# Patient Record
Sex: MALE | Race: WHITE | Employment: OTHER | URBAN - NONMETROPOLITAN AREA
[De-identification: names, ages, dates, MRNs, and addresses within clinical notes are randomized per-mention and may not be internally consistent; named-entity substitution may affect disease eponyms.]

---

## 2017-01-02 DIAGNOSIS — I73.9 PVD (PERIPHERAL VASCULAR DISEASE) (H): Primary | ICD-10-CM

## 2017-01-03 NOTE — TELEPHONE ENCOUNTER
Zocor     Last Written Prescription Date: 11/20/15  Last Fill Quantity: 90, # refills: 3  Last Office Visit with [facility], [facility] or [facility] prescribing provider: 04/29/16       CHOL      120   10/13/2015  HDL       53   10/13/2015  LDL       52   10/13/2015  TRIG       73   10/13/2015  CHOLHDLRATIO      2.3   10/13/2015

## 2017-01-04 ENCOUNTER — ANTICOAGULATION THERAPY VISIT (OUTPATIENT)
Dept: ANTICOAGULATION | Facility: OTHER | Age: 77
End: 2017-01-04
Attending: FAMILY MEDICINE
Payer: COMMERCIAL

## 2017-01-04 DIAGNOSIS — I73.9 PVD (PERIPHERAL VASCULAR DISEASE) (H): ICD-10-CM

## 2017-01-04 DIAGNOSIS — Z79.01 LONG-TERM (CURRENT) USE OF ANTICOAGULANTS: Primary | ICD-10-CM

## 2017-01-04 DIAGNOSIS — I48.20 CHRONIC ATRIAL FIBRILLATION (H): ICD-10-CM

## 2017-01-04 LAB — INR POINT OF CARE: 1.8 (ref 0.86–1.14)

## 2017-01-04 PROCEDURE — 85610 PROTHROMBIN TIME: CPT | Mod: QW

## 2017-01-04 RX ORDER — WARFARIN SODIUM 5 MG/1
TABLET ORAL
Qty: 90 TABLET | Refills: 4 | COMMUNITY
Start: 2017-01-04 | End: 2017-06-19

## 2017-01-04 RX ORDER — SIMVASTATIN 10 MG
TABLET ORAL
Qty: 30 TABLET | Refills: 0 | Status: SHIPPED | OUTPATIENT
Start: 2017-01-04 | End: 2017-02-01

## 2017-01-04 NOTE — MR AVS SNAPSHOT
Rm Duarte   1/4/2017 8:30 AM   Anticoagulation Therapy Visit    Description:  76 year old male   Provider:  [facility]   Department:  [facility]           INR as of 1/4/2017     Selected INR 1.8! (1/4/2017)      Anticoagulation Summary as of 1/4/2017     INR goal 2.0-3.0   Selected INR 1.8! (1/4/2017)   Full instructions 2.5 mg on Wed, Fri; 5 mg all other days   Next INR check 2/15/2017    Indications   Chronic atrial fibrillation (H) [I48.2]  PVD (peripheral vascular disease) (H) [I73.9]  Long-term (current) use of anticoagulants [Z79.01] [Z79.01]         Your next Anticoagulation Clinic appointment(s)     Feb 15, 2017  8:30 AM   Anticoagulation Visit with [facility]AG[facility]   [facility]    [address] [facility] [address]   [phone]              January 2017 Details    Sun Mon Tue Wed Thu Fri Sat     1               2               3               4      2.5 mg   See details      5      5 mg         6      2.5 mg         7      5 mg           8      5 mg         9      5 mg         10      5 mg         11      2.5 mg         12      5 mg         13      2.5 mg         14      5 mg           15      5 mg         16      5 mg         17      5 mg         18      2.5 mg         19      5 mg         20      2.5 mg         21      5 mg           22      5 mg         23      5 mg         24      5 mg         25      2.5 mg         26      5 mg         27      2.5 mg         28      5 mg           29      5 mg         30      5 mg         31      5 mg              Date Details   01/04 This INR check               How to take your warfarin dose     To take:  2.5 mg Take 0.5 of a 5 mg tablet.    To take:  5 mg Take 1 of the 5 mg tablets.           February 2017 Details    Sun Mon Tue Wed Thu Fri Sat        1      2.5 mg         2      5 mg         3      2.5 mg         4      5 mg           5      5 mg         6      5 mg         7      5 mg         8       2.5 mg         9      5 mg         10      2.5 mg         11      5 mg           12      5 mg         13      5 mg         14      5 mg         15            16               17               18                 19               20               21               22               23               24               25                 26               27               28                    Date Details   No additional details    Date of next INR:  2/15/2017         How to take your warfarin dose     To take:  2.5 mg Take 0.5 of a 5 mg tablet.    To take:  5 mg Take 1 of the 5 mg tablets.

## 2017-01-04 NOTE — PROGRESS NOTES
ANTICOAGULATION FOLLOW-UP CLINIC VISIT    Patient Name:  Rm Duarte  Date:  1/4/2017  Contact Type:  Face to Face    SUBJECTIVE:     Patient Findings     Positives Diet Changes, No Problem Findings    Comments Eating greens routinely.  Discussed this and decided to change the overall Coumadin dose.             OBJECTIVE    INR PROTIME   Date Value Ref Range Status   01/04/2017 1.8* 0.86 - 1.14 Final       ASSESSMENT / PLAN  INR assessment SUB    Recheck INR In: 6 WEEKS    INR Location Clinic      Anticoagulation Summary as of 1/4/2017     INR goal 2.0-3.0   Selected INR 1.8! (1/4/2017)   Maintenance plan 2.5 mg (5 mg x 0.5) on Wed; 5 mg (5 mg x 1) all other days   Full instructions 2.5 mg on Wed; 5 mg all other days   Weekly total 32.5 mg   Plan last modified Zena Burden RN (1/4/2017)   Next INR check 2/15/2017   Priority INR   Target end date Indefinite    Indications   Chronic atrial fibrillation (H) [I48.2]  PVD (peripheral vascular disease) (H) [I73.9]  Long-term (current) use of anticoagulants [Z79.01] [Z79.01]         Anticoagulation Episode Summary     INR check location     Preferred lab     Send INR reminders to  ANTICOAG POOL    Comments takes Azithromycin 250mg daily long term      Anticoagulation Care Providers     Provider Role Specialty Phone number    Dominguez Maradiaga MD [facility] [phone]            See the Encounter Report to view Anticoagulation Flowsheet and Dosing Calendar (Go to Encounters tab in chart review, and find the Anticoagulation Therapy Visit)        Zena Burden RN

## 2017-01-25 DIAGNOSIS — J44.9 CHRONIC OBSTRUCTIVE PULMONARY DISEASE, UNSPECIFIED COPD TYPE (H): Primary | ICD-10-CM

## 2017-01-27 RX ORDER — IPRATROPIUM BROMIDE AND ALBUTEROL 20; 100 UG/1; UG/1
SPRAY, METERED RESPIRATORY (INHALATION)
Qty: 12 G | Refills: 0 | Status: SHIPPED | OUTPATIENT
Start: 2017-01-27 | End: 2017-12-06

## 2017-01-27 NOTE — TELEPHONE ENCOUNTER
Combivent respimat 20-100mcg/act inhaler      Last Written Prescription Date: 6-3-2016  Last Fill Quantity: not specified but pharmacy requesting 12 each,  # refills: 0   Last Office Visit with [facility], [facility] or [facility] prescribing provider: 7-6-2016

## 2017-02-01 DIAGNOSIS — I73.9 PVD (PERIPHERAL VASCULAR DISEASE) (H): Primary | ICD-10-CM

## 2017-02-01 RX ORDER — SIMVASTATIN 10 MG
TABLET ORAL
Qty: 90 TABLET | Refills: 0 | Status: SHIPPED | OUTPATIENT
Start: 2017-02-01 | End: 2017-05-15

## 2017-02-01 NOTE — TELEPHONE ENCOUNTER
Simvastatin     Last Written Prescription Date: 1/4/17---The patient is requesting a 90 day supply  Last Fill Quantity: 30, # refills: 0  Last Office Visit with [facility], [facility] or [facility] prescribing provider: 4/29/16       CHOL      120   10/13/2015  HDL       53   10/13/2015  LDL       52   10/13/2015  TRIG       73   10/13/2015  CHOLHDLRATIO      2.3   10/13/2015

## 2017-02-15 ENCOUNTER — ANTICOAGULATION THERAPY VISIT (OUTPATIENT)
Dept: ANTICOAGULATION | Facility: OTHER | Age: 77
End: 2017-02-15
Attending: FAMILY MEDICINE
Payer: COMMERCIAL

## 2017-02-15 DIAGNOSIS — I48.20 CHRONIC ATRIAL FIBRILLATION (H): ICD-10-CM

## 2017-02-15 DIAGNOSIS — I73.9 PVD (PERIPHERAL VASCULAR DISEASE) (H): ICD-10-CM

## 2017-02-15 DIAGNOSIS — Z79.01 LONG-TERM (CURRENT) USE OF ANTICOAGULANTS: ICD-10-CM

## 2017-02-15 LAB — INR POINT OF CARE: 4.9 (ref 0.86–1.14)

## 2017-02-15 PROCEDURE — 85610 PROTHROMBIN TIME: CPT | Mod: QW

## 2017-02-15 NOTE — Clinical Note
FYI - INR 4.9.  Holding 2/15; will take 2.5mg 2/16.  INR recheck 2/22/17.  States he was ill last wk; did some home remedies for his breathing that seemed to work.  Was using O2 2L/min 24/7; is able to back off this usage this wk as he feels improved.  Monitors PAO2, stated it was low, would not reveal how low it got.  Denies bleeding/bruising.

## 2017-02-15 NOTE — PROGRESS NOTES
ANTICOAGULATION FOLLOW-UP CLINIC VISIT    Patient Name:  Rm Duarte  Date:  2/15/2017  Contact Type:  Face to Face    SUBJECTIVE:     Patient Findings     Positives No Problem Findings    Comments Had been ill last wk; did not seek medical advice; did home remedies.  Was using O2 24/7; reports is able to back off the usage slightly.  States is feeling improved.  Using O2 2L/min today.  States he has been eating his usual amounts of greens.  Discussed need to be cautious with sharp objects; cautious with wt bearing - fall prevention.  Pt informed if there is any bleeding that can not be stopped with the use of cold/ice/direct pressure without peaking 10-20 mins, then go to the nearest Emergency dept or call 911.  PCP notified.           OBJECTIVE    INR Protime   Date Value Ref Range Status   02/15/2017 4.9 (A) 0.86 - 1.14 Final       ASSESSMENT / PLAN  INR assessment SUPRA    Recheck INR In: 1 WEEK    INR Location Clinic      Anticoagulation Summary as of 2/15/2017     INR goal 2.0-3.0   Today's INR 4.9!   Maintenance plan 2.5 mg (5 mg x 0.5) on Wed; 5 mg (5 mg x 1) all other days   Full instructions 2/15: Hold; 2/16: 2.5 mg; Otherwise 2.5 mg on Wed; 5 mg all other days   Weekly total 32.5 mg   Plan last modified Zena Burden RN (1/4/2017)   Next INR check 2/22/2017   Priority INR   Target end date Indefinite    Indications   Chronic atrial fibrillation (H) [I48.2]  PVD (peripheral vascular disease) (H) [I73.9]  Long-term (current) use of anticoagulants [Z79.01] [Z79.01]         Anticoagulation Episode Summary     INR check location     Preferred lab     Send INR reminders to  ANTICOAG POOL    Comments takes Azithromycin 250mg daily long term      Anticoagulation Care Providers     Provider Role Specialty Phone number    Dominguez Maradiaga MD [facility] [phone]            See the Encounter Report to view Anticoagulation Flowsheet and Dosing Calendar (Go to Encounters tab in chart  review, and find the Anticoagulation Therapy Visit)        Zena Burden RN

## 2017-02-15 NOTE — MR AVS SNAPSHOT
Abdalla RADHA Gerald   2/15/2017 8:30 AM   Anticoagulation Therapy Visit    Description:  76 year old male   Provider:  NERY ANTI COAGULATION   Department:  [facility]           INR as of 2/15/2017     Today's INR 4.9!      Anticoagulation Summary as of 2/15/2017     INR goal 2.0-3.0   Today's INR 4.9!   Full instructions 2/15: Hold; 2/16: 2.5 mg; Otherwise 2.5 mg on Wed; 5 mg all other days   Next INR check 2/22/2017    Indications   Chronic atrial fibrillation (H) [I48.2]  PVD (peripheral vascular disease) (H) [I73.9]  Long-term (current) use of anticoagulants [Z79.01] [Z79.01]         Your next Anticoagulation Clinic appointment(s)     Feb 22, 2017  8:30 AM CST   Anticoagulation Visit with NERY ANTI COAGULATION   [facility]    [address] [facility] [address]   [phone]              February 2017 Details    Sun Mon Tue Wed Thu Fri Sat        1               2               3               4                 5               6               7               8               9               10               11                 12               13               14               15      Hold   See details      16      2.5 mg         17      5 mg         18      5 mg           19      5 mg         20      5 mg         21      5 mg         22            23               24               25                 26               27               28                    Date Details   02/15 This INR check       Date of next INR:  2/22/2017         How to take your warfarin dose     To take:  2.5 mg Take 0.5 of a 5 mg tablet.    To take:  5 mg Take 1 of the 5 mg tablets.    Hold Do not take your warfarin dose. See the Details table to the right for additional instructions.

## 2017-02-22 ENCOUNTER — ANTICOAGULATION THERAPY VISIT (OUTPATIENT)
Dept: ANTICOAGULATION | Facility: OTHER | Age: 77
End: 2017-02-22
Attending: FAMILY MEDICINE
Payer: COMMERCIAL

## 2017-02-22 DIAGNOSIS — I48.20 CHRONIC ATRIAL FIBRILLATION (H): ICD-10-CM

## 2017-02-22 DIAGNOSIS — I73.9 PVD (PERIPHERAL VASCULAR DISEASE) (H): ICD-10-CM

## 2017-02-22 DIAGNOSIS — Z79.01 LONG-TERM (CURRENT) USE OF ANTICOAGULANTS: ICD-10-CM

## 2017-02-22 LAB — INR POINT OF CARE: 1.5 (ref 0.86–1.14)

## 2017-02-22 PROCEDURE — 85610 PROTHROMBIN TIME: CPT | Mod: QW

## 2017-02-22 NOTE — MR AVS SNAPSHOT
Rm Duarte   2/22/2017 8:30 AM   Anticoagulation Therapy Visit    Description:  76 year old male   Provider:  [facility] ANTI COAGULATION   Department:  [facility]           INR as of 2/22/2017     Today's INR 1.5!      Anticoagulation Summary as of 2/22/2017     INR goal 2.0-3.0   Today's INR 1.5!   Full instructions 2/22: 5 mg; Otherwise 2.5 mg on Wed; 5 mg all other days   Next INR check 4/5/2017    Indications   Chronic atrial fibrillation (H) [I48.2]  PVD (peripheral vascular disease) (H) [I73.9]  Long-term (current) use of anticoagulants [Z79.01] [Z79.01]         Your next Anticoagulation Clinic appointment(s)     Apr 05, 2017  8:30 AM CDT   Anticoagulation Visit with NERY ANTI COAGULATION   [facility] ([facility])    [address] [facility] [address]  [facility] [address]   [phone]              February 2017 Details    Sun Mon Tue Wed Thu Fri Sat        1               2               3               4                 5               6               7               8               9               10               11                 12               13               14               15               16               17               18                 19               20               21               22      5 mg   See details      23      5 mg         24      5 mg         25      5 mg           26      5 mg         27      5 mg         28      5 mg              Date Details   02/22 This INR check               How to take your warfarin dose     To take:  5 mg Take 1 of the 5 mg tablets.           March 2017 Details    Sun Mon Tue Wed Thu Fri Sat        1      2.5 mg         2      5 mg         3      5 mg         4      5 mg           5      5 mg         6      5 mg         7      5 mg         8      2.5 mg         9      5 mg         10      5 mg         11      5 mg           12      5 mg         13      5 mg         14      5 mg         15      2.5 mg         16      5 mg         17       5 mg         18      5 mg           19      5 mg         20      5 mg         21      5 mg         22      2.5 mg         23      5 mg         24      5 mg         25      5 mg           26      5 mg         27      5 mg         28      5 mg         29      2.5 mg         30      5 mg         31      5 mg           Date Details   No additional details            How to take your warfarin dose     To take:  2.5 mg Take 0.5 of a 5 mg tablet.    To take:  5 mg Take 1 of the 5 mg tablets.           April 2017 Details    Sun Mon Tue Wed Thu Fri Sat           1      5 mg           2      5 mg         3      5 mg         4      5 mg         5            6               7               8                 9               10               11               12               13               14               15                 16               17               18               19               20               21               22                 23               24               25               26               27               28               29                 30                      Date Details   No additional details    Date of next INR:  4/5/2017         How to take your warfarin dose     To take:  2.5 mg Take 0.5 of a 5 mg tablet.    To take:  5 mg Take 1 of the 5 mg tablets.

## 2017-02-22 NOTE — PROGRESS NOTES
ANTICOAGULATION FOLLOW-UP CLINIC VISIT    Patient Name:  Rm Duarte  Date:  2/22/2017  Contact Type:  Face to Face    SUBJECTIVE:     Patient Findings     Positives Change in diet/appetite, Intentional hold of therapy    Comments Intentional HOLD due to supratherapeutic INR last wk.  Pt states he remembered he ate some chocolate that now causes diarrhea and had that all day the day before.  Pt increased greens this past wk.  Pt will resume his normal eating habits.           OBJECTIVE    INR Protime   Date Value Ref Range Status   02/22/2017 1.5 (A) 0.86 - 1.14 Final       ASSESSMENT / PLAN  INR assessment SUB    Recheck INR In: 6 WEEKS    INR Location Clinic      Anticoagulation Summary as of 2/22/2017     INR goal 2.0-3.0   Today's INR 1.5!   Maintenance plan 2.5 mg (5 mg x 0.5) on Wed; 5 mg (5 mg x 1) all other days   Full instructions 2/22: 5 mg; Otherwise 2.5 mg on Wed; 5 mg all other days   Weekly total 32.5 mg   Plan last modified Zena Burden RN (1/4/2017)   Next INR check 4/5/2017   Priority INR   Target end date Indefinite    Indications   Chronic atrial fibrillation (H) [I48.2]  PVD (peripheral vascular disease) (H) [I73.9]  Long-term (current) use of anticoagulants [Z79.01] [Z79.01]         Anticoagulation Episode Summary     INR check location     Preferred lab     Send INR reminders to  ANTICOAG POOL    Comments takes Azithromycin 250mg daily long term      Anticoagulation Care Providers     Provider Role Specialty Phone number    Dominguez Maradiaga MD [facility] [phone]            See the Encounter Report to view Anticoagulation Flowsheet and Dosing Calendar (Go to Encounters tab in chart review, and find the Anticoagulation Therapy Visit)        Zena Burden RN

## 2017-04-05 ENCOUNTER — ANTICOAGULATION THERAPY VISIT (OUTPATIENT)
Dept: ANTICOAGULATION | Facility: OTHER | Age: 77
End: 2017-04-05
Attending: FAMILY MEDICINE
Payer: COMMERCIAL

## 2017-04-05 DIAGNOSIS — Z79.01 LONG-TERM (CURRENT) USE OF ANTICOAGULANTS: ICD-10-CM

## 2017-04-05 DIAGNOSIS — I48.20 CHRONIC ATRIAL FIBRILLATION (H): ICD-10-CM

## 2017-04-05 DIAGNOSIS — I73.9 PVD (PERIPHERAL VASCULAR DISEASE) (H): ICD-10-CM

## 2017-04-05 LAB — INR POINT OF CARE: 2.7 (ref 0.86–1.14)

## 2017-04-05 PROCEDURE — 85610 PROTHROMBIN TIME: CPT | Mod: QW

## 2017-05-15 DIAGNOSIS — I73.9 PVD (PERIPHERAL VASCULAR DISEASE) (H): ICD-10-CM

## 2017-05-15 NOTE — TELEPHONE ENCOUNTER
Zocor     Last Written Prescription Date: 2/1/2017  Last Fill Quantity: 90, # refills: 0  Last Office Visit with G, P or [facility] prescribing provider: 1/26/2016       Lab Results   Component Value Date    CHOL 120 10/13/2015     Lab Results   Component Value Date    HDL 53 10/13/2015     Lab Results   Component Value Date    LDL 52 10/13/2015     Lab Results   Component Value Date    TRIG 73 10/13/2015     Lab Results   Component Value Date    CHOLHDLRATIO 2.3 10/13/2015

## 2017-05-16 RX ORDER — SIMVASTATIN 10 MG
TABLET ORAL
Qty: 30 TABLET | Refills: 0 | Status: SHIPPED | OUTPATIENT
Start: 2017-05-16 | End: 2017-06-23

## 2017-05-17 ENCOUNTER — ANTICOAGULATION THERAPY VISIT (OUTPATIENT)
Dept: ANTICOAGULATION | Facility: OTHER | Age: 77
End: 2017-05-17
Attending: FAMILY MEDICINE
Payer: COMMERCIAL

## 2017-05-17 DIAGNOSIS — I73.9 PVD (PERIPHERAL VASCULAR DISEASE) (H): ICD-10-CM

## 2017-05-17 DIAGNOSIS — Z79.01 LONG-TERM (CURRENT) USE OF ANTICOAGULANTS: ICD-10-CM

## 2017-05-17 DIAGNOSIS — I48.20 CHRONIC ATRIAL FIBRILLATION (H): ICD-10-CM

## 2017-05-17 LAB — INR POINT OF CARE: 2.8 (ref 0.86–1.14)

## 2017-05-17 PROCEDURE — 85610 PROTHROMBIN TIME: CPT | Mod: QW,ZL

## 2017-05-17 NOTE — MR AVS SNAPSHOT
Rm Duarte   5/17/2017 8:45 AM   Anticoagulation Therapy Visit    Description:  76 year old male   Provider:  [facility]AG[facility]   Department:  [facility]           INR as of 5/17/2017     Today's INR 2.8      Anticoagulation Summary as of 5/17/2017     INR goal 2.0-3.0   Today's INR 2.8   Full instructions 2.5 mg on Wed; 5 mg all other days   Next INR check 6/28/2017    Indications   Chronic atrial fibrillation (H) [I48.2]  PVD (peripheral vascular disease) (H) [I73.9]  Long-term (current) use of anticoagulants [Z79.01] [Z79.01]         May 2017 Details    Sun Mon Tue Wed Thu Fri Sat      1               2               3               4               5               6                 7               8               9               10               11               12               13                 14               15               16               17      2.5 mg   See details      18      5 mg         19      5 mg         20      5 mg           21      5 mg         22      5 mg         23      5 mg         24      2.5 mg         25      5 mg         26      5 mg         27      5 mg           28      5 mg         29      5 mg         30      5 mg         31      2.5 mg             Date Details   05/17 This INR check               How to take your warfarin dose     To take:  2.5 mg Take 0.5 of a 5 mg tablet.    To take:  5 mg Take 1 of the 5 mg tablets.           June 2017 Details    Sun Mon Tue Wed Thu Fri Sat         1      5 mg         2      5 mg         3      5 mg           4      5 mg         5      5 mg         6      5 mg         7      2.5 mg         8      5 mg         9      5 mg         10      5 mg           11      5 mg         12      5 mg         13      5 mg         14      2.5 mg         15      5 mg         16      5 mg         17      5 mg           18      5 mg         19      5 mg         20      5 mg         21      2.5 mg         22      5 mg         23      5 mg          24      5 mg           25      5 mg         26      5 mg         27      5 mg         28            29               30                 Date Details   No additional details    Date of next INR:  6/28/2017         How to take your warfarin dose     To take:  2.5 mg Take 0.5 of a 5 mg tablet.    To take:  5 mg Take 1 of the 5 mg tablets.

## 2017-05-17 NOTE — PROGRESS NOTES
ANTICOAGULATION FOLLOW-UP CLINIC VISIT    Patient Name:  Rm Duarte  Date:  5/17/2017  Contact Type:  Face to Face    SUBJECTIVE:     Patient Findings     Positives No Problem Findings           OBJECTIVE    INR Protime   Date Value Ref Range Status   05/17/2017 2.8 (A) 0.86 - 1.14 Final       ASSESSMENT / PLAN  INR assessment THER    Recheck INR In: 6 WEEKS    INR Location Clinic      Anticoagulation Summary as of 5/17/2017     INR goal 2.0-3.0   Today's INR 2.8   Maintenance plan 2.5 mg (5 mg x 0.5) on Wed; 5 mg (5 mg x 1) all other days   Full instructions 2.5 mg on Wed; 5 mg all other days   Weekly total 32.5 mg   No change documented Zena Burden RN   Plan last modified Zena Burden RN (1/4/2017)   Next INR check 6/28/2017   Priority INR   Target end date Indefinite    Indications   Chronic atrial fibrillation (H) [I48.2]  PVD (peripheral vascular disease) (H) [I73.9]  Long-term (current) use of anticoagulants [Z79.01] [Z79.01]         Anticoagulation Episode Summary     INR check location     Preferred lab     Send INR reminders to  ANTICOAG POOL    Comments takes Azithromycin 250mg daily long term      Anticoagulation Care Providers     Provider Role Specialty Phone number    Dominguez Maradiaga MD [facility] [phone]            See the Encounter Report to view Anticoagulation Flowsheet and Dosing Calendar (Go to Encounters tab in chart review, and find the Anticoagulation Therapy Visit)        Zena Burden RN

## 2017-06-12 DIAGNOSIS — J43.8 OTHER EMPHYSEMA (H): Primary | ICD-10-CM

## 2017-06-12 RX ORDER — AZITHROMYCIN 250 MG/1
TABLET, FILM COATED ORAL
Qty: 30 TABLET | Refills: 5 | Status: SHIPPED | OUTPATIENT
Start: 2017-06-12 | End: 2017-12-01

## 2017-06-12 NOTE — TELEPHONE ENCOUNTER
Last office visit 04/29/16.  Azithromycin last filled by Joanie Zhu. Please associate dx and sign if appropriate.

## 2017-06-13 ENCOUNTER — ANTICOAGULATION THERAPY VISIT (OUTPATIENT)
Dept: ANTICOAGULATION | Facility: OTHER | Age: 77
End: 2017-06-13

## 2017-06-13 DIAGNOSIS — I73.9 PVD (PERIPHERAL VASCULAR DISEASE) (H): ICD-10-CM

## 2017-06-13 DIAGNOSIS — I48.20 CHRONIC ATRIAL FIBRILLATION (H): ICD-10-CM

## 2017-06-13 DIAGNOSIS — Z79.01 LONG-TERM (CURRENT) USE OF ANTICOAGULANTS: ICD-10-CM

## 2017-06-13 NOTE — PROGRESS NOTES
ANTICOAGULATION FOLLOW-UP CLINIC VISIT    Patient Name:  Rm Duarte  Date:  6/13/2017  Contact Type:  Telephone    SUBJECTIVE:     Patient Findings     Comments Call made to patient as  Note sent to warfarin clinic that patient started z marjorie. Per patient he has been on azithromycin daily for long term and this is not new.  No changes made to warfarin dosing.           OBJECTIVE    INR Protime   Date Value Ref Range Status   05/17/2017 2.8 (A) 0.86 - 1.14 Final       ASSESSMENT / PLAN  No question data found.  Anticoagulation Summary as of 6/13/2017     INR goal 2.0-3.0   Today's INR No new INR was available at the time of this encounter.   Maintenance plan 2.5 mg (5 mg x 0.5) on Wed; 5 mg (5 mg x 1) all other days   Full instructions 2.5 mg on Wed; 5 mg all other days   Weekly total 32.5 mg   No change documented Zena Haas RN   Plan last modified Zena Burden RN (1/4/2017)   Next INR check 6/28/2017   Priority INR   Target end date Indefinite    Indications   Chronic atrial fibrillation (H) [I48.2]  PVD (peripheral vascular disease) (H) [I73.9]  Long-term (current) use of anticoagulants [Z79.01] [Z79.01]         Anticoagulation Episode Summary     INR check location     Preferred lab     Send INR reminders to  ANTICOAG POOL    Comments takes Azithromycin 250mg daily long term      Anticoagulation Care Providers     Provider Role Specialty Phone number    Dominguez Maradiaga MD [facility] [phone]            See the Encounter Report to view Anticoagulation Flowsheet and Dosing Calendar (Go to Encounters tab in chart review, and find the Anticoagulation Therapy Visit)        Zena Haas RN

## 2017-06-13 NOTE — MR AVS SNAPSHOT
Rm Duarte   6/13/2017   Anticoagulation Therapy Visit    Description:  76 year old male   Provider:  Dominguez Maradiaga MD   Department:  [facility]           INR as of 6/13/2017     Today's INR No new INR was available at the time of this encounter.      Anticoagulation Summary as of 6/13/2017     INR goal 2.0-3.0   Today's INR No new INR was available at the time of this encounter.   Full instructions 2.5 mg on Wed; 5 mg all other days   Next INR check 6/28/2017    Indications   Chronic atrial fibrillation (H) [I48.2]  PVD (peripheral vascular disease) (H) [I73.9]  Long-term (current) use of anticoagulants [Z79.01] [Z79.01]         June 2017 Details    Sun Mon Tue Wed Thu Fri Sat         1               2               3                 4               5               6               7               8               9               10                 11               12               13      5 mg   See details      14      2.5 mg         15      5 mg         16      5 mg         17      5 mg           18      5 mg         19      5 mg         20      5 mg         21      2.5 mg         22      5 mg         23      5 mg         24      5 mg           25      5 mg         26      5 mg         27      5 mg         28            29               30                 Date Details   06/13 This INR check       Date of next INR:  6/28/2017         How to take your warfarin dose     To take:  2.5 mg Take 0.5 of a 5 mg tablet.    To take:  5 mg Take 1 of the 5 mg tablets.

## 2017-06-14 DIAGNOSIS — I48.20 CHRONIC ATRIAL FIBRILLATION (H): ICD-10-CM

## 2017-06-15 ENCOUNTER — ANTICOAGULATION THERAPY VISIT (OUTPATIENT)
Dept: ANTICOAGULATION | Facility: OTHER | Age: 77
End: 2017-06-15

## 2017-06-15 ENCOUNTER — OFFICE VISIT (OUTPATIENT)
Dept: FAMILY MEDICINE | Facility: OTHER | Age: 77
End: 2017-06-15
Attending: FAMILY MEDICINE
Payer: COMMERCIAL

## 2017-06-15 ENCOUNTER — TELEPHONE (OUTPATIENT)
Dept: FAMILY MEDICINE | Facility: OTHER | Age: 77
End: 2017-06-15

## 2017-06-15 VITALS
OXYGEN SATURATION: 93 % | TEMPERATURE: 98.5 F | BODY MASS INDEX: 26.88 KG/M2 | WEIGHT: 192 LBS | DIASTOLIC BLOOD PRESSURE: 52 MMHG | SYSTOLIC BLOOD PRESSURE: 92 MMHG | RESPIRATION RATE: 24 BRPM | HEART RATE: 75 BPM | HEIGHT: 71 IN

## 2017-06-15 DIAGNOSIS — I73.9 PVD (PERIPHERAL VASCULAR DISEASE) (H): ICD-10-CM

## 2017-06-15 DIAGNOSIS — I48.20 CHRONIC ATRIAL FIBRILLATION (H): ICD-10-CM

## 2017-06-15 DIAGNOSIS — Z79.01 LONG-TERM (CURRENT) USE OF ANTICOAGULANTS: ICD-10-CM

## 2017-06-15 DIAGNOSIS — J44.1 COPD EXACERBATION (H): Primary | ICD-10-CM

## 2017-06-15 PROCEDURE — 96372 THER/PROPH/DIAG INJ SC/IM: CPT | Performed by: FAMILY MEDICINE

## 2017-06-15 PROCEDURE — 99212 OFFICE O/P EST SF 10 MIN: CPT

## 2017-06-15 PROCEDURE — 99213 OFFICE O/P EST LOW 20 MIN: CPT | Performed by: FAMILY MEDICINE

## 2017-06-15 RX ORDER — SULFAMETHOXAZOLE/TRIMETHOPRIM 800-160 MG
1 TABLET ORAL 2 TIMES DAILY
Qty: 28 TABLET | Refills: 0 | Status: ON HOLD | OUTPATIENT
Start: 2017-06-15 | End: 2017-06-21

## 2017-06-15 RX ORDER — METHYLPREDNISOLONE SODIUM SUCCINATE 125 MG/2ML
125 INJECTION, POWDER, LYOPHILIZED, FOR SOLUTION INTRAMUSCULAR; INTRAVENOUS ONCE
Qty: 2 ML | Refills: 0 | OUTPATIENT
Start: 2017-06-15 | End: 2017-06-15

## 2017-06-15 RX ORDER — PREDNISONE 20 MG/1
TABLET ORAL
Qty: 20 TABLET | Refills: 0 | Status: ON HOLD | OUTPATIENT
Start: 2017-06-15 | End: 2017-06-21

## 2017-06-15 ASSESSMENT — PAIN SCALES - GENERAL: PAINLEVEL: NO PAIN (0)

## 2017-06-15 NOTE — TELEPHONE ENCOUNTER
Coumadin 5 MG    Last Written Prescription Date: 01/04/17  Last Fill Qty: 90, # refills: 4  Last Office Visit with G, P or [facility] prescribing provider: 06/15/17       Date and Result of Last PT/INR:   Lab Results   Component Value Date    INR 2.8 05/17/2017    INR 2.7 04/05/2017    INR 8.49 09/07/2016    INR 5.12 08/26/2015

## 2017-06-15 NOTE — PROGRESS NOTES
Prior to injection verified patient identity using patient's name and date of birth.  Per orders of Dr. Maradiaga, injection of Solumedrol 125 mg given by Joann Walls. Patient instructed to remain in clinic for 20 minutes afterwards, and to report any adverse reaction to me immediately.

## 2017-06-15 NOTE — PROGRESS NOTES
ANTICOAGULATION FOLLOW-UP CLINIC VISIT    Patient Name:  Rm Duarte  Date:  6/15/2017  Contact Type:  Telephone    SUBJECTIVE:     Patient Findings     Positives Antibiotic use or infection (Bactrim and Prednisone burst)           OBJECTIVE    INR Protime   Date Value Ref Range Status   05/17/2017 2.8 (A) 0.86 - 1.14 Final       ASSESSMENT / PLAN  INR assessment THER NO INR THIS DATE   Recheck INR In: 4 DAYS    INR Location Clinic      Anticoagulation Summary as of 6/15/2017     INR goal 2.0-3.0   Today's INR No new INR was available at the time of this encounter.   Maintenance plan 2.5 mg (5 mg x 0.5) on Wed; 5 mg (5 mg x 1) all other days   Full instructions 6/15: 2.5 mg; 6/16: 2.5 mg; 6/17: 2.5 mg; 6/18: 2.5 mg; Otherwise 2.5 mg on Wed; 5 mg all other days   Weekly total 32.5 mg   Plan last modified Zena Burden RN (1/4/2017)   Next INR check 6/19/2017   Priority INR   Target end date Indefinite    Indications   Chronic atrial fibrillation (H) [I48.2]  PVD (peripheral vascular disease) (H) [I73.9]  Long-term (current) use of anticoagulants [Z79.01] [Z79.01]         Anticoagulation Episode Summary     INR check location     Preferred lab     Send INR reminders to  ANTICOAG POOL    Comments takes Azithromycin 250mg daily long term      Anticoagulation Care Providers     Provider Role Specialty Phone number    Dominguez Maradiaga MD [facility] [phone]            See the Encounter Report to view Anticoagulation Flowsheet and Dosing Calendar (Go to Encounters tab in chart review, and find the Anticoagulation Therapy Visit)    INR dosing and INR recheck date discussed via telephone this date.  Pt is on Bactrim and Prednisone burst.    LAZ DAVID RN

## 2017-06-15 NOTE — MR AVS SNAPSHOT
"              After Visit Summary   6/15/2017    Rm Duarte    MRN: [id number]           Patient Information     Date Of Birth          1940        Visit Information        Provider Department      6/15/2017 11:15 AM Dominguez Maradiaga MD [facility]        Today's Diagnoses     COPD exacerbation (H)    -  1      Care Instructions    F/u with ongoing concerns.           Follow-ups after your visit        Who to contact     If you have questions or need follow up information about today's clinic visit or your schedule please contact [facility] directly at [phone].  Normal or non-critical lab and imaging results will be communicated to you by [employer], letter or phone within 4 business days after the clinic has received the results. If you do not hear from us within 7 days, please contact the clinic through [employer] or phone. If you have a critical or abnormal lab result, we will notify you by phone as soon as possible.  Submit refill requests through [employer] or call your pharmacy and they will forward the refill request to us. Please allow 3 business days for your refill to be completed.          Additional Information About Your Visit        MyChart Information     [employer] lets you send messages to your doctor, view your test results, renew your prescriptions, schedule appointments and more. To sign up, go to [email or web address] . Click on \"Log in\" on the left side of the screen, which will take you to the Welcome page. Then click on \"Sign up Now\" on the right side of the page.     You will be asked to enter the access code listed below, as well as some personal information. Please follow the directions to create your username and password.     Your access code is: [email or web address]  Expires: 2017 12:34 PM     Your access code will  in 90 days. If you need help or a new code, please call your [facility] or [phone].        Care EveryWhere ID     This is " "your Care EveryWhere ID. This could be used by other organizations to access your [place] medical records  [id number]        Your Vitals Were     Pulse Temperature Respirations Height Pulse Oximetry BMI (Body Mass Index)    75 98.5  F (36.9  C) (Tympanic) 24 5' 11\" (1.803 m) 93% 26.78 kg/m2       Blood Pressure from Last 3 Encounters:   06/15/17 92/52   07/06/16 112/60   06/22/16 106/60    Weight from Last 3 Encounters:   06/15/17 192 lb (87.1 kg)   04/29/16 197 lb (89.4 kg)   03/07/16 196 lb (88.9 kg)              We Performed the Following     INJECTION INTRAMUSCULAR OR SUB-Q     METHYLPRED 125 MG SOD INJ          Today's Medication Changes          These changes are accurate as of: 6/15/17 12:34 PM.  If you have any questions, ask your nurse or doctor.               Start taking these medicines.        Dose/Directions    methylPREDNISolone sodium succinate 125 mg/2 mL injection   Commonly known as:  solu-MEDROL   Used for:  COPD exacerbation (H)   Started by:  Dominguez Maradiaga MD        Dose:  125 mg   Inject 2 mLs (125 mg) into the muscle once for 1 dose   Quantity:  2 mL   Refills:  0       predniSONE 20 MG tablet   Commonly known as:  DELTASONE   Used for:  COPD exacerbation (H)   Started by:  Dominguez Maradiaga MD        Take 3 tabs (60 mg) by mouth daily x 3 days, 2 tabs (40 mg) daily x 3 days, 1 tab (20 mg) daily x 3 days, then 1/2 tab (10 mg) x 3 days.   Quantity:  20 tablet   Refills:  0       sulfamethoxazole-trimethoprim 800-160 MG per tablet   Commonly known as:  BACTRIM DS/SEPTRA DS   Used for:  COPD exacerbation (H)   Started by:  Dominguez Maradiaga MD        Dose:  1 tablet   Take 1 tablet by mouth 2 times daily   Quantity:  28 tablet   Refills:  0            Where to get your medicines      These medications were sent to [facility] [address] [id number]  [id number] [id number]     Phone:  [phone]     predniSONE 20 MG tablet    sulfamethoxazole-trimethoprim " 800-160 MG per tablet         Some of these will need a paper prescription and others can be bought over the counter.  Ask your nurse if you have questions.     You don't need a prescription for these medications     methylPREDNISolone sodium succinate 125 mg/2 mL injection                Primary Care Provider Office Phone # Fax #    Dominguez Maradiaga [phone]       [facility] [address] [place] [facility] [address]        Thank you!     Thank you for choosing [facility]  for your care. Our goal is always to provide you with excellent care. Hearing back from our patients is one way we can continue to improve our services. Please take a few minutes to complete the written survey that you may receive in the mail after your visit with us. Thank you!             Your Updated Medication List - Protect others around you: Learn how to safely use, store and throw away your medicines at www.disposemymeds.org.          This list is accurate as of: 6/15/17 12:34 PM.  Always use your most recent med list.                   Brand Name Dispense Instructions for use    ASPIRIN LOW DOSE 81 MG tablet   Generic drug:  aspirin      Take 1 tablet by mouth daily.       azithromycin 250 MG tablet    ZITHROMAX    30 tablet    Take one tablet daily.       budesonide Powd      1 puff 2 times daily Inhales 0.25mcg BID       CENTRUM SILVER per tablet      Take 1 tablet by mouth daily.       cilostazol 100 MG tablet    PLETAL    180 tablet    TAKE ONE TABLET BY MOUTH TWICE DAILY       IMODIUM A-D 2 MG tablet   Generic drug:  loperamide      Take 2 mg by mouth 4 times daily as needed. Take 2 tablet by oral route after 1st loose stool and 1 tablet after each subsequent bowel movement; do not exceed 16 mg in 24 hrs. As needed.       * ipratropium - albuterol 0.5 mg/2.5 mg/3 mL 0.5-2.5 (3) MG/3ML neb solution    DUONEB    360 vial    USE ONE AMPULE IN NEBULIZER EVERY 6 HOURS AS NEEDED FOR SHORTNESS OF  BREATH /DYSPNEA  OR  WHEEZING       * COMBIVENT RESPIMAT  MCG/ACT inhaler   Generic drug:  Ipratropium-Albuterol     12 g    INHALE ONE PUFF BY MOUTH 4 TIMES DAILY *MAX 6 DOSES PER DAY*       methylPREDNISolone sodium succinate 125 mg/2 mL injection    solu-MEDROL    2 mL    Inject 2 mLs (125 mg) into the muscle once for 1 dose       PERFOROMIST 20 MCG/2ML neb solution   Generic drug:  formoterol     360 mL    USE ONE  IN NEBULIZER EVERY 12 HOURS       predniSONE 20 MG tablet    DELTASONE    20 tablet    Take 3 tabs (60 mg) by mouth daily x 3 days, 2 tabs (40 mg) daily x 3 days, 1 tab (20 mg) daily x 3 days, then 1/2 tab (10 mg) x 3 days.       simvastatin 10 MG tablet    ZOCOR    30 tablet    TAKE ONE TABLET BY MOUTH AT BEDTIME       sulfamethoxazole-trimethoprim 800-160 MG per tablet    BACTRIM DS/SEPTRA DS    28 tablet    Take 1 tablet by mouth 2 times daily       tamsulosin 0.4 MG capsule    FLOMAX    30 capsule    Take 1 capsule (0.4 mg) by mouth daily       warfarin 5 MG tablet    COUMADIN    90 tablet    Take 2.5mg (1/2 pill) Wed; 5mg (1 pill) all other days or as directed by [facility]       * Notice:  This list has 2 medication(s) that are the same as other medications prescribed for you. Read the directions carefully, and ask your doctor or other care provider to review them with you.

## 2017-06-15 NOTE — NURSING NOTE
"Chief Complaint   Patient presents with     Shortness of Breath     Pt is in for shortness of breath, cough and chest congestion. Pt is on 2 LPM of O2.       Initial BP 92/52 (BP Location: Right arm, Patient Position: Chair, Cuff Size: Adult Large)  Pulse 75  Temp 98.5  F (36.9  C) (Tympanic)  Resp 24  Ht 5' 11\" (1.803 m)  Wt 192 lb (87.1 kg)  SpO2 93%  BMI 26.78 kg/m2 Estimated body mass index is 26.78 kg/(m^2) as calculated from the following:    Height as of this encounter: 5' 11\" (1.803 m).    Weight as of this encounter: 192 lb (87.1 kg).  Medication Reconciliation: complete   Joann Walls    "

## 2017-06-15 NOTE — PROGRESS NOTES
Rm Duarte    Laura 15, 2017    Chief Complaint   Patient presents with     Shortness of Breath     Pt is in for shortness of breath, cough and chest congestion. Pt is on 2 LPM of O2.       SUBJECTIVE:  Here for copd exaccerbation.  This happens from time to time.  He has purulent sputum and increased sob.  He has been on prophylaxis (azithro) and this has decreased these events.  He would like to get meds to try and avoid the hospital.  He has a very good understanding about the disease.  See below.      Past Medical History:   Diagnosis Date     Atrial fibrillation (H) 3/23/2012     COPD 5/16/2011     PVD (peripheral vascular disease) (H)        Past Surgical History:   Procedure Laterality Date     COPD:FEV1-0.74/FVC-3.35 22%, DLCO-28%  3/1/2010    Severe COPD; 4/23/2008 PFT's done : 0.96/3.81 25% Fev1, DLCO 45%.  1991 were 4.31/6.20!!!!!!!??     ECHO: TDS, EF~50%, abnl septum, o/w NORMAL  5/20/2011     PAD: CTA> occlusion of L mid SFA with reconstitution  12/2/2010     S/P colonoscopy: tubular adenoma & tics  5/4/2010    Repeat in 2014; Dr Graham     Tobacco Abuse Ongoing         Current Outpatient Prescriptions   Medication Sig Dispense Refill     sulfamethoxazole-trimethoprim (BACTRIM DS/SEPTRA DS) 800-160 MG per tablet Take 1 tablet by mouth 2 times daily 28 tablet 0     predniSONE (DELTASONE) 20 MG tablet Take 3 tabs (60 mg) by mouth daily x 3 days, 2 tabs (40 mg) daily x 3 days, 1 tab (20 mg) daily x 3 days, then 1/2 tab (10 mg) x 3 days. 20 tablet 0     methylPREDNISolone sodium succinate (SOLU-MEDROL) 125 mg/2 mL injection Inject 2 mLs (125 mg) into the muscle once for 1 dose 2 mL 0     azithromycin (ZITHROMAX) 250 MG tablet Take one tablet daily. 30 tablet 5     simvastatin (ZOCOR) 10 MG tablet TAKE ONE TABLET BY MOUTH AT BEDTIME 30 tablet 0     cilostazol (PLETAL) 100 MG tablet TAKE ONE TABLET BY MOUTH TWICE DAILY 180 tablet 3     COMBIVENT RESPIMAT  MCG/ACT inhaler INHALE ONE PUFF BY MOUTH 4  TIMES DAILY *MAX 6 DOSES PER DAY* 12 g 0     warfarin (COUMADIN) 5 MG tablet Take 2.5mg (1/2 pill) Wed; 5mg (1 pill) all other days or as directed by [facility] 90 tablet 4     ipratropium - albuterol 0.5 mg/2.5 mg/3 mL (DUONEB) 0.5-2.5 (3) MG/3ML nebulization USE ONE AMPULE IN NEBULIZER EVERY 6 HOURS AS NEEDED FOR SHORTNESS OF BREATH /DYSPNEA  OR  WHEEZING 360 vial 3     PERFOROMIST 20 MCG/2ML nebulizer solution USE ONE  IN NEBULIZER EVERY 12 HOURS 360 mL 3     tamsulosin (FLOMAX) 0.4 MG 24 hr capsule Take 1 capsule (0.4 mg) by mouth daily 30 capsule 1     budesonide POWD 1 puff 2 times daily Inhales 0.25mcg BID       Multiple Vitamins-Minerals (CENTRUM SILVER) per tablet Take 1 tablet by mouth daily.       aspirin (ASPIRIN LOW DOSE) 81 MG tablet Take 1 tablet by mouth daily.       loperamide (IMODIUM A-D) 2 MG tablet Take 2 mg by mouth 4 times daily as needed. Take 2 tablet by oral route after 1st loose stool and 1 tablet after each subsequent bowel movement; do not exceed 16 mg in 24 hrs. As needed.         No Known Allergies    Family History   Problem Relation Age of Onset     Other - See Comments Mother      AAA, cause of death     Other - See Comments Other 69     AAA, family hx     Chronic Obstructive Pulmonary Disease Brother        Social History     Social History     Marital status: Single     Spouse name: N/A     Number of children: N/A     Years of education: N/A     Occupational History      Retired     /[employer]     Social History Main Topics     Smoking status: Former Smoker     Types: Cigarettes     Smokeless tobacco: Never Used      Comment: Tried to quit; Quit 2011     Alcohol use 0.0 oz/week     0 Standard drinks or equivalent per week      Comment: Beer; rarely     Drug use: Not on file     Sexual activity: Not on file     Other Topics Concern     Blood Transfusions Yes     Caffeine Concern No     Parent/Sibling W/ Cabg, Mi Or Angioplasty Before 65f 55m? No      Social History Narrative       5 point ROS negative except as noted above in HPI, including Gen., Resp., CV, GI &  system review.     OBJECTIVE:  B/P: 92/52, T: 98.5, P: 75, R: 24    GENERAL APPEARANCE: Alert, no acute distress  CV: regular rate and rhythm, no murmur, rub or gallop  RESP: decreased breath sounds with some wheeze at the base bilaterally.  No crackles.   ABDOMEN: normal bowel sounds, soft, nontender, no hepatosplenomegaly or other masses  SKIN: no suspicious lesions or rashes to visualized skin  NEURO: Alert, oriented x 3, speech and mentation normal    ASSESSMENT and PLAN:  (J44.1) COPD exacerbation (H)  (primary encounter diagnosis)  Comment: discussed.   Plan: sulfamethoxazole-trimethoprim (BACTRIM         DS/SEPTRA DS) 800-160 MG per tablet, predniSONE        (DELTASONE) 20 MG tablet, METHYLPRED 125 MG SOD        INJ, INJECTION INTRAMUSCULAR OR SUB-Q,         methylPREDNISolone sodium succinate         (SOLU-MEDROL) 125 mg/2 mL injection        Add the bactrim.  Solumedrol, and oral prednisone burst and taper.  Didn't do extensive workup as would not have changed management.  F/u with any worsening for further workup.  He is well educated on this.

## 2017-06-15 NOTE — MR AVS SNAPSHOT
Rm Duarte   6/15/2017   Anticoagulation Therapy Visit    Description:  76 year old male   Provider:  Dominguez Maradiaga MD   Department:  Hc Anti Coagulation           INR as of 6/15/2017     Today's INR No new INR was available at the time of this encounter.      Anticoagulation Summary as of 6/15/2017     INR goal 2.0-3.0   Today's INR No new INR was available at the time of this encounter.   Full instructions 6/15: 2.5 mg; 6/16: 2.5 mg; 6/17: 2.5 mg; 6/18: 2.5 mg; Otherwise 2.5 mg on Wed; 5 mg all other days   Next INR check 6/19/2017    Indications   Chronic atrial fibrillation (H) [I48.2]  PVD (peripheral vascular disease) (H) [I73.9]  Long-term (current) use of anticoagulants [Z79.01] [Z79.01]         June 2017 Details    Sun Mon Tue Wed Thu Fri Sat         1               2               3                 4               5               6               7               8               9               10                 11               12               13               14               15      2.5 mg   See details      16      2.5 mg         17      2.5 mg           18      2.5 mg         19            20               21               22               23               24                 25               26               27               28               29               30                 Date Details   06/15 This INR check       Date of next INR:  6/19/2017         How to take your warfarin dose     To take:  2.5 mg Take 0.5 of a 5 mg tablet.    To take:  5 mg Take 1 of the 5 mg tablets.

## 2017-06-15 NOTE — TELEPHONE ENCOUNTER
Pt calls he thought he was supposed to get 3 prescriptions at visit today. I spoke to Dr Maradiaga and was told the 3rd medication was the Solumedrol shot he was given in the clinic. I called the pt and notified.

## 2017-06-19 ENCOUNTER — TELEPHONE (OUTPATIENT)
Dept: FAMILY MEDICINE | Facility: OTHER | Age: 77
End: 2017-06-19

## 2017-06-19 DIAGNOSIS — I48.20 CHRONIC ATRIAL FIBRILLATION (H): ICD-10-CM

## 2017-06-19 RX ORDER — WARFARIN SODIUM 5 MG/1
TABLET ORAL
Qty: 90 TABLET | Refills: 4 | Status: SHIPPED | OUTPATIENT
Start: 2017-06-19 | End: 2017-11-01

## 2017-06-19 RX ORDER — WARFARIN SODIUM 5 MG/1
TABLET ORAL
Qty: 90 TABLET | Refills: 0 | Status: SHIPPED | OUTPATIENT
Start: 2017-06-19 | End: 2017-07-13

## 2017-06-19 NOTE — TELEPHONE ENCOUNTER
Reason for call:  Medication    1. Medication Name? Warfarin  2. Is this request for a refill? Yes  3. What Pharmacy do you use? [facility]  4. Have you contacted your pharmacy? Yes    5. If yes, when?  (Please note that the turn-around-time for prescriptions is 72 business hours; I am sending your request at this time. SEND TO  [facility]  )  Description: Pt stated prescription was called in on Wed. Was very upset and stated he is out of med and needs filled today. Advised pt we did not get request from pharmacy and he stated that they did call this in to us and we need to call them and get it taken care of. If you have any questions or concern please call pt back at [phone]  Was an appointment offered for this a call? No   Preferred method for responding to this messageTelephone Call  If we cannot reach you directly, may we leave a detailed response at the number you provided? Yes  Can this message wait until your PCP/Provider returns if not available today? No, PCP out

## 2017-06-19 NOTE — TELEPHONE ENCOUNTER
Last [facility] office visit states office visit 6/19/17.  Please see below.  Medication pended.  Please advise.  Thanks

## 2017-06-20 ENCOUNTER — HOSPITAL ENCOUNTER (OUTPATIENT)
Facility: HOSPITAL | Age: 77
Setting detail: OBSERVATION
Discharge: HOME | End: 2017-06-21
Attending: EMERGENCY MEDICINE | Admitting: INTERNAL MEDICINE
Payer: COMMERCIAL

## 2017-06-20 ENCOUNTER — ANTICOAGULATION THERAPY VISIT (OUTPATIENT)
Dept: ANTICOAGULATION | Facility: OTHER | Age: 77
End: 2017-06-20
Attending: FAMILY MEDICINE
Payer: COMMERCIAL

## 2017-06-20 DIAGNOSIS — J44.1 COPD EXACERBATION (H): ICD-10-CM

## 2017-06-20 DIAGNOSIS — I48.20 CHRONIC ATRIAL FIBRILLATION (H): ICD-10-CM

## 2017-06-20 DIAGNOSIS — I73.9 PVD (PERIPHERAL VASCULAR DISEASE) (H): ICD-10-CM

## 2017-06-20 DIAGNOSIS — J44.1 OBSTRUCTIVE CHRONIC BRONCHITIS WITH EXACERBATION (H): Primary | ICD-10-CM

## 2017-06-20 DIAGNOSIS — Z79.01 LONG-TERM (CURRENT) USE OF ANTICOAGULANTS: ICD-10-CM

## 2017-06-20 DIAGNOSIS — J43.8 OTHER EMPHYSEMA (H): ICD-10-CM

## 2017-06-20 LAB
ALBUMIN SERPL-MCNC: 3.1 G/DL (ref 3.4–5)
ALP SERPL-CCNC: 78 U/L (ref 40–150)
ALT SERPL W P-5'-P-CCNC: 31 U/L (ref 0–70)
ANION GAP SERPL CALCULATED.3IONS-SCNC: 5 MMOL/L (ref 3–14)
AST SERPL W P-5'-P-CCNC: 18 U/L (ref 0–45)
BASOPHILS # BLD AUTO: 0 10E9/L (ref 0–0.2)
BASOPHILS NFR BLD AUTO: 0.1 %
BILIRUB SERPL-MCNC: 0.3 MG/DL (ref 0.2–1.3)
BUN SERPL-MCNC: 24 MG/DL (ref 7–30)
CALCIUM SERPL-MCNC: 8.3 MG/DL (ref 8.5–10.1)
CHLORIDE SERPL-SCNC: 103 MMOL/L (ref 94–109)
CO2 SERPL-SCNC: 29 MMOL/L (ref 20–32)
CREAT SERPL-MCNC: 1.12 MG/DL (ref 0.66–1.25)
CRP SERPL-MCNC: <2.9 MG/L (ref 0–8)
D DIMER PPP DDU-MCNC: <200 NG/ML D-DU (ref 0–300)
DIFFERENTIAL METHOD BLD: ABNORMAL
EOSINOPHIL # BLD AUTO: 0.1 10E9/L (ref 0–0.7)
EOSINOPHIL NFR BLD AUTO: 1.2 %
ERYTHROCYTE [DISTWIDTH] IN BLOOD BY AUTOMATED COUNT: 13.3 % (ref 10–15)
GFR SERPL CREATININE-BSD FRML MDRD: 64 ML/MIN/1.7M2
GLUCOSE SERPL-MCNC: 105 MG/DL (ref 70–99)
HCT VFR BLD AUTO: 43.5 % (ref 40–53)
HGB BLD-MCNC: 14.1 G/DL (ref 13.3–17.7)
IMM GRANULOCYTES # BLD: 0 10E9/L (ref 0–0.4)
IMM GRANULOCYTES NFR BLD: 0.4 %
INR POINT OF CARE: 2.2 (ref 0.86–1.14)
LYMPHOCYTES # BLD AUTO: 0.5 10E9/L (ref 0.8–5.3)
LYMPHOCYTES NFR BLD AUTO: 5.3 %
MCH RBC QN AUTO: 32.5 PG (ref 26.5–33)
MCHC RBC AUTO-ENTMCNC: 32.4 G/DL (ref 31.5–36.5)
MCV RBC AUTO: 100 FL (ref 78–100)
MONOCYTES # BLD AUTO: 0.5 10E9/L (ref 0–1.3)
MONOCYTES NFR BLD AUTO: 5.6 %
NEUTROPHILS # BLD AUTO: 7.4 10E9/L (ref 1.6–8.3)
NEUTROPHILS NFR BLD AUTO: 87.4 %
NRBC # BLD AUTO: 0 10*3/UL
NRBC BLD AUTO-RTO: 0 /100
NT-PROBNP SERPL-MCNC: 162 PG/ML (ref 0–900)
PLATELET # BLD AUTO: 308 10E9/L (ref 150–450)
POTASSIUM SERPL-SCNC: 4.5 MMOL/L (ref 3.4–5.3)
PROT SERPL-MCNC: 7.3 G/DL (ref 6.8–8.8)
RBC # BLD AUTO: 4.34 10E12/L (ref 4.4–5.9)
SODIUM SERPL-SCNC: 137 MMOL/L (ref 133–144)
TROPONIN I SERPL-MCNC: NORMAL UG/L (ref 0–0.04)
WBC # BLD AUTO: 8.5 10E9/L (ref 4–11)

## 2017-06-20 PROCEDURE — 25000132 ZZH RX MED GY IP 250 OP 250 PS 637: Performed by: INTERNAL MEDICINE

## 2017-06-20 PROCEDURE — 94640 AIRWAY INHALATION TREATMENT: CPT

## 2017-06-20 PROCEDURE — 83880 ASSAY OF NATRIURETIC PEPTIDE: CPT | Performed by: EMERGENCY MEDICINE

## 2017-06-20 PROCEDURE — 80053 COMPREHEN METABOLIC PANEL: CPT | Performed by: EMERGENCY MEDICINE

## 2017-06-20 PROCEDURE — 99220 ZZC INITIAL OBSERVATION CARE,LEVL III: CPT | Performed by: NURSE PRACTITIONER

## 2017-06-20 PROCEDURE — 25000125 ZZHC RX 250: Performed by: FAMILY MEDICINE

## 2017-06-20 PROCEDURE — 25000132 ZZH RX MED GY IP 250 OP 250 PS 637: Performed by: NURSE PRACTITIONER

## 2017-06-20 PROCEDURE — 94640 AIRWAY INHALATION TREATMENT: CPT | Mod: 76

## 2017-06-20 PROCEDURE — 40000275 ZZH STATISTIC RCP TIME EA 10 MIN

## 2017-06-20 PROCEDURE — 96374 THER/PROPH/DIAG INJ IV PUSH: CPT

## 2017-06-20 PROCEDURE — 40000786 ZZHCL STATISTIC ACTIVE MRSA SURVEILLANCE CULTURE: Performed by: INTERNAL MEDICINE

## 2017-06-20 PROCEDURE — 25000128 H RX IP 250 OP 636: Performed by: EMERGENCY MEDICINE

## 2017-06-20 PROCEDURE — 86140 C-REACTIVE PROTEIN: CPT | Performed by: EMERGENCY MEDICINE

## 2017-06-20 PROCEDURE — 85025 COMPLETE CBC W/AUTO DIFF WBC: CPT | Performed by: EMERGENCY MEDICINE

## 2017-06-20 PROCEDURE — 99285 EMERGENCY DEPT VISIT HI MDM: CPT | Mod: 25

## 2017-06-20 PROCEDURE — 85379 FIBRIN DEGRADATION QUANT: CPT | Performed by: EMERGENCY MEDICINE

## 2017-06-20 PROCEDURE — 71020 ZZHC CHEST TWO VIEWS, FRONT/LAT: CPT | Mod: TC

## 2017-06-20 PROCEDURE — 93005 ELECTROCARDIOGRAM TRACING: CPT

## 2017-06-20 PROCEDURE — 99285 EMERGENCY DEPT VISIT HI MDM: CPT | Performed by: EMERGENCY MEDICINE

## 2017-06-20 PROCEDURE — G0378 HOSPITAL OBSERVATION PER HR: HCPCS

## 2017-06-20 PROCEDURE — 84484 ASSAY OF TROPONIN QUANT: CPT | Performed by: EMERGENCY MEDICINE

## 2017-06-20 PROCEDURE — 25000125 ZZHC RX 250: Performed by: NURSE PRACTITIONER

## 2017-06-20 PROCEDURE — 93010 ELECTROCARDIOGRAM REPORT: CPT | Performed by: INTERNAL MEDICINE

## 2017-06-20 PROCEDURE — 25000125 ZZHC RX 250: Performed by: EMERGENCY MEDICINE

## 2017-06-20 RX ORDER — ACETAMINOPHEN 325 MG/1
650 TABLET ORAL EVERY 4 HOURS PRN
Status: DISCONTINUED | OUTPATIENT
Start: 2017-06-20 | End: 2017-06-21 | Stop reason: HOSPADM

## 2017-06-20 RX ORDER — NALOXONE HYDROCHLORIDE 0.4 MG/ML
.1-.4 INJECTION, SOLUTION INTRAMUSCULAR; INTRAVENOUS; SUBCUTANEOUS
Status: DISCONTINUED | OUTPATIENT
Start: 2017-06-20 | End: 2017-06-21 | Stop reason: HOSPADM

## 2017-06-20 RX ORDER — IPRATROPIUM BROMIDE AND ALBUTEROL SULFATE 2.5; .5 MG/3ML; MG/3ML
3 SOLUTION RESPIRATORY (INHALATION) EVERY 4 HOURS PRN
Status: DISCONTINUED | OUTPATIENT
Start: 2017-06-20 | End: 2017-06-21 | Stop reason: HOSPADM

## 2017-06-20 RX ORDER — LEVOFLOXACIN 500 MG/1
500 TABLET, FILM COATED ORAL DAILY
Status: DISCONTINUED | OUTPATIENT
Start: 2017-06-20 | End: 2017-06-21 | Stop reason: HOSPADM

## 2017-06-20 RX ORDER — BUDESONIDE 0.5 MG/2ML
0.5 INHALANT ORAL ONCE
Status: COMPLETED | OUTPATIENT
Start: 2017-06-20 | End: 2017-06-20

## 2017-06-20 RX ORDER — PREDNISONE 20 MG/1
40 TABLET ORAL DAILY
Status: DISCONTINUED | OUTPATIENT
Start: 2017-06-21 | End: 2017-06-21 | Stop reason: HOSPADM

## 2017-06-20 RX ORDER — CILOSTAZOL 100 MG/1
100 TABLET ORAL 2 TIMES DAILY
Status: DISCONTINUED | OUTPATIENT
Start: 2017-06-20 | End: 2017-06-21 | Stop reason: HOSPADM

## 2017-06-20 RX ORDER — SODIUM CHLORIDE 9 MG/ML
INJECTION, SOLUTION INTRAVENOUS CONTINUOUS
Status: DISCONTINUED | OUTPATIENT
Start: 2017-06-20 | End: 2017-06-20

## 2017-06-20 RX ORDER — BUDESONIDE 0.5 MG/2ML
0.5 INHALANT ORAL 2 TIMES DAILY
Status: DISCONTINUED | OUTPATIENT
Start: 2017-06-20 | End: 2017-06-21 | Stop reason: HOSPADM

## 2017-06-20 RX ORDER — LOPERAMIDE HCL 2 MG
2 CAPSULE ORAL 4 TIMES DAILY PRN
Status: DISCONTINUED | OUTPATIENT
Start: 2017-06-20 | End: 2017-06-21 | Stop reason: HOSPADM

## 2017-06-20 RX ORDER — BUDESONIDE 0.25 MG/2ML
0.25 INHALANT ORAL ONCE
Status: DISCONTINUED | OUTPATIENT
Start: 2017-06-20 | End: 2017-06-20

## 2017-06-20 RX ORDER — FORMOTEROL FUMARATE DIHYDRATE 20 UG/2ML
20 SOLUTION RESPIRATORY (INHALATION) 2 TIMES DAILY
Status: DISCONTINUED | OUTPATIENT
Start: 2017-06-20 | End: 2017-06-20

## 2017-06-20 RX ORDER — LEVOFLOXACIN 750 MG/1
750 TABLET, FILM COATED ORAL DAILY
Status: DISCONTINUED | OUTPATIENT
Start: 2017-06-20 | End: 2017-06-20

## 2017-06-20 RX ORDER — ALBUTEROL SULFATE 0.83 MG/ML
2.5 SOLUTION RESPIRATORY (INHALATION)
Status: DISCONTINUED | OUTPATIENT
Start: 2017-06-20 | End: 2017-06-20

## 2017-06-20 RX ORDER — WARFARIN SODIUM 2 MG/1
4 TABLET ORAL
Status: COMPLETED | OUTPATIENT
Start: 2017-06-20 | End: 2017-06-20

## 2017-06-20 RX ORDER — ASPIRIN 81 MG/1
81 TABLET, CHEWABLE ORAL DAILY
Status: DISCONTINUED | OUTPATIENT
Start: 2017-06-21 | End: 2017-06-21 | Stop reason: HOSPADM

## 2017-06-20 RX ORDER — TAMSULOSIN HYDROCHLORIDE 0.4 MG/1
0.4 CAPSULE ORAL AT BEDTIME
Status: DISCONTINUED | OUTPATIENT
Start: 2017-06-20 | End: 2017-06-21 | Stop reason: HOSPADM

## 2017-06-20 RX ORDER — IPRATROPIUM BROMIDE AND ALBUTEROL SULFATE 2.5; .5 MG/3ML; MG/3ML
3 SOLUTION RESPIRATORY (INHALATION) ONCE
Status: COMPLETED | OUTPATIENT
Start: 2017-06-20 | End: 2017-06-20

## 2017-06-20 RX ORDER — ARFORMOTEROL TARTRATE 15 UG/2ML
15 SOLUTION RESPIRATORY (INHALATION) 2 TIMES DAILY
Status: DISCONTINUED | OUTPATIENT
Start: 2017-06-20 | End: 2017-06-21 | Stop reason: HOSPADM

## 2017-06-20 RX ORDER — DEXAMETHASONE SODIUM PHOSPHATE 10 MG/ML
15 INJECTION, SOLUTION INTRAMUSCULAR; INTRAVENOUS ONCE
Status: COMPLETED | OUTPATIENT
Start: 2017-06-20 | End: 2017-06-20

## 2017-06-20 RX ORDER — LIDOCAINE 40 MG/G
CREAM TOPICAL
Status: DISCONTINUED | OUTPATIENT
Start: 2017-06-20 | End: 2017-06-21 | Stop reason: HOSPADM

## 2017-06-20 RX ADMIN — DEXAMETHASONE SODIUM PHOSPHATE 15 MG: 10 INJECTION, SOLUTION INTRAMUSCULAR; INTRAVENOUS at 10:08

## 2017-06-20 RX ADMIN — IPRATROPIUM BROMIDE AND ALBUTEROL SULFATE 3 ML: .5; 3 SOLUTION RESPIRATORY (INHALATION) at 14:03

## 2017-06-20 RX ADMIN — ALBUTEROL SULFATE 2.5 MG: 2.5 SOLUTION RESPIRATORY (INHALATION) at 10:11

## 2017-06-20 RX ADMIN — SODIUM CHLORIDE: 9 INJECTION, SOLUTION INTRAVENOUS at 10:08

## 2017-06-20 RX ADMIN — IPRATROPIUM BROMIDE AND ALBUTEROL SULFATE 3 ML: .5; 3 SOLUTION RESPIRATORY (INHALATION) at 10:15

## 2017-06-20 RX ADMIN — TAMSULOSIN HYDROCHLORIDE 0.4 MG: 0.4 CAPSULE ORAL at 21:43

## 2017-06-20 RX ADMIN — BUDESONIDE 0.5 MG: 0.5 INHALANT RESPIRATORY (INHALATION) at 10:15

## 2017-06-20 RX ADMIN — IPRATROPIUM BROMIDE AND ALBUTEROL SULFATE 3 ML: .5; 3 SOLUTION RESPIRATORY (INHALATION) at 23:40

## 2017-06-20 RX ADMIN — IPRATROPIUM BROMIDE AND ALBUTEROL SULFATE 3 ML: .5; 3 SOLUTION RESPIRATORY (INHALATION) at 09:16

## 2017-06-20 RX ADMIN — ARFORMOTEROL TARTRATE 15 MCG: 15 SOLUTION RESPIRATORY (INHALATION) at 19:12

## 2017-06-20 RX ADMIN — LOPERAMIDE HYDROCHLORIDE 2 MG: 2 CAPSULE ORAL at 21:43

## 2017-06-20 RX ADMIN — IPRATROPIUM BROMIDE AND ALBUTEROL SULFATE 3 ML: .5; 3 SOLUTION RESPIRATORY (INHALATION) at 19:12

## 2017-06-20 RX ADMIN — LEVOFLOXACIN 500 MG: 500 TABLET, FILM COATED ORAL at 14:20

## 2017-06-20 RX ADMIN — WARFARIN SODIUM 4 MG: 2 TABLET ORAL at 18:02

## 2017-06-20 RX ADMIN — BUDESONIDE 0.5 MG: 0.5 INHALANT RESPIRATORY (INHALATION) at 19:12

## 2017-06-20 RX ADMIN — CILOSTAZOL 100 MG: 100 TABLET ORAL at 21:43

## 2017-06-20 ASSESSMENT — ENCOUNTER SYMPTOMS
LIGHT-HEADEDNESS: 1
FATIGUE: 1
HEADACHES: 1
VOICE CHANGE: 1
ACTIVITY CHANGE: 1
SHORTNESS OF BREATH: 1
CHEST TIGHTNESS: 1
COUGH: 1
WEAKNESS: 1
MYALGIAS: 1
WHEEZING: 1

## 2017-06-20 NOTE — ED NOTES
"Patient presents by self with complaint of shortness of breath/COPD exacerbation. Patient was seen in the clinic on June 16 and prescribed Prednisone and Azithromax. Patient reports improvement in symptoms until yesterday when he states \"my breathing went downhill.\" Patient reports being unable to catch his breath, especially with exertion. Patient uses 2L of O2 at home and reports his saturations have been about 92-93%. On arrival, audible wheeze noted with sats 93%. RR of 22-24. Denies fevers. Patient also states he has had a Infrequent cough, with green/tan sputum. Attached to monitors, call light within reach.   "

## 2017-06-20 NOTE — PROGRESS NOTES
Met with Rm for CM/SS assessment.  See flow sheet for completed assessment.      Rm lives independently at home alone.  He is independent with AD:'s, medications, driving and finances.  He lives in a two level home.  All of the stairs have railings.  He is chronically on 2 L of oxygen which he gets through [facility].  He states that he has a health care directive completed.  He believes that his sister, Patt is listed as the health care agent.  He states that his sister and a couple of friends are his support system.  He is a  but is not connected with the [facility] and declines interest at this time.  He declines tobacco use.  He drinks a beer on occasion.  He identifies as Baptism but this is not of real importance to him.  He has been independent with household chores and yard work.  States that at times he struggles and is not sure if he would qualify for support.  States that he contacted his insurance but has not heard back.  Provided him with contact information for [facility].      Rm sees Dr. Maradiaga for primary care and was last seen on Thursday.  He was last seen by a dental provider in [place] a couple years ago.  He has not been to an eye care provider for many years.  He denies an interest in local eye care or dental care providers.  He uses [facility] for medications.      Rm denies questions or concerns.

## 2017-06-20 NOTE — ED NOTES
Pt ambulated around unit with assist of staff.  Pt sats dropped to 74% on 2L NC.  Pt had increased work of breathing.

## 2017-06-20 NOTE — H&P
[facility]    History and Physical  Hospitalist       Date of Admission:  6/20/2017  Date of Service (when I saw the patient): 06/20/17    Assessment & Plan   Rm Duarte is a 76 year old male who presents with dyspnea.    COPD exacerbation (H): Patient is on chronic daily azithromycin treatment. He was started on bactrim by PCP 6/15/17. He continues his normal respiratory treatments and inhalers. He is on 2 liters oxygen chronically, he states he has not increased his dose or amount of time he wears this at home. His actually order in PRN, however he states he wears it continuously for the last several months. He reports his sputum has become more tenacious and he feels more difficult to clear. Will start Levaquin and continue oral steroids tomorrow as he received IV decadron in the ED and took his prednisone dose this morning. Oxygen at 2 liters and increase PRN. He has known decreased sats with any activity down to 86%-90% per last pulmonology note. Supportive cares. No signs of sepsis or pneumonia. CXR shows no consolidation or marah infiltrate, does show stable interstitial disease.       Long-term (current) use of anticoagulants [Z79.01]: Expect increased INr level with use of Levaquin, pharmacy to dose coumadin.      Chronic atrial fibrillation (H): EKG from ED showing NSR with PVCs. Rate well controlled. Continue coumadin,he is not on any beta blockers or rate controlling medications at home.        Arteriosclerotic cardiovascular disease (ASCVD): As above. He is on a statin, will continue on discharge.       PVD (peripheral vascular disease) (H): Continue Pletal, ASA,coumadin as at home.     DVT Prophylaxis: Low Risk/Ambulatory with no VTE prophylaxis indicated  Code Status: DNR / DNI    Disposition: Expected discharge in 1-2 days once vital signs stable, dyspnea improved.    Kristie Lynch, CNP    Primary Care Physician   Dominguez Maradiaga    Chief Complaint   Increasing dyspnea,chest  "tightness and cough over the past 6 days without improvement with outpatient intervention.    History is obtained from the patient    History of Present Illness   Rm Duarte is a 76 year old male who presents with increasing dyspnea over the prior 6 days. He reports he noticed increased shortness of breath first on 6/14 and he promptly got in to see his PCP the next day. He was started on bactrim BID and given IM steroids and oral prednisone taper. He has not noted any improvement in his shortness of breath since starting treatment and was concerned so came in to be evaluated again. He notes audible wheezing with ambulation as a feeling of decreased sputum production due to increased tenacity with change os sputum color form white to tan/yellow. He denies any orthopnea, has been able to sleep lying down. Mild tight cough. He reports that he noted having \"sweats\" on and off for the first two days after seeing Dr. Maradiaga but denies chills and does not think he was running a fever at anytime. He presented to the ED and was able to maintain his saturations on 2 liters/nc oxygen until he got up to ambulate at which time he dropped into the mid 70's. He is placed in hospital for further observation and treatment.     Past Medical History    I have reviewed this patient's medical history and updated it with pertinent information if needed.   Past Medical History:   Diagnosis Date     Atrial fibrillation (H) 3/23/2012     COPD 5/16/2011     PVD (peripheral vascular disease) (H)        Past Surgical History   I have reviewed this patient's surgical history and updated it with pertinent information if needed.  Past Surgical History:   Procedure Laterality Date     COPD:FEV1-0.74/FVC-3.35 22%, DLCO-28%  3/1/2010    Severe COPD; 4/23/2008 PFT's done : 0.96/3.81 25% Fev1, DLCO 45%.  1991 were 4.31/6.20!!!!!!!??     ECHO: TDS, EF~50%, abnl septum, o/w NORMAL  5/20/2011     PAD: CTA> occlusion of L mid SFA with reconstitution "  2010     S/P colonoscopy: tubular adenoma & tics  2010    Repeat in 2014; Dr Graham     Tobacco Abuse Ongoing         Prior to Admission Medications   Prior to Admission Medications   Prescriptions Last Dose Informant Patient Reported? Taking?   COMBIVENT RESPIMAT  MCG/ACT inhaler 2017 at Unknown time  No Yes   Sig: INHALE ONE PUFF BY MOUTH 4 TIMES DAILY *MAX 6 DOSES PER DAY*   Multiple Vitamins-Minerals (CENTRUM SILVER) per tablet 2017 at Unknown time  Yes Yes   Sig: Take 1 tablet by mouth daily.   PERFOROMIST 20 MCG/2ML nebulizer solution 2017 at Unknown time  No Yes   Sig: USE ONE  IN NEBULIZER EVERY 12 HOURS   aspirin (ASPIRIN LOW DOSE) 81 MG tablet 2017 at Unknown time  Yes Yes   Sig: Take 1 tablet by mouth daily.   azithromycin (ZITHROMAX) 250 MG tablet 2017 at Unknown time  No Yes   Sig: Take one tablet daily.   budesonide POWD 2017 at Unknown time  Yes Yes   Si puff 2 times daily Inhales 0.25mcg BID   cilostazol (PLETAL) 100 MG tablet 2017 at Unknown time  No Yes   Sig: TAKE ONE TABLET BY MOUTH TWICE DAILY   ipratropium - albuterol 0.5 mg/2.5 mg/3 mL (DUONEB) 0.5-2.5 (3) MG/3ML nebulization 2017 at Unknown time  No Yes   Sig: USE ONE AMPULE IN NEBULIZER EVERY 6 HOURS AS NEEDED FOR SHORTNESS OF BREATH /DYSPNEA  OR  WHEEZING   loperamide (IMODIUM A-D) 2 MG tablet 2017 at Unknown time  Yes Yes   Sig: Take 2 mg by mouth 4 times daily as needed. Take 2 tablet by oral route after 1st loose stool and 1 tablet after each subsequent bowel movement; do not exceed 16 mg in 24 hrs. As needed.   predniSONE (DELTASONE) 20 MG tablet 2017 at Unknown time  No Yes   Sig: Take 3 tabs (60 mg) by mouth daily x 3 days, 2 tabs (40 mg) daily x 3 days, 1 tab (20 mg) daily x 3 days, then 1/2 tab (10 mg) x 3 days.   simvastatin (ZOCOR) 10 MG tablet 2017 at Unknown time  No Yes   Sig: TAKE ONE TABLET BY MOUTH AT BEDTIME   sulfamethoxazole-trimethoprim (BACTRIM  DS/SEPTRA DS) 800-160 MG per tablet 2017 at Unknown time  No Yes   Sig: Take 1 tablet by mouth 2 times daily   tamsulosin (FLOMAX) 0.4 MG 24 hr capsule 2017 at Unknown time  No Yes   Sig: Take 1 capsule (0.4 mg) by mouth daily   warfarin (COUMADIN) 5 MG tablet Past Week at Unknown time  No Yes   Sig: TAKE 1/2 A TABLET BY MOUTH ON WEDNESDAY AND 1 TABLET ALL OTHER DAYS OR AS DIRECTED   warfarin (COUMADIN) 5 MG tablet 2017 at Unknown time  No Yes   Sig: Take 2.5mg (1/2 pill) Wed; 5mg (1 pill) all other days or as directed by [facility]      Facility-Administered Medications: None     Allergies   No Known Allergies    Social History   I have reviewed this patient's social history and updated it with pertinent information if needed. Rm Duarte  reports that he has quit smoking. His smoking use included Cigarettes. He has never used smokeless tobacco. He reports that he drinks alcohol.    Family History   I have reviewed this patient's family history and updated it with pertinent information if needed.   Family History   Problem Relation Age of Onset     Other - See Comments Mother      AAA, cause of death     Other - See Comments Other 69     AAA, family hx     Chronic Obstructive Pulmonary Disease Brother       MI age 60's     Family History Negative Sister        Review of Systems   CONSTITUTIONAL:  positive for  sweats and fatigue  negative for  fevers, chills, malaise, anorexia and weight loss  HEENT:  negative for  earaches, nasal congestion, sore throat and hoarseness  RESPIRATORY:  positive for  cough with sputum, dyspnea and wheezing  negative for  dry cough, hemoptysis, chest pain, pleuritic pain and cyanosis  CARDIOVASCULAR:  positive for  dyspnea  negative for  chest pain, palpitations, orthopnea, exertional chest pressure/discomfort, edema  GASTROINTESTINAL:  negative for nausea, vomiting, change in bowel habits and abdominal pain  GENITOURINARY:  negative for frequency,  dysuria and urinary incontinence  HEMATOLOGIC/LYMPHATIC:  negative for swelling/edema  ALLERGIC/IMMUNOLOGIC:  negative for urticaria, hay fever and drug reactions  MUSCULOSKELETAL:  negative for  myalgias and arthralgias  NEUROLOGICAL:  negative for headaches, dizziness, numbness, pain and tingling    Physical Exam   Temp: 97.2  F (36.2  C) Temp src: Tympanic BP: 114/73 Pulse: 96 Heart Rate: 94 Resp: 22 SpO2: 95 % O2 Device: Nasal cannula Oxygen Delivery: 5 LPM (will wean to 4L NC)  Vital Signs with Ranges  Temp:  [97.2  F (36.2  C)-97.7  F (36.5  C)] 97.2  F (36.2  C)  Pulse:  [95-96] 96  Heart Rate:  [81-95] 94  Resp:  [20-22] 22  BP: ()/(61-78) 114/73  SpO2:  [92 %-100 %] 95 %  195 lbs 12.3 oz    Constitutional: Flat affect, awake,no acute distress  HEENT: Mild erythema with cobblestone appearance posterior nasopharynx. No cervical lymphadenopathy.   Respiratory: Very diminished and tight with minimal air movement bilaterally mid to base. Upper lobes have inspiratory and expiratory wheezing.   Cardiovascular: Heart rate irregularly irregular, no murmur, no JVD, no peripheral edema.   GI: Soft, nontender, bowel sounds positive   Skin: No open areas, rashes or bruising noted.   Musculoskeletal: Moves arms and legs independently. Pedal pulse on the left in nonpalpable-patient states this is his normal.   Neurologic: Alert and oriented, no marah neurological deficits noted during course of exam.       Data   Data reviewed today:  I personally reviewed labs and radiology reports from ED.    Recent Labs  Lab 06/20/17  0931 06/20/17   WBC 8.5  --    HGB 14.1  --      --      --    INR  --  2.2*     --    POTASSIUM 4.5  --    CHLORIDE 103  --    CO2 29  --    BUN 24  --    CR 1.12  --    ANIONGAP 5  --    KENDRICK 8.3*  --    *  --    ALBUMIN 3.1*  --    PROTTOTAL 7.3  --    BILITOTAL 0.3  --    ALKPHOS 78  --    ALT 31  --    AST 18  --    TROPI <0.015The 99th percentile for upper reference  range is 0.045 ug/L.  Troponin values in the range of 0.045 - 0.120 ug/L may be associated with risks of adverse clinical events.  --        Recent Results (from the past 24 hour(s))   XR Chest 2 Views    Narrative    CHEST TWO VIEWS    REPORT:  There is some interstitial thickening seen at the lung bases  which is stable from 2015.  There is an irregular distribution of  pulmonary vascularity suggestive of emphysema.  Heart is normal in  size.    IMPRESSION:  INTERSTITIAL THICKENING WITHOUT CHANGE.  Exam Date: Jun 20, 2017 10:50:05 AM  Author: CRUZITO LEVY  This report is preliminary and transcribed

## 2017-06-20 NOTE — PROGRESS NOTES
ANTICOAGULATION FOLLOW-UP CLINIC VISIT    Patient Name:  Rm Duarte  Date:  6/20/2017  Contact Type:  Face to Face    SUBJECTIVE:     Patient Findings     Comments resp distress. Coarse sounding respirations. Prednisone, last day of 40mg today, will start 20mg x 3 and 10mg x 3. Has 8 more days of Bactrim           OBJECTIVE    INR Protime   Date Value Ref Range Status   06/20/2017 2.2 (A) 0.86 - 1.14 Final       ASSESSMENT / PLAN  INR assessment THER    Recheck INR In: 6 DAYS    INR Location Clinic      Anticoagulation Summary as of 6/20/2017     INR goal 2.0-3.0   Today's INR 2.2   Maintenance plan 2.5 mg (5 mg x 0.5) on Wed; 5 mg (5 mg x 1) all other days   Full instructions 6/20: 2.5 mg; 6/22: 2.5 mg; 6/24: 2.5 mg; 6/25: 2.5 mg; Otherwise 2.5 mg on Wed; 5 mg all other days   Weekly total 32.5 mg   Plan last modified Zena Burden RN (1/4/2017)   Next INR check 6/26/2017   Priority INR   Target end date Indefinite    Indications   Chronic atrial fibrillation (H) [I48.2]  PVD (peripheral vascular disease) (H) [I73.9]  Long-term (current) use of anticoagulants [Z79.01] [Z79.01]         Anticoagulation Episode Summary     INR check location     Preferred lab     Send INR reminders to  ANTICOAG POOL    Comments takes Azithromycin 250mg daily long term      Anticoagulation Care Providers     Provider Role Specialty Phone number    Dominguez Maradiaga MD [facility] [phone]            See the Encounter Report to view Anticoagulation Flowsheet and Dosing Calendar (Go to Encounters tab in chart review, and find the Anticoagulation Therapy Visit)        Zena Haas, RN

## 2017-06-20 NOTE — ED NOTES
Patient aware of plan of care with no questions or concerns. Report called to June. Belongings sent with patient.

## 2017-06-20 NOTE — ED PROVIDER NOTES
History     Chief Complaint   Patient presents with     Shortness of Breath     worsening over the last few days.     HPI  Rm Duarte is a 76 year old male who has had atrial fibrillation for many years and is on coumadin.  He was in the INR clinic today when staff there noted his severe DACOSTA and advised him to come directly to the the ED.   He is a alberto living in his own home in [place].  He has known COPD on multiple preventive as well as rescue inhalers and nebs as well as Oxygen 2 L/M.  He claims to have never been in the hospital for his exacerbations but this episode seems to be worse with DACOSTA unlike he has ever experienced.  Other medical issues include PVD, hyperlipidemia, BPH, and long standing smoking hx which he stopped a few years ago.  Denies fever, chills, allergies to pollens or recent respiratory infection.     I have reviewed the Medications, Allergies, Past Medical and Surgical History, and Social History in the Epic system.  Review of Systems   Constitutional: Positive for activity change and fatigue.   HENT: Positive for congestion and voice change.    Respiratory: Positive for cough, chest tightness, shortness of breath and wheezing.    Musculoskeletal: Positive for myalgias.   Neurological: Positive for weakness, light-headedness and headaches.   All other systems reviewed and are negative.    Physical Exam   BP: 119/77  Pulse: 95  Heart Rate: 95  Temp: 97.6  F (36.4  C)  Resp: 22  SpO2: 97 %  Physical Exam   Constitutional: He is oriented to person, place, and time. He appears well-developed and well-nourished.   Mildly disheveled stoic fellow lying quietly but on the edge of orthopnea lying at 45 degree.    HENT:   Head: Normocephalic and atraumatic.   Eyes: Conjunctivae and EOM are normal. Pupils are equal, round, and reactive to light.   Neck: Normal range of motion. Neck supple. No tracheal deviation present. No thyromegaly present.   Cardiovascular: Normal rate and regular  "rhythm.    No murmur heard.  Pulmonary/Chest: He is in respiratory distress. He has wheezes. He has rales.   Scant bibasilar rales with generalized wheezes and prolonged expiratory sounds.    Abdominal: Soft. Bowel sounds are normal. He exhibits no distension. There is no tenderness. There is no rebound and no guarding.   Musculoskeletal: Normal range of motion. He exhibits no edema, tenderness or deformity.   Neurological: He is alert and oriented to person, place, and time.   Skin: He is not diaphoretic.     ED Course     ED Course     Procedures  ECG  Sinus rhythm with PACs, nonspecific T wave abnormality, QTc 421 ms  Critical Care time:  none    Labs Ordered and Resulted from Time of ED Arrival Up to the Time of Departure from the ED   CBC WITH PLATELETS DIFFERENTIAL - Abnormal; Notable for the following:        Result Value    RBC Count 4.34 (*)     Absolute Lymphocytes 0.5 (*)     All other components within normal limits   COMPREHENSIVE METABOLIC PANEL - Abnormal; Notable for the following:     Glucose 105 (*)     Calcium 8.3 (*)     Albumin 3.1 (*)     All other components within normal limits   CRP INFLAMMATION   TROPONIN I   NT PROBNP INPATIENT   D-DIMER (FV RANGE)   PERIPHERAL IV CATHETER     Assessments & Plan (with Medical Decision Making)   Rm has an obvious COPD flareup.  He was anxious to get home but realized he seemed somehow worse this time so was compliant.  IV placed and labs obtained which were remarkably normal.  CXR showed progression of interstitial thickening at the lung bases with his obvious emphysema in upper aspects of chest.   Sequence of nebs were given along with decadron 15mg IV but after a \"road test\" around the dep't Rm was all in with sats inspite of his 2 L/M down to 70%.  So he is admitted to the hospitalist service with Dr. Bradley accepting.   I have reviewed the nursing notes.    I have reviewed the findings, diagnosis, plan and need for follow up with the " patient.    Current Discharge Medication List          Final diagnoses:   COPD exacerbation (H)       6/20/2017   [facility]     Lobo Zuleta MD  06/20/17 [id number]

## 2017-06-20 NOTE — PLAN OF CARE
Problem: Patient Goal: Rt Focus  Goal: 1. Patient Goal: RT Focus  [place] [facility] - Respiratory Clinical Assessment     Current Patient History:    Respiratory History: COPD    Smoking History: 1 ppd    Oxygen dependency: Yes,    Oxygen prescribed: 2 lpm NC at home     6/20/2017 2:06 PM Patient Initial Assessment:     Level of Consciousness: alert , cooperative    Skin color: pink    Lung sounds:Diminshed with wheezes    Respirations:  Mild intercostal retractions    Respiratory symptoms: harsh cough    Cough/Sputum:  harsh and mild    Current oxygenation status: 5L NC

## 2017-06-20 NOTE — IP AVS SNAPSHOT
MRN:[id number]                      After Visit Summary   6/20/2017    Rm Duarte    MRN: [id number]           Thank you!     Thank you for choosing [facility] for your care. Our goal is always to provide you with excellent care. Hearing back from our patients is one way we can continue to improve our services. Please take a few minutes to complete the written survey that you may receive in the mail after you visit with us. Thank you!        Patient Information     Date Of Birth          1940        Designated Caregiver       Most Recent Value    Caregiver    Will someone help with your care after discharge? yes    Name of designated caregiver Dominguez Delvalle     Phone number of caregiver -- [patient has it at home, cell number ]    Caregiver address [place]       About your hospital stay     You were admitted on:  June 20, 2017 You last received care in the:  [facility]    You were discharged on:  June 21, 2017        Reason for your hospital stay       COPD exacerbation                  Who to Call     For medical emergencies, please call 911.  For non-urgent questions about your medical care, please call your primary care provider or clinic, [phone]          Attending Provider     Provider Specialty    Lobo Zuleta MD Emergency Medicine    Wade Smart MD Internal Medicine    Kristie Lynch, NP Nurse Practitioner       Primary Care Provider Office Phone # Fax #    Dominguez Maradiaga [phone]       When to contact your care team       Return to be seen if you develop worsening shortness of breath, chest pain or any other worsening symptoms.                  After Care Instructions     Activity       Your activity upon discharge: activity as tolerated            Diet       Follow this diet upon discharge: Orders Placed This Encounter      Combination Diet 2 gm NA Diet            Discharge Instructions       Take 2.5mg of Coumadin tonight and  "tomorrow () and follow-up with Coumadin Clinic for INR on .  Do not take your Azithromycin while taking Levaquin. Return to normal dosing once Levaquin is complete.                  Follow-up Appointments     Follow-up and recommended labs and tests        Follow up with primary care provider, Dominguez Maradiaga, within 7 days for hospital follow- up.  No follow up labs or test are needed.  Follow-up with Coumadin clinic with INR on 17 for coumadin dosing.                  Your next 10 appointments already scheduled     2017 11:15 AM CDT   (Arrive by 11:00 AM)   SHORT with Dominguez Maradiaga MD   [facility]    [address] [facility] [address]   [phone]              Further instructions from your care team         Appointment made with Dr. Maradiaga  at 11:00    Pending Results     No orders found for last 3 day(s).            Admission Information     Date & Time Provider Department Dept. Phone    2017 Kristie Lynch, [facility] [phone]      Your Vitals Were     Blood Pressure Pulse Temperature Respirations Height Weight    108/70 83 97.1  F (36.2  C) (Tympanic) 20 1.803 m (5' 11\") 88.8 kg (195 lb 12.3 oz)    Pulse Oximetry BMI (Body Mass Index)                96% 27.3 kg/m2          MyChart Information     amaysim lets you send messages to your doctor, view your test results, renew your prescriptions, schedule appointments and more. To sign up, go to [email or web address] . Click on \"Log in\" on the left side of the screen, which will take you to the Welcome page. Then click on \"Sign up Now\" on the right side of the page.     You will be asked to enter the access code listed below, as well as some personal information. Please follow the directions to create your username and password.     Your access code is: [email or web address]  Expires: 2017 12:34 PM     Your access code will  in 90 days. If you " need help or a new code, please call your [facility] clinic or [phone].        Care EveryWhere ID     This is your Care EveryWhere ID. This could be used by other organizations to access your [facility] medical records  [id number]        Equal Access to Services     DARA SIMPSON : Hadii aad ku hadedwigejoss Jg, waangelda luqadaha, qaybta kaalmada zac, karina clint margamalgorzata trimbledanie mccormack suleman prado. So [facility] [phone].    ATENCIÓN: Si habla español, tiene a dhillon disposición servicios gratuitos de asistencia lingüística. Llame al [phone].    We comply with applicable federal civil rights laws and [place] laws. We do not discriminate on the basis of race, color, national origin, age, disability sex, sexual orientation or gender identity.               Review of your medicines      START taking        Dose / Directions    levofloxacin 500 MG tablet   Commonly known as:  LEVAQUIN   Indication:  COPD exacerbation   Used for:  Obstructive chronic bronchitis with exacerbation (H)        Dose:  500 mg   Start taking on:  6/22/2017   Take 1 tablet (500 mg) by mouth daily for 5 days   Quantity:  5 tablet   Refills:  0         CONTINUE these medicines which may have CHANGED, or have new prescriptions. If we are uncertain of the size of tablets/capsules you have at home, strength may be listed as something that might have changed.        Dose / Directions    predniSONE 20 MG tablet   Commonly known as:  DELTASONE   This may have changed:  additional instructions        2 tabs (40 mg) daily x 2 days, 1 tab (20 mg) daily x 3 days, then 1/2 tab (10 mg) x 3 days.   Quantity:  9 tablet   Refills:  0         CONTINUE these medicines which have NOT CHANGED        Dose / Directions    ASPIRIN LOW DOSE 81 MG tablet   Generic drug:  aspirin        Dose:  1 tablet   Take 1 tablet by mouth daily.   Refills:  0       azithromycin 250 MG tablet   Commonly known as:  ZITHROMAX   Used for:  Other emphysema (H)        Take one tablet  daily.   Quantity:  30 tablet   Refills:  5       budesonide Powd   Used for:  Long term (current) use of anticoagulants        Dose:  1 puff   1 puff 2 times daily Inhales 0.25mcg BID   Refills:  0       CENTRUM SILVER per tablet        Dose:  1 tablet   Take 1 tablet by mouth daily.   Refills:  0       cilostazol 100 MG tablet   Commonly known as:  PLETAL        TAKE ONE TABLET BY MOUTH TWICE DAILY   Quantity:  180 tablet   Refills:  3       IMODIUM A-D 2 MG tablet   Generic drug:  loperamide        Dose:  2 mg   Take 2 mg by mouth 4 times daily as needed. Take 2 tablet by oral route after 1st loose stool and 1 tablet after each subsequent bowel movement; do not exceed 16 mg in 24 hrs. As needed.   Refills:  0       * ipratropium - albuterol 0.5 mg/2.5 mg/3 mL 0.5-2.5 (3) MG/3ML neb solution   Commonly known as:  DUONEB   Used for:  COPD (chronic obstructive pulmonary disease) (H)        USE ONE AMPULE IN NEBULIZER EVERY 6 HOURS AS NEEDED FOR SHORTNESS OF BREATH /DYSPNEA  OR  WHEEZING   Quantity:  360 vial   Refills:  3       * COMBIVENT RESPIMAT  MCG/ACT inhaler   Used for:  Chronic obstructive pulmonary disease, unspecified COPD type (H)   Generic drug:  Ipratropium-Albuterol        INHALE ONE PUFF BY MOUTH 4 TIMES DAILY *MAX 6 DOSES PER DAY*   Quantity:  12 g   Refills:  0       PERFOROMIST 20 MCG/2ML neb solution   Used for:  Other emphysema (H)   Generic drug:  formoterol        USE ONE  IN NEBULIZER EVERY 12 HOURS   Quantity:  360 mL   Refills:  3       simvastatin 10 MG tablet   Commonly known as:  ZOCOR        TAKE ONE TABLET BY MOUTH AT BEDTIME   Quantity:  30 tablet   Refills:  0       tamsulosin 0.4 MG capsule   Commonly known as:  FLOMAX   Used for:  BPH (benign prostatic hyperplasia)        Dose:  0.4 mg   Take 1 capsule (0.4 mg) by mouth daily   Quantity:  30 capsule   Refills:  1       * warfarin 5 MG tablet   Commonly known as:  COUMADIN        TAKE 1/2 A TABLET BY MOUTH ON WEDNESDAY AND 1  TABLET ALL OTHER DAYS OR AS DIRECTED   Quantity:  90 tablet   Refills:  0       * warfarin 5 MG tablet   Commonly known as:  COUMADIN        Take 2.5mg (1/2 pill) Wed; 5mg (1 pill) all other days or as directed by [facility]   Quantity:  90 tablet   Refills:  4       * Notice:  This list has 4 medication(s) that are the same as other medications prescribed for you. Read the directions carefully, and ask your doctor or other care provider to review them with you.      STOP taking     sulfamethoxazole-trimethoprim 800-160 MG per tablet   Commonly known as:  BACTRIM DS/SEPTRA DS                Where to get your medicines      These medications were sent to [id number] - [place], [place] - [address]   [address] , [place] [address]     Phone:  [phone]     levofloxacin 500 MG tablet    predniSONE 20 MG tablet                Protect others around you: Learn how to safely use, store and throw away your medicines at www.disposemymeds.org.             Medication List: This is a list of all your medications and when to take them. Check marks below indicate your daily home schedule. Keep this list as a reference.      Medications           Morning Afternoon Evening Bedtime As Needed    ASPIRIN LOW DOSE 81 MG tablet   Take 1 tablet by mouth daily.   Generic drug:  aspirin                                azithromycin 250 MG tablet   Commonly known as:  ZITHROMAX   Take one tablet daily.                                budesonide Powd   1 puff 2 times daily Inhales 0.25mcg BID                                CENTRUM SILVER per tablet   Take 1 tablet by mouth daily.                                cilostazol 100 MG tablet   Commonly known as:  PLETAL   TAKE ONE TABLET BY MOUTH TWICE DAILY   Last time this was given:  100 mg on 6/21/2017  8:18 AM                                IMODIUM A-D 2 MG tablet   Take 2 mg by mouth 4 times daily as needed. Take 2 tablet by oral route after 1st loose stool and 1 tablet  after each subsequent bowel movement; do not exceed 16 mg in 24 hrs. As needed.   Generic drug:  loperamide                                * ipratropium - albuterol 0.5 mg/2.5 mg/3 mL 0.5-2.5 (3) MG/3ML neb solution   Commonly known as:  DUONEB   USE ONE AMPULE IN NEBULIZER EVERY 6 HOURS AS NEEDED FOR SHORTNESS OF BREATH /DYSPNEA  OR  WHEEZING   Last time this was given:  3 mLs on 6/21/2017  8:39 AM                                * COMBIVENT RESPIMAT  MCG/ACT inhaler   INHALE ONE PUFF BY MOUTH 4 TIMES DAILY *MAX 6 DOSES PER DAY*   Generic drug:  Ipratropium-Albuterol                                levofloxacin 500 MG tablet   Commonly known as:  LEVAQUIN   Take 1 tablet (500 mg) by mouth daily for 5 days   Start taking on:  6/22/2017   Last time this was given:  500 mg on 6/21/2017  8:17 AM                                PERFOROMIST 20 MCG/2ML neb solution   USE ONE  IN NEBULIZER EVERY 12 HOURS   Generic drug:  formoterol                                predniSONE 20 MG tablet   Commonly known as:  DELTASONE   2 tabs (40 mg) daily x 2 days, 1 tab (20 mg) daily x 3 days, then 1/2 tab (10 mg) x 3 days.   Last time this was given:  40 mg on 6/21/2017  8:18 AM                                simvastatin 10 MG tablet   Commonly known as:  ZOCOR   TAKE ONE TABLET BY MOUTH AT BEDTIME                                tamsulosin 0.4 MG capsule   Commonly known as:  FLOMAX   Take 1 capsule (0.4 mg) by mouth daily   Last time this was given:  0.4 mg on 6/20/2017  9:43 PM                                * warfarin 5 MG tablet   Commonly known as:  COUMADIN   TAKE 1/2 A TABLET BY MOUTH ON WEDNESDAY AND 1 TABLET ALL OTHER DAYS OR AS DIRECTED   Last time this was given:  4 mg on 6/20/2017  6:02 PM                                * warfarin 5 MG tablet   Commonly known as:  COUMADIN   Take 2.5mg (1/2 pill) Wed; 5mg (1 pill) all other days or as directed by [facility]   Last time this was given:  4 mg on 6/20/2017  6:02 PM                                 * Notice:  This list has 4 medication(s) that are the same as other medications prescribed for you. Read the directions carefully, and ask your doctor or other care provider to review them with you.

## 2017-06-20 NOTE — PHARMACY-ANTICOAGULATION SERVICE
Clinical Pharmacy - Warfarin Dosing Consult     Pharmacy has been consulted to manage this patient s warfarin therapy.  Indication: Atrial Fibrillation  Therapy Goal: INR 2-3  Provider/Team: Hospitalist  OP Anticoag Clinic: [facility]  Warfarin Prior to Admission: Yes  Warfarin PTA Regimen: 2.5 mg on Wed, 5 mg the rest of the week  Significant drug interactions: Levaquin started 6/20, was on zithromax, prednisone, and bactrim at home  Recent documented change in oral intake/nutrition: Unknown    INR Protime   Date Value Ref Range Status   06/20/2017 2.2 (A) 0.86 - 1.14 Final   05/17/2017 2.8 (A) 0.86 - 1.14 Final       Recommend warfarin 4 mg today.  Pharmacy will monitor Abdalla M Gerald daily and order warfarin doses to achieve specified goal.      Please contact pharmacy as soon as possible if the warfarin needs to be held for a procedure or if the warfarin goals change.

## 2017-06-20 NOTE — ED NOTES
Pt in for complaints of sob worsening since last night. Was seen at the clinic a few days ago ans started on an abx an prednisone for sx. Not helping per pt.

## 2017-06-20 NOTE — PLAN OF CARE
Face to face report given with opportunity to observe patient.    Report given to Jeanie Solorzano   6/20/2017  6:54 PM

## 2017-06-20 NOTE — IP AVS SNAPSHOT
[facility]    [address] [address]    Phone:  [phone]    Fax:  [phone]                                       After Visit Summary   6/20/2017    Rm Duarte    MRN: [id number]           After Visit Summary Signature Page     I have received my discharge instructions, and my questions have been answered. I have discussed any challenges I see with this plan with the nurse or doctor.    ..........................................................................................................................................  Patient/Patient Representative Signature      ..........................................................................................................................................  Patient Representative Print Name and Relationship to Patient    ..................................................               ................................................  Date                                            Time    ..........................................................................................................................................  Reviewed by Signature/Title    ...................................................              ..............................................  Date                                                            Time

## 2017-06-20 NOTE — PHARMACY
Performist nebs 20 mcg inhalation bid changed to Brovana (Arformoterol) 15 mcg inhalation bid per protocol  June 20, 2017  Sonia Woody

## 2017-06-20 NOTE — MR AVS SNAPSHOT
Rm Duarte   6/20/2017 8:45 AM   Anticoagulation Therapy Visit    Description:  76 year old male   Provider:   ANTI COAGULATION   Department:   Anti Coagulation           INR as of 6/20/2017     Today's INR       Anticoagulation Summary as of 6/20/2017     INR goal 2.0-3.0   Today's INR    Full instructions 6/20: 2.5 mg; 6/22: 2.5 mg; 6/24: 2.5 mg; 6/25: 2.5 mg; Otherwise 2.5 mg on Wed; 5 mg all other days   Next INR check 6/26/2017    Indications   Chronic atrial fibrillation (H) [I48.2]  PVD (peripheral vascular disease) (H) [I73.9]  Long-term (current) use of anticoagulants [Z79.01] [Z79.01]         June 2017 Details    Sun Mon Tue Wed Thu Fri Sat         1               2               3                 4               5               6               7               8               9               10                 11               12               13               14               15               16               17                 18               19               20      2.5 mg   See details      21      2.5 mg         22      2.5 mg         23      5 mg         24      2.5 mg           25      2.5 mg         26            27               28               29               30                 Date Details   06/20 This INR check       Date of next INR:  6/26/2017         How to take your warfarin dose     To take:  2.5 mg Take 0.5 of a 5 mg tablet.    To take:  5 mg Take 1 of the 5 mg tablets.

## 2017-06-20 NOTE — ED NOTES
"Patient reports \"my breathing is much better after the neb.\" Continues on 2L of O2 via NC with sats 96%.   "

## 2017-06-21 VITALS
HEART RATE: 83 BPM | SYSTOLIC BLOOD PRESSURE: 108 MMHG | OXYGEN SATURATION: 96 % | HEIGHT: 71 IN | BODY MASS INDEX: 27.41 KG/M2 | DIASTOLIC BLOOD PRESSURE: 70 MMHG | TEMPERATURE: 97.1 F | WEIGHT: 195.77 LBS | RESPIRATION RATE: 20 BRPM

## 2017-06-21 LAB
ANION GAP SERPL CALCULATED.3IONS-SCNC: 4 MMOL/L (ref 3–14)
BACTERIA SPEC CULT: NORMAL
BASOPHILS # BLD AUTO: 0 10E9/L (ref 0–0.2)
BASOPHILS NFR BLD AUTO: 0.2 %
BUN SERPL-MCNC: 21 MG/DL (ref 7–30)
CALCIUM SERPL-MCNC: 8.6 MG/DL (ref 8.5–10.1)
CHLORIDE SERPL-SCNC: 104 MMOL/L (ref 94–109)
CO2 SERPL-SCNC: 31 MMOL/L (ref 20–32)
CREAT SERPL-MCNC: 1.04 MG/DL (ref 0.66–1.25)
DIFFERENTIAL METHOD BLD: ABNORMAL
EOSINOPHIL # BLD AUTO: 0 10E9/L (ref 0–0.7)
EOSINOPHIL NFR BLD AUTO: 0.2 %
ERYTHROCYTE [DISTWIDTH] IN BLOOD BY AUTOMATED COUNT: 13.2 % (ref 10–15)
GFR SERPL CREATININE-BSD FRML MDRD: 70 ML/MIN/1.7M2
GLUCOSE SERPL-MCNC: 94 MG/DL (ref 70–99)
HCT VFR BLD AUTO: 39.2 % (ref 40–53)
HGB BLD-MCNC: 13 G/DL (ref 13.3–17.7)
IMM GRANULOCYTES # BLD: 0 10E9/L (ref 0–0.4)
IMM GRANULOCYTES NFR BLD: 0.5 %
INR PPP: 2.34 (ref 0.8–1.2)
LYMPHOCYTES # BLD AUTO: 0.6 10E9/L (ref 0.8–5.3)
LYMPHOCYTES NFR BLD AUTO: 9.5 %
MCH RBC QN AUTO: 32.7 PG (ref 26.5–33)
MCHC RBC AUTO-ENTMCNC: 33.2 G/DL (ref 31.5–36.5)
MCV RBC AUTO: 99 FL (ref 78–100)
MICRO REPORT STATUS: NORMAL
MONOCYTES # BLD AUTO: 0.5 10E9/L (ref 0–1.3)
MONOCYTES NFR BLD AUTO: 7.7 %
NEUTROPHILS # BLD AUTO: 4.8 10E9/L (ref 1.6–8.3)
NEUTROPHILS NFR BLD AUTO: 81.9 %
NRBC # BLD AUTO: 0 10*3/UL
NRBC BLD AUTO-RTO: 0 /100
PLATELET # BLD AUTO: 264 10E9/L (ref 150–450)
POTASSIUM SERPL-SCNC: 4.5 MMOL/L (ref 3.4–5.3)
RBC # BLD AUTO: 3.98 10E12/L (ref 4.4–5.9)
SODIUM SERPL-SCNC: 139 MMOL/L (ref 133–144)
SPECIMEN SOURCE: NORMAL
WBC # BLD AUTO: 5.8 10E9/L (ref 4–11)

## 2017-06-21 PROCEDURE — 40000275 ZZH STATISTIC RCP TIME EA 10 MIN

## 2017-06-21 PROCEDURE — 25000132 ZZH RX MED GY IP 250 OP 250 PS 637: Performed by: NURSE PRACTITIONER

## 2017-06-21 PROCEDURE — 94640 AIRWAY INHALATION TREATMENT: CPT

## 2017-06-21 PROCEDURE — 36415 COLL VENOUS BLD VENIPUNCTURE: CPT | Performed by: NURSE PRACTITIONER

## 2017-06-21 PROCEDURE — 25000132 ZZH RX MED GY IP 250 OP 250 PS 637: Performed by: INTERNAL MEDICINE

## 2017-06-21 PROCEDURE — 85610 PROTHROMBIN TIME: CPT | Performed by: NURSE PRACTITIONER

## 2017-06-21 PROCEDURE — G0378 HOSPITAL OBSERVATION PER HR: HCPCS

## 2017-06-21 PROCEDURE — 25000125 ZZHC RX 250: Performed by: NURSE PRACTITIONER

## 2017-06-21 PROCEDURE — 80048 BASIC METABOLIC PNL TOTAL CA: CPT | Performed by: NURSE PRACTITIONER

## 2017-06-21 PROCEDURE — 99217 ZZC OBSERVATION CARE DISCHARGE: CPT | Performed by: NURSE PRACTITIONER

## 2017-06-21 PROCEDURE — 85025 COMPLETE CBC W/AUTO DIFF WBC: CPT | Performed by: NURSE PRACTITIONER

## 2017-06-21 RX ORDER — PREDNISONE 20 MG/1
TABLET ORAL
Qty: 9 TABLET | Refills: 0 | Status: SHIPPED | OUTPATIENT
Start: 2017-06-21 | End: 2017-06-28

## 2017-06-21 RX ORDER — WARFARIN SODIUM 2.5 MG/1
2.5 TABLET ORAL
Status: DISCONTINUED | OUTPATIENT
Start: 2017-06-21 | End: 2017-06-21 | Stop reason: HOSPADM

## 2017-06-21 RX ORDER — LEVOFLOXACIN 500 MG/1
500 TABLET, FILM COATED ORAL DAILY
Qty: 5 TABLET | Refills: 0 | Status: SHIPPED | OUTPATIENT
Start: 2017-06-22 | End: 2017-06-26

## 2017-06-21 RX ADMIN — PREDNISONE 40 MG: 20 TABLET ORAL at 08:18

## 2017-06-21 RX ADMIN — CILOSTAZOL 100 MG: 100 TABLET ORAL at 08:18

## 2017-06-21 RX ADMIN — LOPERAMIDE HYDROCHLORIDE 2 MG: 2 CAPSULE ORAL at 05:02

## 2017-06-21 RX ADMIN — LEVOFLOXACIN 500 MG: 500 TABLET, FILM COATED ORAL at 08:17

## 2017-06-21 RX ADMIN — ASPIRIN 81 MG 81 MG: 81 TABLET ORAL at 08:18

## 2017-06-21 RX ADMIN — IPRATROPIUM BROMIDE AND ALBUTEROL SULFATE 3 ML: .5; 3 SOLUTION RESPIRATORY (INHALATION) at 08:39

## 2017-06-21 RX ADMIN — BUDESONIDE 0.5 MG: 0.5 INHALANT RESPIRATORY (INHALATION) at 08:39

## 2017-06-21 RX ADMIN — ARFORMOTEROL TARTRATE 15 MCG: 15 SOLUTION RESPIRATORY (INHALATION) at 08:40

## 2017-06-21 NOTE — PLAN OF CARE
Problem: Goal Outcome Summary  Goal: Goal Outcome Summary  Outcome: No Change  Patient is alert and oriented. He has DACOSTA. LS with wheezes inspiratory and expiratory however denies the need for a neb. Transfers independently in his room without issue. Denies pain. He has remained free from all falls and/or injuries during shift.     Problem: Acute Respiratory Distress Syndrome (Adult)  Goal: Signs and Symptoms of Listed Potential Problems Will be Absent or Manageable (Acute Respiratory Distress Syndrome)  Signs and symptoms of listed potential problems will be absent or manageable by discharge/transition of care (reference Acute Respiratory Distress Syndrome (Adult) CPG).   Outcome: No Change    06/20/17 1541   Acute Respiratory Distress Syndrome   Problems Assessed (Acute Respiratory Distress Syndrome) hypoxia/hypoxemia   Problems Present (Acute Respiratory Distress Syndrome) hypoxia/hypoxemia         Face to face report given with opportunity to observe patient.    Report given to Nunu Jacinto RN  6/20/2017  11:05 PM

## 2017-06-21 NOTE — PLAN OF CARE
Problem: Patient Goal: Rt Focus  Goal: 1. Patient Goal: RT Focus  Pt weaned to 2lpm nc sats 97%.  BS diminished with exp wheezing.  Nebs given as ordered.

## 2017-06-21 NOTE — PLAN OF CARE
Problem: Patient Goal: Social Work Focus  Goal: 1. Patient Goal: Social Work Focus  Outcome: No Change  Assessment done via flowsheet.     LOC: awake, alert and oriented X3  Others present: no one     Primary PCP: Dr Dominguez Maradiaga  Healthcare directive: says he has one and will bring in a copy for our records     Support system: sister Patt. Does not have any wife/ex-wife nor kids, not in contact with any past friends. He was provided the brochure for the Senior Linkage Line and the [facility] senior resource guide.     Lives at: home, house  With: alone  Equipment used: oxygen at 2 LPM, provided by [employer]; has a nebulizer   Home/[facility] services: none  Pharmacy: [facility] in [place]     Adequate resources for needs (housing, utilities, transportation, food/med): yes     Meds and appointment management: has been able to manage independently     ADL's: independent  Ambulation: independent  Household: independent, but says it is hard for him to keep it clean so it has become a mess, he has also been having a difficult time with yard care. His sister will help him to clean up his house enough so that he can hire someone to come in and clean regularly. He plans to hire someone to do his yard care for him. Does his own shopping and meal prep.      Falls: none  Fall prevention resources given: na     Stressors: none voiced     Mental health or substance abuse: no concerns expressed, he has never worked with a counselor. He does not smoke, drinks beer about 1X week, and does not use drugs        [place]: he is a , but [place] not [facility] with the [facility]. He was provided the application for [employer] benefits and the number for the local [facility]  VA referral line called: na     Transportation: he drives, will have his sister drive him home from the hospital     ZINA: none     Goal: return home     Barriers: none

## 2017-06-21 NOTE — PLAN OF CARE
Face to face report given with opportunity to observe patient.    Report given to Fatimah Solorzano   6/20/2017  7:06 PM

## 2017-06-21 NOTE — PLAN OF CARE
Problem: Goal Outcome Summary  Goal: Goal Outcome Summary  Outcome: No Change  Continues to be dyspneic with exertion; takes a long time to catch breath after ambulating to bathroom and back.  LS are very diminished throughout with expiratory wheezes heard posteriorly.  Did have one episode of diarrhea on nights but did not take any imodium since evening shift.  IV patent and SL'd.  Is alert and oriented and able to make needs known.       Problem: Acute Respiratory Distress Syndrome (Adult)  Goal: Signs and Symptoms of Listed Potential Problems Will be Absent or Manageable (Acute Respiratory Distress Syndrome)  Signs and symptoms of listed potential problems will be absent or manageable by discharge/transition of care (reference Acute Respiratory Distress Syndrome (Adult) CPG).   Outcome: No Change    06/21/17 0010   Acute Respiratory Distress Syndrome   Problems Assessed (Acute Respiratory Distress Syndrome) hypoxia/hypoxemia   Problems Present (Acute Respiratory Distress Syndrome) hypoxia/hypoxemia

## 2017-06-21 NOTE — PHARMACY
[facility]    Pharmacy      Antimicrobial Stewardship Note     Current antimicrobial therapy:  Anti-infectives (Future)    Start     Dose/Rate Route Frequency Ordered Stop    06/20/17 1330  levofloxacin (LEVAQUIN) tablet 500 mg      500 mg Oral DAILY 06/20/17 1319            Indication: COPD exacerbation     Pertinent labs:  Creatinine   Creatinine   Date Value Ref Range Status   06/21/2017 1.04 0.66 - 1.25 mg/dL Final   06/20/2017 1.12 0.66 - 1.25 mg/dL Final   10/13/2015 0.94 0.66 - 1.25 mg/dL Final     WBC   WBC   Date Value Ref Range Status   06/21/2017 5.8 4.0 - 11.0 10e9/L Final   06/20/2017 8.5 4.0 - 11.0 10e9/L Final   05/19/2014 4.4 4.0 - 11.0 10e9/L Final     ANC No components found for: ANC     Culture Results:     Active MRSA Surveillance Culture [[id number]] Collected: 06/20/17 1232       Order Status: Completed Specimen: Swab from Nose Updated: 06/21/17 0715        Specimen Description Nares        Culture Micro No MRSA isolated        Micro Report Status FINAL 06/21/2017           Recommendations/Interventions:  1. No recommendations at this time, medication therapy appropriate.    Walt Valiente RPH  June 21, 2017

## 2017-06-21 NOTE — PLAN OF CARE
Face to face report given with opportunity to observe patient.    Report given to RADHA Silva   6/21/2017  8:01 AM

## 2017-06-21 NOTE — PLAN OF CARE
"[facility] Observation Note:  Patient is registered to observation and is in 3212/3212-1 at approximately 1225 via cart accompanied by transport tech from emergency room . Report received from [place] in SBAR format at 1200 via telephone. Patient transferred to bed via self.. Patient is alert and oriented X 3, denies pain; rates at 0 on 0-10 scale.  Patient oriented to room, unit, hourly rounding, and plan of care. Explained the admission packet including \"What is Observation Status\" and patient handbook with patient bill of rights brochure. Will continue to monitor and document as needed.  Nursing Observation criteria listed below was met:    Health care directives status obtained and documented: No    Care Everywhere authorization completed No      MRSA swab completed for patient 65 years and older: Yes      If initial lactic acid >2.0, repeat lactic acid drawn within one hour of arrival to unit: NA. If no, state reason: NA      Patient identifies a surrogate decision maker: NO    Vaccination assessment and education completed: Yes   Vaccinations received prior to hospitalization: Pneumovax yes  Influenza(seasonal)  N/A   Vaccination(s) ordered: NA      Skin issues/needs documented:NA    Isolation Patient: no Education given and documented, correct sign in place and documentation row added to PCS:  No      Fall Prevention: Observation fall risk completed:  No Education given and documented, sticker and magnet in place: N/A      General Care Plan initiated with observation goal(s): Yes    Education (including assessment) Documented: Yes    New medication information given to patient and documented: No    Patient elects to use own medications from home during hospitalization:  No If yes, a MD order was obtained to use Medications from Home:  No and home medications were sent to Pharmacy for verification for use during hospitalization: No    Patient has discharge needs (If yes, please explain): No      "

## 2017-06-21 NOTE — PROGRESS NOTES
Name: Rm Duarte    MRN#: [id number]    Reason for Hospitalization: COPD exacerbation (H) [J44.1]    ZINA: none    Discharge Date: 6/21/2017    Patient / Family response to discharge plan: he independently developed his plan to return home    Follow-Up Appt: Future Appointments  Date Time Provider Department Center   6/28/2017 11:15 AM Dominguez Maradiaga MD [facility]       Other Providers (Care Coordinator, County Services, PCA services etc): No    Discharge Disposition: home    Joann Killian RN

## 2017-06-21 NOTE — PLAN OF CARE
Patient discharged at 1:15 PM via wheel chair accompanied by sister and staff. Prescriptions sent to patients preferred pharmacy. All belongings sent with patient.     Discharge instructions reviewed with patient . Listed belongings gathered and returned to patient.     Patient discharged to home .       Core Measures and Vaccines  Core Measures applicable during stay: No.   Pneumonia and Influenza given prior to discharge, if indicated: N/A    Surgical Patient   Surgical Procedures during stay: NA  Did patient receive discharge instruction on wound care and recognition of infection symptoms? N/A    MISC  Follow up appointment made:  Yes  Home and hospital aquired medications returned to patient: N/A  Patient reports pain was well managed at discharge: Yes

## 2017-06-21 NOTE — PLAN OF CARE
Problem: Patient Goal: Rt Focus  Goal: 1. Patient Goal: RT Focus  Outcome: No Change  Patient given nebs as needed for shortness of breath.  95% on 2lpm nc.  Breath sounds expiratory wheezes bilat.  npc.

## 2017-06-21 NOTE — PLAN OF CARE
Face to face report given with opportunity to observe patient.    Report given to June Moran RN  6/21/2017  10:45 AM

## 2017-06-21 NOTE — DISCHARGE SUMMARY
"[facility]    Discharge Summary  Hospitalist    Date of Admission:  6/20/2017  Date of Discharge:  6/21/2017  1:15 PM  Discharging Provider: Kristie Lynch CNP  Date of Service (when I saw the patient): 06/21/17    Discharge Diagnoses   Principal Problem:    COPD exacerbation (H)  Active Problems:    Long-term (current) use of anticoagulants [Z79.01]    Chronic atrial fibrillation (H)    Arteriosclerotic cardiovascular disease (ASCVD)    Obstructive chronic bronchitis with exacerbation (H)    PVD (peripheral vascular disease) (H)    History of Present Illness   Rm Duarte is a 76 year old male who presents with increasing dyspnea over the prior 6 days. He reports he noticed increased shortness of breath first on 6/14 and he promptly got in to see his PCP the next day. He was started on bactrim BID and given IM steroids and oral prednisone taper. He has not noted any improvement in his shortness of breath since starting treatment and was concerned so came in to be evaluated again. He notes audible wheezing with ambulation as a feeling of decreased sputum production due to increased tenacity with change os sputum color form white to tan/yellow. He denies any orthopnea, has been able to sleep lying down. Mild tight cough. He reports that he noted having \"sweats\" on and off for the first two days after seeing Dr. Maradiaga but denies chills and does not think he was running a fever at anytime. He presented to the ED and was able to maintain his saturations on 2 liters/nc oxygen until he got up to ambulate at which time he dropped into the mid 70's. He is placed in hospital for further observation and treatment.    Hospital Course   Rm Duarte was admitted on 6/20/2017.  The following problems were addressed during his hospitalization:  COPD exacerbation (H): Patient is on chronic daily azithromycin treatment. He was started on bactrim by PCP 6/15/17. He continues his normal respiratory treatments " and inhalers. He is on 2 liters oxygen chronically, he states he has not increased his dose or amount of time he wears this at home. His actually order in PRN, however he states he wears it continuously for the last several months. He reported his sputum has become more tenacious and he feels more difficult to clear. Stopped bactrim and started on Levaquin and continued oral steroid at starting dose of 40 mg daily with regular taper.He received IV decadron in the ED x1 dose. Oxygen remained at 2 liters. This morning he feels his cough has loosened up and breathing is easier.  He has known decreased sats with any activity down to 86%-90% per last pulmonology note,however on admission they were running in low to mid 70's with minimal exertion. This morning he is able to walk the length of the hallway with brief decrease down to 88% with frequent rest periods.  CXR shows no consolidation or marah infiltrate, does show stable interstitial disease. He feels safe to manage his symptoms at home. Discharged to finish course of Levaquin and taper prednisone. He is instructed to hold azithromycin while taking Levaquin then restart.        Long-term (current) use of anticoagulants [Z79.01]: Expect increased INr level with use of Levaquin, instructed to take 2.5mg dose today and tomorrow and follow-up with INR 6/23 AM.       Chronic atrial fibrillation (H): EKG from ED showing NSR with PVCs. Rate well controlled.        Arteriosclerotic cardiovascular disease (ASCVD): As above. He is on a statin, will continue on discharge.        PVD (peripheral vascular disease) (H): Continue Pletal, ASA,coumadin as at home.       Kristie Lynch, CNP    Pending Results     Unresulted Labs Ordered in the Past 30 Days of this Admission     No orders found for last 61 day(s).          Code Status   DNR / DNI       Primary Care Physician   Dominugez Maradiaga          Discharge Disposition   Discharged to home  Condition at discharge:  Stable    Consultations This Hospital Stay   PHARMACY TO DOSE WARFARIN    Time Spent on this Encounter   I, Kristie Lynch, personally saw the patient today and spent less than or equal to 30 minutes discharging this patient.    Discharge Orders     Reason for your hospital stay   COPD exacerbation     Follow-up and recommended labs and tests    Follow up with primary care provider, Dominguez Maradiaga, within 7 days for hospital follow- up.  No follow up labs or test are needed.  Follow-up with Coumadin clinic with INR on 6/23/17 for coumadin dosing.     Activity   Your activity upon discharge: activity as tolerated     When to contact your care team   Return to be seen if you develop worsening shortness of breath, chest pain or any other worsening symptoms.     Discharge Instructions   Take 2.5mg of Coumadin tonight and tomorrow (6/22) and follow-up with Coumadin Clinic for INR on 6/23.  Do not take your Azithromycin while taking Levaquin. Return to normal dosing once Levaquin is complete.     DNR/DNI     Diet   Follow this diet upon discharge: Orders Placed This Encounter     Combination Diet 2 gm NA Diet       Discharge Medications   Current Discharge Medication List      START taking these medications    Details   levofloxacin (LEVAQUIN) 500 MG tablet Take 1 tablet (500 mg) by mouth daily for 5 days  Qty: 5 tablet, Refills: 0    Associated Diagnoses: Obstructive chronic bronchitis with exacerbation (H)         CONTINUE these medications which have CHANGED    Details   predniSONE (DELTASONE) 20 MG tablet 2 tabs (40 mg) daily x 2 days, 1 tab (20 mg) daily x 3 days, then 1/2 tab (10 mg) x 3 days.  Qty: 9 tablet, Refills: 0    Associated Diagnoses: COPD exacerbation (H)         CONTINUE these medications which have NOT CHANGED    Details   !! warfarin (COUMADIN) 5 MG tablet TAKE 1/2 A TABLET BY MOUTH ON WEDNESDAY AND 1 TABLET ALL OTHER DAYS OR AS DIRECTED  Qty: 90 tablet, Refills: 0    Associated Diagnoses:  Chronic atrial fibrillation (H)      !! warfarin (COUMADIN) 5 MG tablet Take 2.5mg (1/2 pill) Wed; 5mg (1 pill) all other days or as directed by [facility]  Qty: 90 tablet, Refills: 4    Associated Diagnoses: Chronic atrial fibrillation (H)      azithromycin (ZITHROMAX) 250 MG tablet Take one tablet daily.  Qty: 30 tablet, Refills: 5    Associated Diagnoses: Other emphysema (H)      simvastatin (ZOCOR) 10 MG tablet TAKE ONE TABLET BY MOUTH AT BEDTIME  Qty: 30 tablet, Refills: 0    Comments: DUE FOR FOLLOW UP  Associated Diagnoses: PVD (peripheral vascular disease) (H)      cilostazol (PLETAL) 100 MG tablet TAKE ONE TABLET BY MOUTH TWICE DAILY  Qty: 180 tablet, Refills: 3    Associated Diagnoses: PVD (peripheral vascular disease) (H)      COMBIVENT RESPIMAT  MCG/ACT inhaler INHALE ONE PUFF BY MOUTH 4 TIMES DAILY *MAX 6 DOSES PER DAY*  Qty: 12 g, Refills: 0    Associated Diagnoses: Chronic obstructive pulmonary disease, unspecified COPD type (H)      ipratropium - albuterol 0.5 mg/2.5 mg/3 mL (DUONEB) 0.5-2.5 (3) MG/3ML nebulization USE ONE AMPULE IN NEBULIZER EVERY 6 HOURS AS NEEDED FOR SHORTNESS OF BREATH /DYSPNEA  OR  WHEEZING  Qty: 360 vial, Refills: 3    Associated Diagnoses: COPD (chronic obstructive pulmonary disease) (H)      PERFOROMIST 20 MCG/2ML nebulizer solution USE ONE  IN NEBULIZER EVERY 12 HOURS  Qty: 360 mL, Refills: 3    Associated Diagnoses: Other emphysema (H)      tamsulosin (FLOMAX) 0.4 MG 24 hr capsule Take 1 capsule (0.4 mg) by mouth daily  Qty: 30 capsule, Refills: 1    Associated Diagnoses: BPH (benign prostatic hyperplasia)      budesonide POWD 1 puff 2 times daily Inhales 0.25mcg BID    Associated Diagnoses: Long term (current) use of anticoagulants      Multiple Vitamins-Minerals (CENTRUM SILVER) per tablet Take 1 tablet by mouth daily.      aspirin (ASPIRIN LOW DOSE) 81 MG tablet Take 1 tablet by mouth daily.      loperamide (IMODIUM A-D) 2 MG tablet Take 2 mg by mouth 4  times daily as needed. Take 2 tablet by oral route after 1st loose stool and 1 tablet after each subsequent bowel movement; do not exceed 16 mg in 24 hrs. As needed.       !! - Potential duplicate medications found. Please discuss with provider.      STOP taking these medications       sulfamethoxazole-trimethoprim (BACTRIM DS/SEPTRA DS) 800-160 MG per tablet Comments:   Reason for Stopping:             Allergies   No Known Allergies  Data   Most Recent 3 CBC's:  Recent Labs   Lab Test  06/21/17   0515  06/20/17   0931  05/19/14   0933   WBC  5.8  8.5  4.4   HGB  13.0*  14.1  15.6   MCV  99  100  93   PLT  264  308  277      Most Recent 3 BMP's:  Recent Labs   Lab Test  06/21/17   0515  06/20/17   0931  10/13/15   0854   NA  139  137  141   POTASSIUM  4.5  4.5  4.1   CHLORIDE  104  103  107   CO2  31  29  28   BUN  21  24  17   CR  1.04  1.12  0.94   ANIONGAP  4  5  6   KENDRICK  8.6  8.3*  8.2*   GLC  94  105*  104*     Most Recent 2 LFT's:  Recent Labs   Lab Test  06/20/17   0931  10/13/15   0854   AST  18  17   ALT  31  17   ALKPHOS  78  66   BILITOTAL  0.3  0.3     Most Recent INR's and Anticoagulation Dosing History:  Anticoagulation Dose History     Recent Dosing and Labs Latest Ref Rng & Units 1/4/2017 2/15/2017 2/22/2017 4/5/2017 5/17/2017 6/20/2017 6/21/2017    Warfarin 2 mg - - - - - - 4 mg -    INR 0.80 - 1.20 - - - - - - 2.34(H)    INR 0.86 - 1.14 1.8(A) 4.9(A) 1.5(A) 2.7(A) 2.8(A) 2.2(A) -    INR Point of Care 0.86 - 1.14 - - - - - - -        Most Recent 3 Troponin's:  Recent Labs   Lab Test  06/20/17 0931   TROPI  <0.015  The 99th percentile for upper reference range is 0.045 ug/L.  Troponin values in   the range of 0.045 - 0.120 ug/L may be associated with risks of adverse   clinical events.       Most Recent Cholesterol Panel:  Recent Labs   Lab Test  10/13/15   0854   CHOL  120   LDL  52   HDL  53   TRIG  73     Most Recent 6 Bacteria Isolates From Any Culture (See EPIC Reports for Culture  Details):  Recent Labs   Lab Test  06/20/17   1232   CULT  No MRSA isolated     Most Recent TSH, T4 and A1c Labs:No lab results found.  Results for orders placed or performed during the hospital encounter of 06/20/17   XR Chest 2 Views    Narrative    CHEST TWO VIEWS    REPORT:  There is some interstitial thickening seen at the lung bases  which is stable from 2015.  There is an irregular distribution of  pulmonary vascularity suggestive of emphysema.  Heart is normal in  size.    IMPRESSION:  INTERSTITIAL THICKENING WITHOUT CHANGE.  Exam Date: Jun 20, 2017 10:50:05 AM  Author: CRUZITO LEVY  This report is final and signed

## 2017-06-22 NOTE — PHARMACY-ANTICOAGULATION SERVICE
Clinical Pharmacy- Warfarin Discharge Note  This patient is currently on warfarin for the treatment of Atrial fibrillation.  INR Goal= 2-3  Expected length of therapy     Anticoagulation Dose History     Recent Dosing and Labs Latest Ref Rng & Units 1/4/2017 2/15/2017 2/22/2017 4/5/2017 5/17/2017 6/20/2017 6/21/2017    Warfarin 2 mg - - - - - - 4 mg -    INR 0.80 - 1.20 - - - - - - 2.34(H)    INR 0.86 - 1.14 1.8(A) 4.9(A) 1.5(A) 2.7(A) 2.8(A) 2.2(A) -    INR Point of Care 0.86 - 1.14 - - - - - - -          Vitamin K doses administered during the last 7 days: 0  FFP administered during the last 7 days: 0  Recommend discharging the patient on a warfarin regimen of 2.5 mg for 2 dosed then follow up with [facility] on 6/23. Info phoned to Zena from [employer] student      The patient should have an INR checked 6-23-17

## 2017-06-23 ENCOUNTER — TELEPHONE (OUTPATIENT)
Dept: CASE MANAGEMENT | Facility: HOSPITAL | Age: 77
End: 2017-06-23

## 2017-06-23 ENCOUNTER — ANTICOAGULATION THERAPY VISIT (OUTPATIENT)
Dept: ANTICOAGULATION | Facility: OTHER | Age: 77
End: 2017-06-23

## 2017-06-23 DIAGNOSIS — Z79.01 LONG-TERM (CURRENT) USE OF ANTICOAGULANTS: ICD-10-CM

## 2017-06-23 DIAGNOSIS — I48.20 CHRONIC ATRIAL FIBRILLATION (H): ICD-10-CM

## 2017-06-23 DIAGNOSIS — I73.9 PVD (PERIPHERAL VASCULAR DISEASE) (H): ICD-10-CM

## 2017-06-23 LAB — INR BLD: 1.9 (ref 0.86–1.14)

## 2017-06-23 PROCEDURE — 36416 COLLJ CAPILLARY BLOOD SPEC: CPT | Mod: ZL | Performed by: FAMILY MEDICINE

## 2017-06-23 PROCEDURE — 85610 PROTHROMBIN TIME: CPT | Mod: QW,ZL | Performed by: FAMILY MEDICINE

## 2017-06-23 NOTE — MR AVS SNAPSHOT
Rm Duarte   6/23/2017   Anticoagulation Therapy Visit    Description:  76 year old male   Provider:  Dominguez Maradiaga MD   Department:  Hc Anti Coagulation           INR as of 6/23/2017     Today's INR 1.9!      Anticoagulation Summary as of 6/23/2017     INR goal 2.0-3.0   Today's INR 1.9!   Full instructions 6/23: 2.5 mg; 6/24: 2.5 mg; 6/25: 2.5 mg; Otherwise 2.5 mg on Wed; 5 mg all other days   Next INR check 6/30/2017    Indications   Chronic atrial fibrillation (H) [I48.2]  PVD (peripheral vascular disease) (H) [I73.9]  Long-term (current) use of anticoagulants [Z79.01] [Z79.01]         June 2017 Details    Sun Mon Tue Wed Thu Fri Sat         1               2               3                 4               5               6               7               8               9               10                 11               12               13               14               15               16               17                 18               19               20               21               22               23      2.5 mg   See details      24      2.5 mg           25      2.5 mg         26      5 mg         27      5 mg         28      2.5 mg         29      5 mg         30              Date Details   06/23 This INR check       Date of next INR:  6/30/2017         How to take your warfarin dose     To take:  2.5 mg Take 0.5 of a 5 mg tablet.    To take:  5 mg Take 1 of the 5 mg tablets.

## 2017-06-23 NOTE — PROGRESS NOTES
ANTICOAGULATION FOLLOW-UP CLINIC VISIT    Patient Name:  Rm Duarte  Date:  6/23/2017  Contact Type:  Telephone    SUBJECTIVE:     Patient Findings     Comments Done with levaquin on 6/26/17, done with prednisone on the 29th,  Will resume azithromycin on 6/26/17           OBJECTIVE    INR Point of Care   Date Value Ref Range Status   06/23/2017 1.9 (H) 0.86 - 1.14 Final     Comment:     This test is intended for monitoring Coumadin therapy.  Results are not   accurate   in patients with prolonged INR due to factor deficiency.         ASSESSMENT / PLAN  INR assessment THER    Recheck INR In: 1 WEEK    INR Location Clinic      Anticoagulation Summary as of 6/23/2017     INR goal 2.0-3.0   Today's INR 1.9!   Maintenance plan 2.5 mg (5 mg x 0.5) on Wed; 5 mg (5 mg x 1) all other days   Full instructions 6/23: 2.5 mg; 6/24: 2.5 mg; 6/25: 2.5 mg; Otherwise 2.5 mg on Wed; 5 mg all other days   Weekly total 32.5 mg   Plan last modified Zena Burden RN (1/4/2017)   Next INR check 6/30/2017   Priority INR   Target end date Indefinite    Indications   Chronic atrial fibrillation (H) [I48.2]  PVD (peripheral vascular disease) (H) [I73.9]  Long-term (current) use of anticoagulants [Z79.01] [Z79.01]         Anticoagulation Episode Summary     INR check location     Preferred lab     Send INR reminders to  ANTICOAG POOL    Comments takes Azithromycin 250mg daily long term      Anticoagulation Care Providers     Provider Role Specialty Phone number    Dominguez Maradiaga MD [facility] [phone]            See the Encounter Report to view Anticoagulation Flowsheet and Dosing Calendar (Go to Encounters tab in chart review, and find the Anticoagulation Therapy Visit)        Zena Haas, RN

## 2017-06-26 ENCOUNTER — TELEPHONE (OUTPATIENT)
Dept: CASE MANAGEMENT | Facility: HOSPITAL | Age: 77
End: 2017-06-26

## 2017-06-26 ENCOUNTER — ANTICOAGULATION THERAPY VISIT (OUTPATIENT)
Dept: ANTICOAGULATION | Facility: OTHER | Age: 77
End: 2017-06-26

## 2017-06-26 ENCOUNTER — TELEPHONE (OUTPATIENT)
Dept: FAMILY MEDICINE | Facility: OTHER | Age: 77
End: 2017-06-26

## 2017-06-26 DIAGNOSIS — I73.9 PVD (PERIPHERAL VASCULAR DISEASE) (H): ICD-10-CM

## 2017-06-26 DIAGNOSIS — I48.20 CHRONIC ATRIAL FIBRILLATION (H): ICD-10-CM

## 2017-06-26 DIAGNOSIS — Z79.01 LONG-TERM (CURRENT) USE OF ANTICOAGULANTS: ICD-10-CM

## 2017-06-26 DIAGNOSIS — J44.1 OBSTRUCTIVE CHRONIC BRONCHITIS WITH EXACERBATION (H): ICD-10-CM

## 2017-06-26 RX ORDER — LEVOFLOXACIN 500 MG/1
500 TABLET, FILM COATED ORAL DAILY
Qty: 5 TABLET | Refills: 0 | Status: SHIPPED | OUTPATIENT
Start: 2017-06-26 | End: 2017-07-05

## 2017-06-26 RX ORDER — SIMVASTATIN 10 MG
TABLET ORAL
Qty: 30 TABLET | Refills: 0 | Status: SHIPPED | OUTPATIENT
Start: 2017-06-26 | End: 2017-07-28

## 2017-06-26 NOTE — MR AVS SNAPSHOT
Rm Duarte   6/26/2017   Anticoagulation Therapy Visit    Description:  76 year old male   Provider:  Dominguez Maradiaga MD   Department:  Hc Anti Coagulation           INR as of 6/26/2017     Today's INR No new INR was available at the time of this encounter.      Anticoagulation Summary as of 6/26/2017     INR goal 2.0-3.0   Today's INR No new INR was available at the time of this encounter.   Full instructions 6/26: 2.5 mg; 6/27: 2.5 mg; Otherwise 2.5 mg on Wed; 5 mg all other days   Next INR check 6/30/2017    Indications   Chronic atrial fibrillation (H) [I48.2]  PVD (peripheral vascular disease) (H) [I73.9]  Long-term (current) use of anticoagulants [Z79.01] [Z79.01]         June 2017 Details    Sun Mon Tue Wed Thu Fri Sat         1               2               3                 4               5               6               7               8               9               10                 11               12               13               14               15               16               17                 18               19               20               21               22               23               24                 25               26      2.5 mg   See details      27      2.5 mg         28      2.5 mg         29      5 mg         30              Date Details   06/26 This INR check       Date of next INR:  6/30/2017         How to take your warfarin dose     To take:  2.5 mg Take 0.5 of a 5 mg tablet.    To take:  5 mg Take 1 of the 5 mg tablets.

## 2017-06-26 NOTE — PROGRESS NOTES
ANTICOAGULATION FOLLOW-UP CLINIC VISIT    Patient Name:  Rm Duarte  Date:  6/26/2017  Contact Type:  Telephone    SUBJECTIVE:     Patient Findings     Positives Antibiotic use or infection (Levaquin 500mg  X 5 days)           OBJECTIVE    INR Point of Care   Date Value Ref Range Status   06/23/2017 1.9 (H) 0.86 - 1.14 Final     Comment:     This test is intended for monitoring Coumadin therapy.  Results are not   accurate   in patients with prolonged INR due to factor deficiency.         ASSESSMENT / PLAN  INR assessment THER NO INR DONE   Recheck INR In: 4 DAYS    INR Location Clinic      Anticoagulation Summary as of 6/26/2017     INR goal 2.0-3.0   Today's INR    Maintenance plan 2.5 mg (5 mg x 0.5) on Wed; 5 mg (5 mg x 1) all other days   Full instructions 6/26: 2.5 mg; 6/27: 2.5 mg; Otherwise 2.5 mg on Wed; 5 mg all other days   Weekly total 32.5 mg   Plan last modified Zena Burden RN (1/4/2017)   Next INR check 6/30/2017   Priority INR   Target end date Indefinite    Indications   Chronic atrial fibrillation (H) [I48.2]  PVD (peripheral vascular disease) (H) [I73.9]  Long-term (current) use of anticoagulants [Z79.01] [Z79.01]         Anticoagulation Episode Summary     INR check location     Preferred lab     Send INR reminders to  ANTICOAG POOL    Comments takes Azithromycin 250mg daily long term      Anticoagulation Care Providers     Provider Role Specialty Phone number    Dominguez Maradiaga MD [facility] [phone]            See the Encounter Report to view Anticoagulation Flowsheet and Dosing Calendar (Go to Encounters tab in chart review, and find the Anticoagulation Therapy Visit)    Warfarin dosing and INR recheck date discussed via telephone with pt this date.    LAZ DAVID RN

## 2017-06-26 NOTE — TELEPHONE ENCOUNTER
Sent.  Have him stay off the azithro still, and I will see him soon.  Thanks.Dominguez Maradiaga

## 2017-06-28 ENCOUNTER — OFFICE VISIT (OUTPATIENT)
Dept: FAMILY MEDICINE | Facility: OTHER | Age: 77
End: 2017-06-28
Attending: FAMILY MEDICINE
Payer: COMMERCIAL

## 2017-06-28 ENCOUNTER — ANTICOAGULATION THERAPY VISIT (OUTPATIENT)
Dept: ANTICOAGULATION | Facility: OTHER | Age: 77
End: 2017-06-28

## 2017-06-28 VITALS
OXYGEN SATURATION: 93 % | DIASTOLIC BLOOD PRESSURE: 56 MMHG | HEIGHT: 71 IN | HEART RATE: 71 BPM | TEMPERATURE: 98.1 F | SYSTOLIC BLOOD PRESSURE: 108 MMHG | BODY MASS INDEX: 27.3 KG/M2 | RESPIRATION RATE: 20 BRPM | WEIGHT: 195 LBS

## 2017-06-28 DIAGNOSIS — I73.9 PVD (PERIPHERAL VASCULAR DISEASE) (H): ICD-10-CM

## 2017-06-28 DIAGNOSIS — I48.20 CHRONIC ATRIAL FIBRILLATION (H): ICD-10-CM

## 2017-06-28 DIAGNOSIS — Z79.01 LONG-TERM (CURRENT) USE OF ANTICOAGULANTS: ICD-10-CM

## 2017-06-28 DIAGNOSIS — J44.1 COPD EXACERBATION (H): Primary | ICD-10-CM

## 2017-06-28 DIAGNOSIS — Z71.89 ACP (ADVANCE CARE PLANNING): Chronic | ICD-10-CM

## 2017-06-28 PROCEDURE — 96372 THER/PROPH/DIAG INJ SC/IM: CPT | Performed by: FAMILY MEDICINE

## 2017-06-28 PROCEDURE — 99214 OFFICE O/P EST MOD 30 MIN: CPT | Performed by: FAMILY MEDICINE

## 2017-06-28 PROCEDURE — 99212 OFFICE O/P EST SF 10 MIN: CPT | Mod: 25

## 2017-06-28 RX ORDER — PREDNISONE 20 MG/1
TABLET ORAL
Qty: 20 TABLET | Refills: 0 | Status: SHIPPED | OUTPATIENT
Start: 2017-06-28 | End: 2017-07-13 | Stop reason: DRUGHIGH

## 2017-06-28 RX ORDER — METHYLPREDNISOLONE SODIUM SUCCINATE 125 MG/2ML
125 INJECTION, POWDER, LYOPHILIZED, FOR SOLUTION INTRAMUSCULAR; INTRAVENOUS ONCE
Qty: 2 ML | Refills: 0 | OUTPATIENT
Start: 2017-06-28 | End: 2017-06-28

## 2017-06-28 ASSESSMENT — ANXIETY QUESTIONNAIRES
6. BECOMING EASILY ANNOYED OR IRRITABLE: NOT AT ALL
7. FEELING AFRAID AS IF SOMETHING AWFUL MIGHT HAPPEN: NOT AT ALL
1. FEELING NERVOUS, ANXIOUS, OR ON EDGE: NOT AT ALL
2. NOT BEING ABLE TO STOP OR CONTROL WORRYING: NOT AT ALL
3. WORRYING TOO MUCH ABOUT DIFFERENT THINGS: NOT AT ALL
5. BEING SO RESTLESS THAT IT IS HARD TO SIT STILL: NOT AT ALL
GAD7 TOTAL SCORE: 0

## 2017-06-28 ASSESSMENT — PATIENT HEALTH QUESTIONNAIRE - PHQ9: 5. POOR APPETITE OR OVEREATING: NOT AT ALL

## 2017-06-28 ASSESSMENT — PAIN SCALES - GENERAL: PAINLEVEL: NO PAIN (0)

## 2017-06-28 NOTE — PROGRESS NOTES
ANTICOAGULATION FOLLOW-UP CLINIC VISIT    Patient Name:  Rm Duarte  Date:  6/28/2017  Contact Type:  Telephone    SUBJECTIVE:     Patient Findings     Positives Change in medications    Comments Increased dose of prednisone again.  60mg x 3 days, 40mg x 3 days, 20mg x 3 days, 10mg x 3 days..  3 days left of levaquin and then back to zithromax, 1 tab daily           OBJECTIVE    INR Point of Care   Date Value Ref Range Status   06/23/2017 1.9 (H) 0.86 - 1.14 Final     Comment:     This test is intended for monitoring Coumadin therapy.  Results are not   accurate   in patients with prolonged INR due to factor deficiency.         ASSESSMENT / PLAN  No question data found.  Anticoagulation Summary as of 6/28/2017     INR goal 2.0-3.0   Today's INR    Maintenance plan 2.5 mg (5 mg x 0.5) on Wed; 5 mg (5 mg x 1) all other days   Full instructions 6/29: 2.5 mg; 6/30: 2.5 mg; 7/1: 2.5 mg; 7/2: 2.5 mg; 7/4: 2.5 mg; 7/6: 2.5 mg; Otherwise 2.5 mg on Wed; 5 mg all other days   Weekly total 32.5 mg   Plan last modified Zena Burden RN (1/4/2017)   Next INR check 7/7/2017   Priority INR   Target end date Indefinite    Indications   Chronic atrial fibrillation (H) [I48.2]  PVD (peripheral vascular disease) (H) [I73.9]  Long-term (current) use of anticoagulants [Z79.01] [Z79.01]         Anticoagulation Episode Summary     INR check location     Preferred lab     Send INR reminders to  ANTICO POOL    Comments takes Azithromycin 250mg daily long term      Anticoagulation Care Providers     Provider Role Specialty Phone number    Dominguez Maradiaga MD [facility] [phone]            See the Encounter Report to view Anticoagulation Flowsheet and Dosing Calendar (Go to Encounters tab in chart review, and find the Anticoagulation Therapy Visit)        Zena Haas RN

## 2017-06-28 NOTE — PROGRESS NOTES
Rm Duarte    June 28, 2017    Chief Complaint   Patient presents with     Hospital F/U     admit on 6/20/17 and d/c on 6/21/17 for COPD exacerbation     *_* Health Care Directive *_*     has paperwork at home       SUBJECTIVE:  Here for f/u.  Was doing a bit better, but now doing a bit worse.  In hospital for copd exaccerbation.  Now, quite a bit worse.  He only has one more day of steroid left.  I extended the levaquin over the phone a couple of days ago.  No side effects there.   Wondering what to do.  It is getting harder to breathe.      Past Medical History:   Diagnosis Date     Atrial fibrillation (H) 3/23/2012     COPD 5/16/2011     PVD (peripheral vascular disease) (H)        Past Surgical History:   Procedure Laterality Date     COPD:FEV1-0.74/FVC-3.35 22%, DLCO-28%  3/1/2010    Severe COPD; 4/23/2008 PFT's done : 0.96/3.81 25% Fev1, DLCO 45%.  1991 were 4.31/6.20!!!!!!!??     ECHO: TDS, EF~50%, abnl septum, o/w NORMAL  5/20/2011     PAD: CTA> occlusion of L mid SFA with reconstitution  12/2/2010     S/P colonoscopy: tubular adenoma & tics  5/4/2010    Repeat in 2014; Dr Graham     Tobacco Abuse Ongoing         Current Outpatient Prescriptions   Medication Sig Dispense Refill     predniSONE (DELTASONE) 20 MG tablet Take 3 tabs (60 mg) by mouth daily x 3 days, 2 tabs (40 mg) daily x 3 days, 1 tab (20 mg) daily x 3 days, then 1/2 tab (10 mg) x 3 days. 20 tablet 0     simvastatin (ZOCOR) 10 MG tablet TAKE ONE TABLET BY MOUTH AT BEDTIME 30 tablet 0     levofloxacin (LEVAQUIN) 500 MG tablet Take 1 tablet (500 mg) by mouth daily 5 tablet 0     predniSONE (DELTASONE) 20 MG tablet 2 tabs (40 mg) daily x 2 days, 1 tab (20 mg) daily x 3 days, then 1/2 tab (10 mg) x 3 days. 9 tablet 0     warfarin (COUMADIN) 5 MG tablet TAKE 1/2 A TABLET BY MOUTH ON WEDNESDAY AND 1 TABLET ALL OTHER DAYS OR AS DIRECTED 90 tablet 0     warfarin (COUMADIN) 5 MG tablet Take 2.5mg (1/2 pill) Wed; 5mg (1 pill) all other days or as  directed by [facility] 90 tablet 4     cilostazol (PLETAL) 100 MG tablet TAKE ONE TABLET BY MOUTH TWICE DAILY 180 tablet 3     COMBIVENT RESPIMAT  MCG/ACT inhaler INHALE ONE PUFF BY MOUTH 4 TIMES DAILY *MAX 6 DOSES PER DAY* 12 g 0     ipratropium - albuterol 0.5 mg/2.5 mg/3 mL (DUONEB) 0.5-2.5 (3) MG/3ML nebulization USE ONE AMPULE IN NEBULIZER EVERY 6 HOURS AS NEEDED FOR SHORTNESS OF BREATH /DYSPNEA  OR  WHEEZING 360 vial 3     PERFOROMIST 20 MCG/2ML nebulizer solution USE ONE  IN NEBULIZER EVERY 12 HOURS 360 mL 3     tamsulosin (FLOMAX) 0.4 MG 24 hr capsule Take 1 capsule (0.4 mg) by mouth daily 30 capsule 1     budesonide POWD 1 puff 2 times daily Inhales 0.25mcg BID       Multiple Vitamins-Minerals (CENTRUM SILVER) per tablet Take 1 tablet by mouth daily.       aspirin (ASPIRIN LOW DOSE) 81 MG tablet Take 1 tablet by mouth daily.       loperamide (IMODIUM A-D) 2 MG tablet Take 2 mg by mouth 4 times daily as needed. Take 2 tablet by oral route after 1st loose stool and 1 tablet after each subsequent bowel movement; do not exceed 16 mg in 24 hrs. As needed.       azithromycin (ZITHROMAX) 250 MG tablet Take one tablet daily. (Patient not taking: Reported on 2017) 30 tablet 5       No Known Allergies    Family History   Problem Relation Age of Onset     Other - See Comments Mother      AAA, cause of death     Other - See Comments Other 69     AAA, family hx     Chronic Obstructive Pulmonary Disease Brother       MI age 60's     Family History Negative Sister        Social History     Social History     Marital status: Single     Spouse name: N/A     Number of children: N/A     Years of education: N/A     Occupational History      Retired     /[employer]     Social History Main Topics     Smoking status: Former Smoker     Types: Cigarettes     Smokeless tobacco: Never Used      Comment: Tried to quit; Quit      Alcohol use 0.0 oz/week     0 Standard drinks or  equivalent per week      Comment: Beer; rarely     Drug use: Not on file     Sexual activity: Not on file     Other Topics Concern     Blood Transfusions Yes     Caffeine Concern No     Parent/Sibling W/ Cabg, Mi Or Angioplasty Before 65f 55m? No     Social History Narrative       5 point ROS negative except as noted above in HPI, including Gen., Resp., CV, GI &  system review.     OBJECTIVE:  B/P: 108/56, T: 98.1, P: 71, R: 20    GENERAL APPEARANCE: Alert, no acute distress  CV: regular rate and rhythm, no murmur, rub or gallop  RESP: lungs clear to auscultation bilaterally with decreased breath sounds throughout.  No crackles or any wheeze.    ABDOMEN: normal bowel sounds, soft, nontender, no hepatosplenomegaly or other masses  SKIN: no suspicious lesions or rashes to visualized skin  NEURO: Alert, oriented x 3, speech and mentation normal    ASSESSMENT and PLAN:  (J44.1) COPD exacerbation (H)  (primary encounter diagnosis)  Comment: worsening with decreasing steroids I think.   Plan: predniSONE (DELTASONE) 20 MG tablet        Reviewed options.  We extended the abx out 5 more days, and I am going to again increase and lengthen the steroid as well.  Solumedrol given here, and increase prednisone to the 60 mg taper usually used and do this again.  F/u promptly with any worsening.  He is very aware of the significance of the situation.

## 2017-06-28 NOTE — NURSING NOTE
MEDICATION: Solumedrol  ROUTE: IM  SITE: RUQ - Gluteus  DOSE: 2 ml  LOT #: [id number]  :  [employer]  EXPIRATION DATE:  06/2019  NDC#: 4779-1044-20    Prior to injection verified patient identity using patient's name and date of birth.  Per orders of Dr. Maradiaga, injection of Solumedrol given by Joann Walls. Patient instructed to remain in clinic for 20 minutes afterwards, and to report any adverse reaction to me immediately.  Joann Walls

## 2017-06-28 NOTE — NURSING NOTE
"Chief Complaint   Patient presents with     Hospital F/U     admit on 6/20/17 and d/c on 6/21/17 for COPD exacerbation     *_* Health Care Directive *_*     has paperwork at home       Initial /56 (BP Location: Left arm, Patient Position: Chair, Cuff Size: Adult Large)  Pulse 71  Temp 98.1  F (36.7  C) (Tympanic)  Resp 20  Ht 5' 11\" (1.803 m)  Wt 195 lb (88.5 kg)  SpO2 93%  BMI 27.2 kg/m2 Estimated body mass index is 27.2 kg/(m^2) as calculated from the following:    Height as of this encounter: 5' 11\" (1.803 m).    Weight as of this encounter: 195 lb (88.5 kg).  Medication Reconciliation: complete   Stephania Chandra LPN      "

## 2017-06-28 NOTE — MR AVS SNAPSHOT
"              After Visit Summary   6/28/2017    Rm Duarte    MRN: [id number]           Patient Information     Date Of Birth          1940        Visit Information        Provider Department      6/28/2017 11:15 AM Dominguez Maradiaga MD [facility]        Today's Diagnoses     COPD exacerbation (H)    -  1    ACP (advance care planning)          Care Instructions    F/u with ongoing concerns.           Follow-ups after your visit        Who to contact     If you have questions or need follow up information about today's clinic visit or your schedule please contact [facility] directly at [phone].  Normal or non-critical lab and imaging results will be communicated to you by MyChart, letter or phone within 4 business days after the clinic has received the results. If you do not hear from us within 7 days, please contact the clinic through MyChart or phone. If you have a critical or abnormal lab result, we will notify you by phone as soon as possible.  Submit refill requests through [employer] or call your pharmacy and they will forward the refill request to us. Please allow 3 business days for your refill to be completed.          Additional Information About Your Visit        Care EveryWhere ID     This is your Care EveryWhere ID. This could be used by other organizations to access your [facility] medical records  [id number]        Your Vitals Were     Pulse Temperature Respirations Height Pulse Oximetry BMI (Body Mass Index)    71 98.1  F (36.7  C) (Tympanic) 20 5' 11\" (1.803 m) 93% 27.2 kg/m2       Blood Pressure from Last 3 Encounters:   06/28/17 108/56   06/21/17 108/70   06/15/17 92/52    Weight from Last 3 Encounters:   06/28/17 195 lb (88.5 kg)   06/20/17 195 lb 12.3 oz (88.8 kg)   06/15/17 192 lb (87.1 kg)              We Performed the Following     INJECTION INTRAMUSCULAR OR SUB-Q     METHYLPRED 125 MG SOD INJ          Today's Medication Changes          These " changes are accurate as of: 6/28/17  1:10 PM.  If you have any questions, ask your nurse or doctor.               Start taking these medicines.        Dose/Directions    methylPREDNISolone sodium succinate 125 mg/2 mL injection   Commonly known as:  solu-MEDROL   Used for:  COPD exacerbation (H)   Started by:  Dominguez Maradiaga MD        Dose:  125 mg   Inject 2 mLs (125 mg) into the muscle once for 1 dose   Quantity:  2 mL   Refills:  0         These medicines have changed or have updated prescriptions.        Dose/Directions    * predniSONE 20 MG tablet   Commonly known as:  DELTASONE   This may have changed:  Another medication with the same name was added. Make sure you understand how and when to take each.   Used for:  COPD exacerbation (H)        2 tabs (40 mg) daily x 2 days, 1 tab (20 mg) daily x 3 days, then 1/2 tab (10 mg) x 3 days.   Quantity:  9 tablet   Refills:  0       * predniSONE 20 MG tablet   Commonly known as:  DELTASONE   This may have changed:  You were already taking a medication with the same name, and this prescription was added. Make sure you understand how and when to take each.   Used for:  COPD exacerbation (H)   Changed by:  Dominguez Maradiaga MD        Take 3 tabs (60 mg) by mouth daily x 3 days, 2 tabs (40 mg) daily x 3 days, 1 tab (20 mg) daily x 3 days, then 1/2 tab (10 mg) x 3 days.   Quantity:  20 tablet   Refills:  0       * Notice:  This list has 2 medication(s) that are the same as other medications prescribed for you. Read the directions carefully, and ask your doctor or other care provider to review them with you.         Where to get your medicines      These medications were sent to [id number] - [place], [place] - [address]  [address], [place] [address]     Phone:  [phone]     predniSONE 20 MG tablet         Some of these will need a paper prescription and others can be bought over the counter.  Ask your nurse if you have questions.     You don't need a  prescription for these medications     methylPREDNISolone sodium succinate 125 mg/2 mL injection                Primary Care Provider Office Phone # Fax #    Dominguez Maradiaga [phone]       [facility] [address] [facility] [address]        Equal Access to Services     SYDNIPOLINA TATIANA : Hadii aad ku hadasho Soomaali, waaxda luqadaha, qaybta kaalmada adeegyada, waxay idiin hayaan adedanie khmaira laclay prado. So [facility] [phone].    ATENCIÓN: Si habla español, tiene a dhillon disposición servicios gratuitos de asistencia lingüística. Llame al [phone].    We comply with applicable federal civil rights laws and [place] laws. We do not discriminate on the basis of race, color, national origin, age, disability sex, sexual orientation or gender identity.            Thank you!     Thank you for choosing [facility]  for your care. Our goal is always to provide you with excellent care. Hearing back from our patients is one way we can continue to improve our services. Please take a few minutes to complete the written survey that you may receive in the mail after your visit with us. Thank you!             Your Updated Medication List - Protect others around you: Learn how to safely use, store and throw away your medicines at www.disposemymeds.org.          This list is accurate as of: 6/28/17  1:10 PM.  Always use your most recent med list.                   Brand Name Dispense Instructions for use Diagnosis    ASPIRIN LOW DOSE 81 MG tablet   Generic drug:  aspirin      Take 1 tablet by mouth daily.        azithromycin 250 MG tablet    ZITHROMAX    30 tablet    Take one tablet daily.    Other emphysema (H)       budesonide Powd      1 puff 2 times daily Inhales 0.25mcg BID    Long term (current) use of anticoagulants       CENTRUM SILVER per tablet      Take 1 tablet by mouth daily.        cilostazol 100 MG tablet    PLETAL    180 tablet    TAKE ONE TABLET BY MOUTH TWICE DAILY    PVD  (peripheral vascular disease) (H)       IMODIUM A-D 2 MG tablet   Generic drug:  loperamide      Take 2 mg by mouth 4 times daily as needed. Take 2 tablet by oral route after 1st loose stool and 1 tablet after each subsequent bowel movement; do not exceed 16 mg in 24 hrs. As needed.        * ipratropium - albuterol 0.5 mg/2.5 mg/3 mL 0.5-2.5 (3) MG/3ML neb solution    DUONEB    360 vial    USE ONE AMPULE IN NEBULIZER EVERY 6 HOURS AS NEEDED FOR SHORTNESS OF BREATH /DYSPNEA  OR  WHEEZING    COPD (chronic obstructive pulmonary disease) (H)       * COMBIVENT RESPIMAT  MCG/ACT inhaler   Generic drug:  Ipratropium-Albuterol     12 g    INHALE ONE PUFF BY MOUTH 4 TIMES DAILY *MAX 6 DOSES PER DAY*    Chronic obstructive pulmonary disease, unspecified COPD type (H)       levofloxacin 500 MG tablet    LEVAQUIN    5 tablet    Take 1 tablet (500 mg) by mouth daily    Obstructive chronic bronchitis with exacerbation (H)       methylPREDNISolone sodium succinate 125 mg/2 mL injection    solu-MEDROL    2 mL    Inject 2 mLs (125 mg) into the muscle once for 1 dose    COPD exacerbation (H)       PERFOROMIST 20 MCG/2ML neb solution   Generic drug:  formoterol     360 mL    USE ONE  IN NEBULIZER EVERY 12 HOURS    Other emphysema (H)       * predniSONE 20 MG tablet    DELTASONE    9 tablet    2 tabs (40 mg) daily x 2 days, 1 tab (20 mg) daily x 3 days, then 1/2 tab (10 mg) x 3 days.    COPD exacerbation (H)       * predniSONE 20 MG tablet    DELTASONE    20 tablet    Take 3 tabs (60 mg) by mouth daily x 3 days, 2 tabs (40 mg) daily x 3 days, 1 tab (20 mg) daily x 3 days, then 1/2 tab (10 mg) x 3 days.    COPD exacerbation (H)       simvastatin 10 MG tablet    ZOCOR    30 tablet    TAKE ONE TABLET BY MOUTH AT BEDTIME    PVD (peripheral vascular disease) (H)       tamsulosin 0.4 MG capsule    FLOMAX    30 capsule    Take 1 capsule (0.4 mg) by mouth daily    BPH (benign prostatic hyperplasia)       * warfarin 5 MG tablet     COUMADIN    90 tablet    TAKE 1/2 A TABLET BY MOUTH ON WEDNESDAY AND 1 TABLET ALL OTHER DAYS OR AS DIRECTED    Chronic atrial fibrillation (H)       * warfarin 5 MG tablet    COUMADIN    90 tablet    Take 2.5mg (1/2 pill) Wed; 5mg (1 pill) all other days or as directed by [facility]    Chronic atrial fibrillation (H)       * Notice:  This list has 6 medication(s) that are the same as other medications prescribed for you. Read the directions carefully, and ask your doctor or other care provider to review them with you.

## 2017-06-28 NOTE — MR AVS SNAPSHOT
Abdalla RADHA Duarte   6/28/2017   Anticoagulation Therapy Visit    Description:  76 year old male   Provider:  Dominguez Maradiaga MD   Department:  H[facility] Anti Coagulation           INR as of 6/28/2017     Today's INR No new INR was available at the time of this encounter.      Anticoagulation Summary as of 6/28/2017     INR goal 2.0-3.0   Today's INR No new INR was available at the time of this encounter.   Full instructions 6/29: 2.5 mg; 6/30: 2.5 mg; 7/1: 2.5 mg; 7/2: 2.5 mg; 7/4: 2.5 mg; 7/6: 2.5 mg; Otherwise 2.5 mg on Wed; 5 mg all other days   Next INR check 7/7/2017    Indications   Chronic atrial fibrillation (H) [I48.2]  PVD (peripheral vascular disease) (H) [I73.9]  Long-term (current) use of anticoagulants [Z79.01] [Z79.01]         June 2017 Details    Sun Mon Tue Wed Thu Fri Sat         1               2               3                 4               5               6               7               8               9               10                 11               12               13               14               15               16               17                 18               19               20               21               22               23               24                 25               26               27               28      2.5 mg   See details      29      2.5 mg         30      2.5 mg           Date Details   06/28 This INR check               How to take your warfarin dose     To take:  2.5 mg Take 0.5 of a 5 mg tablet.           July 2017 Details    Sun Mon Tue Wed Thu Fri Sat           1      2.5 mg           2      2.5 mg         3      5 mg         4      2.5 mg         5      2.5 mg         6      2.5 mg         7            8                 9               10               11               12               13               14               15                 16               17               18               19               20               21               22                  23               24               25               26               27               28               29                 30               31                     Date Details   No additional details    Date of next INR:  7/7/2017         How to take your warfarin dose     To take:  2.5 mg Take 0.5 of a 5 mg tablet.    To take:  5 mg Take 1 of the 5 mg tablets.

## 2017-06-29 ASSESSMENT — ANXIETY QUESTIONNAIRES: GAD7 TOTAL SCORE: 0

## 2017-06-29 ASSESSMENT — PATIENT HEALTH QUESTIONNAIRE - PHQ9: SUM OF ALL RESPONSES TO PHQ QUESTIONS 1-9: 0

## 2017-06-30 ENCOUNTER — ANTICOAGULATION THERAPY VISIT (OUTPATIENT)
Dept: ANTICOAGULATION | Facility: OTHER | Age: 77
End: 2017-06-30

## 2017-06-30 DIAGNOSIS — I48.20 CHRONIC ATRIAL FIBRILLATION (H): ICD-10-CM

## 2017-06-30 DIAGNOSIS — Z79.01 LONG-TERM (CURRENT) USE OF ANTICOAGULANTS: ICD-10-CM

## 2017-06-30 DIAGNOSIS — I73.9 PVD (PERIPHERAL VASCULAR DISEASE) (H): ICD-10-CM

## 2017-06-30 LAB — INR BLD: 1.2 (ref 0.86–1.14)

## 2017-06-30 PROCEDURE — 36416 COLLJ CAPILLARY BLOOD SPEC: CPT | Mod: ZL | Performed by: FAMILY MEDICINE

## 2017-06-30 PROCEDURE — 85610 PROTHROMBIN TIME: CPT | Mod: QW,ZL | Performed by: FAMILY MEDICINE

## 2017-06-30 NOTE — MR AVS SNAPSHOT
Rm Duarte   6/30/2017   Anticoagulation Therapy Visit    Description:  76 year old male   Provider:  Dominguez Maradiaga MD   Department:  Hc Anti Coagulation           INR as of 6/30/2017     Today's INR 1.2!      Anticoagulation Summary as of 6/30/2017     INR goal 2.0-3.0   Today's INR 1.2!   Full instructions 7/2: 2.5 mg; 7/4: 2.5 mg; 7/6: 2.5 mg; Otherwise 2.5 mg on Wed; 5 mg all other days   Next INR check 7/3/2017    Indications   Chronic atrial fibrillation (H) [I48.2]  PVD (peripheral vascular disease) (H) [I73.9]  Long-term (current) use of anticoagulants [Z79.01] [Z79.01]         June 2017 Details    Sun Mon Tue Wed Thu Fri Sat         1               2               3                 4               5               6               7               8               9               10                 11               12               13               14               15               16               17                 18               19               20               21               22               23               24                 25               26               27               28               29               30      5 mg   See details        Date Details   06/30 This INR check               How to take your warfarin dose     To take:  5 mg Take 1 of the 5 mg tablets.           July 2017 Details    Sun Mon Tue Wed Thu Fri Sat           1      5 mg           2      2.5 mg         3            4               5               6               7               8                 9               10               11               12               13               14               15                 16               17               18               19               20               21               22                 23               24               25               26               27               28               29                 30               31                     Date Details   No  additional details    Date of next INR:  7/3/2017         How to take your warfarin dose     To take:  2.5 mg Take 0.5 of a 5 mg tablet.    To take:  5 mg Take 1 of the 5 mg tablets.

## 2017-06-30 NOTE — Clinical Note
Rm Duarte's INR is 1.2 today.  He is still on prednisone 60mg and levaquin.  I gave him his regular dose of warfarin for 2 days as INR should increase with prednisone and antibiotic. INR recheck on 7/3/17.    Zena Haas RN Anticoagulation clinic

## 2017-06-30 NOTE — PROGRESS NOTES
ANTICOAGULATION FOLLOW-UP CLINIC VISIT    Patient Name:  Rm Duarte  Date:  6/30/2017  Contact Type:  Telephone    SUBJECTIVE:     Patient Findings     Comments Last day of prednisone 60mg today, tomorrow will start 40mg x 3 days.  He will finish levaquin on 7/1 and start daily azithromycin on 7/2/17.           OBJECTIVE    INR Point of Care   Date Value Ref Range Status   06/30/2017 1.2 (H) 0.86 - 1.14 Final     Comment:     This test is intended for monitoring Coumadin therapy.  Results are not   accurate   in patients with prolonged INR due to factor deficiency.         ASSESSMENT / PLAN  INR assessment SUB    Recheck INR In: 3 DAYS    INR Location Clinic      Anticoagulation Summary as of 6/30/2017     INR goal 2.0-3.0   Today's INR 1.2!   Maintenance plan 2.5 mg (5 mg x 0.5) on Wed; 5 mg (5 mg x 1) all other days   Full instructions 7/2: 2.5 mg; 7/4: 2.5 mg; 7/6: 2.5 mg; Otherwise 2.5 mg on Wed; 5 mg all other days   Weekly total 32.5 mg   Plan last modified Zena Burden, RN (1/4/2017)   Next INR check 7/3/2017   Priority INR   Target end date Indefinite    Indications   Chronic atrial fibrillation (H) [I48.2]  PVD (peripheral vascular disease) (H) [I73.9]  Long-term (current) use of anticoagulants [Z79.01] [Z79.01]         Anticoagulation Episode Summary     INR check location     Preferred lab     Send INR reminders to  ANTICOAG POOL    Comments takes Azithromycin 250mg daily long term      Anticoagulation Care Providers     Provider Role Specialty Phone number    Dominguez Maradiaga MD [facility] [phone]            See the Encounter Report to view Anticoagulation Flowsheet and Dosing Calendar (Go to Encounters tab in chart review, and find the Anticoagulation Therapy Visit)        Zena Haas, RN

## 2017-07-03 ENCOUNTER — ANTICOAGULATION THERAPY VISIT (OUTPATIENT)
Dept: ANTICOAGULATION | Facility: OTHER | Age: 77
End: 2017-07-03

## 2017-07-03 DIAGNOSIS — I48.20 CHRONIC ATRIAL FIBRILLATION (H): ICD-10-CM

## 2017-07-03 DIAGNOSIS — I73.9 PVD (PERIPHERAL VASCULAR DISEASE) (H): ICD-10-CM

## 2017-07-03 DIAGNOSIS — Z79.01 LONG-TERM (CURRENT) USE OF ANTICOAGULANTS: ICD-10-CM

## 2017-07-03 LAB — INR BLD: 1.3 (ref 0.86–1.14)

## 2017-07-03 PROCEDURE — 85610 PROTHROMBIN TIME: CPT | Mod: QW,ZL | Performed by: FAMILY MEDICINE

## 2017-07-03 PROCEDURE — 36416 COLLJ CAPILLARY BLOOD SPEC: CPT | Mod: ZL | Performed by: FAMILY MEDICINE

## 2017-07-03 NOTE — PROGRESS NOTES
ANTICOAGULATION FOLLOW-UP CLINIC VISIT    Patient Name:  Rm Duarte  Date:  7/3/2017  Contact Type:  Telephone/ message left on his home phone voicemail re: INR result, warfarin dosing and INR recheck date. He is to call if any bleeding/bruising, changes in diet/meds/activity or question    SUBJECTIVE:     Patient Findings     Positives Antibiotic use or infection    Comments levaquin done and back to azithromycin daily. Also down to 40mg prednisone and will be dropping to 20mg in 2 days.           OBJECTIVE    INR Point of Care   Date Value Ref Range Status   07/03/2017 1.3 (H) 0.86 - 1.14 Final     Comment:     This test is intended for monitoring Coumadin therapy.  Results are not   accurate   in patients with prolonged INR due to factor deficiency.         ASSESSMENT / PLAN  No question data found.  Anticoagulation Summary as of 7/3/2017     INR goal 2.0-3.0   Today's INR 1.3!   Maintenance plan 2.5 mg (5 mg x 0.5) on Wed; 5 mg (5 mg x 1) all other days   Full instructions 2.5 mg on Wed; 5 mg all other days   Weekly total 32.5 mg   Plan last modified Zena Burden RN (1/4/2017)   Next INR check 7/7/2017   Priority INR   Target end date Indefinite    Indications   Chronic atrial fibrillation (H) [I48.2]  PVD (peripheral vascular disease) (H) [I73.9]  Long-term (current) use of anticoagulants [Z79.01] [Z79.01]         Anticoagulation Episode Summary     INR check location     Preferred lab     Send INR reminders to  ANTICOAG POOL    Comments takes Azithromycin 250mg daily long term      Anticoagulation Care Providers     Provider Role Specialty Phone number    Dominguez Maradiaga MD [facility] [phone]            See the Encounter Report to view Anticoagulation Flowsheet and Dosing Calendar (Go to Encounters tab in chart review, and find the Anticoagulation Therapy Visit)        Zena Haas, RN

## 2017-07-03 NOTE — MR AVS SNAPSHOT
Rm Duarte   7/3/2017   Anticoagulation Therapy Visit    Description:  76 year old male   Provider:  Dominguez Maradiaga MD   Department:  [facility] Anti Coagulation           INR as of 7/3/2017     Today's INR 1.3!      Anticoagulation Summary as of 7/3/2017     INR goal 2.0-3.0   Today's INR 1.3!   Full instructions 2.5 mg on Wed; 5 mg all other days   Next INR check 7/7/2017    Indications   Chronic atrial fibrillation (H) [I48.2]  PVD (peripheral vascular disease) (H) [I73.9]  Long-term (current) use of anticoagulants [Z79.01] [Z79.01]         July 2017 Details    Sun Mon Tue Wed Thu Fri Sat           1                 2               3      5 mg   See details      4      5 mg         5      2.5 mg         6      5 mg         7            8                 9               10               11               12               13               14               15                 16               17               18               19               20               21               22                 23               24               25               26               27               28               29                 30               31                     Date Details   07/03 This INR check       Date of next INR:  7/7/2017         How to take your warfarin dose     To take:  2.5 mg Take 0.5 of a 5 mg tablet.    To take:  5 mg Take 1 of the 5 mg tablets.

## 2017-07-05 ENCOUNTER — ANTICOAGULATION THERAPY VISIT (OUTPATIENT)
Dept: ANTICOAGULATION | Facility: OTHER | Age: 77
End: 2017-07-05

## 2017-07-05 ENCOUNTER — OFFICE VISIT (OUTPATIENT)
Dept: FAMILY MEDICINE | Facility: OTHER | Age: 77
End: 2017-07-05
Attending: PHYSICIAN ASSISTANT
Payer: COMMERCIAL

## 2017-07-05 VITALS
HEIGHT: 71 IN | SYSTOLIC BLOOD PRESSURE: 104 MMHG | TEMPERATURE: 99.1 F | OXYGEN SATURATION: 92 % | DIASTOLIC BLOOD PRESSURE: 52 MMHG | RESPIRATION RATE: 24 BRPM | HEART RATE: 67 BPM | WEIGHT: 195 LBS | BODY MASS INDEX: 27.3 KG/M2

## 2017-07-05 DIAGNOSIS — Z79.01 LONG-TERM (CURRENT) USE OF ANTICOAGULANTS: ICD-10-CM

## 2017-07-05 DIAGNOSIS — I48.20 CHRONIC ATRIAL FIBRILLATION (H): ICD-10-CM

## 2017-07-05 DIAGNOSIS — J44.1 OBSTRUCTIVE CHRONIC BRONCHITIS WITH EXACERBATION (H): Primary | ICD-10-CM

## 2017-07-05 DIAGNOSIS — I73.9 PVD (PERIPHERAL VASCULAR DISEASE) (H): ICD-10-CM

## 2017-07-05 PROCEDURE — 99213 OFFICE O/P EST LOW 20 MIN: CPT | Performed by: PHYSICIAN ASSISTANT

## 2017-07-05 PROCEDURE — 99212 OFFICE O/P EST SF 10 MIN: CPT

## 2017-07-05 RX ORDER — DOXYCYCLINE 100 MG/1
100 CAPSULE ORAL 2 TIMES DAILY
Qty: 20 CAPSULE | Refills: 0 | Status: SHIPPED | OUTPATIENT
Start: 2017-07-05 | End: 2017-07-17

## 2017-07-05 RX ORDER — PREDNISONE 20 MG/1
TABLET ORAL
Qty: 20 TABLET | Refills: 0 | Status: SHIPPED | OUTPATIENT
Start: 2017-07-05 | End: 2017-07-13 | Stop reason: DRUGHIGH

## 2017-07-05 ASSESSMENT — PAIN SCALES - GENERAL: PAINLEVEL: NO PAIN (0)

## 2017-07-05 NOTE — NURSING NOTE
"Chief Complaint   Patient presents with     COPD     Pt isin for a FU on COPD. Pt was better on Sunday and worsened again Monday. Pt'sO2 sat is 92 - 93% on O2. Pt finishes Prednisone tomorrow.        Initial /52 (BP Location: Right arm, Patient Position: Chair, Cuff Size: Adult Regular)  Pulse 67  Temp 99.1  F (37.3  C) (Tympanic)  Resp 24  Ht 5' 11\" (1.803 m)  Wt 195 lb (88.5 kg)  SpO2 92%  BMI 27.2 kg/m2 Estimated body mass index is 27.2 kg/(m^2) as calculated from the following:    Height as of this encounter: 5' 11\" (1.803 m).    Weight as of this encounter: 195 lb (88.5 kg).  Medication Reconciliation: complete   Jonan Walls    "

## 2017-07-05 NOTE — MR AVS SNAPSHOT
Rm Duarte   7/5/2017   Anticoagulation Therapy Visit    Description:  76 year old male   Provider:  Dominguez Maradiaga MD   Department:  [facility] Anti Coagulation           INR as of 7/5/2017     Today's INR No new INR was available at the time of this encounter.      Anticoagulation Summary as of 7/5/2017     INR goal 2.0-3.0   Today's INR No new INR was available at the time of this encounter.   Full instructions 7/6: 2.5 mg; 7/7: 2.5 mg; 7/8: 2.5 mg; 7/9: 2.5 mg; Otherwise 2.5 mg on Wed; 5 mg all other days   Next INR check 7/13/2017    Indications   Chronic atrial fibrillation (H) [I48.2]  PVD (peripheral vascular disease) (H) [I73.9]  Long-term (current) use of anticoagulants [Z79.01] [Z79.01]         July 2017 Details    Sun Mon Tue Wed Thu Fri Sat           1                 2               3               4               5      2.5 mg   See details      6      2.5 mg         7      2.5 mg         8      2.5 mg           9      2.5 mg         10      5 mg         11      5 mg         12      2.5 mg         13            14               15                 16               17               18               19               20               21               22                 23               24               25               26               27               28               29                 30               31                     Date Details   07/05 This INR check       Date of next INR:  7/13/2017         How to take your warfarin dose     To take:  2.5 mg Take 0.5 of a 5 mg tablet.    To take:  5 mg Take 1 of the 5 mg tablets.

## 2017-07-05 NOTE — PROGRESS NOTES
Chief complaint:   Chief Complaint   Patient presents with     COPD     Pt isin for a FU on COPD. Pt was better on Sunday and worsened again Monday. Pt'sO2 sat is 92 - 93% on O2. Pt finishes Prednisone tomorrow.        Subjective: Rm Duarte is a 76 year old male who presents with a  3 day history of increased SOB. Minimal cough. No fever. One week ago treated for COPD exacerbation. 1 more day of Prednisone 20 mg left. He felt much improved until Sunday.    Past Medical History:   Diagnosis Date     Atrial fibrillation (H) 3/23/2012     COPD 5/16/2011     PVD (peripheral vascular disease) (H)      Past Surgical History:   Procedure Laterality Date     COPD:FEV1-0.74/FVC-3.35 22%, DLCO-28%  3/1/2010    Severe COPD; 4/23/2008 PFT's done : 0.96/3.81 25% Fev1, DLCO 45%.  1991 were 4.31/6.20!!!!!!!??     ECHO: TDS, EF~50%, abnl septum, o/w NORMAL  5/20/2011     PAD: CTA> occlusion of L mid SFA with reconstitution  12/2/2010     S/P colonoscopy: tubular adenoma & tics  5/4/2010    Repeat in 2014; Dr Graham     Tobacco Abuse Ongoing       Current Outpatient Prescriptions   Medication Sig Dispense Refill     predniSONE (DELTASONE) 20 MG tablet Take 3 tabs (60 mg) by mouth daily x 3 days, 2 tabs (40 mg) daily x 3 days, 1 tab (20 mg) daily x 3 days, then 1/2 tab (10 mg) x 3 days. 20 tablet 0     simvastatin (ZOCOR) 10 MG tablet TAKE ONE TABLET BY MOUTH AT BEDTIME 30 tablet 0     warfarin (COUMADIN) 5 MG tablet TAKE 1/2 A TABLET BY MOUTH ON WEDNESDAY AND 1 TABLET ALL OTHER DAYS OR AS DIRECTED 90 tablet 0     warfarin (COUMADIN) 5 MG tablet Take 2.5mg (1/2 pill) Wed; 5mg (1 pill) all other days or as directed by [facility] 90 tablet 4     azithromycin (ZITHROMAX) 250 MG tablet Take one tablet daily. 30 tablet 5     cilostazol (PLETAL) 100 MG tablet TAKE ONE TABLET BY MOUTH TWICE DAILY 180 tablet 3     COMBIVENT RESPIMAT  MCG/ACT inhaler INHALE ONE PUFF BY MOUTH 4 TIMES DAILY *MAX 6 DOSES PER DAY* 12 g 0      ipratropium - albuterol 0.5 mg/2.5 mg/3 mL (DUONEB) 0.5-2.5 (3) MG/3ML nebulization USE ONE AMPULE IN NEBULIZER EVERY 6 HOURS AS NEEDED FOR SHORTNESS OF BREATH /DYSPNEA  OR  WHEEZING 360 vial 3     PERFOROMIST 20 MCG/2ML nebulizer solution USE ONE  IN NEBULIZER EVERY 12 HOURS 360 mL 3     tamsulosin (FLOMAX) 0.4 MG 24 hr capsule Take 1 capsule (0.4 mg) by mouth daily 30 capsule 1     budesonide POWD 1 puff 2 times daily Inhales 0.25mcg BID       Multiple Vitamins-Minerals (CENTRUM SILVER) per tablet Take 1 tablet by mouth daily.       aspirin (ASPIRIN LOW DOSE) 81 MG tablet Take 1 tablet by mouth daily.       loperamide (IMODIUM A-D) 2 MG tablet Take 2 mg by mouth 4 times daily as needed. Take 2 tablet by oral route after 1st loose stool and 1 tablet after each subsequent bowel movement; do not exceed 16 mg in 24 hrs. As needed.        No Known Allergies    Family and Social History are reviewed.    Review Of Systems  Skin: Denies rash  Eyes: Denies redness or discharge   Ears/Nose/Throat: Denies sore throat, nasal congestion  Respiratory: as above  Cardiovascular: Denies chest pain or palpitations   Gastrointestinal:Denies nausea, vomiting, diarrhea       Objective:   B/P: 104/52, T: 99.1, P: 67, R: 24      Physical Exam  General: Alert orientated. NAD  Skin is unremarkable.  HEENT:Posterior pharynx non-erythematous. EAC's clear. Right TM intact. Left TM intact. Neck supple. Positive anterior chain cervical lymphadenopathy palpable  Lungs: Decreased breath sounds throughout. No distinct crackle, rhonchi or wheeze. O2 rechecked at 91% on O2 2L/min  Cardiac: RRR    Assessment:   (J44.1) Obstructive chronic bronchitis with exacerbation (H)  (primary encounter diagnosis)  Comment: We will do another Prednisone taper starting at 60 mg for 3 days. Doxy bid for 10 days. F/u 1 week with Dr. Maradiaga  Plan: doxycycline (VIBRAMYCIN) 100 MG capsule,         predniSONE (DELTASONE) 20 MG tablet              Rest. Increase  fluids. Tylenol for fever or discomfort. Return if symptoms persist or worsen.    Gabi Koo PA-C

## 2017-07-05 NOTE — MR AVS SNAPSHOT
"              After Visit Summary   7/5/2017    Rm Duarte    MRN: [id number]           Patient Information     Date Of Birth          1940        Visit Information        Provider Department      7/5/2017 8:30 AM Gabi Koo PA [facility]        Today's Diagnoses     Obstructive chronic bronchitis with exacerbation (H)    -  1       Follow-ups after your visit        Your next 10 appointments already scheduled     Jul 13, 2017  8:30 AM CDT   (Arrive by 8:15 AM)   SHORT with Dominguez Maradiaga MD   [facility] ([facility] )    [address]  [facility] [address]   [phone]              Who to contact     If you have questions or need follow up information about today's clinic visit or your schedule please contact [facility] directly at [phone].  Normal or non-critical lab and imaging results will be communicated to you by MyChart, letter or phone within 4 business days after the clinic has received the results. If you do not hear from us within 7 days, please contact the clinic through MyChart or phone. If you have a critical or abnormal lab result, we will notify you by phone as soon as possible.  Submit refill requests through [facility] or call your pharmacy and they will forward the refill request to us. Please allow 3 business days for your refill to be completed.          Additional Information About Your Visit        Care EveryWhere ID     This is your Care EveryWhere ID. This could be used by other organizations to access your [place] medical records  [id number]        Your Vitals Were     Pulse Temperature Respirations Height Pulse Oximetry BMI (Body Mass Index)    67 99.1  F (37.3  C) (Tympanic) 24 5' 11\" (1.803 m) 92% 27.2 kg/m2       Blood Pressure from Last 3 Encounters:   07/05/17 104/52   06/28/17 108/56   06/21/17 108/70    Weight from Last 3 Encounters:   07/05/17 195 lb (88.5 kg)   06/28/17 195 lb (88.5 kg) "   06/20/17 195 lb 12.3 oz (88.8 kg)              Today, you had the following     No orders found for display         Today's Medication Changes          These changes are accurate as of: 7/5/17  9:28 AM.  If you have any questions, ask your nurse or doctor.               Start taking these medicines.        Dose/Directions    doxycycline 100 MG capsule   Commonly known as:  VIBRAMYCIN   Used for:  Obstructive chronic bronchitis with exacerbation (H)   Started by:  Gabi Koo PA        Dose:  100 mg   Take 1 capsule (100 mg) by mouth 2 times daily   Quantity:  20 capsule   Refills:  0         These medicines have changed or have updated prescriptions.        Dose/Directions    * predniSONE 20 MG tablet   Commonly known as:  DELTASONE   This may have changed:  Another medication with the same name was added. Make sure you understand how and when to take each.   Used for:  COPD exacerbation (H)   Changed by:  Dominguez Maradiaga MD        Take 3 tabs (60 mg) by mouth daily x 3 days, 2 tabs (40 mg) daily x 3 days, 1 tab (20 mg) daily x 3 days, then 1/2 tab (10 mg) x 3 days.   Quantity:  20 tablet   Refills:  0       * predniSONE 20 MG tablet   Commonly known as:  DELTASONE   This may have changed:  You were already taking a medication with the same name, and this prescription was added. Make sure you understand how and when to take each.   Used for:  Obstructive chronic bronchitis with exacerbation (H)   Changed by:  Gabi Koo PA        60 mg daily for 3 days, then 40 mg daily for 3 days, then 20 mg daily for 3 days, then 10 mg daily for 3 days   Quantity:  20 tablet   Refills:  0       * Notice:  This list has 2 medication(s) that are the same as other medications prescribed for you. Read the directions carefully, and ask your doctor or other care provider to review them with you.         Where to get your medicines      These medications were sent to [id number] - [place], [place] - [address] [facility]  [address] [address]     Phone:  [phone]     doxycycline 100 MG capsule    predniSONE 20 MG tablet                Primary Care Provider Office Phone # Fax #    Dominguez Maradiaga [phone]       [facility] [address]  [facility] [address]        Equal Access to Services     DARA SIMPSON : Hadii aad ku hadasho Soomaali, waaxda luqadaha, qaybta kaalmada adeegyada, waxay idiin hayaan adeeg kharash la'aan ah. So [facility] [phone].    ATENCIÓN: Si habla español, tiene a dhillon disposición servicios gratuitos de asistencia lingüística. Llame al [phone].    We comply with applicable federal civil rights laws and [place] laws. We do not discriminate on the basis of race, color, national origin, age, disability sex, sexual orientation or gender identity.            Thank you!     Thank you for choosing [facility]  for your care. Our goal is always to provide you with excellent care. Hearing back from our patients is one way we can continue to improve our services. Please take a few minutes to complete the written survey that you may receive in the mail after your visit with us. Thank you!             Your Updated Medication List - Protect others around you: Learn how to safely use, store and throw away your medicines at www.disposemymeds.org.          This list is accurate as of: 7/5/17  9:28 AM.  Always use your most recent med list.                   Brand Name Dispense Instructions for use Diagnosis    ASPIRIN LOW DOSE 81 MG tablet   Generic drug:  aspirin      Take 1 tablet by mouth daily.        azithromycin 250 MG tablet    ZITHROMAX    30 tablet    Take one tablet daily.    Other emphysema (H)       budesonide Powd      1 puff 2 times daily Inhales 0.25mcg BID    Long term (current) use of anticoagulants       CENTRUM SILVER per tablet      Take 1 tablet by mouth daily.        cilostazol 100 MG tablet    PLETAL    180 tablet    TAKE ONE TABLET BY  MOUTH TWICE DAILY    PVD (peripheral vascular disease) (H)       doxycycline 100 MG capsule    VIBRAMYCIN    20 capsule    Take 1 capsule (100 mg) by mouth 2 times daily    Obstructive chronic bronchitis with exacerbation (H)       IMODIUM A-D 2 MG tablet   Generic drug:  loperamide      Take 2 mg by mouth 4 times daily as needed. Take 2 tablet by oral route after 1st loose stool and 1 tablet after each subsequent bowel movement; do not exceed 16 mg in 24 hrs. As needed.        * ipratropium - albuterol 0.5 mg/2.5 mg/3 mL 0.5-2.5 (3) MG/3ML neb solution    DUONEB    360 vial    USE ONE AMPULE IN NEBULIZER EVERY 6 HOURS AS NEEDED FOR SHORTNESS OF BREATH /DYSPNEA  OR  WHEEZING    COPD (chronic obstructive pulmonary disease) (H)       * COMBIVENT RESPIMAT  MCG/ACT inhaler   Generic drug:  Ipratropium-Albuterol     12 g    INHALE ONE PUFF BY MOUTH 4 TIMES DAILY *MAX 6 DOSES PER DAY*    Chronic obstructive pulmonary disease, unspecified COPD type (H)       PERFOROMIST 20 MCG/2ML neb solution   Generic drug:  formoterol     360 mL    USE ONE  IN NEBULIZER EVERY 12 HOURS    Other emphysema (H)       * predniSONE 20 MG tablet    DELTASONE    20 tablet    Take 3 tabs (60 mg) by mouth daily x 3 days, 2 tabs (40 mg) daily x 3 days, 1 tab (20 mg) daily x 3 days, then 1/2 tab (10 mg) x 3 days.    COPD exacerbation (H)       * predniSONE 20 MG tablet    DELTASONE    20 tablet    60 mg daily for 3 days, then 40 mg daily for 3 days, then 20 mg daily for 3 days, then 10 mg daily for 3 days    Obstructive chronic bronchitis with exacerbation (H)       simvastatin 10 MG tablet    ZOCOR    30 tablet    TAKE ONE TABLET BY MOUTH AT BEDTIME    PVD (peripheral vascular disease) (H)       tamsulosin 0.4 MG capsule    FLOMAX    30 capsule    Take 1 capsule (0.4 mg) by mouth daily    BPH (benign prostatic hyperplasia)       * warfarin 5 MG tablet    COUMADIN    90 tablet    TAKE 1/2 A TABLET BY MOUTH ON WEDNESDAY AND 1 TABLET ALL OTHER  DAYS OR AS DIRECTED    Chronic atrial fibrillation (H)       * warfarin 5 MG tablet    COUMADIN    90 tablet    Take 2.5mg (1/2 pill) Wed; 5mg (1 pill) all other days or as directed by [facility]    Chronic atrial fibrillation (H)       * Notice:  This list has 6 medication(s) that are the same as other medications prescribed for you. Read the directions carefully, and ask your doctor or other care provider to review them with you.

## 2017-07-05 NOTE — PROGRESS NOTES
ANTICOAGULATION FOLLOW-UP CLINIC VISIT    Patient Name:  Rm Duarte  Date:  7/5/2017  Contact Type:  Telephone    SUBJECTIVE:     Patient Findings     Positives Antibiotic use or infection (Doxycycline 100mg BID X 10 days started 7.5.17)    Comments LM stating to inform coumadin clinic of any bleeding/bruising, changes in meds/diet/activity, or with any questions or concerns.           OBJECTIVE    INR Point of Care   Date Value Ref Range Status   07/03/2017 1.3 (H) 0.86 - 1.14 Final     Comment:     This test is intended for monitoring Coumadin therapy.  Results are not   accurate   in patients with prolonged INR due to factor deficiency.         ASSESSMENT / PLAN  INR assessment THER NO INR DONE   Recheck INR In: 8 DAYS    INR Location Clinic      Anticoagulation Summary as of 7/5/2017     INR goal 2.0-3.0   Today's INR    Maintenance plan 2.5 mg (5 mg x 0.5) on Wed; 5 mg (5 mg x 1) all other days   Full instructions 7/6: 2.5 mg; 7/7: 2.5 mg; 7/8: 2.5 mg; 7/9: 2.5 mg; Otherwise 2.5 mg on Wed; 5 mg all other days   Weekly total 32.5 mg   Plan last modified Zena Burden RN (1/4/2017)   Next INR check 7/13/2017   Priority INR   Target end date Indefinite    Indications   Chronic atrial fibrillation (H) [I48.2]  PVD (peripheral vascular disease) (H) [I73.9]  Long-term (current) use of anticoagulants [Z79.01] [Z79.01]         Anticoagulation Episode Summary     INR check location     Preferred lab     Send INR reminders to  ANTICOAG POOL    Comments takes Azithromycin 250mg daily long term      Anticoagulation Care Providers     Provider Role Specialty Phone number    Dominguez Maradiaga MD [facility] [phone]            See the Encounter Report to view Anticoagulation Flowsheet and Dosing Calendar (Go to Encounters tab in chart review, and find the Anticoagulation Therapy Visit)    LM in regards to Warfarin dosing and INR recheck date.    LAZ DAVID RN

## 2017-07-13 ENCOUNTER — ANTICOAGULATION THERAPY VISIT (OUTPATIENT)
Dept: ANTICOAGULATION | Facility: OTHER | Age: 77
End: 2017-07-13

## 2017-07-13 ENCOUNTER — OFFICE VISIT (OUTPATIENT)
Dept: FAMILY MEDICINE | Facility: OTHER | Age: 77
End: 2017-07-13
Attending: FAMILY MEDICINE
Payer: COMMERCIAL

## 2017-07-13 VITALS
DIASTOLIC BLOOD PRESSURE: 60 MMHG | WEIGHT: 207 LBS | HEIGHT: 71 IN | HEART RATE: 88 BPM | OXYGEN SATURATION: 88 % | BODY MASS INDEX: 28.98 KG/M2 | SYSTOLIC BLOOD PRESSURE: 138 MMHG | TEMPERATURE: 97.3 F | RESPIRATION RATE: 16 BRPM

## 2017-07-13 DIAGNOSIS — I48.20 CHRONIC ATRIAL FIBRILLATION (H): ICD-10-CM

## 2017-07-13 DIAGNOSIS — Z79.01 LONG-TERM (CURRENT) USE OF ANTICOAGULANTS: ICD-10-CM

## 2017-07-13 DIAGNOSIS — J44.1 COPD EXACERBATION (H): Primary | ICD-10-CM

## 2017-07-13 DIAGNOSIS — I73.9 PVD (PERIPHERAL VASCULAR DISEASE) (H): ICD-10-CM

## 2017-07-13 LAB — INR BLD: 1.5 (ref 0.86–1.14)

## 2017-07-13 PROCEDURE — 99212 OFFICE O/P EST SF 10 MIN: CPT

## 2017-07-13 PROCEDURE — 85610 PROTHROMBIN TIME: CPT | Mod: QW,ZL | Performed by: FAMILY MEDICINE

## 2017-07-13 PROCEDURE — 99214 OFFICE O/P EST MOD 30 MIN: CPT | Performed by: FAMILY MEDICINE

## 2017-07-13 PROCEDURE — 36416 COLLJ CAPILLARY BLOOD SPEC: CPT | Mod: ZL | Performed by: FAMILY MEDICINE

## 2017-07-13 RX ORDER — PREDNISONE 10 MG/1
TABLET ORAL
Qty: 200 TABLET | Refills: 1 | Status: SHIPPED | OUTPATIENT
Start: 2017-07-13 | End: 2017-08-30

## 2017-07-13 RX ORDER — PREDNISONE 10 MG/1
TABLET ORAL
Qty: 200 TABLET | Refills: 1 | Status: SHIPPED | OUTPATIENT
Start: 2017-07-13 | End: 2017-07-13

## 2017-07-13 ASSESSMENT — ANXIETY QUESTIONNAIRES
5. BEING SO RESTLESS THAT IT IS HARD TO SIT STILL: NOT AT ALL
7. FEELING AFRAID AS IF SOMETHING AWFUL MIGHT HAPPEN: NOT AT ALL
2. NOT BEING ABLE TO STOP OR CONTROL WORRYING: NOT AT ALL
3. WORRYING TOO MUCH ABOUT DIFFERENT THINGS: NOT AT ALL
1. FEELING NERVOUS, ANXIOUS, OR ON EDGE: NOT AT ALL
GAD7 TOTAL SCORE: 0
6. BECOMING EASILY ANNOYED OR IRRITABLE: NOT AT ALL

## 2017-07-13 ASSESSMENT — PATIENT HEALTH QUESTIONNAIRE - PHQ9: 5. POOR APPETITE OR OVEREATING: NOT AT ALL

## 2017-07-13 ASSESSMENT — PAIN SCALES - GENERAL: PAINLEVEL: NO PAIN (0)

## 2017-07-13 NOTE — MR AVS SNAPSHOT
Rm Duarte   7/13/2017   Anticoagulation Therapy Visit    Description:  76 year old male   Provider:  Dominguez Maradiaga MD   Department:  Hc Anti Coagulation           INR as of 7/13/2017     Today's INR 1.5!      Anticoagulation Summary as of 7/13/2017     INR goal 2.0-3.0   Today's INR 1.5!   Full instructions 2.5 mg on Wed; 5 mg all other days   Next INR check 7/17/2017    Indications   Chronic atrial fibrillation (H) [I48.2]  PVD (peripheral vascular disease) (H) [I73.9]  Long-term (current) use of anticoagulants [Z79.01] [Z79.01]         July 2017 Details    Sun Mon Tue Wed Thu Fri Sat           1                 2               3               4               5               6               7               8                 9               10               11               12               13      5 mg   See details      14      5 mg         15      5 mg           16      5 mg         17            18               19               20               21               22                 23               24               25               26               27               28               29                 30               31                     Date Details   07/13 This INR check       Date of next INR:  7/17/2017         How to take your warfarin dose     To take:  5 mg Take 1 of the 5 mg tablets.

## 2017-07-13 NOTE — MR AVS SNAPSHOT
"              After Visit Summary   7/13/2017    Rm Duarte    MRN: [id number]           Patient Information     Date Of Birth          1940        Visit Information        Provider Department      7/13/2017 8:30 AM Dominguez Maradiaga MD [facility]        Today's Diagnoses     COPD exacerbation (H)    -  1      Care Instructions    F/u with ongoing concerns.           Follow-ups after your visit        Who to contact     If you have questions or need follow up information about today's clinic visit or your schedule please contact [facility] directly at [phone].  Normal or non-critical lab and imaging results will be communicated to you by MyChart, letter or phone within 4 business days after the clinic has received the results. If you do not hear from us within 7 days, please contact the clinic through MyChart or phone. If you have a critical or abnormal lab result, we will notify you by phone as soon as possible.  Submit refill requests through [facility] or call your pharmacy and they will forward the refill request to us. Please allow 3 business days for your refill to be completed.          Additional Information About Your Visit        Care EveryWhere ID     This is your Care EveryWhere ID. This could be used by other organizations to access your [facility] medical records  [id number]        Your Vitals Were     Pulse Temperature Respirations Height Pulse Oximetry BMI (Body Mass Index)    88 97.3  F (36.3  C) (Tympanic) 16 5' 11\" (1.803 m) 88% 28.87 kg/m2       Blood Pressure from Last 3 Encounters:   07/13/17 138/60   07/05/17 104/52   06/28/17 108/56    Weight from Last 3 Encounters:   07/13/17 207 lb (93.9 kg)   07/05/17 195 lb (88.5 kg)   06/28/17 195 lb (88.5 kg)              Today, you had the following     No orders found for display         Today's Medication Changes          These changes are accurate as of: 7/13/17  8:43 AM.  If you have any questions, ask your " nurse or doctor.               These medicines have changed or have updated prescriptions.        Dose/Directions    * predniSONE 20 MG tablet   Commonly known as:  DELTASONE   This may have changed:  Another medication with the same name was added. Make sure you understand how and when to take each.   Used for:  COPD exacerbation (H)   Changed by:  Dominguez Maradiaga MD        Take 3 tabs (60 mg) by mouth daily x 3 days, 2 tabs (40 mg) daily x 3 days, 1 tab (20 mg) daily x 3 days, then 1/2 tab (10 mg) x 3 days.   Quantity:  20 tablet   Refills:  0       * predniSONE 20 MG tablet   Commonly known as:  DELTASONE   This may have changed:  Another medication with the same name was added. Make sure you understand how and when to take each.   Used for:  Obstructive chronic bronchitis with exacerbation (H)   Changed by:  Gabi Koo PA        60 mg daily for 3 days, then 40 mg daily for 3 days, then 20 mg daily for 3 days, then 10 mg daily for 3 days   Quantity:  20 tablet   Refills:  0       * predniSONE 10 MG tablet   Commonly known as:  DELTASONE   This may have changed:  You were already taking a medication with the same name, and this prescription was added. Make sure you understand how and when to take each.   Used for:  COPD exacerbation (H)   Changed by:  Dominguez Maradiaga MD        Use 6 pills daily x 7 days, 5 pills daily x 7 days, 4 pills daily x 7 days, 3 pills daily x 7 days, 2 pills daily x 7 days, 1 pill daily x 7 days, and 1/2 pill daily x 7 days.   Quantity:  200 tablet   Refills:  1       warfarin 5 MG tablet   Commonly known as:  COUMADIN   This may have changed:  Another medication with the same name was removed. Continue taking this medication, and follow the directions you see here.   Used for:  Chronic atrial fibrillation (H)   Changed by:  Dominguez Maradiaga MD        Take 2.5mg (1/2 pill) Wed; 5mg (1 pill) all other days or as directed by [facility]   Quantity:  90 tablet    Refills:  4       * Notice:  This list has 3 medication(s) that are the same as other medications prescribed for you. Read the directions carefully, and ask your doctor or other care provider to review them with you.         Where to get your medicines      Call your pharmacy to confirm that your medication is ready for pickup. It may take up to 24 hours for them to receive the prescription. If the prescription is not ready within 3 business days, please contact your clinic or your provider.     We will let you know when these medications are ready. If you don't hear back within 3 business days, please contact us.     predniSONE 10 MG tablet                Primary Care Provider Office Phone # Fax #    Dominguez Maradiaga [phone]       [facility] [address] [address]        Equal Access to Services     DARA SIMPSON : Hadii bette fragoso hadasho Soomaali, waaxda luqadaha, qaybta kaalmada adeegyada, karina shell . So [facility] [phone].    ATENCIÓN: Si habla español, tiene a dhillon disposición servicios gratuitos de asistencia lingüística. Llame al [phone].    We comply with applicable federal civil rights laws and [place] laws. We do not discriminate on the basis of race, color, national origin, age, disability sex, sexual orientation or gender identity.            Thank you!     Thank you for choosing [facility]  for your care. Our goal is always to provide you with excellent care. Hearing back from our patients is one way we can continue to improve our services. Please take a few minutes to complete the written survey that you may receive in the mail after your visit with us. Thank you!             Your Updated Medication List - Protect others around you: Learn how to safely use, store and throw away your medicines at www.disposemymeds.org.          This list is accurate as of: 7/13/17  8:43 AM.  Always use your most recent med  list.                   Brand Name Dispense Instructions for use Diagnosis    ASPIRIN LOW DOSE 81 MG tablet   Generic drug:  aspirin      Take 1 tablet by mouth daily.        azithromycin 250 MG tablet    ZITHROMAX    30 tablet    Take one tablet daily.    Other emphysema (H)       budesonide Powd      1 puff 2 times daily Inhales 0.25mcg BID    Long term (current) use of anticoagulants       CENTRUM SILVER per tablet      Take 1 tablet by mouth daily.        cilostazol 100 MG tablet    PLETAL    180 tablet    TAKE ONE TABLET BY MOUTH TWICE DAILY    PVD (peripheral vascular disease) (H)       doxycycline 100 MG capsule    VIBRAMYCIN    20 capsule    Take 1 capsule (100 mg) by mouth 2 times daily    Obstructive chronic bronchitis with exacerbation (H)       IMODIUM A-D 2 MG tablet   Generic drug:  loperamide      Take 2 mg by mouth 4 times daily as needed. Take 2 tablet by oral route after 1st loose stool and 1 tablet after each subsequent bowel movement; do not exceed 16 mg in 24 hrs. As needed.        * ipratropium - albuterol 0.5 mg/2.5 mg/3 mL 0.5-2.5 (3) MG/3ML neb solution    DUONEB    360 vial    USE ONE AMPULE IN NEBULIZER EVERY 6 HOURS AS NEEDED FOR SHORTNESS OF BREATH /DYSPNEA  OR  WHEEZING    COPD (chronic obstructive pulmonary disease) (H)       * COMBIVENT RESPIMAT  MCG/ACT inhaler   Generic drug:  Ipratropium-Albuterol     12 g    INHALE ONE PUFF BY MOUTH 4 TIMES DAILY *MAX 6 DOSES PER DAY*    Chronic obstructive pulmonary disease, unspecified COPD type (H)       PERFOROMIST 20 MCG/2ML neb solution   Generic drug:  formoterol     360 mL    USE ONE  IN NEBULIZER EVERY 12 HOURS    Other emphysema (H)       * predniSONE 20 MG tablet    DELTASONE    20 tablet    Take 3 tabs (60 mg) by mouth daily x 3 days, 2 tabs (40 mg) daily x 3 days, 1 tab (20 mg) daily x 3 days, then 1/2 tab (10 mg) x 3 days.    COPD exacerbation (H)       * predniSONE 20 MG tablet    DELTASONE    20 tablet    60 mg daily for 3  days, then 40 mg daily for 3 days, then 20 mg daily for 3 days, then 10 mg daily for 3 days    Obstructive chronic bronchitis with exacerbation (H)       * predniSONE 10 MG tablet    DELTASONE    200 tablet    Use 6 pills daily x 7 days, 5 pills daily x 7 days, 4 pills daily x 7 days, 3 pills daily x 7 days, 2 pills daily x 7 days, 1 pill daily x 7 days, and 1/2 pill daily x 7 days.    COPD exacerbation (H)       simvastatin 10 MG tablet    ZOCOR    30 tablet    TAKE ONE TABLET BY MOUTH AT BEDTIME    PVD (peripheral vascular disease) (H)       tamsulosin 0.4 MG capsule    FLOMAX    30 capsule    Take 1 capsule (0.4 mg) by mouth daily    BPH (benign prostatic hyperplasia)       warfarin 5 MG tablet    COUMADIN    90 tablet    Take 2.5mg (1/2 pill) Wed; 5mg (1 pill) all other days or as directed by [facility]    Chronic atrial fibrillation (H)       * Notice:  This list has 5 medication(s) that are the same as other medications prescribed for you. Read the directions carefully, and ask your doctor or other care provider to review them with you.

## 2017-07-13 NOTE — PROGRESS NOTES
Rm Duarte    July 13, 2017    Chief Complaint   Patient presents with     RECHECK     follow-up hospitalization       SUBJECTIVE:  Here for f/u.  Doing terribly.  Cannot breath.  Is good on 60 mg but gets worse with lower doses.  About to go off this taper now in 3 days.  No cough.  No fever.  Lengthy review.      Past Medical History:   Diagnosis Date     Atrial fibrillation (H) 3/23/2012     COPD 5/16/2011     PVD (peripheral vascular disease) (H)        Past Surgical History:   Procedure Laterality Date     COPD:FEV1-0.74/FVC-3.35 22%, DLCO-28%  3/1/2010    Severe COPD; 4/23/2008 PFT's done : 0.96/3.81 25% Fev1, DLCO 45%.  1991 were 4.31/6.20!!!!!!!??     ECHO: TDS, EF~50%, abnl septum, o/w NORMAL  5/20/2011     PAD: CTA> occlusion of L mid SFA with reconstitution  12/2/2010     S/P colonoscopy: tubular adenoma & tics  5/4/2010    Repeat in 2014; Dr Graham     Tobacco Abuse Ongoing         Current Outpatient Prescriptions   Medication Sig Dispense Refill     predniSONE (DELTASONE) 10 MG tablet Use 6 pills daily x 7 days, 5 pills daily x 7 days, 4 pills daily x 7 days, 3 pills daily x 7 days, 2 pills daily x 7 days, 1 pill daily x 7 days, and 1/2 pill daily x 7 days. 200 tablet 1     doxycycline (VIBRAMYCIN) 100 MG capsule Take 1 capsule (100 mg) by mouth 2 times daily 20 capsule 0     predniSONE (DELTASONE) 20 MG tablet Take 3 tabs (60 mg) by mouth daily x 3 days, 2 tabs (40 mg) daily x 3 days, 1 tab (20 mg) daily x 3 days, then 1/2 tab (10 mg) x 3 days. 20 tablet 0     simvastatin (ZOCOR) 10 MG tablet TAKE ONE TABLET BY MOUTH AT BEDTIME 30 tablet 0     warfarin (COUMADIN) 5 MG tablet Take 2.5mg (1/2 pill) Wed; 5mg (1 pill) all other days or as directed by [facility] 90 tablet 4     cilostazol (PLETAL) 100 MG tablet TAKE ONE TABLET BY MOUTH TWICE DAILY 180 tablet 3     COMBIVENT RESPIMAT  MCG/ACT inhaler INHALE ONE PUFF BY MOUTH 4 TIMES DAILY *MAX 6 DOSES PER DAY* 12 g 0     ipratropium -  albuterol 0.5 mg/2.5 mg/3 mL (DUONEB) 0.5-2.5 (3) MG/3ML nebulization USE ONE AMPULE IN NEBULIZER EVERY 6 HOURS AS NEEDED FOR SHORTNESS OF BREATH /DYSPNEA  OR  WHEEZING 360 vial 3     PERFOROMIST 20 MCG/2ML nebulizer solution USE ONE  IN NEBULIZER EVERY 12 HOURS 360 mL 3     tamsulosin (FLOMAX) 0.4 MG 24 hr capsule Take 1 capsule (0.4 mg) by mouth daily 30 capsule 1     budesonide POWD 1 puff 2 times daily Inhales 0.25mcg BID       Multiple Vitamins-Minerals (CENTRUM SILVER) per tablet Take 1 tablet by mouth daily.       aspirin (ASPIRIN LOW DOSE) 81 MG tablet Take 1 tablet by mouth daily.       loperamide (IMODIUM A-D) 2 MG tablet Take 2 mg by mouth 4 times daily as needed. Take 2 tablet by oral route after 1st loose stool and 1 tablet after each subsequent bowel movement; do not exceed 16 mg in 24 hrs. As needed.       predniSONE (DELTASONE) 20 MG tablet 60 mg daily for 3 days, then 40 mg daily for 3 days, then 20 mg daily for 3 days, then 10 mg daily for 3 days (Patient not taking: Reported on 2017) 20 tablet 0     [DISCONTINUED] warfarin (COUMADIN) 5 MG tablet TAKE 1/2 A TABLET BY MOUTH ON WEDNESDAY AND 1 TABLET ALL OTHER DAYS OR AS DIRECTED 90 tablet 0     azithromycin (ZITHROMAX) 250 MG tablet Take one tablet daily. (Patient not taking: Reported on 2017) 30 tablet 5       No Known Allergies    Family History   Problem Relation Age of Onset     Other - See Comments Mother      AAA, cause of death     Other - See Comments Other 69     AAA, family hx     Chronic Obstructive Pulmonary Disease Brother       MI age 60's     Family History Negative Sister        Social History     Social History     Marital status: Single     Spouse name: N/A     Number of children: N/A     Years of education: N/A     Occupational History      Retired     /[employer]     Social History Main Topics     Smoking status: Former Smoker     Types: Cigarettes     Smokeless tobacco: Never Used      Comment:  Tried to quit; Quit 2011     Alcohol use 0.0 oz/week     0 Standard drinks or equivalent per week      Comment: Beer; rarely     Drug use: Not on file     Sexual activity: Not on file     Other Topics Concern     Blood Transfusions Yes     Caffeine Concern No     Parent/Sibling W/ Cabg, Mi Or Angioplasty Before 65f 55m? No     Social History Narrative       5 point ROS negative except as noted above in HPI, including Gen., Resp., CV, GI &  system review.     OBJECTIVE:  B/P: 138/60, T: 97.3, P: 88, R: 16    GENERAL APPEARANCE: Alert, no acute distress  CV: regular rate and rhythm, no murmur, rub or gallop  RESP: lungs clear to auscultation bilaterally with decreased breath sounds throughout and some wheeze at the base.   ABDOMEN: normal bowel sounds, soft, nontender, no hepatosplenomegaly or other masses  SKIN: no suspicious lesions or rashes to visualized skin  NEURO: Alert, oriented x 3, speech and mentation normal    ASSESSMENT and PLAN:  (J44.1) COPD exacerbation (H)  (primary encounter diagnosis)  Comment: ongoing  Plan: predniSONE (DELTASONE) 10 MG tablet        Each time on the higher dose he's fine, and then when we taper it comes right back.  Taper as above over 7 weeks.   60 down to 5 mg.  No fever or concern for ongoing infection and he has had 3 rounds of abx.  He will resume prophylactic azithro daily.  F/u promptly with any concerns.

## 2017-07-13 NOTE — NURSING NOTE
"Chief Complaint   Patient presents with     RECHECK     follow-up hospitalization       Initial /60 (BP Location: Right arm, Patient Position: Chair, Cuff Size: Adult Large)  Pulse 88  Temp 97.3  F (36.3  C) (Tympanic)  Resp 16  Ht 5' 11\" (1.803 m)  Wt 207 lb (93.9 kg)  SpO2 (!) 88%  BMI 28.87 kg/m2 Estimated body mass index is 28.87 kg/(m^2) as calculated from the following:    Height as of this encounter: 5' 11\" (1.803 m).    Weight as of this encounter: 207 lb (93.9 kg).  Medication Reconciliation: complete   Anaya Drake      "

## 2017-07-13 NOTE — PROGRESS NOTES
ANTICOAGULATION FOLLOW-UP CLINIC VISIT    Patient Name:  Rm Duarte  Date:  7/13/2017  Contact Type:  Telephone    SUBJECTIVE:     Patient Findings     Comments One day left of doxycycline then back to daily azithromycin, prednisone dose 60mg x7d, 50mg x7d, 40mg x7 d, 30mg x7d, 20 mg x 7d, 69dps2a, 5mg x7d           OBJECTIVE    INR Point of Care   Date Value Ref Range Status   07/13/2017 1.5 (H) 0.86 - 1.14 Final     Comment:     This test is intended for monitoring Coumadin therapy.  Results are not   accurate   in patients with prolonged INR due to factor deficiency.         ASSESSMENT / PLAN  INR assessment SUB    Recheck INR In: 4 DAYS    INR Location Clinic      Anticoagulation Summary as of 7/13/2017     INR goal 2.0-3.0   Today's INR 1.5!   Maintenance plan 2.5 mg (5 mg x 0.5) on Wed; 5 mg (5 mg x 1) all other days   Full instructions 2.5 mg on Wed; 5 mg all other days   Weekly total 32.5 mg   No change documented Zena Haas RN   Plan last modified Zena Burden RN (1/4/2017)   Next INR check 7/17/2017   Priority INR   Target end date Indefinite    Indications   Chronic atrial fibrillation (H) [I48.2]  PVD (peripheral vascular disease) (H) [I73.9]  Long-term (current) use of anticoagulants [Z79.01] [Z79.01]         Anticoagulation Episode Summary     INR check location     Preferred lab     Send INR reminders to  ANTICOAG POOL    Comments takes Azithromycin 250mg daily long term      Anticoagulation Care Providers     Provider Role Specialty Phone number    Dominguez Maradiaga MD [facility] [phone]            See the Encounter Report to view Anticoagulation Flowsheet and Dosing Calendar (Go to Encounters tab in chart review, and find the Anticoagulation Therapy Visit)        Zena Haas RN

## 2017-07-14 ASSESSMENT — PATIENT HEALTH QUESTIONNAIRE - PHQ9: SUM OF ALL RESPONSES TO PHQ QUESTIONS 1-9: 0

## 2017-07-14 ASSESSMENT — ANXIETY QUESTIONNAIRES: GAD7 TOTAL SCORE: 0

## 2017-07-17 ENCOUNTER — ANTICOAGULATION THERAPY VISIT (OUTPATIENT)
Dept: ANTICOAGULATION | Facility: OTHER | Age: 77
End: 2017-07-17

## 2017-07-17 DIAGNOSIS — I73.9 PVD (PERIPHERAL VASCULAR DISEASE) (H): ICD-10-CM

## 2017-07-17 DIAGNOSIS — I48.20 CHRONIC ATRIAL FIBRILLATION (H): ICD-10-CM

## 2017-07-17 DIAGNOSIS — Z79.01 LONG-TERM (CURRENT) USE OF ANTICOAGULANTS: ICD-10-CM

## 2017-07-17 LAB — INR BLD: 2.6 (ref 0.86–1.14)

## 2017-07-17 PROCEDURE — 36416 COLLJ CAPILLARY BLOOD SPEC: CPT | Mod: ZL | Performed by: FAMILY MEDICINE

## 2017-07-17 PROCEDURE — 85610 PROTHROMBIN TIME: CPT | Mod: QW,ZL | Performed by: FAMILY MEDICINE

## 2017-07-17 NOTE — MR AVS SNAPSHOT
Rm Duarte   7/17/2017   Anticoagulation Therapy Visit    Description:  76 year old male   Provider:  Dominguez Maradiaga MD   Department:  H[facility] Anti Coagulation           INR as of 7/17/2017     Today's INR 2.6      Anticoagulation Summary as of 7/17/2017     INR goal 2.0-3.0   Today's INR 2.6   Full instructions 2.5 mg on Wed; 5 mg all other days   Next INR check 7/27/2017    Indications   Chronic atrial fibrillation (H) [I48.2]  PVD (peripheral vascular disease) (H) [I73.9]  Long-term (current) use of anticoagulants [Z79.01] [Z79.01]         July 2017 Details    Sun Mon Tue Wed Thu Fri Sat           1                 2               3               4               5               6               7               8                 9               10               11               12               13               14               15                 16               17      5 mg   See details      18      5 mg         19      2.5 mg         20      5 mg         21      5 mg         22      5 mg           23      5 mg         24      5 mg         25      5 mg         26      2.5 mg         27            28               29                 30               31                     Date Details   07/17 This INR check       Date of next INR:  7/27/2017         How to take your warfarin dose     To take:  2.5 mg Take 0.5 of a 5 mg tablet.    To take:  5 mg Take 1 of the 5 mg tablets.

## 2017-07-17 NOTE — PROGRESS NOTES
ANTICOAGULATION FOLLOW-UP CLINIC VISIT    Patient Name:  Rm Duarte  Date:  7/17/2017  Contact Type:  Telephone/ talked to patient re: INR result, warfarin dosing and INR recheck date. He verbalized understanding and had no questions.     SUBJECTIVE:     Patient Findings     Comments Done with doxycycline and is not back to taking azithromycin 250 daily           OBJECTIVE    INR Point of Care   Date Value Ref Range Status   07/17/2017 2.6 (H) 0.86 - 1.14 Final     Comment:     This test is intended for monitoring Coumadin therapy.  Results are not   accurate   in patients with prolonged INR due to factor deficiency.         ASSESSMENT / PLAN  INR assessment THER    Recheck INR In: 9 DAYS    INR Location Clinic      Anticoagulation Summary as of 7/17/2017     INR goal 2.0-3.0   Today's INR 2.6   Maintenance plan 2.5 mg (5 mg x 0.5) on Wed; 5 mg (5 mg x 1) all other days   Full instructions 2.5 mg on Wed; 5 mg all other days   Weekly total 32.5 mg   No change documented Zena Haas RN   Plan last modified Zena Burden RN (1/4/2017)   Next INR check 7/27/2017   Priority INR   Target end date Indefinite    Indications   Chronic atrial fibrillation (H) [I48.2]  PVD (peripheral vascular disease) (H) [I73.9]  Long-term (current) use of anticoagulants [Z79.01] [Z79.01]         Anticoagulation Episode Summary     INR check location     Preferred lab     Send INR reminders to  ANTICOAG POOL    Comments takes Azithromycin 250mg daily long term      Anticoagulation Care Providers     Provider Role Specialty Phone number    Dominguez Maradiaga MD [facility] [phone]            See the Encounter Report to view Anticoagulation Flowsheet and Dosing Calendar (Go to Encounters tab in chart review, and find the Anticoagulation Therapy Visit)        Zena Haas, RN

## 2017-07-21 ENCOUNTER — MEDICAL CORRESPONDENCE (OUTPATIENT)
Dept: HEALTH INFORMATION MANAGEMENT | Facility: HOSPITAL | Age: 77
End: 2017-07-21

## 2017-07-27 ENCOUNTER — ANTICOAGULATION THERAPY VISIT (OUTPATIENT)
Dept: ANTICOAGULATION | Facility: OTHER | Age: 77
End: 2017-07-27

## 2017-07-27 DIAGNOSIS — I73.9 PVD (PERIPHERAL VASCULAR DISEASE) (H): ICD-10-CM

## 2017-07-27 DIAGNOSIS — Z79.01 LONG-TERM (CURRENT) USE OF ANTICOAGULANTS: ICD-10-CM

## 2017-07-27 DIAGNOSIS — I48.20 CHRONIC ATRIAL FIBRILLATION (H): ICD-10-CM

## 2017-07-27 LAB — INR BLD: 3.9 (ref 0.86–1.14)

## 2017-07-27 PROCEDURE — 36416 COLLJ CAPILLARY BLOOD SPEC: CPT | Mod: ZL | Performed by: FAMILY MEDICINE

## 2017-07-27 PROCEDURE — 85610 PROTHROMBIN TIME: CPT | Mod: QW,ZL | Performed by: FAMILY MEDICINE

## 2017-07-27 NOTE — MR AVS SNAPSHOT
Rm GARCIA Gerald   7/27/2017   Anticoagulation Therapy Visit    Description:  76 year old male   Provider:  Dominguez Maradiaga MD   Department:  [facility] Anti Coagulation           INR as of 7/27/2017     Today's INR 3.9!      Anticoagulation Summary as of 7/27/2017     INR goal 2.0-3.0   Today's INR 3.9!   Full instructions 7/27: Hold; Otherwise 2.5 mg on Mon, Wed, Fri; 5 mg all other days   Next INR check 8/2/2017    Indications   Chronic atrial fibrillation (H) [I48.2]  PVD (peripheral vascular disease) (H) [I73.9]  Long-term (current) use of anticoagulants [Z79.01] [Z79.01]         July 2017 Details    Sun Mon Tue Wed Thu Fri Sat           1                 2               3               4               5               6               7               8                 9               10               11               12               13               14               15                 16               17               18               19               20               21               22                 23               24               25               26               27      Hold   See details      28      2.5 mg         29      5 mg           30      5 mg         31      2.5 mg               Date Details   07/27 This INR check               How to take your warfarin dose     To take:  2.5 mg Take 0.5 of a 5 mg tablet.    To take:  5 mg Take 1 of the 5 mg tablets.    Hold Do not take your warfarin dose. See the Details table to the right for additional instructions.                August 2017 Details    Sun Mon Tue Wed Thu Fri Sat       1      5 mg         2            3               4               5                 6               7               8               9               10               11               12                 13               14               15               16               17               18               19                 20               21               22               23                24               25               26                 27               28               29               30               31                  Date Details   No additional details    Date of next INR:  8/2/2017         How to take your warfarin dose     To take:  2.5 mg Take 0.5 of a 5 mg tablet.    To take:  5 mg Take 1 of the 5 mg tablets.

## 2017-07-27 NOTE — PROGRESS NOTES
ANTICOAGULATION FOLLOW-UP CLINIC VISIT    Patient Name:  Rm Duarte  Date:  7/27/2017  Contact Type:  Telephone    SUBJECTIVE:     Patient Findings     Positives Change in medications    Comments Prednisone dose  decrease to 40mg daily for 1 week  Call placed to patient and message left to call Coumadin Clinic. Patient returned call.  He has no new medications other than prednisone dose decrease, No bleeding/bruising. We discussed warfarin dosing and INR recheck date and he verbalized understanding           OBJECTIVE    INR Point of Care   Date Value Ref Range Status   07/27/2017 3.9 (H) 0.86 - 1.14 Final     Comment:     This test is intended for monitoring Coumadin therapy.  Results are not   accurate   in patients with prolonged INR due to factor deficiency.         ASSESSMENT / PLAN  INR assessment SUPRA    Recheck INR In: 1 WEEK    INR Location Clinic      Anticoagulation Summary as of 7/27/2017     INR goal 2.0-3.0   Today's INR 3.9!   Maintenance plan 2.5 mg (5 mg x 0.5) on Mon, Wed, Fri; 5 mg (5 mg x 1) all other days   Full instructions 7/27: Hold; Otherwise 2.5 mg on Mon, Wed, Fri; 5 mg all other days   Weekly total 27.5 mg   Plan last modified Zena Haas RN (7/27/2017)   Next INR check 8/2/2017   Priority INR   Target end date Indefinite    Indications   Chronic atrial fibrillation (H) [I48.2]  PVD (peripheral vascular disease) (H) [I73.9]  Long-term (current) use of anticoagulants [Z79.01] [Z79.01]         Anticoagulation Episode Summary     INR check location     Preferred lab     Send INR reminders to  ANTICOAG POOL    Comments takes Azithromycin 250mg daily long term      Anticoagulation Care Providers     Provider Role Specialty Phone number    Dominguez Maradiaga MD [facility] [phone]            See the Encounter Report to view Anticoagulation Flowsheet and Dosing Calendar (Go to Encounters tab in chart review, and find the Anticoagulation Therapy Visit)        Zena  GAGAN Haas, RN

## 2017-07-28 DIAGNOSIS — J44.9 COPD (CHRONIC OBSTRUCTIVE PULMONARY DISEASE) (H): ICD-10-CM

## 2017-07-28 DIAGNOSIS — I73.9 PVD (PERIPHERAL VASCULAR DISEASE) (H): ICD-10-CM

## 2017-07-31 RX ORDER — IPRATROPIUM BROMIDE AND ALBUTEROL SULFATE 2.5; .5 MG/3ML; MG/3ML
SOLUTION RESPIRATORY (INHALATION)
Qty: 360 VIAL | Refills: 1 | Status: SHIPPED | OUTPATIENT
Start: 2017-07-31 | End: 2017-12-18

## 2017-07-31 RX ORDER — SIMVASTATIN 10 MG
TABLET ORAL
Qty: 30 TABLET | Refills: 0 | Status: SHIPPED | OUTPATIENT
Start: 2017-07-31 | End: 2017-09-04

## 2017-08-02 ENCOUNTER — ANTICOAGULATION THERAPY VISIT (OUTPATIENT)
Dept: ANTICOAGULATION | Facility: OTHER | Age: 77
End: 2017-08-02

## 2017-08-02 DIAGNOSIS — I48.20 CHRONIC ATRIAL FIBRILLATION (H): ICD-10-CM

## 2017-08-02 DIAGNOSIS — I73.9 PVD (PERIPHERAL VASCULAR DISEASE) (H): ICD-10-CM

## 2017-08-02 DIAGNOSIS — Z79.01 LONG-TERM (CURRENT) USE OF ANTICOAGULANTS: ICD-10-CM

## 2017-08-02 LAB — INR BLD: 3.8 (ref 0.86–1.14)

## 2017-08-02 PROCEDURE — 85610 PROTHROMBIN TIME: CPT | Mod: QW,ZL | Performed by: FAMILY MEDICINE

## 2017-08-02 PROCEDURE — 36416 COLLJ CAPILLARY BLOOD SPEC: CPT | Mod: ZL | Performed by: FAMILY MEDICINE

## 2017-08-02 NOTE — MR AVS SNAPSHOT
Abdalla RADHA Duarte   8/2/2017   Anticoagulation Therapy Visit    Description:  76 year old male   Provider:  Dominguez Maradiaga MD   Department:  Hc Anti Coagulation           INR as of 8/2/2017     Today's INR 3.8!      Anticoagulation Summary as of 8/2/2017     INR goal 2.0-3.0   Today's INR 3.8!   Full instructions 8/2: Hold; 8/3: 2.5 mg; Otherwise 5 mg on Mon, Wed, Fri; 2.5 mg all other days   Next INR check 8/9/2017    Indications   Chronic atrial fibrillation (H) [I48.2]  PVD (peripheral vascular disease) (H) [I73.9]  Long-term (current) use of anticoagulants [Z79.01] [Z79.01]         August 2017 Details    Sun Mon Tue Wed Thu Fri Sat       1               2      Hold   See details      3      2.5 mg         4      5 mg         5      2.5 mg           6      2.5 mg         7      5 mg         8      2.5 mg         9            10               11               12                 13               14               15               16               17               18               19                 20               21               22               23               24               25               26                 27               28               29               30               31                  Date Details   08/02 This INR check       Date of next INR:  8/9/2017         How to take your warfarin dose     To take:  2.5 mg Take 0.5 of a 5 mg tablet.    To take:  5 mg Take 1 of the 5 mg tablets.    Hold Do not take your warfarin dose. See the Details table to the right for additional instructions.

## 2017-08-09 ENCOUNTER — ANTICOAGULATION THERAPY VISIT (OUTPATIENT)
Dept: ANTICOAGULATION | Facility: OTHER | Age: 77
End: 2017-08-09

## 2017-08-09 DIAGNOSIS — I48.20 CHRONIC ATRIAL FIBRILLATION (H): ICD-10-CM

## 2017-08-09 DIAGNOSIS — Z79.01 LONG-TERM (CURRENT) USE OF ANTICOAGULANTS: ICD-10-CM

## 2017-08-09 DIAGNOSIS — I73.9 PVD (PERIPHERAL VASCULAR DISEASE) (H): ICD-10-CM

## 2017-08-09 LAB — INR BLD: 1.6 (ref 0.86–1.14)

## 2017-08-09 PROCEDURE — 36416 COLLJ CAPILLARY BLOOD SPEC: CPT | Mod: ZL | Performed by: FAMILY MEDICINE

## 2017-08-09 PROCEDURE — 85610 PROTHROMBIN TIME: CPT | Mod: QW,ZL | Performed by: FAMILY MEDICINE

## 2017-08-09 NOTE — MR AVS SNAPSHOT
Rm Duarte   8/9/2017   Anticoagulation Therapy Visit    Description:  76 year old male   Provider:  Dominguez Maradiaga MD   Department:  Hc Anti Coagulation           INR as of 8/9/2017     Today's INR 1.6!      Anticoagulation Summary as of 8/9/2017     INR goal 2.0-3.0   Today's INR 1.6!   Full instructions 8/9: 7.5 mg; Otherwise 5 mg on Mon, Wed, Fri; 2.5 mg all other days   Next INR check 8/16/2017    Indications   Chronic atrial fibrillation (H) [I48.2]  PVD (peripheral vascular disease) (H) [I73.9]  Long-term (current) use of anticoagulants [Z79.01] [Z79.01]         August 2017 Details    Sun Mon Tue Wed Thu Fri Sat       1               2               3               4               5                 6               7               8               9      7.5 mg   See details      10      2.5 mg         11      5 mg         12      2.5 mg           13      2.5 mg         14      5 mg         15      2.5 mg         16            17               18               19                 20               21               22               23               24               25               26                 27               28               29               30               31                  Date Details   08/09 This INR check       Date of next INR:  8/16/2017         How to take your warfarin dose     To take:  2.5 mg Take 0.5 of a 5 mg tablet.    To take:  5 mg Take 1 of the 5 mg tablets.    To take:  7.5 mg Take 1.5 of the 5 mg tablets.

## 2017-08-09 NOTE — PROGRESS NOTES
ANTICOAGULATION FOLLOW-UP CLINIC VISIT    Patient Name:  Rm Duarte  Date:  8/9/2017  Contact Type:  Telephone/ message left on home phone voicemail    SUBJECTIVE:     Patient Findings     Positives No Problem Findings    Comments Call placed to and message left on patient listed phone number re: INR result, warfarin dosing and INR recheck date. He is to notify warfarin clinic if any bleeding/bruising, changes in diet/meds/activity or questions.            OBJECTIVE    INR Point of Care   Date Value Ref Range Status   08/09/2017 1.6 (H) 0.86 - 1.14 Final     Comment:     This test is intended for monitoring Coumadin therapy.  Results are not   accurate   in patients with prolonged INR due to factor deficiency.         ASSESSMENT / PLAN  INR assessment SUB    Recheck INR In: 1 WEEK    INR Location Clinic      Anticoagulation Summary as of 8/9/2017     INR goal 2.0-3.0   Today's INR 1.6!   Maintenance plan 5 mg (5 mg x 1) on Mon, Wed, Fri; 2.5 mg (5 mg x 0.5) all other days   Full instructions 8/9: 7.5 mg; Otherwise 5 mg on Mon, Wed, Fri; 2.5 mg all other days   Weekly total 25 mg   Plan last modified Zena Haas RN (8/2/2017)   Next INR check 8/16/2017   Priority INR   Target end date Indefinite    Indications   Chronic atrial fibrillation (H) [I48.2]  PVD (peripheral vascular disease) (H) [I73.9]  Long-term (current) use of anticoagulants [Z79.01] [Z79.01]         Anticoagulation Episode Summary     INR check location     Preferred lab     Send INR reminders to  DAVID POOL    Comments takes Azithromycin 250mg daily long term      Anticoagulation Care Providers     Provider Role Specialty Phone number    Dominguez Maradiaga MD [facility] [phone]            See the Encounter Report to view Anticoagulation Flowsheet and Dosing Calendar (Go to Encounters tab in chart review, and find the Anticoagulation Therapy Visit)        Zena Haas, RN

## 2017-08-16 ENCOUNTER — ANTICOAGULATION THERAPY VISIT (OUTPATIENT)
Dept: ANTICOAGULATION | Facility: OTHER | Age: 77
End: 2017-08-16

## 2017-08-16 DIAGNOSIS — I73.9 PVD (PERIPHERAL VASCULAR DISEASE) (H): ICD-10-CM

## 2017-08-16 DIAGNOSIS — I48.20 CHRONIC ATRIAL FIBRILLATION (H): ICD-10-CM

## 2017-08-16 DIAGNOSIS — Z79.01 LONG-TERM (CURRENT) USE OF ANTICOAGULANTS: ICD-10-CM

## 2017-08-16 LAB — INR BLD: 1.5 (ref 0.86–1.14)

## 2017-08-16 PROCEDURE — 85610 PROTHROMBIN TIME: CPT | Mod: QW,ZL | Performed by: FAMILY MEDICINE

## 2017-08-16 PROCEDURE — 36416 COLLJ CAPILLARY BLOOD SPEC: CPT | Mod: ZL | Performed by: FAMILY MEDICINE

## 2017-08-16 NOTE — MR AVS SNAPSHOT
Rm Duarte   8/16/2017   Anticoagulation Therapy Visit    Description:  76 year old male   Provider:  Dominguez Maradiaga MD   Department:  Hc Anti Coagulation           INR as of 8/16/2017     Today's INR 1.5!      Anticoagulation Summary as of 8/16/2017     INR goal 2.0-3.0   Today's INR 1.5!   Full instructions 8/16: 7.5 mg; 8/17: 5 mg; Otherwise 5 mg on Mon, Wed, Fri; 2.5 mg all other days   Next INR check 8/23/2017    Indications   Chronic atrial fibrillation (H) [I48.2]  PVD (peripheral vascular disease) (H) [I73.9]  Long-term (current) use of anticoagulants [Z79.01] [Z79.01]         August 2017 Details    Sun Mon Tue Wed Thu Fri Sat       1               2               3               4               5                 6               7               8               9               10               11               12                 13               14               15               16      7.5 mg   See details      17      5 mg         18      5 mg         19      2.5 mg           20      2.5 mg         21      5 mg         22      2.5 mg         23            24               25               26                 27               28               29               30               31                  Date Details   08/16 This INR check       Date of next INR:  8/23/2017         How to take your warfarin dose     To take:  2.5 mg Take 0.5 of a 5 mg tablet.    To take:  5 mg Take 1 of the 5 mg tablets.    To take:  7.5 mg Take 1.5 of the 5 mg tablets.

## 2017-08-16 NOTE — PROGRESS NOTES
ANTICOAGULATION FOLLOW-UP CLINIC VISIT    Patient Name:  Rm Duarte  Date:  8/16/2017  Contact Type:  Telephone/ message left on home phone    SUBJECTIVE:     Patient Findings     Comments Call placed to patient and message left on home phone voicemail re: INR result, warfarin dosing and INR recheck date. Requested that he call me to let me know how much prednisone he is on for this week and how many weeks he has left. He is to let this nurse know if he has had any bleeding/bruising, changes in diet/meds/activity or questions.            OBJECTIVE    INR Point of Care   Date Value Ref Range Status   08/16/2017 1.5 (H) 0.86 - 1.14 Final     Comment:     This test is intended for monitoring Coumadin therapy.  Results are not   accurate in patients with prolonged INR due to factor deficiency.         ASSESSMENT / PLAN  INR assessment SUB    Recheck INR In: 1 WEEK    INR Location Clinic      Anticoagulation Summary as of 8/16/2017     INR goal 2.0-3.0   Today's INR 1.5!   Maintenance plan 5 mg (5 mg x 1) on Mon, Wed, Fri; 2.5 mg (5 mg x 0.5) all other days   Full instructions 8/16: 7.5 mg; 8/17: 5 mg; Otherwise 5 mg on Mon, Wed, Fri; 2.5 mg all other days   Weekly total 25 mg   Plan last modified Zena Haas RN (8/2/2017)   Next INR check 8/23/2017   Priority INR   Target end date Indefinite    Indications   Chronic atrial fibrillation (H) [I48.2]  PVD (peripheral vascular disease) (H) [I73.9]  Long-term (current) use of anticoagulants [Z79.01] [Z79.01]         Anticoagulation Episode Summary     INR check location     Preferred lab     Send INR reminders to  ANTICOAG POOL    Comments takes Azithromycin 250mg daily long term      Anticoagulation Care Providers     Provider Role Specialty Phone number    Dominguez Maradiaga MD [facility] [phone]            See the Encounter Report to view Anticoagulation Flowsheet and Dosing Calendar (Go to Encounters tab in chart review, and find the  Anticoagulation Therapy Visit)        Zena Haas RN

## 2017-08-18 DIAGNOSIS — J43.8 OTHER EMPHYSEMA (H): ICD-10-CM

## 2017-08-18 NOTE — TELEPHONE ENCOUNTER
Perforomist      Last Written Prescription Date: 8/18/16  Last Fill Quantity: 3,  # refills: 0   Last Office Visit with G, [facility] or [facility] prescribing provider: 7/13/17

## 2017-08-21 RX ORDER — FORMOTEROL FUMARATE DIHYDRATE 20 UG/2ML
SOLUTION RESPIRATORY (INHALATION)
Qty: 360 ML | Refills: 0 | Status: ON HOLD | OUTPATIENT
Start: 2017-08-21 | End: 2017-11-29

## 2017-08-23 ENCOUNTER — ANTICOAGULATION THERAPY VISIT (OUTPATIENT)
Dept: ANTICOAGULATION | Facility: OTHER | Age: 77
End: 2017-08-23

## 2017-08-23 DIAGNOSIS — I73.9 PVD (PERIPHERAL VASCULAR DISEASE) (H): ICD-10-CM

## 2017-08-23 DIAGNOSIS — Z79.01 LONG-TERM (CURRENT) USE OF ANTICOAGULANTS: ICD-10-CM

## 2017-08-23 DIAGNOSIS — I48.20 CHRONIC ATRIAL FIBRILLATION (H): ICD-10-CM

## 2017-08-23 LAB — INR BLD: 2.9 (ref 0.86–1.14)

## 2017-08-23 PROCEDURE — 36416 COLLJ CAPILLARY BLOOD SPEC: CPT | Mod: ZL | Performed by: FAMILY MEDICINE

## 2017-08-23 PROCEDURE — 85610 PROTHROMBIN TIME: CPT | Mod: QW,ZL | Performed by: FAMILY MEDICINE

## 2017-08-23 NOTE — PROGRESS NOTES
ANTICOAGULATION FOLLOW-UP CLINIC VISIT    Patient Name:  Rm Duarte  Date:  8/23/2017  Contact Type:  Telephone/ message left on his home phone voicemail    SUBJECTIVE:     Patient Findings     Comments Call placed to home phone and message left re: INR result, warfarin dosing and INR recheck date. He is to call warfarin clinic if any bleeding/bruising, changes in diet/meds/activity or questions.            OBJECTIVE    INR Point of Care   Date Value Ref Range Status   08/23/2017 2.9 (H) 0.86 - 1.14 Final     Comment:     This test is intended for monitoring Coumadin therapy.  Results are not   accurate in patients with prolonged INR due to factor deficiency.         ASSESSMENT / PLAN  INR assessment THER    Recheck INR In: 1 WEEK    INR Location Clinic      Anticoagulation Summary as of 8/23/2017     INR goal 2.0-3.0   Today's INR 2.9   Maintenance plan 5 mg (5 mg x 1) on Mon, Wed, Fri; 2.5 mg (5 mg x 0.5) all other days   Full instructions 5 mg on Mon, Wed, Fri; 2.5 mg all other days   Weekly total 25 mg   No change documented Zena Haas RN   Plan last modified Zena Haas RN (8/2/2017)   Next INR check 8/30/2017   Priority INR   Target end date Indefinite    Indications   Chronic atrial fibrillation (H) [I48.2]  PVD (peripheral vascular disease) (H) [I73.9]  Long-term (current) use of anticoagulants [Z79.01] [Z79.01]         Anticoagulation Episode Summary     INR check location     Preferred lab     Send INR reminders to  ANTICOAG POOL    Comments takes Azithromycin 250mg daily long term      Anticoagulation Care Providers     Provider Role Specialty Phone number    Dominguez Maradiaga MD [facility] [phone]            See the Encounter Report to view Anticoagulation Flowsheet and Dosing Calendar (Go to Encounters tab in chart review, and find the Anticoagulation Therapy Visit)        Zena Haas RN

## 2017-08-23 NOTE — MR AVS SNAPSHOT
Rm Duarte   8/23/2017   Anticoagulation Therapy Visit    Description:  76 year old male   Provider:  Dominguez Maradiaga MD   Department:  Hc Anti Coagulation           INR as of 8/23/2017     Today's INR 2.9      Anticoagulation Summary as of 8/23/2017     INR goal 2.0-3.0   Today's INR 2.9   Full instructions 5 mg on Mon, Wed, Fri; 2.5 mg all other days   Next INR check 8/30/2017    Indications   Chronic atrial fibrillation (H) [I48.2]  PVD (peripheral vascular disease) (H) [I73.9]  Long-term (current) use of anticoagulants [Z79.01] [Z79.01]         August 2017 Details    Sun Mon Tue Wed Thu Fri Sat       1               2               3               4               5                 6               7               8               9               10               11               12                 13               14               15               16               17               18               19                 20               21               22               23      5 mg   See details      24      2.5 mg         25      5 mg         26      2.5 mg           27      2.5 mg         28      5 mg         29      2.5 mg         30            31                  Date Details   08/23 This INR check       Date of next INR:  8/30/2017         How to take your warfarin dose     To take:  2.5 mg Take 0.5 of a 5 mg tablet.    To take:  5 mg Take 1 of the 5 mg tablets.

## 2017-08-30 ENCOUNTER — ANTICOAGULATION THERAPY VISIT (OUTPATIENT)
Dept: ANTICOAGULATION | Facility: OTHER | Age: 77
End: 2017-08-30

## 2017-08-30 DIAGNOSIS — I73.9 PVD (PERIPHERAL VASCULAR DISEASE) (H): ICD-10-CM

## 2017-08-30 DIAGNOSIS — Z79.01 LONG-TERM (CURRENT) USE OF ANTICOAGULANTS: ICD-10-CM

## 2017-08-30 DIAGNOSIS — I48.20 CHRONIC ATRIAL FIBRILLATION (H): ICD-10-CM

## 2017-08-30 LAB — INR BLD: 1.7 (ref 0.86–1.14)

## 2017-08-30 PROCEDURE — 85610 PROTHROMBIN TIME: CPT | Mod: QW,ZL | Performed by: FAMILY MEDICINE

## 2017-08-30 PROCEDURE — 36416 COLLJ CAPILLARY BLOOD SPEC: CPT | Mod: ZL | Performed by: FAMILY MEDICINE

## 2017-08-30 NOTE — MR AVS SNAPSHOT
Rm RADHA Duarte   8/30/2017   Anticoagulation Therapy Visit    Description:  76 year old male   Provider:  Dominguez Maradiaga MD   Department:  Hc Anti Coagulation           INR as of 8/30/2017     Today's INR 1.7!      Anticoagulation Summary as of 8/30/2017     INR goal 2.0-3.0   Today's INR 1.7!   Full instructions 8/30: 5 mg; Otherwise 2.5 mg on Mon, Fri; 5 mg all other days   Next INR check 9/13/2017    Indications   Chronic atrial fibrillation (H) [I48.2]  PVD (peripheral vascular disease) (H) [I73.9]  Long-term (current) use of anticoagulants [Z79.01] [Z79.01]         August 2017 Details    Sun Mon Tue Wed Thu Fri Sat       1               2               3               4               5                 6               7               8               9               10               11               12                 13               14               15               16               17               18               19                 20               21               22               23               24               25               26                 27               28               29               30      5 mg   See details      31      5 mg            Date Details   08/30 This INR check               How to take your warfarin dose     To take:  5 mg Take 1 of the 5 mg tablets.           September 2017 Details    Sun Mon Tue Wed Thu Fri Sat          1      2.5 mg         2      5 mg           3      5 mg         4      2.5 mg         5      5 mg         6      5 mg         7      5 mg         8      2.5 mg         9      5 mg           10      5 mg         11      2.5 mg         12      5 mg         13            14               15               16                 17               18               19               20               21               22               23                 24               25               26               27               28               29               30                 Date Details   No additional details    Date of next INR:  9/13/2017         How to take your warfarin dose     To take:  2.5 mg Take 0.5 of a 5 mg tablet.    To take:  5 mg Take 1 of the 5 mg tablets.

## 2017-08-30 NOTE — PROGRESS NOTES
ANTICOAGULATION FOLLOW-UP CLINIC VISIT    Patient Name:  Rm Duarte  Date:  8/30/2017  Contact Type:  Telephone/ message left on home phone    SUBJECTIVE:     Patient Findings     Comments Message left on patient home phone re: INR result, warfarin dosing and INR recheck date. He is to let me know if prednisone has been completed and if any bleeding/bruising, changes in diet/activity or if he has questions.            OBJECTIVE    INR Point of Care   Date Value Ref Range Status   08/30/2017 1.7 (H) 0.86 - 1.14 Final     Comment:     This test is intended for monitoring Coumadin therapy.  Results are not   accurate in patients with prolonged INR due to factor deficiency.         ASSESSMENT / PLAN  INR assessment SUB    Recheck INR In: 2 WEEKS    INR Location Clinic      Anticoagulation Summary as of 8/30/2017     INR goal 2.0-3.0   Today's INR 1.7!   Maintenance plan 2.5 mg (5 mg x 0.5) on Mon, Fri; 5 mg (5 mg x 1) all other days   Full instructions 8/30: 5 mg; Otherwise 2.5 mg on Mon, Fri; 5 mg all other days   Weekly total 30 mg   Plan last modified Zena Haas RN (8/30/2017)   Next INR check 9/13/2017   Priority INR   Target end date Indefinite    Indications   Chronic atrial fibrillation (H) [I48.2]  PVD (peripheral vascular disease) (H) [I73.9]  Long-term (current) use of anticoagulants [Z79.01] [Z79.01]         Anticoagulation Episode Summary     INR check location     Preferred lab     Send INR reminders to  ANTICOAG POOL    Comments takes Azithromycin 250mg daily long term      Anticoagulation Care Providers     Provider Role Specialty Phone number    Dominguez Maradiaga MD [facility] [phone]            See the Encounter Report to view Anticoagulation Flowsheet and Dosing Calendar (Go to Encounters tab in chart review, and find the Anticoagulation Therapy Visit)        Zena Haas, RN

## 2017-09-04 DIAGNOSIS — I73.9 PVD (PERIPHERAL VASCULAR DISEASE) (H): ICD-10-CM

## 2017-09-05 ENCOUNTER — MEDICAL CORRESPONDENCE (OUTPATIENT)
Dept: HEALTH INFORMATION MANAGEMENT | Facility: HOSPITAL | Age: 77
End: 2017-09-05

## 2017-09-06 RX ORDER — SIMVASTATIN 10 MG
TABLET ORAL
Qty: 30 TABLET | Refills: 0 | Status: SHIPPED | OUTPATIENT
Start: 2017-09-06 | End: 2017-10-04

## 2017-09-13 ENCOUNTER — ANTICOAGULATION THERAPY VISIT (OUTPATIENT)
Dept: ANTICOAGULATION | Facility: OTHER | Age: 77
End: 2017-09-13

## 2017-09-13 DIAGNOSIS — I73.9 PVD (PERIPHERAL VASCULAR DISEASE) (H): ICD-10-CM

## 2017-09-13 DIAGNOSIS — Z79.01 LONG-TERM (CURRENT) USE OF ANTICOAGULANTS: ICD-10-CM

## 2017-09-13 DIAGNOSIS — I48.20 CHRONIC ATRIAL FIBRILLATION (H): ICD-10-CM

## 2017-09-13 LAB — INR BLD: 2.7 (ref 0.86–1.14)

## 2017-09-13 PROCEDURE — 85610 PROTHROMBIN TIME: CPT | Mod: QW,ZL | Performed by: FAMILY MEDICINE

## 2017-09-13 PROCEDURE — 36416 COLLJ CAPILLARY BLOOD SPEC: CPT | Mod: ZL | Performed by: FAMILY MEDICINE

## 2017-09-13 RX ORDER — ROFLUMILAST 500 UG/1
500 TABLET ORAL AT BEDTIME
COMMUNITY
Start: 2017-08-30

## 2017-09-13 NOTE — PROGRESS NOTES
ANTICOAGULATION FOLLOW-UP CLINIC VISIT    Patient Name:  Rm Duarte  Date:  9/13/2017  Contact Type:  Telephone    SUBJECTIVE:     Patient Findings     Comments Call placed to patient re: INR result, warfarin dosing and INR recheck date. Started Daliresp 2 weeks ago.  He states that he has been taking warfarin 2.5mg Mon,Wed,Fri and 5mg ROW instead of 2.5mg Mon,Fri and 5mg ROW.  He verbalized understanding of instructions and has no questions.            OBJECTIVE    INR Point of Care   Date Value Ref Range Status   09/13/2017 2.7 (H) 0.86 - 1.14 Final     Comment:     This test is intended for monitoring Coumadin therapy.  Results are not   accurate in patients with prolonged INR due to factor deficiency.         ASSESSMENT / PLAN  INR assessment THER    Recheck INR In: 2 WEEKS    INR Location Clinic      Anticoagulation Summary as of 9/13/2017     INR goal 2.0-3.0   Today's INR 2.7   Maintenance plan 2.5 mg (5 mg x 0.5) on Mon, Wed, Fri; 5 mg (5 mg x 1) all other days   Full instructions 2.5 mg on Mon, Wed, Fri; 5 mg all other days   Weekly total 27.5 mg   Plan last modified Zena Haas RN (9/13/2017)   Next INR check 9/20/2017   Priority INR   Target end date Indefinite    Indications   Chronic atrial fibrillation (H) [I48.2]  PVD (peripheral vascular disease) (H) [I73.9]  Long-term (current) use of anticoagulants [Z79.01] [Z79.01]         Anticoagulation Episode Summary     INR check location     Preferred lab     Send INR reminders to  DAVID POOL    Comments takes Azithromycin 250mg daily long term      Anticoagulation Care Providers     Provider Role Specialty Phone number    Dominguez Maradiaga MD [facility] [phone]            See the Encounter Report to view Anticoagulation Flowsheet and Dosing Calendar (Go to Encounters tab in chart review, and find the Anticoagulation Therapy Visit)        Zena Haas, RN

## 2017-09-13 NOTE — MR AVS SNAPSHOT
Rm Duarte   9/13/2017   Anticoagulation Therapy Visit    Description:  76 year old male   Provider:  Dominguez Maradiaga MD   Department:  Hc Anti Coagulation           INR as of 9/13/2017     Today's INR 2.7      Anticoagulation Summary as of 9/13/2017     INR goal 2.0-3.0   Today's INR 2.7   Full instructions 2.5 mg on Mon, Wed, Fri; 5 mg all other days   Next INR check 9/20/2017    Indications   Chronic atrial fibrillation (H) [I48.2]  PVD (peripheral vascular disease) (H) [I73.9]  Long-term (current) use of anticoagulants [Z79.01] [Z79.01]         September 2017 Details    Sun Mon Tue Wed Thu Fri Sat          1               2                 3               4               5               6               7               8               9                 10               11               12               13      2.5 mg   See details      14      5 mg         15      2.5 mg         16      5 mg           17      5 mg         18      2.5 mg         19      5 mg         20            21               22               23                 24               25               26               27               28               29               30                Date Details   09/13 This INR check       Date of next INR:  9/20/2017         How to take your warfarin dose     To take:  2.5 mg Take 0.5 of a 5 mg tablet.    To take:  5 mg Take 1 of the 5 mg tablets.

## 2017-09-20 ENCOUNTER — ANTICOAGULATION THERAPY VISIT (OUTPATIENT)
Dept: ANTICOAGULATION | Facility: OTHER | Age: 77
End: 2017-09-20

## 2017-09-20 DIAGNOSIS — I73.9 PVD (PERIPHERAL VASCULAR DISEASE) (H): ICD-10-CM

## 2017-09-20 DIAGNOSIS — Z79.01 LONG-TERM (CURRENT) USE OF ANTICOAGULANTS: ICD-10-CM

## 2017-09-20 DIAGNOSIS — I48.20 CHRONIC ATRIAL FIBRILLATION (H): ICD-10-CM

## 2017-09-20 LAB — INR BLD: 4.4 (ref 0.86–1.14)

## 2017-09-20 PROCEDURE — 36416 COLLJ CAPILLARY BLOOD SPEC: CPT | Mod: ZL | Performed by: FAMILY MEDICINE

## 2017-09-20 PROCEDURE — 85610 PROTHROMBIN TIME: CPT | Mod: QW,ZL | Performed by: FAMILY MEDICINE

## 2017-09-20 NOTE — PROGRESS NOTES
ANTICOAGULATION FOLLOW-UP CLINIC VISIT    Patient Name:  Rm Duarte  Date:  9/20/2017  Contact Type:  Face to Face    SUBJECTIVE:     Patient Findings     Positives No Problem Findings    Comments Call placed to patient home and message left re: INR result, warfarin dosing and INR recheck date. Requested patient call me back if any bleeding/bruising, changes in diet/meds/activity, any vomiting or diarrhea or any questions.            OBJECTIVE    INR Point of Care   Date Value Ref Range Status   09/20/2017 4.4 (H) 0.86 - 1.14 Final     Comment:     This test is intended for monitoring Coumadin therapy.  Results are not   accurate in patients with prolonged INR due to factor deficiency.         ASSESSMENT / PLAN  INR assessment SUPRA    Recheck INR In: 1 WEEK    INR Location Clinic      Anticoagulation Summary as of 9/20/2017     INR goal 2.0-3.0   Today's INR 4.4!   Maintenance plan 5 mg (5 mg x 1) on Mon, Wed, Fri; 2.5 mg (5 mg x 0.5) all other days   Full instructions 9/20: Hold; 9/21: 2.5 mg; Otherwise 5 mg on Mon, Wed, Fri; 2.5 mg all other days   Weekly total 25 mg   Plan last modified Zena Haas RN (9/20/2017)   Next INR check 9/27/2017   Priority INR   Target end date Indefinite    Indications   Chronic atrial fibrillation (H) [I48.2]  PVD (peripheral vascular disease) (H) [I73.9]  Long-term (current) use of anticoagulants [Z79.01] [Z79.01]         Anticoagulation Episode Summary     INR check location     Preferred lab     Send INR reminders to  ANTICO POOL    Comments takes Azithromycin 250mg daily long term      Anticoagulation Care Providers     Provider Role Specialty Phone number    Dominguez Maradiaga MD [facility] [phone]            See the Encounter Report to view Anticoagulation Flowsheet and Dosing Calendar (Go to Encounters tab in chart review, and find the Anticoagulation Therapy Visit)        Zena Haas, RN

## 2017-09-20 NOTE — MR AVS SNAPSHOT
Rm GARCIA Gerald   9/20/2017   Anticoagulation Therapy Visit    Description:  76 year old male   Provider:  Dominguez Maradiaga MD   Department:  [facility]           INR as of 9/20/2017     Today's INR 4.4!      Anticoagulation Summary as of 9/20/2017     INR goal 2.0-3.0   Today's INR 4.4!   Full instructions 9/20: Hold; 9/21: 2.5 mg; Otherwise 5 mg on Mon, Wed, Fri; 2.5 mg all other days   Next INR check 9/27/2017    Indications   Chronic atrial fibrillation (H) [I48.2]  PVD (peripheral vascular disease) (H) [I73.9]  Long-term (current) use of anticoagulants [Z79.01] [Z79.01]         September 2017 Details    Sun Mon Tue Wed Thu Fri Sat          1               2                 3               4               5               6               7               8               9                 10               11               12               13               14               15               16                 17               18               19               20      Hold   See details      21      2.5 mg         22      5 mg         23      2.5 mg           24      2.5 mg         25      5 mg         26      2.5 mg         27            28               29               30                Date Details   09/20 This INR check       Date of next INR:  9/27/2017         How to take your warfarin dose     To take:  2.5 mg Take 0.5 of a 5 mg tablet.    To take:  5 mg Take 1 of the 5 mg tablets.    Hold Do not take your warfarin dose. See the Details table to the right for additional instructions.

## 2017-09-27 ENCOUNTER — ANTICOAGULATION THERAPY VISIT (OUTPATIENT)
Dept: ANTICOAGULATION | Facility: OTHER | Age: 77
End: 2017-09-27

## 2017-09-27 DIAGNOSIS — Z79.01 LONG-TERM (CURRENT) USE OF ANTICOAGULANTS: ICD-10-CM

## 2017-09-27 DIAGNOSIS — I73.9 PVD (PERIPHERAL VASCULAR DISEASE) (H): ICD-10-CM

## 2017-09-27 DIAGNOSIS — I48.20 CHRONIC ATRIAL FIBRILLATION (H): ICD-10-CM

## 2017-09-27 LAB — INR BLD: 1.8 (ref 0.86–1.14)

## 2017-09-27 PROCEDURE — 85610 PROTHROMBIN TIME: CPT | Mod: QW,ZL | Performed by: FAMILY MEDICINE

## 2017-09-27 PROCEDURE — 36416 COLLJ CAPILLARY BLOOD SPEC: CPT | Mod: ZL | Performed by: FAMILY MEDICINE

## 2017-09-27 RX ORDER — WARFARIN SODIUM 2.5 MG/1
TABLET ORAL
Qty: 30 TABLET | Refills: 1 | Status: SHIPPED | OUTPATIENT
Start: 2017-09-27 | End: 2017-11-01

## 2017-09-27 NOTE — MR AVS SNAPSHOT
Rm RADHA Duarte   9/27/2017   Anticoagulation Therapy Visit    Description:  76 year old male   Provider:  Dominguez Maradiaga MD   Department:  [facility]           INR as of 9/27/2017     Today's INR 1.8!      Anticoagulation Summary as of 9/27/2017     INR goal 2.0-3.0   Today's INR 1.8!   Full instructions 9/28: 3.75 mg; Otherwise 5 mg on Mon, Wed, Fri; 2.5 mg all other days   Next INR check 10/4/2017    Indications   Chronic atrial fibrillation (H) [I48.2]  PVD (peripheral vascular disease) (H) [I73.9]  Long-term (current) use of anticoagulants [Z79.01] [Z79.01]         September 2017 Details    Sun Mon Tue Wed Thu Fri Sat          1               2                 3               4               5               6               7               8               9                 10               11               12               13               14               15               16                 17               18               19               20               21               22               23                 24               25               26               27      5 mg   See details      28      3.75 mg         29      5 mg         30      2.5 mg          Date Details   09/27 This INR check               How to take your warfarin dose     To take:  2.5 mg Take 0.5 of a 5 mg tablet.    To take:  3.75 mg Take 1.5 of the 2.5 mg tablets.    To take:  5 mg Take 1 of the 5 mg tablets.           October 2017 Details    Sun Mon Tue Wed Thu Fri Sat     1      2.5 mg         2      5 mg         3      2.5 mg         4            5               6               7                 8               9               10               11               12               13               14                 15               16               17               18               19               20               21                 22               23               24               25               26               27                28                 29               30               31                    Date Details   No additional details    Date of next INR:  10/4/2017         How to take your warfarin dose     To take:  2.5 mg Take 0.5 of a 5 mg tablet.    To take:  5 mg Take 1 of the 5 mg tablets.

## 2017-09-27 NOTE — PROGRESS NOTES
ANTICOAGULATION FOLLOW-UP CLINIC VISIT    Patient Name:  Rm Duarte  Date:  9/27/2017  Contact Type:  Telephone    SUBJECTIVE:     Patient Findings     Comments Call placed to patient and spoke to him re: INR result, warfarin dosing and INR recheck date. He verbalized understanding and has no questions. He states he cannot get to pharmacy today to get 2.5mg size pills so will get the tomorrow.             OBJECTIVE    INR Point of Care   Date Value Ref Range Status   09/27/2017 1.8 (H) 0.86 - 1.14 Final     Comment:     This test is intended for monitoring Coumadin therapy.  Results are not   accurate in patients with prolonged INR due to factor deficiency.         ASSESSMENT / PLAN  INR assessment SUB    Recheck INR In: 1 WEEK    INR Location Clinic      Anticoagulation Summary as of 9/27/2017     INR goal 2.0-3.0   Today's INR 1.8!   Maintenance plan 5 mg (5 mg x 1) on Mon, Wed, Fri; 2.5 mg (5 mg x 0.5) all other days   Full instructions 9/28: 3.75 mg; Otherwise 5 mg on Mon, Wed, Fri; 2.5 mg all other days   Weekly total 25 mg   Plan last modified Zena Haas RN (9/27/2017)   Next INR check 10/4/2017   Priority INR   Target end date Indefinite    Indications   Chronic atrial fibrillation (H) [I48.2]  PVD (peripheral vascular disease) (H) [I73.9]  Long-term (current) use of anticoagulants [Z79.01] [Z79.01]         Anticoagulation Episode Summary     INR check location     Preferred lab     Send INR reminders to  ANTICOAG POOL    Comments takes Azithromycin 250mg daily long term      Anticoagulation Care Providers     Provider Role Specialty Phone number    Dominguez Maradiaga MD [facility] [phone]            See the Encounter Report to view Anticoagulation Flowsheet and Dosing Calendar (Go to Encounters tab in chart review, and find the Anticoagulation Therapy Visit)        Zena Haas, RN

## 2017-10-04 ENCOUNTER — ANTICOAGULATION THERAPY VISIT (OUTPATIENT)
Dept: ANTICOAGULATION | Facility: OTHER | Age: 77
End: 2017-10-04

## 2017-10-04 DIAGNOSIS — I73.9 PVD (PERIPHERAL VASCULAR DISEASE) (H): ICD-10-CM

## 2017-10-04 DIAGNOSIS — Z79.01 LONG-TERM (CURRENT) USE OF ANTICOAGULANTS: ICD-10-CM

## 2017-10-04 DIAGNOSIS — I48.20 CHRONIC ATRIAL FIBRILLATION (H): ICD-10-CM

## 2017-10-04 LAB — INR BLD: 1.8 (ref 0.86–1.14)

## 2017-10-04 PROCEDURE — 85610 PROTHROMBIN TIME: CPT | Mod: QW,ZL | Performed by: FAMILY MEDICINE

## 2017-10-04 PROCEDURE — 36416 COLLJ CAPILLARY BLOOD SPEC: CPT | Mod: ZL | Performed by: FAMILY MEDICINE

## 2017-10-04 NOTE — PROGRESS NOTES
ANTICOAGULATION FOLLOW-UP CLINIC VISIT    Patient Name:  Rm Duarte  Date:  10/4/2017  Contact Type:  Telephone    SUBJECTIVE:     Patient Findings     Positives No Problem Findings    Comments Call placed to patient and spoke to him re: INR result, warfarin dosing and INR recheck date. He verbalized understanding of instruction and has no questions.            OBJECTIVE    INR Point of Care   Date Value Ref Range Status   10/04/2017 1.8 (H) 0.86 - 1.14 Final     Comment:     This test is intended for monitoring Coumadin therapy.  Results are not   accurate in patients with prolonged INR due to factor deficiency.         ASSESSMENT / PLAN  INR assessment SUB    Recheck INR In: 1 WEEK    INR Location Clinic      Anticoagulation Summary as of 10/4/2017     INR goal 2.0-3.0   Today's INR 1.8!   Maintenance plan 3.75 mg (2.5 mg x 1.5) every day   Full instructions 10/4: 7.5 mg; Otherwise 3.75 mg every day   Weekly total 26.25 mg   Plan last modified Zena Haas RN (10/4/2017)   Next INR check 10/11/2017   Priority INR   Target end date Indefinite    Indications   Chronic atrial fibrillation (H) [I48.2]  PVD (peripheral vascular disease) (H) [I73.9]  Long-term (current) use of anticoagulants [Z79.01] [Z79.01]         Anticoagulation Episode Summary     INR check location     Preferred lab     Send INR reminders to  ANTICOAG POOL    Comments takes Azithromycin 250mg daily long term      Anticoagulation Care Providers     Provider Role Specialty Phone number    Dominguez Maradiaga MD [facility] [phone]            See the Encounter Report to view Anticoagulation Flowsheet and Dosing Calendar (Go to Encounters tab in chart review, and find the Anticoagulation Therapy Visit)        Zena Haas, RN

## 2017-10-04 NOTE — MR AVS SNAPSHOT
Rm Duarte   10/4/2017   Anticoagulation Therapy Visit    Description:  76 year old male   Provider:  Dominguez Maradiaga MD   Department:  [facility]           INR as of 10/4/2017     Today's INR 1.8!      Anticoagulation Summary as of 10/4/2017     INR goal 2.0-3.0   Today's INR 1.8!   Full instructions 10/4: 7.5 mg; Otherwise 3.75 mg every day   Next INR check 10/11/2017    Indications   Chronic atrial fibrillation (H) [I48.2]  PVD (peripheral vascular disease) (H) [I73.9]  Long-term (current) use of anticoagulants [Z79.01] [Z79.01]         October 2017 Details    Sun Mon Tue Wed Thu Fri Sat     1               2               3               4      7.5 mg   See details      5      3.75 mg         6      3.75 mg         7      3.75 mg           8      3.75 mg         9      3.75 mg         10      3.75 mg         11            12               13               14                 15               16               17               18               19               20               21                 22               23               24               25               26               27               28                 29               30               31                    Date Details   10/04 This INR check       Date of next INR:  10/11/2017         How to take your warfarin dose     To take:  3.75 mg Take 1.5 of the 2.5 mg tablets.    To take:  7.5 mg Take 1.5 of the 5 mg tablets.

## 2017-10-05 RX ORDER — SIMVASTATIN 10 MG
TABLET ORAL
Qty: 90 TABLET | Refills: 0 | Status: SHIPPED | OUTPATIENT
Start: 2017-10-05 | End: 2018-07-02

## 2017-10-05 RX ORDER — SIMVASTATIN 10 MG
TABLET ORAL
Qty: 90 TABLET | Refills: 0 | Status: SHIPPED | OUTPATIENT
Start: 2017-10-05 | End: 2017-10-05

## 2017-10-11 ENCOUNTER — ANTICOAGULATION THERAPY VISIT (OUTPATIENT)
Dept: ANTICOAGULATION | Facility: OTHER | Age: 77
End: 2017-10-11

## 2017-10-11 DIAGNOSIS — I48.20 CHRONIC ATRIAL FIBRILLATION (H): ICD-10-CM

## 2017-10-11 DIAGNOSIS — Z79.01 LONG-TERM (CURRENT) USE OF ANTICOAGULANTS: ICD-10-CM

## 2017-10-11 DIAGNOSIS — I73.9 PVD (PERIPHERAL VASCULAR DISEASE) (H): ICD-10-CM

## 2017-10-11 LAB — INR BLD: 5.9 (ref 0.86–1.14)

## 2017-10-11 PROCEDURE — 85610 PROTHROMBIN TIME: CPT | Mod: QW,ZL | Performed by: FAMILY MEDICINE

## 2017-10-11 PROCEDURE — 36416 COLLJ CAPILLARY BLOOD SPEC: CPT | Mod: ZL | Performed by: FAMILY MEDICINE

## 2017-10-11 NOTE — PROGRESS NOTES
ANTICOAGULATION FOLLOW-UP CLINIC VISIT    Patient Name:  Rm Duarte  Date:  10/11/2017  Contact Type:  Telephone/ spoke to patient via phone prior to him leaving clinic lab    SUBJECTIVE:     Patient Findings     Positives Other complaints    Comments Call from Cherelle chamorro [place] lab, stating patient has panic INR.  She states INR is 5.9.  I spoke to patient via phone prior to him leaving clinic lab.  He states had diarrhea yesterday.  States watery diarrhea x 1 and none since.  We discussed warfarin dosing and INR recheck date. He states he cannot go to lab on 10/13 as he will be out of town.  We discussed warfarin dosing for next 5 days and we also discussed that if he is having diarrhea stools to decrease warfarin dose for 3.75mg to 2.5mg that day.  We also discussed that if he has any bleeding that does not stop after constant pressure for 15 min that he has to go to ER.  He verbalized understanding of all instructions and has no questions.            OBJECTIVE    INR Point of Care   Date Value Ref Range Status   10/11/2017 5.9 (HH) 0.86 - 1.14 Corrected     Comment:     Critical Value called to and read back by  ELIAZAR FERRARA AT 1125 10/11/17 VK RRB  This test is intended for monitoring Coumadin therapy.  Results are not   accurate in patients with prolonged INR due to factor deficiency.  CORRECTED ON 10/11 AT 1135: PREVIOUSLY REPORTED AS 5.9 This test is intended   for monitoring Coumadin therapy.  Results are not accurate in patients with   prolonged INR due to factor deficiency.         ASSESSMENT / PLAN  INR assessment SUPRA    Recheck INR In: 5 DAYS    INR Location Clinic      Anticoagulation Summary as of 10/11/2017     INR goal 2.0-3.0   Today's INR 5.9!   Maintenance plan 3.75 mg (2.5 mg x 1.5) every day   Full instructions 10/11: Hold; 10/12: Hold; 10/13: 2.5 mg; Otherwise 3.75 mg every day   Weekly total 26.25 mg   Plan last modified Eliazar Haas RN (10/4/2017)   Next INR check  10/16/2017   Priority INR   Target end date Indefinite    Indications   Chronic atrial fibrillation (H) [I48.2]  PVD (peripheral vascular disease) (H) [I73.9]  Long-term (current) use of anticoagulants [Z79.01] [Z79.01]         Anticoagulation Episode Summary     INR check location     Preferred lab     Send INR reminders to  ANTICOAG POOL    Comments takes Azithromycin 250mg daily long term      Anticoagulation Care Providers     Provider Role Specialty Phone number    Dominguez Maradiaga MD [facility] [phone]            See the Encounter Report to view Anticoagulation Flowsheet and Dosing Calendar (Go to Encounters tab in chart review, and find the Anticoagulation Therapy Visit)        Zena Haas RN

## 2017-10-11 NOTE — Clinical Note
Rm Duarte had INR of 5.9.  He said he had some diarrhea which I know sends his INR up very fast.  I am holding warfarin for 2 days. He cannot have INR done on 10/13 as he will  Be out of town. I told him dosing and also told him if any diarrhea decrease one of the 3.75 doses to 2.5mg instead.  I also told him if any bleeding that he cannot stop with direct pressure for 15 min that he has to go to ER.  He verbalized understanding of instructions and will have INR on 10/16 when he returns.  Zena Haas RN Anticoagulation clinic

## 2017-10-11 NOTE — MR AVS SNAPSHOT
Rm GARCIA Gerald   10/11/2017   Anticoagulation Therapy Visit    Description:  76 year old male   Provider:  Dominguez Maradiaga MD   Department:  [facility]           INR as of 10/11/2017     Today's INR 5.9!      Anticoagulation Summary as of 10/11/2017     INR goal 2.0-3.0   Today's INR 5.9!   Full instructions 10/11: Hold; 10/12: Hold; 10/13: 2.5 mg; Otherwise 3.75 mg every day   Next INR check 10/16/2017    Indications   Chronic atrial fibrillation (H) [I48.2]  PVD (peripheral vascular disease) (H) [I73.9]  Long-term (current) use of anticoagulants [Z79.01] [Z79.01]         October 2017 Details    Sun Mon Tue Wed Thu Fri Sat     1               2               3               4               5               6               7                 8               9               10               11      Hold   See details      12      Hold         13      2.5 mg         14      3.75 mg           15      3.75 mg         16            17               18               19               20               21                 22               23               24               25               26               27               28                 29               30               31                    Date Details   10/11 This INR check       Date of next INR:  10/16/2017         How to take your warfarin dose     To take:  2.5 mg Take 1 of the 2.5 mg tablets.    To take:  3.75 mg Take 1.5 of the 2.5 mg tablets.    Hold Do not take your warfarin dose. See the Details table to the right for additional instructions.

## 2017-10-16 ENCOUNTER — ANTICOAGULATION THERAPY VISIT (OUTPATIENT)
Dept: ANTICOAGULATION | Facility: OTHER | Age: 77
End: 2017-10-16

## 2017-10-16 DIAGNOSIS — I73.9 PVD (PERIPHERAL VASCULAR DISEASE) (H): ICD-10-CM

## 2017-10-16 DIAGNOSIS — Z79.01 LONG-TERM (CURRENT) USE OF ANTICOAGULANTS: ICD-10-CM

## 2017-10-16 DIAGNOSIS — I48.20 CHRONIC ATRIAL FIBRILLATION (H): ICD-10-CM

## 2017-10-16 LAB — INR BLD: 1.6 (ref 0.86–1.14)

## 2017-10-16 PROCEDURE — 36416 COLLJ CAPILLARY BLOOD SPEC: CPT | Mod: ZL | Performed by: FAMILY MEDICINE

## 2017-10-16 PROCEDURE — 85610 PROTHROMBIN TIME: CPT | Mod: QW,ZL | Performed by: FAMILY MEDICINE

## 2017-10-16 NOTE — MR AVS SNAPSHOT
Rm Duarte   10/16/2017   Anticoagulation Therapy Visit    Description:  76 year old male   Provider:  Dominguez Maradiaga MD   Department:  [facility]           INR as of 10/16/2017     Today's INR 1.6!      Anticoagulation Summary as of 10/16/2017     INR goal 2.0-3.0   Today's INR 1.6!   Full instructions 10/16: 5 mg; 10/18: 3.75 mg; Otherwise 2.5 mg on Wed; 3.75 mg all other days   Next INR check 10/23/2017    Indications   Chronic atrial fibrillation (H) [I48.2]  PVD (peripheral vascular disease) (H) [I73.9]  Long-term (current) use of anticoagulants [Z79.01] [Z79.01]         October 2017 Details    Sun Mon Tue Wed Thu Fri Sat     1               2               3               4               5               6               7                 8               9               10               11               12               13               14                 15               16      5 mg   See details      17      3.75 mg         18      3.75 mg         19      3.75 mg         20      3.75 mg         21      3.75 mg           22      3.75 mg         23            24               25               26               27               28                 29               30               31                    Date Details   10/16 This INR check       Date of next INR:  10/23/2017         How to take your warfarin dose     To take:  3.75 mg Take 1.5 of the 2.5 mg tablets.    To take:  5 mg Take 1 of the 5 mg tablets.

## 2017-10-16 NOTE — PROGRESS NOTES
ANTICOAGULATION FOLLOW-UP CLINIC VISIT    Patient Name:  Rm Duarte  Date:  10/16/2017  Contact Type:  message left on home phone voicemail    SUBJECTIVE:     Patient Findings     Positives No Problem Findings    Comments Call placed to patient home and message left re: INR result, warfarin dosing and INR recheck date. He is to notify warfarin clinic if he has any bleeding/bruising, changes in diet/meds/activity or questions.            OBJECTIVE    INR Point of Care   Date Value Ref Range Status   10/16/2017 1.6 (H) 0.86 - 1.14 Final     Comment:     This test is intended for monitoring Coumadin therapy.  Results are not   accurate in patients with prolonged INR due to factor deficiency.         ASSESSMENT / PLAN  INR assessment SUB    Recheck INR In: 1 WEEK    INR Location Clinic      Anticoagulation Summary as of 10/16/2017     INR goal 2.0-3.0   Today's INR 1.6!   Maintenance plan 2.5 mg (2.5 mg x 1) on Wed; 3.75 mg (2.5 mg x 1.5) all other days   Full instructions 10/16: 5 mg; 10/18: 3.75 mg; Otherwise 2.5 mg on Wed; 3.75 mg all other days   Weekly total 25 mg   Plan last modified Zena Haas RN (10/16/2017)   Next INR check 10/23/2017   Priority INR   Target end date Indefinite    Indications   Chronic atrial fibrillation (H) [I48.2]  PVD (peripheral vascular disease) (H) [I73.9]  Long-term (current) use of anticoagulants [Z79.01] [Z79.01]         Anticoagulation Episode Summary     INR check location     Preferred lab     Send INR reminders to [facility] POOL    Comments takes Azithromycin 250mg daily long term      Anticoagulation Care Providers     Provider Role Specialty Phone number    Dominguez Maradiaga MD [facility] [phone]            See the Encounter Report to view Anticoagulation Flowsheet and Dosing Calendar (Go to Encounters tab in chart review, and find the Anticoagulation Therapy Visit)        Zena Haas, RN

## 2017-10-18 ENCOUNTER — ALLIED HEALTH/NURSE VISIT (OUTPATIENT)
Dept: FAMILY MEDICINE | Facility: OTHER | Age: 77
End: 2017-10-18
Attending: FAMILY MEDICINE
Payer: COMMERCIAL

## 2017-10-18 DIAGNOSIS — Z23 NEED FOR PROPHYLACTIC VACCINATION AND INOCULATION AGAINST INFLUENZA: Primary | ICD-10-CM

## 2017-10-18 PROCEDURE — 90662 IIV NO PRSV INCREASED AG IM: CPT

## 2017-10-18 PROCEDURE — G0008 ADMIN INFLUENZA VIRUS VAC: HCPCS

## 2017-10-18 NOTE — PROGRESS NOTES
Injectable Influenza Immunization Documentation    1.  Is the person to be vaccinated sick today?   No    2. Does the person to be vaccinated have an allergy to a component   of the vaccine?   No    3. Has the person to be vaccinated ever had a serious reaction   to influenza vaccine in the past?   No    4. Has the person to be vaccinated ever had Guillain-Barré syndrome?   No    Form completed by Joann Walls

## 2017-10-18 NOTE — MR AVS SNAPSHOT
"              After Visit Summary   10/18/2017    Rm Duarte    MRN: [id number]           Patient Information     Date Of Birth          1940        Visit Information        Provider Department      10/18/2017 8:30 AM NA FLU SHOT CLINIC [facility]        Today's Diagnoses     Need for prophylactic vaccination and inoculation against influenza    -  1       Follow-ups after your visit        Your next 10 appointments already scheduled     Oct 18, 2017  8:30 AM CDT   (Arrive by 8:15 AM)   Flu Shot with NA FLU SHOT CLINIC   [facility] ([facility] )    [address] [facility] [address]   [phone]              Who to contact     If you have questions or need follow up information about today's clinic visit or your schedule please contact [facility] directly at [phone].  Normal or non-critical lab and imaging results will be communicated to you by MyChart, letter or phone within 4 business days after the clinic has received the results. If you do not hear from us within 7 days, please contact the clinic through MyChart or phone. If you have a critical or abnormal lab result, we will notify you by phone as soon as possible.  Submit refill requests through [employer] or call your pharmacy and they will forward the refill request to us. Please allow 3 business days for your refill to be completed.          Additional Information About Your Visit        MyChart Information     [employer] lets you send messages to your doctor, view your test results, renew your prescriptions, schedule appointments and more. To sign up, go to [email or web address] . Click on \"Log in\" on the left side of the screen, which will take you to the Welcome page. Then click on \"Sign up Now\" on the right side of the page.     You will be asked to enter the access code listed below, as well as some personal information. Please follow the directions to create your " username and password.     Your access code is: [email or web address]  Expires: 2018  8:19 AM     Your access code will  in 90 days. If you need help or a new code, please call your [place] clinic or [phone].        Care EveryWhere ID     This is your Care EveryWhere ID. This could be used by other organizations to access your [place] medical records  [id number]         Blood Pressure from Last 3 Encounters:   17 138/60   17 104/52   17 108/56    Weight from Last 3 Encounters:   17 207 lb (93.9 kg)   17 195 lb (88.5 kg)   17 195 lb (88.5 kg)              We Performed the Following     ADMIN INFLUENZA (For MEDICARE Patients ONLY) []     FLU VACCINE, INCREASED ANTIGEN, PRESV FREE, AGE 65+ [79089]        Primary Care Provider Office Phone # Fax #    Dominguez Maradiaga [phone]       [address] [address]        Equal Access to Services     [facility] : Hadii aad ku hadasho Soomaali, waaxda luqadaha, qaybta kaalmada adeegyada, karina hoangin hayaan cordell shell . So [facility] [phone].    ATENCIÓN: Si habla español, tiene a dhillon disposición servicios gratuitos de asistencia lingüística. Llame al [phone].    We comply with applicable federal civil rights laws and [place] laws. We do not discriminate on the basis of race, color, national origin, age, disability, sex, sexual orientation, or gender identity.            Thank you!     Thank you for choosing [facility]  for your care. Our goal is always to provide you with excellent care. Hearing back from our patients is one way we can continue to improve our services. Please take a few minutes to complete the written survey that you may receive in the mail after your visit with us. Thank you!             Your Updated Medication List - Protect others around you: Learn how to safely use, store and throw away your medicines at  www.disposemymeds.org.          This list is accurate as of: 10/18/17  8:19 AM.  Always use your most recent med list.                   Brand Name Dispense Instructions for use Diagnosis    ASPIRIN LOW DOSE 81 MG tablet   Generic drug:  aspirin      Take 1 tablet by mouth daily.        azithromycin 250 MG tablet    ZITHROMAX    30 tablet    Take one tablet daily.    Other emphysema (H)       budesonide Powd      1 puff 2 times daily Inhales 0.25mcg BID    Long term (current) use of anticoagulants       CENTRUM SILVER per tablet      Take 1 tablet by mouth daily.        cilostazol 100 MG tablet    PLETAL    180 tablet    TAKE ONE TABLET BY MOUTH TWICE DAILY    PVD (peripheral vascular disease) (H)       * COMBIVENT RESPIMAT  MCG/ACT inhaler   Generic drug:  Ipratropium-Albuterol     12 g    INHALE ONE PUFF BY MOUTH 4 TIMES DAILY *MAX 6 DOSES PER DAY*    Chronic obstructive pulmonary disease, unspecified COPD type (H)       * ipratropium - albuterol 0.5 mg/2.5 mg/3 mL 0.5-2.5 (3) MG/3ML neb solution    DUONEB    360 vial    USE ONE AMPULE IN NEBULIZER EVERY 6 HOURS AS NEEDED FOR SHORTNESS OF BREATH /DYSPNEA  OR  WHEEZING    COPD (chronic obstructive pulmonary disease) (H)       DALIRESP 500 MCG Tabs tablet   Generic drug:  roflumilast      Take 500 mcg by mouth daily        IMODIUM A-D 2 MG tablet   Generic drug:  loperamide      Take 2 mg by mouth 4 times daily as needed. Take 2 tablet by oral route after 1st loose stool and 1 tablet after each subsequent bowel movement; do not exceed 16 mg in 24 hrs. As needed.        PERFOROMIST 20 MCG/2ML neb solution   Generic drug:  formoterol     360 mL    USE ONE VIAL IN NEBULIZER EVERY 12 HOURS    Other emphysema (H)       simvastatin 10 MG tablet    ZOCOR    90 tablet    TAKE ONE TABLET BY MOUTH AT BEDTIME    PVD (peripheral vascular disease) (H)       tamsulosin 0.4 MG capsule    FLOMAX    30 capsule    Take 1 capsule (0.4 mg) by mouth daily    BPH (benign prostatic  hyperplasia)       * warfarin 5 MG tablet    COUMADIN    90 tablet    Take 2.5mg (1/2 pill) Wed; 5mg (1 pill) all other days or as directed by [facility]    Chronic atrial fibrillation (H)       * warfarin 2.5 MG tablet    COUMADIN    30 tablet    3.75mg 9/28 then 5mg Mon,Wed,Fri and 2.5mg ROW or as directed by warfarin clinic    Long-term (current) use of anticoagulants, PVD (peripheral vascular disease) (H), Chronic atrial fibrillation (H)       * Notice:  This list has 4 medication(s) that are the same as other medications prescribed for you. Read the directions carefully, and ask your doctor or other care provider to review them with you.

## 2017-10-23 ENCOUNTER — ANTICOAGULATION THERAPY VISIT (OUTPATIENT)
Dept: ANTICOAGULATION | Facility: OTHER | Age: 77
End: 2017-10-23

## 2017-10-23 DIAGNOSIS — Z79.01 LONG-TERM (CURRENT) USE OF ANTICOAGULANTS: ICD-10-CM

## 2017-10-23 DIAGNOSIS — I73.9 PVD (PERIPHERAL VASCULAR DISEASE) (H): ICD-10-CM

## 2017-10-23 DIAGNOSIS — I48.20 CHRONIC ATRIAL FIBRILLATION (H): ICD-10-CM

## 2017-10-23 LAB — INR BLD: 2.1 (ref 0.86–1.14)

## 2017-10-23 PROCEDURE — 36416 COLLJ CAPILLARY BLOOD SPEC: CPT | Mod: ZL | Performed by: FAMILY MEDICINE

## 2017-10-23 PROCEDURE — 85610 PROTHROMBIN TIME: CPT | Mod: QW,ZL | Performed by: FAMILY MEDICINE

## 2017-10-23 NOTE — PROGRESS NOTES
ANTICOAGULATION FOLLOW-UP CLINIC VISIT    Patient Name:  Rm Duarte  Date:  10/23/2017  Contact Type:  Telephone    SUBJECTIVE:     Patient Findings     Positives No Problem Findings    Comments Call placed to patient and spoke to him re: INR result, warfarin dosing and INR recheck date. He verbalized understanding and has no questions.            OBJECTIVE    INR Point of Care   Date Value Ref Range Status   10/23/2017 2.1 (H) 0.86 - 1.14 Final     Comment:     This test is intended for monitoring Coumadin therapy.  Results are not   accurate in patients with prolonged INR due to factor deficiency.         ASSESSMENT / PLAN  INR assessment THER    Recheck INR In: 10 DAYS    INR Location Clinic      Anticoagulation Summary as of 10/23/2017     INR goal 2.0-3.0   Today's INR 2.1   Maintenance plan 2.5 mg (2.5 mg x 1) on Wed; 3.75 mg (2.5 mg x 1.5) all other days   Full instructions 2.5 mg on Wed; 3.75 mg all other days   Weekly total 25 mg   No change documented Zena Haas RN   Plan last modified Zena Haas RN (10/16/2017)   Next INR check 11/1/2017   Priority INR   Target end date Indefinite    Indications   Chronic atrial fibrillation (H) [I48.2]  PVD (peripheral vascular disease) (H) [I73.9]  Long-term (current) use of anticoagulants [Z79.01] [Z79.01]         Anticoagulation Episode Summary     INR check location     Preferred lab     Send INR reminders to  ANTICOAG POOL    Comments takes Azithromycin 250mg daily long term      Anticoagulation Care Providers     Provider Role Specialty Phone number    Dominguez Maradiaga MD [facility] [phone]            See the Encounter Report to view Anticoagulation Flowsheet and Dosing Calendar (Go to Encounters tab in chart review, and find the Anticoagulation Therapy Visit)        Zena Haas RN

## 2017-10-23 NOTE — MR AVS SNAPSHOT
Rm RADHA Duarte   10/23/2017   Anticoagulation Therapy Visit    Description:  76 year old male   Provider:  Dominguez Maradiaga MD   Department:  Hc Anti Coagulation           INR as of 10/23/2017     Today's INR 2.1      Anticoagulation Summary as of 10/23/2017     INR goal 2.0-3.0   Today's INR 2.1   Full instructions 2.5 mg on Wed; 3.75 mg all other days   Next INR check 11/1/2017    Indications   Chronic atrial fibrillation (H) [I48.2]  PVD (peripheral vascular disease) (H) [I73.9]  Long-term (current) use of anticoagulants [Z79.01] [Z79.01]         October 2017 Details    Sun Mon Tue Wed Thu Fri Sat     1               2               3               4               5               6               7                 8               9               10               11               12               13               14                 15               16               17               18               19               20               21                 22               23      3.75 mg   See details      24      3.75 mg         25      2.5 mg         26      3.75 mg         27      3.75 mg         28      3.75 mg           29      3.75 mg         30      3.75 mg         31      3.75 mg              Date Details   10/23 This INR check               How to take your warfarin dose     To take:  2.5 mg Take 1 of the 2.5 mg tablets.    To take:  3.75 mg Take 1.5 of the 2.5 mg tablets.           November 2017 Details    Sun Mon Tue Wed Thu Fri Sat        1            2               3               4                 5               6               7               8               9               10               11                 12               13               14               15               16               17               18                 19               20               21               22               23               24               25                 26               27               28                29               30                  Date Details   No additional details    Date of next INR:  11/1/2017         How to take your warfarin dose     To take:  2.5 mg Take 1 of the 2.5 mg tablets.

## 2017-11-01 ENCOUNTER — ANTICOAGULATION THERAPY VISIT (OUTPATIENT)
Dept: ANTICOAGULATION | Facility: OTHER | Age: 77
End: 2017-11-01

## 2017-11-01 DIAGNOSIS — I73.9 PVD (PERIPHERAL VASCULAR DISEASE) (H): ICD-10-CM

## 2017-11-01 DIAGNOSIS — I48.20 CHRONIC ATRIAL FIBRILLATION (H): ICD-10-CM

## 2017-11-01 DIAGNOSIS — Z79.01 LONG-TERM (CURRENT) USE OF ANTICOAGULANTS: ICD-10-CM

## 2017-11-01 LAB — INR BLD: 3.4 (ref 0.86–1.14)

## 2017-11-01 PROCEDURE — 85610 PROTHROMBIN TIME: CPT | Mod: QW,ZL | Performed by: FAMILY MEDICINE

## 2017-11-01 PROCEDURE — 36416 COLLJ CAPILLARY BLOOD SPEC: CPT | Mod: ZL | Performed by: FAMILY MEDICINE

## 2017-11-01 RX ORDER — WARFARIN SODIUM 5 MG/1
TABLET ORAL
Qty: 90 TABLET | Refills: 4 | Status: ON HOLD | COMMUNITY
Start: 2017-11-01 | End: 2017-12-01

## 2017-11-01 RX ORDER — WARFARIN SODIUM 2.5 MG/1
TABLET ORAL
Qty: 30 TABLET | Refills: 1 | COMMUNITY
Start: 2017-11-01 | End: 2017-11-15

## 2017-11-01 NOTE — PROGRESS NOTES
ANTICOAGULATION FOLLOW-UP CLINIC VISIT    Patient Name:  Rm Duarte  Date:  11/1/2017  Contact Type:  Telephone    SUBJECTIVE:     Patient Findings     Positives Extra doses    Comments INR results/Coumadin dosing and INR recheck information discussed by phone with pt.  Pt denies bleeding/bruising/medication changes or illness.  New dosing instructions discussed; pt read back accurately.  Call ended with questions answered and understanding stated.           OBJECTIVE    INR Point of Care   Date Value Ref Range Status   11/01/2017 3.4 (H) 0.86 - 1.14 Final     Comment:     This test is intended for monitoring Coumadin therapy.  Results are not   accurate in patients with prolonged INR due to factor deficiency.         ASSESSMENT / PLAN  INR assessment SUPRA    Recheck INR In: 2 WEEKS    INR Location Clinic      Anticoagulation Summary as of 11/1/2017     INR goal 2.0-3.0   Today's INR 3.4!   Maintenance plan 2.5 mg (2.5 mg x 1) on Mon, Fri; 3.75 mg (2.5 mg x 1.5) all other days   Full instructions 11/1: 1.25 mg; Otherwise 2.5 mg on Mon, Fri; 3.75 mg all other days   Weekly total 23.75 mg   Plan last modified Zena Burden RN (11/1/2017)   Next INR check 11/15/2017   Priority INR   Target end date Indefinite    Indications   Chronic atrial fibrillation (H) [I48.2]  PVD (peripheral vascular disease) (H) [I73.9]  Long-term (current) use of anticoagulants [Z79.01] [Z79.01]         Anticoagulation Episode Summary     INR check location     Preferred lab     Send INR reminders to  ANTICOAG POOL    Comments takes Azithromycin 250mg daily long term      Anticoagulation Care Providers     Provider Role Specialty Phone number    Dominguez Maradiaga MD [facility] [phone]            See the Encounter Report to view Anticoagulation Flowsheet and Dosing Calendar (Go to Encounters tab in chart review, and find the Anticoagulation Therapy Visit)        Zena Burden RN

## 2017-11-01 NOTE — MR AVS SNAPSHOT
Rm Duarte   11/1/2017   Anticoagulation Therapy Visit    Description:  76 year old male   Provider:  Dominguez Maradiaga MD   Department:  Hc Anti Coagulation           INR as of 11/1/2017     Today's INR 3.4!      Anticoagulation Summary as of 11/1/2017     INR goal 2.0-3.0   Today's INR 3.4!   Full instructions 11/1: 1.25 mg; Otherwise 2.5 mg on Mon, Fri; 3.75 mg all other days   Next INR check 11/15/2017    Indications   Chronic atrial fibrillation (H) [I48.2]  PVD (peripheral vascular disease) (H) [I73.9]  Long-term (current) use of anticoagulants [Z79.01] [Z79.01]         November 2017 Details    Sun Mon Tue Wed Thu Fri Sat        1      1.25 mg   See details      2      3.75 mg         3      2.5 mg         4      3.75 mg           5      3.75 mg         6      2.5 mg         7      3.75 mg         8      3.75 mg         9      3.75 mg         10      2.5 mg         11      3.75 mg           12      3.75 mg         13      2.5 mg         14      3.75 mg         15            16               17               18                 19               20               21               22               23               24               25                 26               27               28               29               30                  Date Details   11/01 This INR check       Date of next INR:  11/15/2017         How to take your warfarin dose     To take:  1.25 mg Take 0.5 of a 2.5 mg tablet.    To take:  2.5 mg Take 1 of the 2.5 mg tablets.    To take:  3.75 mg Take 1.5 of the 2.5 mg tablets.

## 2017-11-15 ENCOUNTER — ANTICOAGULATION THERAPY VISIT (OUTPATIENT)
Dept: ANTICOAGULATION | Facility: OTHER | Age: 77
End: 2017-11-15
Attending: FAMILY MEDICINE
Payer: COMMERCIAL

## 2017-11-15 DIAGNOSIS — Z79.01 LONG-TERM (CURRENT) USE OF ANTICOAGULANTS: ICD-10-CM

## 2017-11-15 DIAGNOSIS — I48.20 CHRONIC ATRIAL FIBRILLATION (H): ICD-10-CM

## 2017-11-15 DIAGNOSIS — I73.9 PVD (PERIPHERAL VASCULAR DISEASE) (H): ICD-10-CM

## 2017-11-15 LAB — INR POINT OF CARE: 3 (ref 0.86–1.14)

## 2017-11-15 PROCEDURE — 85610 PROTHROMBIN TIME: CPT | Mod: QW,ZL

## 2017-11-15 RX ORDER — WARFARIN SODIUM 2.5 MG/1
TABLET ORAL
Qty: 30 TABLET | Refills: 1 | COMMUNITY
Start: 2017-11-15 | End: 2017-12-06

## 2017-11-15 NOTE — PROGRESS NOTES
ANTICOAGULATION FOLLOW-UP CLINIC VISIT    Patient Name:  Rm Duarte  Date:  11/15/2017  Contact Type:  Face to Face    SUBJECTIVE:     Patient Findings     Positives No Problem Findings    Comments Discussed an overall dose adjustment R/T today's INR results; still on the high end of the INR Goal, despite the dose reduction and overall dose adjustment made 2 weeks ago.  Pt agreed.  No other issues voiced by pt today - states he felt pretty good yesterday - was able to straighten his garage to park his vehicle.  Pt left with questions answered and understanding stated.           OBJECTIVE    INR Protime   Date Value Ref Range Status   11/15/2017 3.0 (A) 0.86 - 1.14 Final       ASSESSMENT / PLAN  INR assessment THER    Recheck INR In: 4 WEEKS    INR Location Clinic      Anticoagulation Summary as of 11/15/2017     INR goal 2.0-3.0   Today's INR 3.0   Maintenance plan 2.5 mg (2.5 mg x 1) on Mon, Wed, Fri; 3.75 mg (2.5 mg x 1.5) all other days   Full instructions 2.5 mg on Mon, Wed, Fri; 3.75 mg all other days   Weekly total 22.5 mg   Plan last modified Zena Burden RN (11/15/2017)   Next INR check 12/13/2017   Priority INR   Target end date Indefinite    Indications   Chronic atrial fibrillation (H) [I48.2]  PVD (peripheral vascular disease) (H) [I73.9]  Long-term (current) use of anticoagulants [Z79.01] [Z79.01]         Anticoagulation Episode Summary     INR check location     Preferred lab     Send INR reminders to  ANTICOAG POOL    Comments takes Azithromycin 250mg daily long term      Anticoagulation Care Providers     Provider Role Specialty Phone number    Dominguez Maradiaga MD [facility] [phone]            See the Encounter Report to view Anticoagulation Flowsheet and Dosing Calendar (Go to Encounters tab in chart review, and find the Anticoagulation Therapy Visit)        Zena Burden RN

## 2017-11-15 NOTE — MR AVS SNAPSHOT
Abdalla RADHA Duarte   11/15/2017 9:00 AM   Anticoagulation Therapy Visit    Description:  76 year old male   Provider:  NERY ANTI COAGULATION   Department:  Na Anti Coagulation           INR as of 11/15/2017     Today's INR 3.0      Anticoagulation Summary as of 11/15/2017     INR goal 2.0-3.0   Today's INR 3.0   Full instructions 2.5 mg on Mon, Wed, Fri; 3.75 mg all other days   Next INR check 12/13/2017    Indications   Chronic atrial fibrillation (H) [I48.2]  PVD (peripheral vascular disease) (H) [I73.9]  Long-term (current) use of anticoagulants [Z79.01] [Z79.01]         Your next Anticoagulation Clinic appointment(s)     Dec 13, 2017  9:00 AM CST   Anticoagulation Visit with NERY ANTI COAGULATION   [facility] ([facility] )    [address] [facility] [address]   [phone]              November 2017 Details    Sun Mon Tue Wed Thu Fri Sat        1               2               3               4                 5               6               7               8               9               10               11                 12               13               14               15      2.5 mg   See details      16      3.75 mg         17      2.5 mg         18      3.75 mg           19      3.75 mg         20      2.5 mg         21      3.75 mg         22      2.5 mg         23      3.75 mg         24      2.5 mg         25      3.75 mg           26      3.75 mg         27      2.5 mg         28      3.75 mg         29      2.5 mg         30      3.75 mg            Date Details   11/15 This INR check               How to take your warfarin dose     To take:  2.5 mg Take 1 of the 2.5 mg tablets.    To take:  3.75 mg Take 1.5 of the 2.5 mg tablets.           December 2017 Details    Sun Mon Tue Wed Thu Fri Sat          1      2.5 mg         2      3.75 mg           3      3.75 mg         4      2.5 mg         5      3.75 mg         6      2.5 mg         7      3.75 mg         8       2.5 mg         9      3.75 mg           10      3.75 mg         11      2.5 mg         12      3.75 mg         13            14               15               16                 17               18               19               20               21               22               23                 24               25               26               27               28               29               30                 31                      Date Details   No additional details    Date of next INR:  12/13/2017         How to take your warfarin dose     To take:  2.5 mg Take 1 of the 2.5 mg tablets.    To take:  3.75 mg Take 1.5 of the 2.5 mg tablets.

## 2017-11-28 ENCOUNTER — ANTICOAGULATION THERAPY VISIT (OUTPATIENT)
Dept: ANTICOAGULATION | Facility: OTHER | Age: 77
End: 2017-11-28

## 2017-11-28 DIAGNOSIS — Z79.01 LONG-TERM (CURRENT) USE OF ANTICOAGULANTS: ICD-10-CM

## 2017-11-28 DIAGNOSIS — I48.20 CHRONIC ATRIAL FIBRILLATION (H): ICD-10-CM

## 2017-11-28 DIAGNOSIS — I73.9 PVD (PERIPHERAL VASCULAR DISEASE) (H): ICD-10-CM

## 2017-11-28 NOTE — MR AVS SNAPSHOT
Rm Duarte   11/28/2017   Anticoagulation Therapy Visit    Description:  76 year old male   Provider:  Dominguez Maradiaga MD   Department:  Hc Anti Coagulation           INR as of 11/28/2017     Today's INR No new INR was available at the time of this encounter.      Anticoagulation Summary as of 11/28/2017     INR goal 2.0-3.0   Today's INR No new INR was available at the time of this encounter.   Full instructions 11/28: Hold; Otherwise 2.5 mg on Mon, Wed, Fri; 3.75 mg all other days   Next INR check 11/29/2017    Indications   Chronic atrial fibrillation (H) [I48.2]  PVD (peripheral vascular disease) (H) [I73.9]  Long-term (current) use of anticoagulants [Z79.01] [Z79.01]         Your next Anticoagulation Clinic appointment(s)     Dec 13, 2017  9:00 AM CST   Anticoagulation Visit with NA ANTI COAGULATION   [facility] ([facility] )    [address]  [facility] [address]   [phone]              November 2017 Details    Sun Mon Tue Wed Thu Fri Sat        1               2               3               4                 5               6               7               8               9               10               11                 12               13               14               15               16               17               18                 19               20               21               22               23               24               25                 26               27               28      Hold   See details      29 30                  Date Details   11/28 This INR check       Date of next INR:  11/29/2017         How to take your warfarin dose     To take:  2.5 mg Take 1 of the 2.5 mg tablets.    Hold Do not take your warfarin dose. See the Details table to the right for additional instructions.

## 2017-11-28 NOTE — PROGRESS NOTES
ANTICOAGULATION FOLLOW-UP CLINIC VISIT    Patient Name:  Rm Duarte  Date:  11/28/2017  Contact Type:  Telephone    SUBJECTIVE:     Patient Findings     Positives Other complaints    Comments Starting prednisone, tapering dose, 10 mg size pills , starting with 40mg x 4 days, started on 11/25/17,  On 11/29 will start 30mg x 4 days, on 12/3/17 will start 20mg x 4 days, on 12/7/17 start 10mg x 4 days, on 12/11/17 will start 5mg x 4 days. He states he did not decrease his warfarin dose.  We discussed that he should hold his warfarin dose tonight and have INR tomorrow at the clinic. He verbalized understanding of dose hold and has no questions.            OBJECTIVE    INR Protime   Date Value Ref Range Status   11/15/2017 3.0 (A) 0.86 - 1.14 Final       ASSESSMENT / PLAN  No question data found.  Anticoagulation Summary as of 11/28/2017     INR goal 2.0-3.0   Today's INR No new INR was available at the time of this encounter.   Maintenance plan 2.5 mg (2.5 mg x 1) on Mon, Wed, Fri; 3.75 mg (2.5 mg x 1.5) all other days   Full instructions 11/28: Hold; Otherwise 2.5 mg on Mon, Wed, Fri; 3.75 mg all other days   Weekly total 22.5 mg   Plan last modified Zena Burden RN (11/15/2017)   Next INR check 11/29/2017   Priority INR   Target end date Indefinite    Indications   Chronic atrial fibrillation (H) [I48.2]  PVD (peripheral vascular disease) (H) [I73.9]  Long-term (current) use of anticoagulants [Z79.01] [Z79.01]         Anticoagulation Episode Summary     INR check location     Preferred lab     Send INR reminders to  ANTICOAG POOL    Comments takes Azithromycin 250mg daily long term      Anticoagulation Care Providers     Provider Role Specialty Phone number    Dominguez Maradiaga MD [facility] [phone]            See the Encounter Report to view Anticoagulation Flowsheet and Dosing Calendar (Go to Encounters tab in chart review, and find the Anticoagulation Therapy Visit)        Zena FARAH  Waldo RN

## 2017-11-29 ENCOUNTER — ANTICOAGULATION THERAPY VISIT (OUTPATIENT)
Dept: ANTICOAGULATION | Facility: OTHER | Age: 77
End: 2017-11-29

## 2017-11-29 DIAGNOSIS — I73.9 PVD (PERIPHERAL VASCULAR DISEASE) (H): ICD-10-CM

## 2017-11-29 DIAGNOSIS — J43.8 OTHER EMPHYSEMA (H): ICD-10-CM

## 2017-11-29 DIAGNOSIS — Z79.01 LONG-TERM (CURRENT) USE OF ANTICOAGULANTS: ICD-10-CM

## 2017-11-29 DIAGNOSIS — I48.20 CHRONIC ATRIAL FIBRILLATION (H): ICD-10-CM

## 2017-11-29 LAB — INR BLD: 5.1 (ref 0.86–1.14)

## 2017-11-29 RX ORDER — PREDNISONE 20 MG/1
TABLET ORAL DAILY
Status: ON HOLD | COMMUNITY
End: 2017-12-05

## 2017-11-29 NOTE — MR AVS SNAPSHOT
Rm Duarte   11/29/2017   Anticoagulation Therapy Visit    Description:  76 year old male   Provider:  Dominguez Maradiaga MD   Department:  [facility]agulation           INR as of 11/29/2017     Today's INR 5.1!      Anticoagulation Summary as of 11/29/2017     INR goal 2.0-3.0   Today's INR 5.1!   Full instructions 11/29: Hold; 11/30: 1.25 mg; Otherwise 2.5 mg on Mon, Wed, Fri; 3.75 mg all other days   Next INR check 12/1/2017    Indications   Chronic atrial fibrillation (H) [I48.2]  PVD (peripheral vascular disease) (H) [I73.9]  Long-term (current) use of anticoagulants [Z79.01] [Z79.01]         Your next Anticoagulation Clinic appointment(s)     Dec 13, 2017  9:00 AM CST   Anticoagulation Visit with NA ANTI COAGULATION   [facility] ([facility] )    [address] [facility] [address]   [phone]              November 2017 Details    Sun Mon Tue Wed Thu Fri Sat        1               2               3               4                 5               6               7               8               9               10               11                 12               13               14               15               16               17               18                 19               20               21               22               23               24               25                 26               27               28               29      Hold   See details      30      1.25 mg            Date Details   11/29 This INR check               How to take your warfarin dose     To take:  1.25 mg Take 0.5 of a 2.5 mg tablet.    Hold Do not take your warfarin dose. See the Details table to the right for additional instructions.                December 2017 Details    Sun Mon Tue Wed Thu Fri Sat          1            2                 3               4               5               6               7               8               9                 10               11                12               13               14               15               16                 17               18               19               20               21               22               23                 24               25               26               27               28               29               30                 31                      Date Details   No additional details    Date of next INR:  12/1/2017         How to take your warfarin dose     To take:  2.5 mg Take 1 of the 2.5 mg tablets.

## 2017-11-29 NOTE — Clinical Note
Rm Duarte had INR of 5.1.  He had called us yesterday and said he started taking prednisone 4 days prior to call.  He put himself on prednisone taper of 40mg x4d, 30mg x 4d, 20mg x 4 d and 10mg x 4 days. He said he had leftover prednisone so he decided to take it again instead of going to clinic I guess. I held warfarin dose last night and will hold again tonight and have INR done in 2 days.  Zena Haas  Anticoagulation clinic

## 2017-11-29 NOTE — PROGRESS NOTES
ANTICOAGULATION FOLLOW-UP CLINIC VISIT    Patient Name:  Rm Duarte  Date:  11/29/2017  Contact Type:  Telephone    SUBJECTIVE:     Patient Findings     Positives Change in medications, Intentional hold of therapy    Comments Call placed to patient and spoke to him  re: INR result, warfarin dosing and INR recheck date  Has 3 more days of 30mg daily prednisone. He had started prednisone 40mg x 4 days 5 days ago and did not notify us until 11/28/17 which is why INR result is 5.1.  We also discussed if he has any bleeding he cannot get to stop he is to go to the ER           OBJECTIVE    INR Point of Care   Date Value Ref Range Status   11/29/2017 5.1 (HH) 0.86 - 1.14 Corrected     Comment:     Critical Value called to and read back by  CALLED TO ELIAZAR SOTO AT 1050 11/29/2017 VK RRB  This test is intended for monitoring Coumadin therapy.  Results are not   accurate in patients with prolonged INR due to factor deficiency.  CORRECTED ON 11/29 AT 1048: PREVIOUSLY REPORTED AS 5.1 This test is intended   for monitoring Coumadin therapy.  Results are not accurate in patients with   prolonged INR due to factor deficiency.         ASSESSMENT / PLAN  INR assessment SUPRA    Recheck INR In: 2 DAYS    INR Location Clinic      Anticoagulation Summary as of 11/29/2017     INR goal 2.0-3.0   Today's INR 5.1!   Maintenance plan 2.5 mg (2.5 mg x 1) on Mon, Wed, Fri; 3.75 mg (2.5 mg x 1.5) all other days   Full instructions 11/29: Hold; 11/30: 1.25 mg; Otherwise 2.5 mg on Mon, Wed, Fri; 3.75 mg all other days   Weekly total 22.5 mg   Plan last modified Eliazar Burden RN (11/15/2017)   Next INR check 12/1/2017   Priority INR   Target end date Indefinite    Indications   Chronic atrial fibrillation (H) [I48.2]  PVD (peripheral vascular disease) (H) [I73.9]  Long-term (current) use of anticoagulants [Z79.01] [Z79.01]         Anticoagulation Episode Summary     INR check location     Preferred lab     Send INR reminders to   ANTICOAG POOL    Comments takes Azithromycin 250mg daily long term      Anticoagulation Care Providers     Provider Role Specialty Phone number    Dominguez Maradiaga MD [facility] [phone]            See the Encounter Report to view Anticoagulation Flowsheet and Dosing Calendar (Go to Encounters tab in chart review, and find the Anticoagulation Therapy Visit)        Zena Haas RN

## 2017-12-01 ENCOUNTER — APPOINTMENT (OUTPATIENT)
Dept: GENERAL RADIOLOGY | Facility: HOSPITAL | Age: 77
DRG: 190 | End: 2017-12-01
Attending: PHYSICIAN ASSISTANT
Payer: COMMERCIAL

## 2017-12-01 ENCOUNTER — ANTICOAGULATION THERAPY VISIT (OUTPATIENT)
Dept: ANTICOAGULATION | Facility: OTHER | Age: 77
End: 2017-12-01

## 2017-12-01 ENCOUNTER — HOSPITAL ENCOUNTER (INPATIENT)
Facility: HOSPITAL | Age: 77
LOS: 4 days | Discharge: HOME HEALTH | DRG: 190 | End: 2017-12-05
Attending: PHYSICIAN ASSISTANT | Admitting: INTERNAL MEDICINE
Payer: COMMERCIAL

## 2017-12-01 DIAGNOSIS — J44.1 COPD EXACERBATION (H): ICD-10-CM

## 2017-12-01 DIAGNOSIS — I48.20 CHRONIC ATRIAL FIBRILLATION (H): ICD-10-CM

## 2017-12-01 DIAGNOSIS — R29.6 FALLS FREQUENTLY: ICD-10-CM

## 2017-12-01 DIAGNOSIS — I73.9 PVD (PERIPHERAL VASCULAR DISEASE) (H): ICD-10-CM

## 2017-12-01 DIAGNOSIS — Z79.01 LONG-TERM (CURRENT) USE OF ANTICOAGULANTS: ICD-10-CM

## 2017-12-01 DIAGNOSIS — R79.89 ELEVATED BRAIN NATRIURETIC PEPTIDE (BNP) LEVEL: ICD-10-CM

## 2017-12-01 DIAGNOSIS — J44.1 OBSTRUCTIVE CHRONIC BRONCHITIS WITH EXACERBATION (H): Primary | ICD-10-CM

## 2017-12-01 DIAGNOSIS — Z71.89 ACP (ADVANCE CARE PLANNING): Chronic | ICD-10-CM

## 2017-12-01 LAB
ALBUMIN SERPL-MCNC: 2.8 G/DL (ref 3.4–5)
ALBUMIN UR-MCNC: 10 MG/DL
ALP SERPL-CCNC: 73 U/L (ref 40–150)
ALT SERPL W P-5'-P-CCNC: 34 U/L (ref 0–70)
ANION GAP SERPL CALCULATED.3IONS-SCNC: 4 MMOL/L (ref 3–14)
APPEARANCE UR: ABNORMAL
AST SERPL W P-5'-P-CCNC: 19 U/L (ref 0–45)
BACTERIA #/AREA URNS HPF: ABNORMAL /HPF
BASOPHILS # BLD AUTO: 0 10E9/L (ref 0–0.2)
BASOPHILS NFR BLD AUTO: 0.2 %
BILIRUB SERPL-MCNC: 0.5 MG/DL (ref 0.2–1.3)
BILIRUB UR QL STRIP: NEGATIVE
BUN SERPL-MCNC: 16 MG/DL (ref 7–30)
CALCIUM SERPL-MCNC: 8.7 MG/DL (ref 8.5–10.1)
CHLORIDE SERPL-SCNC: 102 MMOL/L (ref 94–109)
CO2 SERPL-SCNC: 30 MMOL/L (ref 20–32)
COLOR UR AUTO: YELLOW
CREAT SERPL-MCNC: 0.93 MG/DL (ref 0.66–1.25)
DIFFERENTIAL METHOD BLD: ABNORMAL
EOSINOPHIL # BLD AUTO: 0.1 10E9/L (ref 0–0.7)
EOSINOPHIL NFR BLD AUTO: 0.9 %
ERYTHROCYTE [DISTWIDTH] IN BLOOD BY AUTOMATED COUNT: 14 % (ref 10–15)
FLUAV+FLUBV AG SPEC QL: NEGATIVE
FLUAV+FLUBV AG SPEC QL: NEGATIVE
GFR SERPL CREATININE-BSD FRML MDRD: 80 ML/MIN/1.7M2
GLUCOSE SERPL-MCNC: 104 MG/DL (ref 70–99)
GLUCOSE UR STRIP-MCNC: NEGATIVE MG/DL
HCT VFR BLD AUTO: 44.5 % (ref 40–53)
HGB BLD-MCNC: 14 G/DL (ref 13.3–17.7)
HGB UR QL STRIP: ABNORMAL
IMM GRANULOCYTES # BLD: 0.1 10E9/L (ref 0–0.4)
IMM GRANULOCYTES NFR BLD: 0.5 %
INR BLD: 2.1 (ref 0.86–1.14)
INR PPP: 1.81 (ref 0.8–1.2)
KETONES UR STRIP-MCNC: NEGATIVE MG/DL
LACTATE SERPL-SCNC: 1.6 MMOL/L (ref 0.4–2)
LEUKOCYTE ESTERASE UR QL STRIP: ABNORMAL
LYMPHOCYTES # BLD AUTO: 0.2 10E9/L (ref 0.8–5.3)
LYMPHOCYTES NFR BLD AUTO: 2 %
MCH RBC QN AUTO: 30 PG (ref 26.5–33)
MCHC RBC AUTO-ENTMCNC: 31.5 G/DL (ref 31.5–36.5)
MCV RBC AUTO: 96 FL (ref 78–100)
MONOCYTES # BLD AUTO: 0.6 10E9/L (ref 0–1.3)
MONOCYTES NFR BLD AUTO: 4.5 %
MUCOUS THREADS #/AREA URNS LPF: PRESENT /LPF
NEUTROPHILS # BLD AUTO: 11.3 10E9/L (ref 1.6–8.3)
NEUTROPHILS NFR BLD AUTO: 91.9 %
NITRATE UR QL: NEGATIVE
NRBC # BLD AUTO: 0 10*3/UL
NRBC BLD AUTO-RTO: 0 /100
NT-PROBNP SERPL-MCNC: 1210 PG/ML (ref 0–900)
PH UR STRIP: 6.5 PH (ref 4.7–8)
PLATELET # BLD AUTO: 369 10E9/L (ref 150–450)
POTASSIUM SERPL-SCNC: 4.7 MMOL/L (ref 3.4–5.3)
PROCALCITONIN SERPL-MCNC: 0.19 NG/ML
PROT SERPL-MCNC: 7.5 G/DL (ref 6.8–8.8)
RBC # BLD AUTO: 4.66 10E12/L (ref 4.4–5.9)
RBC #/AREA URNS AUTO: 15 /HPF (ref 0–2)
SODIUM SERPL-SCNC: 136 MMOL/L (ref 133–144)
SOURCE: ABNORMAL
SP GR UR STRIP: 1.02 (ref 1–1.03)
SPECIMEN SOURCE: NORMAL
TROPONIN I SERPL-MCNC: <0.015 UG/L (ref 0–0.04)
UROBILINOGEN UR STRIP-MCNC: NORMAL MG/DL (ref 0–2)
WBC # BLD AUTO: 12.2 10E9/L (ref 4–11)
WBC #/AREA URNS AUTO: 56 /HPF (ref 0–2)

## 2017-12-01 PROCEDURE — 87804 INFLUENZA ASSAY W/OPTIC: CPT | Performed by: PHYSICIAN ASSISTANT

## 2017-12-01 PROCEDURE — 93005 ELECTROCARDIOGRAM TRACING: CPT

## 2017-12-01 PROCEDURE — 84484 ASSAY OF TROPONIN QUANT: CPT | Performed by: PHYSICIAN ASSISTANT

## 2017-12-01 PROCEDURE — 25000125 ZZHC RX 250: Performed by: PHYSICIAN ASSISTANT

## 2017-12-01 PROCEDURE — 36415 COLL VENOUS BLD VENIPUNCTURE: CPT | Performed by: PHYSICIAN ASSISTANT

## 2017-12-01 PROCEDURE — 40000786 ZZHCL STATISTIC ACTIVE MRSA SURVEILLANCE CULTURE: Performed by: INTERNAL MEDICINE

## 2017-12-01 PROCEDURE — 84145 PROCALCITONIN (PCT): CPT | Performed by: INTERNAL MEDICINE

## 2017-12-01 PROCEDURE — 94640 AIRWAY INHALATION TREATMENT: CPT | Mod: 76

## 2017-12-01 PROCEDURE — 25000132 ZZH RX MED GY IP 250 OP 250 PS 637: Performed by: INTERNAL MEDICINE

## 2017-12-01 PROCEDURE — 96375 TX/PRO/DX INJ NEW DRUG ADDON: CPT

## 2017-12-01 PROCEDURE — 83880 ASSAY OF NATRIURETIC PEPTIDE: CPT | Performed by: PHYSICIAN ASSISTANT

## 2017-12-01 PROCEDURE — 25000125 ZZHC RX 250: Performed by: INTERNAL MEDICINE

## 2017-12-01 PROCEDURE — 96374 THER/PROPH/DIAG INJ IV PUSH: CPT

## 2017-12-01 PROCEDURE — 25000128 H RX IP 250 OP 636: Performed by: PHYSICIAN ASSISTANT

## 2017-12-01 PROCEDURE — 93010 ELECTROCARDIOGRAM REPORT: CPT | Performed by: INTERNAL MEDICINE

## 2017-12-01 PROCEDURE — 25000128 H RX IP 250 OP 636: Performed by: INTERNAL MEDICINE

## 2017-12-01 PROCEDURE — 81001 URINALYSIS AUTO W/SCOPE: CPT | Performed by: PHYSICIAN ASSISTANT

## 2017-12-01 PROCEDURE — 83605 ASSAY OF LACTIC ACID: CPT | Performed by: FAMILY MEDICINE

## 2017-12-01 PROCEDURE — 80053 COMPREHEN METABOLIC PANEL: CPT | Performed by: PHYSICIAN ASSISTANT

## 2017-12-01 PROCEDURE — 85610 PROTHROMBIN TIME: CPT | Performed by: INTERNAL MEDICINE

## 2017-12-01 PROCEDURE — 99284 EMERGENCY DEPT VISIT MOD MDM: CPT | Performed by: PHYSICIAN ASSISTANT

## 2017-12-01 PROCEDURE — 99223 1ST HOSP IP/OBS HIGH 75: CPT | Mod: AI | Performed by: INTERNAL MEDICINE

## 2017-12-01 PROCEDURE — 85025 COMPLETE CBC W/AUTO DIFF WBC: CPT | Performed by: PHYSICIAN ASSISTANT

## 2017-12-01 PROCEDURE — 36415 COLL VENOUS BLD VENIPUNCTURE: CPT | Performed by: INTERNAL MEDICINE

## 2017-12-01 PROCEDURE — 40000275 ZZH STATISTIC RCP TIME EA 10 MIN

## 2017-12-01 PROCEDURE — 12000000 ZZH R&B MED SURG/OB

## 2017-12-01 PROCEDURE — 71020 XR CHEST 2 VW: CPT | Mod: TC

## 2017-12-01 PROCEDURE — 87040 BLOOD CULTURE FOR BACTERIA: CPT | Performed by: PHYSICIAN ASSISTANT

## 2017-12-01 PROCEDURE — 96361 HYDRATE IV INFUSION ADD-ON: CPT

## 2017-12-01 PROCEDURE — 94640 AIRWAY INHALATION TREATMENT: CPT

## 2017-12-01 PROCEDURE — 99285 EMERGENCY DEPT VISIT HI MDM: CPT | Mod: 25

## 2017-12-01 RX ORDER — ASPIRIN 81 MG/1
81 TABLET, CHEWABLE ORAL DAILY
Status: DISCONTINUED | OUTPATIENT
Start: 2017-12-02 | End: 2017-12-05 | Stop reason: HOSPADM

## 2017-12-01 RX ORDER — DEXTROMETHORPHAN HBR AND GUAIFENESIN 5; 100 MG/5ML; MG/5ML
20 LIQUID ORAL EVERY 4 HOURS PRN
COMMUNITY

## 2017-12-01 RX ORDER — ARFORMOTEROL TARTRATE 15 UG/2ML
15 SOLUTION RESPIRATORY (INHALATION) EVERY 12 HOURS
Status: DISCONTINUED | OUTPATIENT
Start: 2017-12-01 | End: 2017-12-05 | Stop reason: HOSPADM

## 2017-12-01 RX ORDER — MORPHINE SULFATE 2 MG/ML
1 INJECTION, SOLUTION INTRAMUSCULAR; INTRAVENOUS
Status: DISCONTINUED | OUTPATIENT
Start: 2017-12-01 | End: 2017-12-05 | Stop reason: HOSPADM

## 2017-12-01 RX ORDER — NALOXONE HYDROCHLORIDE 0.4 MG/ML
.1-.4 INJECTION, SOLUTION INTRAMUSCULAR; INTRAVENOUS; SUBCUTANEOUS
Status: DISCONTINUED | OUTPATIENT
Start: 2017-12-01 | End: 2017-12-01

## 2017-12-01 RX ORDER — BUDESONIDE 0.25 MG/2ML
0.25 INHALANT ORAL 2 TIMES DAILY
Status: DISCONTINUED | OUTPATIENT
Start: 2017-12-01 | End: 2017-12-05 | Stop reason: HOSPADM

## 2017-12-01 RX ORDER — CILOSTAZOL 100 MG/1
100 TABLET ORAL 2 TIMES DAILY
Status: DISCONTINUED | OUTPATIENT
Start: 2017-12-01 | End: 2017-12-05 | Stop reason: HOSPADM

## 2017-12-01 RX ORDER — BUDESONIDE 0.25 MG/2ML
0.25 INHALANT ORAL 2 TIMES DAILY
COMMUNITY

## 2017-12-01 RX ORDER — ONDANSETRON 4 MG/1
4 TABLET, ORALLY DISINTEGRATING ORAL EVERY 6 HOURS PRN
Status: DISCONTINUED | OUTPATIENT
Start: 2017-12-01 | End: 2017-12-05 | Stop reason: HOSPADM

## 2017-12-01 RX ORDER — FUROSEMIDE 10 MG/ML
20 INJECTION INTRAMUSCULAR; INTRAVENOUS ONCE
Status: COMPLETED | OUTPATIENT
Start: 2017-12-01 | End: 2017-12-01

## 2017-12-01 RX ORDER — ROFLUMILAST 500 UG/1
500 TABLET ORAL DAILY
Status: DISCONTINUED | OUTPATIENT
Start: 2017-12-02 | End: 2017-12-05 | Stop reason: HOSPADM

## 2017-12-01 RX ORDER — LIDOCAINE 40 MG/G
CREAM TOPICAL
Status: DISCONTINUED | OUTPATIENT
Start: 2017-12-01 | End: 2017-12-05 | Stop reason: HOSPADM

## 2017-12-01 RX ORDER — DOXYCYCLINE HYCLATE 100 MG
100 TABLET ORAL EVERY 12 HOURS
Status: DISCONTINUED | OUTPATIENT
Start: 2017-12-01 | End: 2017-12-04

## 2017-12-01 RX ORDER — IPRATROPIUM BROMIDE AND ALBUTEROL SULFATE 2.5; .5 MG/3ML; MG/3ML
3 SOLUTION RESPIRATORY (INHALATION) EVERY 4 HOURS
Status: DISCONTINUED | OUTPATIENT
Start: 2017-12-01 | End: 2017-12-02

## 2017-12-01 RX ORDER — CEFTRIAXONE SODIUM 1 G/50ML
1 INJECTION, SOLUTION INTRAVENOUS EVERY 24 HOURS
Status: DISCONTINUED | OUTPATIENT
Start: 2017-12-01 | End: 2017-12-02

## 2017-12-01 RX ORDER — ALBUTEROL SULFATE 0.83 MG/ML
3 SOLUTION RESPIRATORY (INHALATION)
Status: DISCONTINUED | OUTPATIENT
Start: 2017-12-01 | End: 2017-12-02

## 2017-12-01 RX ORDER — IPRATROPIUM BROMIDE AND ALBUTEROL SULFATE 2.5; .5 MG/3ML; MG/3ML
3 SOLUTION RESPIRATORY (INHALATION) EVERY 4 HOURS
Status: DISCONTINUED | OUTPATIENT
Start: 2017-12-01 | End: 2017-12-01

## 2017-12-01 RX ORDER — METHYLPREDNISOLONE SODIUM SUCCINATE 40 MG/ML
40 INJECTION, POWDER, LYOPHILIZED, FOR SOLUTION INTRAMUSCULAR; INTRAVENOUS EVERY 8 HOURS
Status: DISCONTINUED | OUTPATIENT
Start: 2017-12-02 | End: 2017-12-02

## 2017-12-01 RX ORDER — AZITHROMYCIN 250 MG/1
TABLET, FILM COATED ORAL
Qty: 6 TABLET | Refills: 0 | Status: SHIPPED | OUTPATIENT
Start: 2017-12-01 | End: 2017-12-05

## 2017-12-01 RX ORDER — SODIUM CHLORIDE 9 MG/ML
INJECTION, SOLUTION INTRAVENOUS CONTINUOUS
Status: DISCONTINUED | OUTPATIENT
Start: 2017-12-01 | End: 2017-12-04

## 2017-12-01 RX ORDER — IPRATROPIUM BROMIDE AND ALBUTEROL SULFATE 2.5; .5 MG/3ML; MG/3ML
3 SOLUTION RESPIRATORY (INHALATION)
Status: DISCONTINUED | OUTPATIENT
Start: 2017-12-01 | End: 2017-12-02

## 2017-12-01 RX ORDER — METHYLPREDNISOLONE SODIUM SUCCINATE 125 MG/2ML
60 INJECTION, POWDER, LYOPHILIZED, FOR SOLUTION INTRAMUSCULAR; INTRAVENOUS ONCE
Status: COMPLETED | OUTPATIENT
Start: 2017-12-01 | End: 2017-12-01

## 2017-12-01 RX ORDER — ONDANSETRON 2 MG/ML
4 INJECTION INTRAMUSCULAR; INTRAVENOUS EVERY 6 HOURS PRN
Status: DISCONTINUED | OUTPATIENT
Start: 2017-12-01 | End: 2017-12-05 | Stop reason: HOSPADM

## 2017-12-01 RX ORDER — TAMSULOSIN HYDROCHLORIDE 0.4 MG/1
0.4 CAPSULE ORAL DAILY
Status: DISCONTINUED | OUTPATIENT
Start: 2017-12-02 | End: 2017-12-05 | Stop reason: HOSPADM

## 2017-12-01 RX ORDER — NALOXONE HYDROCHLORIDE 0.4 MG/ML
.1-.4 INJECTION, SOLUTION INTRAMUSCULAR; INTRAVENOUS; SUBCUTANEOUS
Status: DISCONTINUED | OUTPATIENT
Start: 2017-12-01 | End: 2017-12-05 | Stop reason: HOSPADM

## 2017-12-01 RX ORDER — SODIUM CHLORIDE, SODIUM LACTATE, POTASSIUM CHLORIDE, CALCIUM CHLORIDE 600; 310; 30; 20 MG/100ML; MG/100ML; MG/100ML; MG/100ML
1000 INJECTION, SOLUTION INTRAVENOUS CONTINUOUS
Status: DISCONTINUED | OUTPATIENT
Start: 2017-12-01 | End: 2017-12-01

## 2017-12-01 RX ORDER — SIMVASTATIN 10 MG
10 TABLET ORAL AT BEDTIME
Status: DISCONTINUED | OUTPATIENT
Start: 2017-12-01 | End: 2017-12-05 | Stop reason: HOSPADM

## 2017-12-01 RX ORDER — ACETAMINOPHEN 325 MG/1
650 TABLET ORAL EVERY 4 HOURS PRN
Status: DISCONTINUED | OUTPATIENT
Start: 2017-12-01 | End: 2017-12-05 | Stop reason: HOSPADM

## 2017-12-01 RX ORDER — BUDESONIDE
1 POWDER (GRAM) MISCELLANEOUS 2 TIMES DAILY
Status: DISCONTINUED | OUTPATIENT
Start: 2017-12-01 | End: 2017-12-01

## 2017-12-01 RX ADMIN — IPRATROPIUM BROMIDE AND ALBUTEROL SULFATE 3 ML: .5; 3 SOLUTION RESPIRATORY (INHALATION) at 23:41

## 2017-12-01 RX ADMIN — CEFTRIAXONE SODIUM 1 G: 1 INJECTION, SOLUTION INTRAVENOUS at 19:35

## 2017-12-01 RX ADMIN — SODIUM CHLORIDE, POTASSIUM CHLORIDE, SODIUM LACTATE AND CALCIUM CHLORIDE 1000 ML: 600; 310; 30; 20 INJECTION, SOLUTION INTRAVENOUS at 12:00

## 2017-12-01 RX ADMIN — BUDESONIDE 0.25 MG: 0.25 INHALANT RESPIRATORY (INHALATION) at 20:15

## 2017-12-01 RX ADMIN — IPRATROPIUM BROMIDE AND ALBUTEROL SULFATE 3 ML: .5; 3 SOLUTION RESPIRATORY (INHALATION) at 12:03

## 2017-12-01 RX ADMIN — DOXYCYCLINE HYCLATE 100 MG: 100 TABLET, FILM COATED ORAL at 19:31

## 2017-12-01 RX ADMIN — SIMVASTATIN 10 MG: 10 TABLET, FILM COATED ORAL at 20:31

## 2017-12-01 RX ADMIN — Medication 1.25 MG: at 19:30

## 2017-12-01 RX ADMIN — SODIUM CHLORIDE, POTASSIUM CHLORIDE, SODIUM LACTATE AND CALCIUM CHLORIDE 1000 ML: 600; 310; 30; 20 INJECTION, SOLUTION INTRAVENOUS at 13:14

## 2017-12-01 RX ADMIN — METHYLPREDNISOLONE SODIUM SUCCINATE 62.5 MG: 125 INJECTION, POWDER, FOR SOLUTION INTRAMUSCULAR; INTRAVENOUS at 15:56

## 2017-12-01 RX ADMIN — IPRATROPIUM BROMIDE AND ALBUTEROL SULFATE 3 ML: .5; 3 SOLUTION RESPIRATORY (INHALATION) at 20:08

## 2017-12-01 RX ADMIN — ARFORMOTEROL TARTRATE 15 MCG: 15 SOLUTION RESPIRATORY (INHALATION) at 20:09

## 2017-12-01 RX ADMIN — FUROSEMIDE 20 MG: 10 INJECTION, SOLUTION INTRAVENOUS at 14:29

## 2017-12-01 RX ADMIN — CILOSTAZOL 100 MG: 100 TABLET ORAL at 20:31

## 2017-12-01 RX ADMIN — SODIUM CHLORIDE: 9 INJECTION, SOLUTION INTRAVENOUS at 19:32

## 2017-12-01 RX ADMIN — GUAIFENESIN 5 ML: 100 SOLUTION ORAL at 20:36

## 2017-12-01 ASSESSMENT — ENCOUNTER SYMPTOMS
DIZZINESS: 0
VOMITING: 0
WHEEZING: 0
SORE THROAT: 0
FATIGUE: 0
COUGH: 1
GASTROINTESTINAL NEGATIVE: 1
SHORTNESS OF BREATH: 1
CHEST TIGHTNESS: 1
FEVER: 0
RHINORRHEA: 0
PALPITATIONS: 0
CARDIOVASCULAR NEGATIVE: 1
CHILLS: 0
HEADACHES: 0
TROUBLE SWALLOWING: 0
LIGHT-HEADEDNESS: 0
PSYCHIATRIC NEGATIVE: 1
SINUS PRESSURE: 0
EYE DISCHARGE: 0
DIARRHEA: 0

## 2017-12-01 NOTE — PROGRESS NOTES
ANTICOAGULATION FOLLOW-UP CLINIC VISIT    Patient Name:  Rm Duarte  Date:  12/1/2017  Contact Type:  message left on patient home phone voicemal.    SUBJECTIVE:     Patient Findings     Positives Other complaints    Comments Was going to call patient but noted is that he is now in the ER for shortness of breath. No ER notes done at this time. Unknown if he will be started on any other medications. Will check on patient status later today and call him if he is not admitted to the hospital. Should be last day of prednisone 30mg. 12/1/17  Call placed to patient and message left on home phone voicemail re: warfarin dosing for 12/1,2,3.  He is to call warfarin clinic on 12/4/17 to let me know if he was started on antibiotics when in the ER.  Warfarin dosing will be decreased as he is currently on 30mg prednisone daily.             OBJECTIVE    INR Point of Care   Date Value Ref Range Status   12/01/2017 2.1 (H) 0.86 - 1.14 Final     Comment:     This test is intended for monitoring Coumadin therapy.  Results are not   accurate in patients with prolonged INR due to factor deficiency.         ASSESSMENT / PLAN  INR assessment THER    Recheck INR In: 1 WEEK    INR Location Clinic      Anticoagulation Summary as of 12/1/2017     INR goal 2.0-3.0   Today's INR 2.1   Maintenance plan 2.5 mg (2.5 mg x 1) on Mon, Wed, Fri; 3.75 mg (2.5 mg x 1.5) all other days   Full instructions 12/2: 2.5 mg; 12/3: 2.5 mg; 12/5: 2.5 mg; Otherwise 2.5 mg on Mon, Wed, Fri; 3.75 mg all other days   Weekly total 22.5 mg   Plan last modified Zena Burden RN (11/15/2017)   Next INR check 12/4/2017   Priority INR   Target end date Indefinite    Indications   Chronic atrial fibrillation (H) [I48.2]  PVD (peripheral vascular disease) (H) [I73.9]  Long-term (current) use of anticoagulants [Z79.01] [Z79.01]         Anticoagulation Episode Summary     INR check location     Preferred lab     Send INR reminders to  ANTICOAG POOL    Comments  takes Azithromycin 250mg daily long term      Anticoagulation Care Providers     Provider Role Specialty Phone number    Dominguez Maradiaga MD [facility] [phone]            See the Encounter Report to view Anticoagulation Flowsheet and Dosing Calendar (Go to Encounters tab in chart review, and find the Anticoagulation Therapy Visit)        Zena Haas RN

## 2017-12-01 NOTE — ED PROVIDER NOTES
"  History     Chief Complaint   Patient presents with     Shortness of Breath     c/o shortness of breath, sats 87-88 % on 02 @ 2 L     The history is provided by the patient. No  was used.     Rm Duarte is a 76 year old male with Hx COPD, chronic afib on coumadin therapy who presents with worsening shortness of breath despite starting prednisone for COPD flare up. He denies fevers, temp 99.4 in triage. Cough intensified today and it \"feels like I can't cough anything up\". He denies chest pain. No pedal edema. He adds he had a few falls last week prior to starting his prednisone. He denies syncope in relation to falls. Denies loss of consciousness. No vomiting.     Problem List:    Patient Active Problem List    Diagnosis Date Noted     ACP (advance care planning) 06/28/2017     Priority: Medium     Advance Care Planning 6/28/2017: ACP Review of Chart / Resources Provided:  Reviewed chart for advance care plan.  Rm Duarte has been provided information and resources to begin or update their advance care plan.  Added by Stephania Chandra             COPD exacerbation (H) 06/20/2017     Priority: Medium     Long-term (current) use of anticoagulants [Z79.01] 08/03/2016     Priority: Medium     PVD (peripheral vascular disease) (H)      Priority: Medium     Obstructive chronic bronchitis with exacerbation (H) 10/14/2013     Priority: Medium     Arteriosclerotic cardiovascular disease (ASCVD) 06/27/2013     Priority: Medium     Chronic atrial fibrillation (H) 04/17/2013     Priority: Medium     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) 07/17/2012     Priority: Medium     Overview:   IMO Update          Past Medical History:    Past Medical History:   Diagnosis Date     Atrial fibrillation (H) 3/23/2012     COPD 5/16/2011     PVD (peripheral vascular disease) (H)        Past Surgical History:    Past Surgical History:   Procedure Laterality Date     COPD:FEV1-0.74/FVC-3.35 22%, DLCO-28%  " 3/1/2010    Severe COPD; 2008 PFT's done : 0.96/3.81 25% Fev1, DLCO 45%.  1991 were 4.31/6.20!!!!!!!??     ECHO: TDS, EF~50%, abnl septum, o/w NORMAL  2011     PAD: CTA> occlusion of L mid SFA with reconstitution  2010     S/P colonoscopy: tubular adenoma & tics  2010    Repeat in ; Dr Graham     Tobacco Abuse Ongoing         Family History:    Family History   Problem Relation Age of Onset     Other - See Comments Mother      AAA, cause of death     Other - See Comments Other 69     AAA, family hx     Chronic Obstructive Pulmonary Disease Brother       MI age 60's     Family History Negative Sister        Social History:  Marital Status:  Single [1]  Social History   Substance Use Topics     Smoking status: Former Smoker     Types: Cigarettes     Smokeless tobacco: Never Used      Comment: Tried to quit; Quit      Alcohol use 0.0 oz/week     0 Standard drinks or equivalent per week      Comment: Beer; rarely        Medications:      azithromycin (ZITHROMAX Z-MARIN) 250 MG tablet   predniSONE (DELTASONE) 20 MG tablet   warfarin (COUMADIN) 2.5 MG tablet   simvastatin (ZOCOR) 10 MG tablet   roflumilast (DALIRESP) 500 MCG TABS tablet   PERFOROMIST 20 MCG/2ML neb solution   ipratropium - albuterol 0.5 mg/2.5 mg/3 mL (DUONEB) 0.5-2.5 (3) MG/3ML neb solution   cilostazol (PLETAL) 100 MG tablet   COMBIVENT RESPIMAT  MCG/ACT inhaler   budesonide POWD   Multiple Vitamins-Minerals (CENTRUM SILVER) per tablet   aspirin (ASPIRIN LOW DOSE) 81 MG tablet   warfarin (COUMADIN) 5 MG tablet   tamsulosin (FLOMAX) 0.4 MG 24 hr capsule   loperamide (IMODIUM A-D) 2 MG tablet       Review of Systems   Constitutional: Negative for chills, fatigue and fever.   HENT: Negative for congestion, ear pain, postnasal drip, rhinorrhea, sinus pressure, sore throat and trouble swallowing.    Eyes: Negative for discharge.   Respiratory: Positive for cough, chest tightness and shortness of breath. Negative for  wheezing.    Cardiovascular: Negative.  Negative for chest pain, palpitations and leg swelling.   Gastrointestinal: Negative.  Negative for diarrhea and vomiting.   Skin: Negative.    Neurological: Negative for dizziness, light-headedness and headaches.   Psychiatric/Behavioral: Negative.        Physical Exam   BP: 123/76  Pulse: 89  Heart Rate: 102  Temp: 99.4  F (37.4  C)  Resp: 24  SpO2: 95 %      Physical Exam   Constitutional: He appears well-developed and well-nourished. He appears distressed (RR 20s).   HENT:   Head: Normocephalic.       Right Ear: External ear normal. No hemotympanum.   Left Ear: External ear normal. No hemotympanum.   Nose: No septal deviation or nasal septal hematoma.   Mouth/Throat: Oropharynx is clear and moist. No lacerations.   Pulmonary/Chest: He has decreased breath sounds in the right lower field. He has wheezes in the right upper field, the left upper field and the left middle field. He has rhonchi in the left middle field. He exhibits no tenderness (left back/flank with bruising from recent fall).   Nursing note and vitals reviewed.      ED Course     ED Course     Procedures  Medications   lidocaine 1 % 1 mL (not administered)   lidocaine (LMX4) kit (not administered)   sodium chloride (PF) 0.9% PF flush 3 mL (not administered)   sodium chloride (PF) 0.9% PF flush 3 mL (3 mLs Intravenous Not Given 12/1/17 1315)   lactated ringers BOLUS 1,000 mL (0 mLs Intravenous Stopped 12/1/17 1314)     Followed by   lactated ringers infusion (0 mLs Intravenous ED Infusing on Admission/transfer 12/1/17 1628)   ipratropium - albuterol 0.5 mg/2.5 mg/3 mL (DUONEB) neb solution 3 mL (3 mLs Nebulization Given 12/1/17 1203)   furosemide (LASIX) injection 20 mg (20 mg Intravenous Given 12/1/17 1429)   methylPREDNISolone sodium succinate (solu-MEDROL) injection 62.5 mg (62.5 mg Intravenous Given 12/1/17 1556)            EKG Interpretation:      Interpreted by Johnnie Rivera  Time reviewed:  1150  Symptoms at time of EKG: dyspnea   Rhythm: sinus tachycardia and 1 degree AV block  Rate: 100-110  Axis: Normal  Ectopy: none  Conduction: 1st degree AV block  ST Segments/ T Waves: Non-specific ST-T wave changes  Q Waves: none  Comparison to prior: Grossly unchanged from 2016    Clinical Impression: no acute changes      Labs Ordered and Resulted from Time of ED Arrival Up to the Time of Departure from the ED   CBC WITH PLATELETS DIFFERENTIAL - Abnormal; Notable for the following:        Result Value    WBC 12.2 (*)     Absolute Neutrophil 11.3 (*)     Absolute Lymphocytes 0.2 (*)     All other components within normal limits   COMPREHENSIVE METABOLIC PANEL - Abnormal; Notable for the following:     Glucose 104 (*)     Albumin 2.8 (*)     All other components within normal limits   ROUTINE UA WITH MICROSCOPIC - Abnormal; Notable for the following:     Blood Urine Small (*)     Protein Albumin Urine 10 (*)     Leukocyte Esterase Urine Large (*)     WBC Urine 56 (*)     RBC Urine 15 (*)     Bacteria Urine None (*)     Mucous Urine Present (*)     All other components within normal limits   NT PROBNP INPATIENT - Abnormal; Notable for the following:     N-Terminal Pro BNP Inpatient 1210 (*)     All other components within normal limits   LACTIC ACID   TROPONIN I   PERIPHERAL IV CATHETER   PULSE OXIMETRY NURSING   CARDIAC CONTINUOUS MONITORING   NURSING DRAW AND HOLD   NURSING DRAW AND HOLD   NURSING DRAW AND HOLD   PULSE OXIMETRY NURSING   CARDIAC CONTINUOUS MONITORING   MEASURE URINE OUTPUT   PATIENT CARE ORDER   STRICT INTAKE AND OUTPUT   BLOOD CULTURE   BLOOD CULTURE   INFLUENZA A/B ANTIGEN     Medications   lidocaine 1 % 1 mL (not administered)   lidocaine (LMX4) kit (not administered)   sodium chloride (PF) 0.9% PF flush 3 mL (not administered)   sodium chloride (PF) 0.9% PF flush 3 mL (3 mLs Intravenous Not Given 12/1/17 1315)   lactated ringers BOLUS 1,000 mL (0 mLs Intravenous Stopped 12/1/17 1314)      Followed by   lactated ringers infusion (0 mLs Intravenous ED Infusing on Admission/transfer 12/1/17 1628)   ipratropium - albuterol 0.5 mg/2.5 mg/3 mL (DUONEB) neb solution 3 mL (3 mLs Nebulization Given 12/1/17 1203)   furosemide (LASIX) injection 20 mg (20 mg Intravenous Given 12/1/17 1429)   methylPREDNISolone sodium succinate (solu-MEDROL) injection 62.5 mg (62.5 mg Intravenous Given 12/1/17 1556)       Assessments & Plan (with Medical Decision Making)     I have reviewed the nursing notes.  I have reviewed the findings, diagnosis, plan and need for follow up with the patient.    New Prescriptions    AZITHROMYCIN (ZITHROMAX Z-MARIN) 250 MG TABLET    Two tablets on the first day, then one tablet daily for the next 4 days       Final diagnoses:   COPD exacerbation (H)   Elevated brain natriuretic peptide (BNP) level   Falls frequently   Pt initially responded to duoneb. He received 1.5L NS d/t sepsis alert, 20 lasix after BNP resulted at 1210, and 60 solumedrol. We were going to discharge to home per patient request with zithromax and return to 40 mg prednisone where he felt his breathing was best, however when he was seated on the bedside to get up and eval breathing, he reported significantly worsened shortness of breath. I spoke with our hospitalist, Dr Smart who graciously accepted care for admission with COPD exacerbation.     12/1/2017   [facility]     Johnnie Rivera PA  12/01/17 [id number]

## 2017-12-01 NOTE — ED NOTES
Patient back from x-ray, reported that he felt a little better after the neb tx. Monitors placed back on patient

## 2017-12-01 NOTE — PROGRESS NOTES
ANTICOAGULATION FOLLOW-UP CLINIC VISIT    Patient Name:  Rm Duarte  Date:  12/1/2017  Contact Type:  did not call patient as he is in ER. Will check his status later and call him if discharged from ER to home    SUBJECTIVE:     Patient Findings     Comments Was going to call patient but noted is that he is now in the ER for shortness of breath. No ER notes done at this time. Unknown if he will be started on any other medications. Will check on patient status later today and call him if he is not admitted to the hospital. Should be last day of prednisone 30mg.            OBJECTIVE    INR Point of Care   Date Value Ref Range Status   12/01/2017 2.1 (H) 0.86 - 1.14 Final     Comment:     This test is intended for monitoring Coumadin therapy.  Results are not   accurate in patients with prolonged INR due to factor deficiency.         ASSESSMENT / PLAN  INR assessment THER    Recheck INR In: 1 WEEK    INR Location Clinic      Anticoagulation Summary as of 12/1/2017     INR goal 2.0-3.0   Today's INR 2.1   Maintenance plan 2.5 mg (2.5 mg x 1) on Mon, Wed, Fri; 3.75 mg (2.5 mg x 1.5) all other days   Full instructions 12/2: 2.5 mg; 12/3: 2.5 mg; 12/5: 2.5 mg; Otherwise 2.5 mg on Mon, Wed, Fri; 3.75 mg all other days   Weekly total 22.5 mg   Plan last modified Zena Burden RN (11/15/2017)   Next INR check 12/7/2017   Priority INR   Target end date Indefinite    Indications   Chronic atrial fibrillation (H) [I48.2]  PVD (peripheral vascular disease) (H) [I73.9]  Long-term (current) use of anticoagulants [Z79.01] [Z79.01]         Anticoagulation Episode Summary     INR check location     Preferred lab     Send INR reminders to  ANTICOAG POOL    Comments takes Azithromycin 250mg daily long term      Anticoagulation Care Providers     Provider Role Specialty Phone number    Dominguez Maradiaga MD [facility] [phone]            See the Encounter Report to view Anticoagulation Flowsheet and Dosing  Calendar (Go to Encounters tab in chart review, and find the Anticoagulation Therapy Visit)        Zena Haas RN

## 2017-12-01 NOTE — ED NOTES
Pt to rm 11 in er via wheelchair, comes from home states shortness of breath since am, denies pain or nausea, is on O2 at 2lpm at home daily, states he feels like he should be able to just cough something up and he would feel better. IV placed with labs drawn, ekg done, provider in to see. sats 95% on 2lpm via nc

## 2017-12-01 NOTE — TELEPHONE ENCOUNTER
Perforomist      Last Written Prescription Date: 8/21/17  Last Fill Quantity: 360 ml,  # refills: 0   Last Office Visit with G, P or [facility] prescribing provider: 7/13/17

## 2017-12-01 NOTE — ED NOTES
Pt states he is feeling like he's breathing better after neb treatment, resting on cart with no complaints

## 2017-12-01 NOTE — IP AVS SNAPSHOT
MRN:[id number]                      After Visit Summary   12/1/2017    Rm Duarte    MRN: [id number]           Thank you!     Thank you for choosing [facility] for your care. Our goal is always to provide you with excellent care. Hearing back from our patients is one way we can continue to improve our services. Please take a few minutes to complete the written survey that you may receive in the mail after you visit with us. Thank you!        Patient Information     Date Of Birth          1940        Designated Caregiver       Most Recent Value    Caregiver    Will someone help with your care after discharge? yes    Name of designated caregiver Terese(sister)    Phone number of caregiver [phone]    Caregiver address [place]      About your hospital stay     You were admitted on:  December 1, 2017 You last received care in the:  [facility]    You were discharged on:  December 5, 2017        Reason for your hospital stay       Rm Duarte is a 76 year old  man who was admitted on 12/1/2017. Increasing cough and dyspnea for 4 days; he started oral steroids, but then abruptly became more short of breath the morning before presenting to emergency department. He was admitted and treated in the usual fashion for chronic obstructive pulmonary disease exacerbation.  No evidence of a lower respiratory tract infection.  He improved, although somewhat slowly, but by the day of discharge felt fit to leave the hospital.  Continuing tapering doses of steroids at the time of discharge.  Cardiac medications including  Low-dose daily azithromycin continued at the time of discharge.                  Who to Call     For medical emergencies, please call 911.  For non-urgent questions about your medical care, please call your primary care provider or clinic, [phone]          Attending Provider     Provider Specialty    Johnnie Rivera PA Physician Assistant - Daxa Gaxiola MD  --    Wade Bradley MD Internal Medicine       Primary Care Provider Office Phone # Fax #    Dominguez Maradiaga [phone]      After Care Instructions     Activity       Your activity upon discharge: activity as tolerated            Diet       Follow this diet upon discharge: Orders Placed This Encounter      Combination Diet Regular Diet Adult            Oxygen Adult       Renew Home Oxygen Order  Renew previous prescription.  Expected treatment length is indefinite (99 months).    Attending Provider: Wade Bradley  Physician signature: See electronic signature associated with these discharge orders  Date of Order: December 5, 2017                  Follow-up Appointments     Follow-up and recommended labs and tests        Follow up with primary care provider, Dominguez Maradiaga, within 7 days for hospital follow- up  INR in 2-3 days by home health nurse or at Dr. Maradiaga's office                  Your next 10 appointments already scheduled     Dec 13, 2017  9:00 AM CST   Anticoagulation Visit with NA ANTI COAGULATION   [facility] ([facility] )    [address] [facility] [address]   [phone]              Additional Services     Home care nursing referral       RN skilled nursing visit. RN to assess respiratory and cardiac status, establish palliative care, blood draw.    Your provider has ordered home care nursing services. If you have not been contacted within 2 days of your discharge please call the inpatient department phone number at [phone] .                  Pending Results     Date and Time Order Name Status Description    12/1/2017 1156 Blood culture Preliminary     12/1/2017 1156 Blood culture Preliminary             Statement of Approval     Ordered          12/05/17 1042  I have reviewed and agree with all the recommendations and orders detailed in this document.  EFFECTIVE NOW     Approved and electronically signed by:   "Wade Smart MD             Admission Information     Date & Time Provider Department Dept. Phone    2017 Wade Smart MD [facility] [phone]      Your Vitals Were     Blood Pressure Pulse Temperature Respirations Pulse Oximetry       118/70 74 97.5  F (36.4  C) (Tympanic) 18 93%       MyChart Information     MyChart lets you send messages to your doctor, view your test results, renew your prescriptions, schedule appointments and more. To sign up, go to [email or web address] . Click on \"Log in\" on the left side of the screen, which will take you to the Welcome page. Then click on \"Sign up Now\" on the right side of the page.     You will be asked to enter the access code listed below, as well as some personal information. Please follow the directions to create your username and password.     Your access code is: [email or web address]  Expires: 2018  7:19 AM     Your access code will  in 90 days. If you need help or a new code, please call your [facility] clinic or [phone].        Care EveryWhere ID     This is your Care EveryWhere ID. This could be used by other organizations to access your [facility] medical records  [id number]        Equal Access to Services     DARA SIMPSON AH: Susan mirzao Sopenny, waaxda luqadaha, qaybta kaalmada adeegyada, karina prado. [facility] [phone].    ATENCIÓN: Si habla español, tiene a dhillon disposición servicios gratuitos de asistencia lingüística. Llame al [phone].    We comply with applicable federal civil rights laws and [place] laws. We do not discriminate on the basis of race, color, national origin, age, disability, sex, sexual orientation, or gender identity.               Review of your medicines      START taking        Dose / Directions    azithromycin 250 MG tablet   Commonly known as:  ZITHROMAX   Indication:  chronic suppressive treatment   Used for:  Obstructive chronic bronchitis with " exacerbation (H)        Dose:  250 mg   Start taking on:  12/6/2017   Take 1 tablet (250 mg) by mouth daily   Quantity:  30 tablet   Refills:  11       sodium chloride 0.65 % nasal spray   Commonly known as:  OCEAN   Used for:  COPD exacerbation (H)        Dose:  1 spray   Spray 1 spray into both nostrils every hour as needed for congestion   Quantity:  1 Bottle   Refills:  0         CONTINUE these medicines which may have CHANGED, or have new prescriptions. If we are uncertain of the size of tablets/capsules you have at home, strength may be listed as something that might have changed.        Dose / Directions    predniSONE 10 MG tablet   Commonly known as:  DELTASONE   This may have changed:    - medication strength  - how to take this  - when to take this  - additional instructions   Used for:  COPD exacerbation (H)        3 tabs PO daily x3, then 2 tabs PO daily x3, then 1 tabs PO daily x3, then stop   Quantity:  18 tablet   Refills:  0         CONTINUE these medicines which have NOT CHANGED        Dose / Directions    ASPIRIN LOW DOSE 81 MG tablet   Generic drug:  aspirin        Dose:  1 tablet   Take 1 tablet by mouth daily.   Refills:  0       budesonide 0.25 MG/2ML neb solution   Commonly known as:  PULMICORT        Dose:  0.25 mg   Take 0.25 mg by nebulization 2 times daily   Refills:  0       CENTRUM SILVER per tablet        Dose:  1 tablet   Take 1 tablet by mouth daily.   Refills:  0       cilostazol 100 MG tablet   Commonly known as:  PLETAL        TAKE ONE TABLET BY MOUTH TWICE DAILY   Quantity:  180 tablet   Refills:  3       * COMBIVENT RESPIMAT  MCG/ACT inhaler   Used for:  Chronic obstructive pulmonary disease, unspecified COPD type (H)   Generic drug:  Ipratropium-Albuterol        INHALE ONE PUFF BY MOUTH 4 TIMES DAILY *MAX 6 DOSES PER DAY*   Quantity:  12 g   Refills:  0       * ipratropium - albuterol 0.5 mg/2.5 mg/3 mL 0.5-2.5 (3) MG/3ML neb solution   Commonly known as:  DUONEB   Used  for:  COPD (chronic obstructive pulmonary disease) (H)        USE ONE AMPULE IN NEBULIZER EVERY 6 HOURS AS NEEDED FOR SHORTNESS OF BREATH /DYSPNEA  OR  WHEEZING   Quantity:  360 vial   Refills:  1       DALIRESP 500 MCG Tabs tablet   Generic drug:  roflumilast        Dose:  500 mcg   Take 500 mcg by mouth daily   Refills:  0       IMODIUM A-D 2 MG tablet   Generic drug:  loperamide        Dose:  2 mg   Take 2 mg by mouth 4 times daily as needed. Take 2 tablet by oral route after 1st loose stool and 1 tablet after each subsequent bowel movement; do not exceed 16 mg in 24 hrs. As needed.   Refills:  0       PERFOROMIST 20 MCG/2ML neb solution   Used for:  Other emphysema (H)   Generic drug:  formoterol        USE ONE VIAL IN NEBULIZER EVERY 12 HOURS   Quantity:  360 mL   Refills:  0       ROBITUSSIN COUGH+CHEST EDVIN DM 5-100 MG/5ML Liqd   Indication:  Cough   Generic drug:  Dextromethorphan-Guaifenesin        Dose:  20 mL   Take 20 mLs by mouth every 4 hours as needed   Refills:  0       simvastatin 10 MG tablet   Commonly known as:  ZOCOR        TAKE ONE TABLET BY MOUTH AT BEDTIME   Quantity:  90 tablet   Refills:  0       tamsulosin 0.4 MG capsule   Commonly known as:  FLOMAX   Used for:  BPH (benign prostatic hyperplasia)        Dose:  0.4 mg   Take 1 capsule (0.4 mg) by mouth daily   Quantity:  30 capsule   Refills:  1       warfarin 2.5 MG tablet   Commonly known as:  COUMADIN   Used for:  Long-term (current) use of anticoagulants        Take 2.5mg Mon/Wed/Fri; 3.7mg all other days or as directed by warfarin clinic   Quantity:  30 tablet   Refills:  1       * Notice:  This list has 2 medication(s) that are the same as other medications prescribed for you. Read the directions carefully, and ask your doctor or other care provider to review them with you.         Where to get your medicines      These medications were sent to [id number] - [place] - [id number]  [id number] [id number]      Phone:  [phone]     PERFOROMIST 20 MCG/2ML neb solution    predniSONE 10 MG tablet    sodium chloride 0.65 % nasal spray               ANTIBIOTIC INSTRUCTION     You've Been Prescribed an Antibiotic - Now What?  Your healthcare team thinks that you or your loved one might have an infection. Some infections can be treated with antibiotics, which are powerful, life-saving drugs. Like all medications, antibiotics have side effects and should only be used when necessary. There are some important things you should know about your antibiotic treatment.      Your healthcare team may run tests before you start taking an antibiotic.    Your team may take samples (e.g., from your blood, urine or other areas) to run tests to look for bacteria. These test can be important to determine if you need an antibiotic at all and, if you do, which antibiotic will work best.      Within a few days, your healthcare team might change or even stop your antibiotic.    Your team may start you on an antibiotic while they are working to find out what is making you sick.    Your team might change your antibiotic because test results show that a different antibiotic would be better to treat your infection.    In some cases, once your team has more information, they learn that you do not need an antibiotic at all. They may find out that you don't have an infection, or that the antibiotic you're taking won't work against your infection. For example, an infection caused by a virus can't be treated with antibiotics. Staying on an antibiotic when you don't need it is more likely to be harmful than helpful.      You may experience side effects from your antibiotic.    Like all medications, antibiotics have side effects. Some of these can be serious.    Let you healthcare team know if you have any known allergies when you are admitted to the hospital.    One significant side effect of nearly all antibiotics is the risk of severe and sometimes  deadly diarrhea caused by Clostridium difficile (C. Difficile). This occurs when a person takes antibiotics because some good germs are destroyed. Antibiotic use allows C. diificile to take over, putting patients at high risk for this serious infection.    As a patient or caregiver, it is important to understand your or your loved one's antibiotic treatment. It is especially important for caregivers to speak up when patients can't speak for themselves. Here are some important questions to ask your healthcare team.    What infection is this antibiotic treating and how do you know I have that infection?    What side effects might occur from this antibiotic?    How long will I need to take this antibiotic?    Is it safe to take this antibiotic with other medications or supplements (e.g., vitamins) that I am taking?     Are there any special directions I need to know about taking this antibiotic? For example, should I take it with food?    How will I be monitored to know whether my infection is responding to the antibiotic?    What tests may help to make sure the right antibiotic is prescribed for me?      Information provided by:  www.cdc.gov/getsmart  U.S. Department of Health and Human Services  Centers for disease Control and Prevention  National Center for Emerging and Zoonotic Infectious Diseases  Division of Healthcare Quality Promotion         Protect others around you: Learn how to safely use, store and throw away your medicines at www.disposemymeds.org.             Medication List: This is a list of all your medications and when to take them. Check marks below indicate your daily home schedule. Keep this list as a reference.      Medications           Morning Afternoon Evening Bedtime As Needed    ASPIRIN LOW DOSE 81 MG tablet   Take 1 tablet by mouth daily.   Generic drug:  aspirin                                azithromycin 250 MG tablet   Commonly known as:  ZITHROMAX   Take 1 tablet (250 mg) by mouth daily    Start taking on:  12/6/2017   Last time this was given:  250 mg on 12/5/2017  9:16 AM                                budesonide 0.25 MG/2ML neb solution   Commonly known as:  PULMICORT   Take 0.25 mg by nebulization 2 times daily   Last time this was given:  0.25 mg on 12/5/2017  7:11 AM                                CENTRUM SILVER per tablet   Take 1 tablet by mouth daily.                                cilostazol 100 MG tablet   Commonly known as:  PLETAL   TAKE ONE TABLET BY MOUTH TWICE DAILY   Last time this was given:  100 mg on 12/5/2017  9:16 AM                                * COMBIVENT RESPIMAT  MCG/ACT inhaler   INHALE ONE PUFF BY MOUTH 4 TIMES DAILY *MAX 6 DOSES PER DAY*   Generic drug:  Ipratropium-Albuterol                                * ipratropium - albuterol 0.5 mg/2.5 mg/3 mL 0.5-2.5 (3) MG/3ML neb solution   Commonly known as:  DUONEB   USE ONE AMPULE IN NEBULIZER EVERY 6 HOURS AS NEEDED FOR SHORTNESS OF BREATH /DYSPNEA  OR  WHEEZING   Last time this was given:  3 mLs on 12/4/2017  7:14 PM                                DALIRESP 500 MCG Tabs tablet   Take 500 mcg by mouth daily   Last time this was given:  500 mcg on 12/5/2017  9:17 AM   Generic drug:  roflumilast                                IMODIUM A-D 2 MG tablet   Take 2 mg by mouth 4 times daily as needed. Take 2 tablet by oral route after 1st loose stool and 1 tablet after each subsequent bowel movement; do not exceed 16 mg in 24 hrs. As needed.   Generic drug:  loperamide                                PERFOROMIST 20 MCG/2ML neb solution   USE ONE VIAL IN NEBULIZER EVERY 12 HOURS   Generic drug:  formoterol                                predniSONE 10 MG tablet   Commonly known as:  DELTASONE   3 tabs PO daily x3, then 2 tabs PO daily x3, then 1 tabs PO daily x3, then stop   Last time this was given:  40 mg on 12/5/2017  9:17 AM                                ROBITUSSIN COUGH+CHEST EDVIN DM 5-100 MG/5ML Liqd   Take 20 mLs by  mouth every 4 hours as needed   Generic drug:  Dextromethorphan-Guaifenesin                                simvastatin 10 MG tablet   Commonly known as:  ZOCOR   TAKE ONE TABLET BY MOUTH AT BEDTIME   Last time this was given:  10 mg on 12/4/2017  8:15 PM                                sodium chloride 0.65 % nasal spray   Commonly known as:  OCEAN   Spray 1 spray into both nostrils every hour as needed for congestion   Last time this was given:  1 spray on 12/5/2017 10:01 AM                                tamsulosin 0.4 MG capsule   Commonly known as:  FLOMAX   Take 1 capsule (0.4 mg) by mouth daily   Last time this was given:  0.4 mg on 12/5/2017  9:16 AM                                warfarin 2.5 MG tablet   Commonly known as:  COUMADIN   Take 2.5mg Mon/Wed/Fri; 3.7mg all other days or as directed by warfarin clinic   Last time this was given:  5 mg on 12/4/2017  6:09 PM                                * Notice:  This list has 2 medication(s) that are the same as other medications prescribed for you. Read the directions carefully, and ask your doctor or other care provider to review them with you.              More Information        Taking Coumadin - CORE MEASURES  Coumadin (warfarin) helps keep your blood from clotting. It is used to reduce the risk for stroke, heart attack, or a blood clot passing to your lung. Coumadin also increases your risk of bleeding. Because of this, it must be taken exactly as directed by your doctor. You also need to protect yourself from injury.    Follow These Tips    Take this medicine at the same time each day. Take it with a full glass of water, with or without food. If you miss a dose, contact your doctor immediately to find out how much to take. Never take a double dose.    Warfarin is an effective drug, but it can be dangerous if not taken properly. It makes your blood less likely to form clots. If you take too much, it can cause too much internal or external bleeding.    Be  sure to tell all of your doctors that you take Coumadin. If you will be taking Coumadin for quite a while, carry an ID card or get a Medic-Alert bracelet. This will alert medical staff in case you aren t able to do so yourself. Also carry with you the name and number of the person to contact in case of an emergency.    You will need to have regular blood tests to measure the effects of the warfarin. Keep your scheduled test appointment and be sure to talk with your doctor afterward to find out your results. Your doctor may need to change your dose. Follow your doctor s advice exactly about how to take this medicine. Do not stop the medicine without talking with your doctor.  Monitoring Your PT/INR Blood Levels After Discharge  Two tests are used to find out how your blood is clotting. One is protime (PT) and the other is the international normalized ratio (INR).    Go for your blood (PT/INR) tests as often as directed. Note that diet and medication can affect your PT/INR level.    Your INR was between _____ and _____ .    Ask your doctor what your goal INR is. My goal INR is between _____ and _____.    My next PT/INR blood draw is due on _________________ (date) at ________________ (time) by ____________________ (name of doctor or clinic).    The name of the doctor who is monitoring my anticoagulation therapy is ______________________ and the phone number is ___________________.    Follow up with your doctor or as advised by his or her staff. It usually takes a few hours for your doctor to get the results of your clotting tests. Please call to get your lab results and find out if your doctor needs to make further changes to your Coumadin dose.    If your labs (PT/INR) are drawn at a location other than your doctor's office, please remember to tell your doctor as soon as you get your lab results.      What to Do at Home  1. Adjust your Coumadin dose as directed by your doctor, ____________________ (name of  doctor).  2. Have another clotting test done every _______ days at _____________________ (name of clinic) by ____________________ (name of doctor).  3. Do not go barefoot. Always wear shoes.  4. Do not trim corns or calluses yourself.  5. Always talk with your doctor before taking any herbs, vitamins, or prescription or over-the-counter (OTC) medications, especially aspirin and nonsteroidal anti-inflammatory drugs.  6. Always talk with your doctor before stopping any medication or changing the dose of any of your medications.  7. Use an electric razor instead of a manual one.  8. Use a soft-bristled toothbrush and waxed floss.  9. Avoid major changes in your diet.      When to Call Your Health Care Provider  Coumadin increases your risk of bleeding. Call your health care provider right away before you take your next dose of Coumadin if you have any of these problems:    Bleeding that doesn t stop in 10 minutes    A heavier-than-normal period or bleeding between periods    Coughing or throwing up blood or something that looks like coffee grounds    Nausea, bloating, diarrhea, or bleeding hemorrhoids    Bleeding hemorrhoids    Dark red or brown urine    Red or black tarry stools    Red or black-and-blue marks on the skin that get larger    A fever or an illness that gets worse    Dizziness, headache, weakness, or fatigue    Chest pain or trouble breathing    A serious fall or a blow to the head    Swelling or pain after an injury or at an injection site    Bleeding gums after brushing your teeth  Keep Your Diet Steady  Keep your diet pretty much the same each day. That s because many foods contain vitamin K. Vitamin K helps your blood clot. So eating foods that contain vitamin K can affect the way Coumadin works. You don t need to avoid foods that have vitamin K. But you do need to keep the amount of them you eat steady (about the same day to day). If you change your diet for any reason, such as due to illness or to  lose weight, be sure to tell your doctor.    Examples of foods high in vitamin K are asparagus, avocado, broccoli, cabbage, kale, spinach, and some other leafy green vegetables. Oils, such as soybean, canola, and olive oils, are also high in vitamin K.    Other food products can affect the way Coumadin works in your body:    Food products that may affect blood clotting include cranberries and cranberry juice, fish oil supplements, garlic, iesha, licorice, and turmeric.    Herbs used in herbal teas or supplements can also affect blood clotting. Keep the amount of herbal teas and supplements you use steady.    Alcohol can increase the effect of Coumadin in your body.  Talk with your health care provider if you have concerns about these or other food products and their effects on Coumadin.  What to Watch For  If you have any of these signs or reactions, call your doctor right away or go to the hospital.  Signs of too much bleeding:    More bruising than normal    Prolonged bleeding from cuts    Bleeding from the nose or gums    Blood in your urine, vomit, or stools (red or black color)    Coughing up blood    Unusually heavy menstrual bleeding    Sudden change to a dark or purplish color in your toes or any other area of your body  Allergic Reactions:    Rash    Itching    Swelling    Trouble swallowing or breathing  ---------- IMPORTANT ----------  Medical Conditions  Before starting this medicine, be sure your doctor knows if you have any of these conditions:    Stomach ulcer now or in the past    Vomited blood or had bloody stools (black or red color)    Aneurysm, pericarditis, or pericardial effusion    Blood disorder    Recent surgery, stroke, mini-stroke, or spinal puncture    Kidney or liver disease, uncontrolled high blood pressure, diabetes, vasculitis, congestive heart failure, lupus or other collagen-vascular disease, or high cholesterol    Pregnancy or breastfeeding    Younger than 18 years old    Recent  or planned dental procedure  Drug Interactions  Many medicines interfere with the effect of Coumadin. Before starting this medicine, be sure your doctor knows about any prescription, OTC, or herbal drugs you are taking. This is especially true if you are taking:    Antibiotics    Heart medicines    Cimetidine (Tagamet)    Aspirin or other anti-inflammatory drugs such as ibuprofen (Advil, Motrin, or Nuprin), naproxen (Aleve, Naprosyn, Anaprox), ketoprofen (Orudis, Oruvail), or other arthritis medicines    Drugs for depression, cancer, HIV (protease inhibitors), diabetes, seizures, gout, high cholesterol, or thyroid replacement    Vitamins containing Vitamin K or herbal products such as ginkgo, Q10, garlic, or [place]'s wort  [NOTE: This information topic may not include all directions, precautions, medical conditions, drug/food interactions, and warnings for this drug. Check with your doctor, nurse, or pharmacist for any questions that you may have.]          1746-2063 [employer]. [address] [place] [address]. All rights reserved. This information is not intended as a substitute for professional medical care. Always follow your healthcare professional's instructions.  This information has been modified by your health care provider with permission from the publisher.                Patient Education    Warfarin Sodium Oral tablet    Warfarin Sodium Solution for injection  Warfarin Sodium Oral tablet  What is this medicine?  WARFARIN (WAR far in) is an anticoagulant. It is used to treat or prevent clots in the veins, arteries, lungs, or heart.  This medicine may be used for other purposes; ask your health care provider or pharmacist if you have questions.  What should I tell my health care provider before I take this medicine?  They need to know if you have any of these conditions:    alcoholism    anemia    bleeding disorders    cancer    diabetes    heart disease    high blood  pressure    history of bleeding in the gastrointestinal tract    history of stroke or other brain injury or disease    kidney or liver disease    protein C deficiency    protein S deficiency    psychosis or dementia    recent injury, recent or planned surgery or procedure    an unusual or allergic reaction to warfarin, other medicines, foods, dyes, or preservatives    pregnant or trying to get pregnant    breast-feeding  How should I use this medicine?  Take this medicine by mouth with a glass of water. Follow the directions on the prescription label. You can take this medicine with or without food. Take your medicine at the same time each day. Do not take it more often than directed. Do not stop taking except on your doctor's advice. Stopping this medicine may increase your risk of a blood clot. Be sure to refill your prescription before you run out of medicine.  If your doctor or healthcare professional calls to change your dose, write down the dose and any other instructions. Always read the dose and instructions back to him or her to make sure you understand them. Tell your doctor or healthcare professional what strength of tablets you have on hand. Ask how many tablets you should take to equal your new dose. Write the date on the new instructions and keep them near your medicine. If you are told to stop taking your medicine until your next blood test, call your doctor or healthcare professional if you do not hear anything within 24 hours of the test to find out your new dose or when to restart your prior dose.  A special MedGuide will be given to you by the pharmacist with each prescription and refill. Be sure to read this information carefully each time.  Talk to your pediatrician regarding the use of this medicine in children. Special care may be needed.  Overdosage: If you think you have taken too much of this medicine contact a poison control center or emergency room at once.  NOTE: This medicine is only  for you. Do not share this medicine with others.  What if I miss a dose?  It is important not to miss a dose. If you miss a dose, call your healthcare provider. Take the dose as soon as possible on the same day. If it is almost time for your next dose, take only that dose. Do not take double or extra doses to make up for a missed dose.  What may interact with this medicine?  Do not take this medicine with any of the following medications:    agents that prevent or dissolve blood clots    aspirin or other salicylates    danshen    dextrothyroxine    mifepristone    George's Wort    red yeast rice  This medicine may also interact with the following medications:    acetaminophen    agents that lower cholesterol    alcohol    allopurinol    amiodarone    antibiotics or medicines for treating bacterial, fungal or viral infections    azathioprine    barbiturate medicines for inducing sleep or treating seizures    certain medicines for diabetes    certain medicines for heart rhythm problems    certain medicines for high blood pressure    chloral hydrate    cisapride    disulfiram    female hormones, including contraceptive or birth control pills    general anesthetics    herbal or dietary products like garlic, ginkgo, ginseng, green tea, or kava kava    influenza virus vaccine    male hormones    medicines for mental depression or psychosis    medicines for some types of cancer    medicines for stomach problems    methylphenidate    NSAIDs, medicines for pain and inflammation, like ibuprofen or naproxen    propoxyphene    quinidine, quinine    raloxifene    seizure or epilepsy medicine like carbamazepine, phenytoin, and valproic acid    steroids like cortisone and prednisone    tamoxifen    thyroid medicine    tramadol    vitamin c, vitamin e, and vitamin K    zafirlukast    zileuton  This list may not describe all possible interactions. Give your health care provider a list of all the medicines, herbs, non-prescription  drugs, or dietary supplements you use. Also tell them if you smoke, drink alcohol, or use illegal drugs. Some items may interact with your medicine.  What should I watch for while using this medicine?  Visit your doctor or health care professional for regular checks on your progress. You will need to have a blood test called a PT/INR regularly. The PT/INR blood test is done to make sure you are getting the right dose of this medicine. It is important to not miss your appointment for the blood tests. When you first start taking this medicine, these tests are done often. Once the correct dose is determined and you take your medicine properly, these tests can be done less often.  Notify your doctor or health care professional and seek emergency treatment if you develop breathing problems; changes in vision; chest pain; severe, sudden headache; pain, swelling, warmth in the leg; trouble speaking; sudden numbness or weakness of the face, arm or leg. These can be signs that your condition has gotten worse.  While you are taking this medicine, carry an identification card with your name, the name and dose of medicine(s) being used, and the name and phone number of your doctor or health care professional or person to contact in an emergency.  Do not start taking or stop taking any medicines or over-the-counter medicines except on the advice of your doctor or health care professional.  You should discuss your diet with your doctor or health care professional. Do not make major changes in your diet. Vitamin K can affect how well this medicine works. Many foods contain vitamin K. It is important to eat a consistent amount of foods with vitamin K. Other foods with vitamin K that you should eat in consistent amounts are asparagus, basil, beef or pork liver, black eyed peas, broccoli, brussel sprouts, cabbage, chickpeas, cucumber with peel, green onions, green tea, okra, parsley, peas, thyme, and green leafy vegetables like beet  greens, cesar greens, endive, kale, mustard greens, spinach, turnip greens, watercress, or certain lettuces like green leaf or elana.  This medicine can cause birth defects or bleeding in an unborn child. Women of childbearing age should use effective birth control while taking this medicine. If a woman becomes pregnant while taking this medicine, she should discuss the potential risks and her options with her health care professional.  Avoid sports and activities that might cause injury while you are using this medicine. Severe falls or injuries can cause unseen bleeding. Be careful when using sharp tools or knives. Consider using an electric razor. Take special care brushing or flossing your teeth. Report any injuries, bruising, or red spots on the skin to your doctor or health care professional.  If you have an illness that causes vomiting, diarrhea, or fever for more than a few days, contact your doctor. Also check with your doctor if you are unable to eat for several days. These problems can change the effect of this medicine.  Even after you stop taking this medicine, it takes several days before your body recovers its normal ability to clot blood. Ask your doctor or health care professional how long you need to be careful. If you are going to have surgery or dental work, tell your doctor or health care professional that you have been taking this medicine.  What side effects may I notice from receiving this medicine?  Side effects that you should report to your doctor or health care professional as soon as possible:    back pain    chills    dizziness    fever    heavy menstrual bleeding or vaginal bleeding    painful, blue, or purple toes    painful, prolonged erection    signs and symptoms of bleeding such as bloody or black, tarry stools; red or dark-brown urine; spitting up blood or brown material that looks like coffee grounds; red spots on the skin; unusual bruising or bleeding from the eye, gums, or  noseskin rash, itching or skin damage    stomach pain    unusually weak or tired    yellowing of skin or eyes  Side effects that usually do not require medical attention (report to your doctor or health care professional if they continue or are bothersome):    diarrhea    hair loss  This list may not describe all possible side effects. Call your doctor for medical advice about side effects. You may report side effects to FDA at 1-822-FRF-2437.  Where should I keep my medicine?  Keep out of the reach of children.  Store at room temperature between 15 and 30 degrees C (59 and 86 degrees F). Protect from light. Throw away any unused medicine after the expiration date. Do not flush down the toilet.  NOTE: This sheet is a summary. It may not cover all possible information. If you have questions about this medicine, talk to your doctor, pharmacist, or health care provider.  NOTE:This sheet is a summary. It may not cover all possible information. If you have questions about this medicine, talk to your doctor, pharmacist, or health care provider. Copyright  2016 [employer]                COPD Flare    You have had a flare-up of your COPD.  COPD, or chronic obstructive pulmonary disease, is a common lung disease. It causes your airways to become irritated and narrower. This makes it harder for you to breathe. Emphysema and chronic bronchitis are both types of COPD. This is a chronic condition, which means you always have it. Sometimes it gets worse. When this happens, it is called a flare-up.  Symptoms of COPD  People with COPD may have symptoms most of the time. In a flare-up, your symptoms get worse. These symptoms may mean you are having a flare-up:    Shortness of breath, shallow or rapid breathing, or wheezing that gets worse    Lung infection    Cough that gets worse    More mucus, thicker mucus or mucus of a different color    Tiredness, decreased energy, or trouble doing your usual activities    Fever    Chest  tightness    Your symptoms don t get better even when you use your usual medicines, inhalers, and nebulizer    Trouble talking    You feel confused  Causes of flare-ups  Unfortunately, a flare-up can happen even though you did everything right, and you followed your doctor s instructions. Some causes of flare-ups are:    Smoking or secondhand smoke    Colds, the flu, or respiratory infections    Air pollution    Sudden change in the weather    Dust, irritating chemicals, or strong fumes    Not taking your medicines as prescribed  Home care  Here are some things you can do at home to treat a flare-up:    Try not to panic. This makes it harder to breathe, and keeps you from doing the right things.    Don t smoke or be around others who are smoking.    Try to drink more fluids than usual during a flare-up, unless your doctor has told you not to because of heart and kidney problems. More fluids can help loosen the mucus.    Use your inhalers and nebulizer, if you have one, as you have been told to.    If you were given antibiotics, take them until they are used up or your doctor tells you to stop. It s important to finish the antibiotics, even though you feel better. This will make sure the infection has cleared.    If you were given prednisone or another steroid, finish it even if you feel better.  Preventing a flare-up  Even though flare-ups happen, the best way to treat one is to prevent it before it starts. Here are some pointers:    Don t smoke or be around others who are smoking.    Take your medicines as you have been told.    Talk with your doctor about getting a flu shot every year. Also find out if you need a pneumonia shot.    If there is a weather advisory warning to stay indoors, try to stay inside when possible.    Try to eat healthy and get plenty of sleep.    Try to avoid things that usually set you off, like dust, chemical fumes, hairsprays, or strong perfumes.  Follow-up care  Follow up with your  healthcare provider, or as advised.  If a culture was done, you will be told if your treatment needs to be changed. You can call as directed for the results.  If X-rays were done, you will be notified of any new findings that may affect your care.  Call 911  Call 911 if any of these occur:    You have trouble breathing    You feel confused or it s difficult to wake you up    You faint or lose consciousness    You have a rapid heart rate    You have new pain in your chest, arm, shoulder, neck or upper back  When to seek medical advice  Call your healthcare provider right away if any of these occur:    Wheezing or shortness of breath gets worse    You need to use your inhalers more often than usual without relief    Fever of 100.4 F (38 C) or higher, or as directed by your healthcare provider    Coughing up lots of dark-colored or bloody mucus (sputum)    Chest pain with each breath    You do not start to get better within 24 hours    Swelling of your ankles gets worse    Dizziness or weakness  Date Last Reviewed: 9/1/2016 2000-2017 [employer]. [address] [place] [address]. All rights reserved. This information is not intended as a substitute for professional medical care. Always follow your healthcare professional's instructions.                Discharge Instructions: COPD  You have been diagnosed with chronic obstructive pulmonary disease (COPD). This is a name given to a group of diseases that limit the flow of air in and out of your lungs. This makes it harder to breathe. With COPD, you are also more likely to get lung infections. COPD includes chronic bronchitis and emphysema. COPD is most often caused by heavy, long-term cigarette smoking.  Home care  Quit smoking    If you smoke, quit. It is the best thing you can do for your COPD and your overall health.    Join a stop-smoking program. There are even telephone, text message, and Internet programs to help you quit.    Ask  your healthcare provider about medicines or other methods to help you quit.    Ask family members to quit smoking as well.    Don't allow people to smoke in your home, in your car, or when they are around you.  Protect yourself from infection    Wash your hands often. Do your best to keep your hands away from your face. Most germs are spread from your hands to your mouth.    Get a flu shot every year. Also ask your provider about pneumonia vaccines.    Avoid crowds. It's especially important to do this in the winter when more people have colds and flu.    To stay healthy, get enough sleep, exercise regularly, and eat a balanced diet. You should:    Get about 8 hours of sleep every night.    Try to exercise for at least 30 minutes on most days.    Have healthy foods including fruits and vegetables, 100% whole grains, lean meats and fish, and low-fat dairy products. Try to stay away from foods high in fats and sugar.  Take your medicines  Take your medicines exactly as directed. Don't skip doses.  Manage your stress  Stress can make COPD worse. Use this stress management technique:    Find a quiet place and sit or lie in a comfortable position.    Close your eyes and perform breathing exercises for several minutes. Ask your provider about the best way to breathe.  Pulmonary rehabilitation    Pulmonary rehab can help you feel better. These programs include exercise, breathing techniques, information about COPD, counseling, and help for smokers.    Ask your provider or your local hospital about programs in your area.  When to call your healthcare provider  Call your provider immediately if you have any of the following:    Shortness of breath, wheezing, or coughing    Increased mucus    Yellow, green, bloody, or smelly mucus    Fever or chills    Tightness in your chest that does not go away with rest or medicine    An irregular heartbeat or a feeling that your heart is beating very fast    Swollen ankles   Date Last  Reviewed: 5/1/2016 2000-2017 [employer]. [address] [place] [address], [place] [address]. All rights reserved. This information is not intended as a substitute for professional medical care. Always follow your healthcare professional's instructions.                What is COPD?  COPD stands for chronic obstructive pulmonary disease. It means the airways in your lungs are blocked (obstructed). Because of this, it is hard to breathe. You may have trouble with daily activities because of shortness of breath. Over time the shortness of breath usually worsens making it more and more difficult to take care of yourself and take part in activities. Chronic bronchitis and emphysema are two common types of COPD.  What happens in chronic bronchitis?    The cells in the airways make more mucus than normal. The mucus builds up, narrowing the airways. This means less air travels into and out of the lungs. The lining of the airways may also become inflamed (swollen) and causes the airways to narrow even more.        What happens in emphysema?    The small airways are damaged and lose their stretchiness. The airways collapse when you exhale, causing air to get trapped in the air sacs. This means that less oxygen enters the blood vessels and less oxygen is delivered to all of the cells of your body. This makes it hard to breathe.     Damage to cilia    Cilia are small hairs that line and protect the airways. Smoking damages the cilia. Damaged cilia can t sweep mucus and particles away. Some of the cilia are destroyed. This damage worsens COPD.  How did I get COPD?  Most people get COPD from smoking. Cigarette smoke damages lungs, which can develop into COPD over many years.  How COPD affects you  COPD makes you work harder to breathe. Air may get trapped in the lungs, which prevents your lungs from filling completely with fresh oxygen-filled air when you inhale (breathe in). It's harder to take deep breaths especially  when you are active and start breathing faster. Over time, your lungs may become enlarged filling the lung with air that does not transfer oxygen into the blood. These problems cause you to have shortness of breath (also called dyspnea). Wheezing (hoarse, whistling breathing), chronic cough, and fatigue (feeling tired and worn out) are also common.   Date Last Reviewed: 5/1/2016 2000-2017 [employer] [address] [place] [address], [place] [address]. All rights reserved. This information is not intended as a substitute for professional medical care. Always follow your healthcare professional's instructions.

## 2017-12-01 NOTE — H&P
[facility]    History and Physical  Hospitalist       Date of Admission:  12/1/2017    Assessment & Plan   Rm Duarte is a pleasant 76 year old male who presents with worsening shortness of breath for the last 4 days.    Principal problem:  1. Acute COPD exacerbation  Patient admitted to the hospital on a telemetry floor. He is on 2 L of oxygen at baseline and we will continue him on same and titrated up  If required to maintain his oxygen saturation between 88 and 92% at least. Patient is not able to cough at this time however we'll provide him with Robitussin and order sputum cultures. Blood cultures have already been drawn in the emergency room. I'm going to empirically treat him with Rocephin and doxycycline and this point in time and order a serum procalcitonin. We will resume patient's DuoNeb and home COPD medications. If patient's clinical condition were to deteriorate then we will transfer him to ICU to initiate noninvasive positive pressure ventilation. Patient's leukocytosis could be secondary to him being on prednisone. Patient treated with intravenous steroids for now. I'm going to check serial troponins to rule out acute coronary syndrome.Recommend outpatient follow-up with his pulmonologist.    Active Problems:  #2 chronic atrial fibrillation  Will resume patient's warfarin. I'm going to ask pharmacy to dose it for us for now. Patient heart rate is under good control. Continue to monitor.    #3 peripheral vascular disease  Stable. Continue home medications. Continue to monitor.    #4 frequent falls  I'm going to ask PT and OT to evaluate patient for his balance and stability. Patient has multiple bruises on his body.    DVT Prophylaxis: Warfarin  Code Status: DNR / DNI    Disposition: Expected discharge in 2-3 days once his shortness of breath is improved and urine/blood cultures have resulted.    Daxa Chavez MD, FACP.    Primary Care Physician   Dominguez Maradiaga    Chief Complaint  "  Shortness of breath for the last 4 days    History is obtained from patient, ER records, past medical records.    History of Present Illness   Mr Rm Duarte is a pleasant 76 year old  man who presents with above mentioned complaints.    As per ER records:  Rm Duarte is a 76 year old male with Hx COPD, chronic afib on coumadin therapy who presents with worsening shortness of breath despite starting prednisone for COPD flare up. He denies fevers, temp 99.4 in triage. Cough intensified today and it \"feels like I can't cough anything up\". He denies chest pain. No pedal edema. He adds he had a few falls last week prior to starting his prednisone. He denies syncope in relation to falls. Denies loss of consciousness. No vomiting.      On my interviewing, patient added that he started himself on steroids. He is on 2 L/m of oxygen at baseline. He did not discuss starting steroids with his primary care physician or pulmonologist. Patient had some prednisone left from previous exacerbations are decided to start with. He took prednisone for 3 days and was doing well however as his condition got worse so he decided to come to the emergency room today. He has seen his pulmonologist in June 2017 when a new medication most likely Daliresp was added.Patient says that he is  Soon to come off of that drug. It is not helping him. Patient has not been smoking. He denies any relation to season or cold weather in terms of his COPD exacerbation. Patient is not physically active at baseline. He denies any subjective fever or cough or phlegm production, but has noticed worsening shortness of breath. Patient denies any prior history of myocardial infarction. Patient fell 5 days ago however denies that he has recurrent history of falls.    ER course:  Patient was given a dose of IV steroids,, blood cultures were taken and was given fluid boluses and sent up to the hospital for further care.    Past Medical History    I have " reviewed this patient's medical history and updated it with pertinent information if needed.   Past Medical History:   Diagnosis Date     Atrial fibrillation (H) 3/23/2012     COPD 2011     PVD (peripheral vascular disease) (H)        Past Surgical History   I have reviewed this patient's surgical history and updated it with pertinent information if needed.  Past Surgical History:   Procedure Laterality Date     COPD:FEV1-0.74/FVC-3.35 22%, DLCO-28%  3/1/2010    Severe COPD; 2008 PFT's done : 0.96/3.81 25% Fev1, DLCO 45%.  1991 were 4.31/6.20!!!!!!!??     ECHO: TDS, EF~50%, abnl septum, o/w NORMAL  2011     PAD: CTA> occlusion of L mid SFA with reconstitution  2010     S/P colonoscopy: tubular adenoma & tics  2010    Repeat in ; Dr Graham     Tobacco Abuse Ongoing         Prior to Admission Medications   Prior to Admission Medications   Prescriptions Last Dose Informant Patient Reported? Taking?   COMBIVENT RESPIMAT  MCG/ACT inhaler 2017 at Unknown time  No Yes   Sig: INHALE ONE PUFF BY MOUTH 4 TIMES DAILY *MAX 6 DOSES PER DAY*   Multiple Vitamins-Minerals (CENTRUM SILVER) per tablet 2017 at Unknown time  Yes Yes   Sig: Take 1 tablet by mouth daily.   PERFOROMIST 20 MCG/2ML neb solution 2017 at Unknown time  No Yes   Sig: USE ONE VIAL IN NEBULIZER EVERY 12 HOURS   aspirin (ASPIRIN LOW DOSE) 81 MG tablet 2017 at Unknown time  Yes Yes   Sig: Take 1 tablet by mouth daily.   budesonide POWD 2017 at Unknown time  Yes Yes   Si puff 2 times daily Inhales 0.25mcg BID   cilostazol (PLETAL) 100 MG tablet 2017 at Unknown time  No Yes   Sig: TAKE ONE TABLET BY MOUTH TWICE DAILY   ipratropium - albuterol 0.5 mg/2.5 mg/3 mL (DUONEB) 0.5-2.5 (3) MG/3ML neb solution 2017 at Unknown time  No Yes   Sig: USE ONE AMPULE IN NEBULIZER EVERY 6 HOURS AS NEEDED FOR SHORTNESS OF BREATH /DYSPNEA  OR  WHEEZING   loperamide (IMODIUM A-D) 2 MG tablet Unknown at Unknown time   Yes No   Sig: Take 2 mg by mouth 4 times daily as needed. Take 2 tablet by oral route after 1st loose stool and 1 tablet after each subsequent bowel movement; do not exceed 16 mg in 24 hrs. As needed.   predniSONE (DELTASONE) 20 MG tablet 2017 at Unknown time  Yes Yes   Sig: Take by mouth daily 40mgx4 d, 30mgx 4d, 20mg x 4 d, 10mg x 4 d   roflumilast (DALIRESP) 500 MCG TABS tablet 2017 at Unknown time  Yes Yes   Sig: Take 500 mcg by mouth daily   simvastatin (ZOCOR) 10 MG tablet 2017 at Unknown time  No Yes   Sig: TAKE ONE TABLET BY MOUTH AT BEDTIME   tamsulosin (FLOMAX) 0.4 MG 24 hr capsule   No No   Sig: Take 1 capsule (0.4 mg) by mouth daily   warfarin (COUMADIN) 2.5 MG tablet 2017 at Unknown time  Yes Yes   Sig: Take 2.5mg Mon/Wed/Fri; 3.7mg all other days or as directed by warfarin clinic   warfarin (COUMADIN) 5 MG tablet Unknown at Unknown time  Yes No   Sig: Take as directed by [facility]      Facility-Administered Medications: None     Allergies   No Known Allergies    Social History   I have reviewed this patient's social history and updated it with pertinent information if needed. Rm GARCIA Gerald  reports that he has quit smoking. His smoking use included Cigarettes. He has never used smokeless tobacco. He reports that he drinks alcohol.    Family History   I have reviewed this patient's family history and updated it with pertinent information if needed.   Family History   Problem Relation Age of Onset     Other - See Comments Mother      AAA, cause of death     Other - See Comments Other 69     AAA, family hx     Chronic Obstructive Pulmonary Disease Brother       MI age 60's     Family History Negative Sister        Review of Systems   A complete 14 point review of systems was done and was found to be negative other than what is stated in HPI.    Physical Exam   Temp: 98.6  F (37  C) Temp src: Tympanic BP: 114/71 Pulse: 74 Heart Rate: 74 Resp: 20 SpO2: 95 % O2 Device:  Nasal cannula Oxygen Delivery: 2 LPM  Vital Signs with Ranges  Temp:  [98.6  F (37  C)-99.9  F (37.7  C)] 98.6  F (37  C)  Pulse:  [74-89] 74  Heart Rate:  [] 74  Resp:  [10-24] 20  BP: ()/(66-93) 114/71  SpO2:  [93 %-95 %] 95 %  0 lbs 0 oz    General: Patient is sitting in bed in no acute distress.He is wearing a nasal cannula.     HEAD: Normocephalic, atraumatic.Patient is mildly diaphoretic.     EYES: EOMI, PERRLA. No scleral icterus.     ENT: Oral cavity: Moist, no ulcers.     NECK: Supple, no carotid bruits or elevated JVP. Thyroid not palpable. No cervical     lymphadenopathy.     LUNGS: Diminished air movement appreciated.     CARDIOVASCULAR EXAM: S1, S2 heard normal. No murmurs, gallops or rubs. All peripheral     pulses palpable and symmetric.     ABDOMEN: Soft, non-distended, non-tender. No peritoneal signs. No organomegaly. No shifting     dullness or ascites. Bowel sounds are normal.     EXTREMITIES: No cyanosis, clubbing or edema.     SKIN: No rash, decubitus ulcers.     NEUROLOGIC EXAM: Alert and oriented x 3. No focal neurologic deficits. Cranial nerves II-XII     grossly intact. Strength 5/5 in flexors and extensors of upper and lower extremities. Sensation     symmetric and equal in all limbs and on trunk. Reflexes 2+ in elbows, knees and ankles.     LYMPHATIC EXAM: No axillary, cervical or inguinal lymphadenopathy.     MUSCULOSKELETAL: No joint effusions.     MENTAL: Mood appropriate. Not depressed, suicidal or homicidal.    Data   Data reviewed today:  I personally reviewed the chest x-ray image(s) showing chronic interstitial changes..    Recent Labs  Lab 12/01/17  1150 12/01/17  1102 11/29/17  0948   WBC 12.2*  --   --    HGB 14.0  --   --    MCV 96  --   --      --   --    INR  --  2.1* 5.1*     --   --    POTASSIUM 4.7  --   --    CHLORIDE 102  --   --    CO2 30  --   --    BUN 16  --   --    CR 0.93  --   --    ANIONGAP 4  --   --    KENDRICK 8.7  --   --    *  --    --    ALBUMIN 2.8*  --   --    PROTTOTAL 7.5  --   --    BILITOTAL 0.5  --   --    ALKPHOS 73  --   --    ALT 34  --   --    AST 19  --   --    TROPI <0.015  --   --        Recent Results (from the past 24 hour(s))   XR Chest 2 Views    Narrative    PROCEDURE: XR CHEST 2 VW 12/1/2017 12:46 PM    HISTORY: Fever;     COMPARISONS: 6/20/2017.    TECHNIQUE: 2 views.    FINDINGS: Heart is stable in size. There is bilateral interstitial  change, similar to the prior exam. Lungs are hyperinflated with  flattening of the diaphragm. Degenerative change is seen within the  spine.         Impression    IMPRESSION: Chronic appearing interstitial change.    MARCELLUS HARTMAN MD

## 2017-12-01 NOTE — IP AVS SNAPSHOT
[facility]    [address] [address]    Phone:  [phone]    Fax:  [phone]                                       After Visit Summary   12/1/2017    Rm Duarte    MRN: [id number]           After Visit Summary Signature Page     I have received my discharge instructions, and my questions have been answered. I have discussed any challenges I see with this plan with the nurse or doctor.    ..........................................................................................................................................  Patient/Patient Representative Signature      ..........................................................................................................................................  Patient Representative Print Name and Relationship to Patient    ..................................................               ................................................  Date                                            Time    ..........................................................................................................................................  Reviewed by Signature/Title    ...................................................              ..............................................  Date                                                            Time

## 2017-12-01 NOTE — MR AVS SNAPSHOT
Rm Duarte   12/1/2017   Anticoagulation Therapy Visit    Description:  76 year old male   Provider:  Dominguez Maradiaga MD   Department:  [facility] Anti Coagulation           INR as of 12/1/2017     Today's INR 2.1      Anticoagulation Summary as of 12/1/2017     INR goal 2.0-3.0   Today's INR 2.1   Full instructions 12/2: 2.5 mg; 12/3: 2.5 mg; 12/5: 2.5 mg; Otherwise 2.5 mg on Mon, Wed, Fri; 3.75 mg all other days   Next INR check 12/7/2017    Indications   Chronic atrial fibrillation (H) [I48.2]  PVD (peripheral vascular disease) (H) [I73.9]  Long-term (current) use of anticoagulants [Z79.01] [Z79.01]         Your next Anticoagulation Clinic appointment(s)     Dec 13, 2017  9:00 AM CST   Anticoagulation Visit with NA ANTI COAGULATION   [facility] ([facility] )    [address]  [facility] [address]   [phone]              December 2017 Details    Sun Mon Tue Wed Thu Fri Sat          1      2.5 mg   See details      2      2.5 mg           3      2.5 mg         4      2.5 mg         5      2.5 mg         6      2.5 mg         7            8               9                 10               11               12               13               14               15               16                 17               18               19               20               21               22               23                 24               25               26               27               28               29               30                 31                      Date Details   12/01 This INR check       Date of next INR:  12/7/2017         How to take your warfarin dose     To take:  2.5 mg Take 1 of the 2.5 mg tablets.    To take:  3.75 mg Take 1.5 of the 2.5 mg tablets.

## 2017-12-01 NOTE — PROGRESS NOTES
Checked in with mR.  He is an independent 76 year old male, who lives at home alone.  He is independent with medications, driving, cleaning, cooking and yard work.  He lives in a multilevel home, identifies as this as a safe environment.  He has been taking his laundry to the laundromat as it has been too difficult to bring to the basement.  He has also struggled with getting into his shower and has been sponge bathing, denies interest in assistance.  He identifies his sister as his support system.  He enjoys going to the [employer].  He sees Dr. Maradiaga for primary care and was last seen about a year ago.  He is chronically on oxygen and is obtained through [facility].  He denies questions or concerns.

## 2017-12-01 NOTE — LETTER
Transition Communication Hand-off for Care Transitions to Next Level of Care Provider    Name: Rm Duarte  MRN #: [id number]  Primary Care Provider: Dominguez Maradiaga  Primary Care MD Name: Dr. Maradiaga  Primary Clinic: [facility] [address] [facility] [address]  [facility] [address]  Primary Care Clinic Name: [facility]  Reason for Hospitalization:  Falls frequently [R29.6]  COPD exacerbation (H) [J44.1]  Elevated brain natriuretic peptide (BNP) level [R79.89]  Admit Date/Time: 12/1/2017 11:40 AM  Discharge Date: Unknown at this time  Payor Source: Payor: [employer] / Plan: [employer] / Product Type: HMO /     Readmission Assessment Measure (ZINA) Risk Score/category: Average    Plan of Care Goals/Milestone Events:   Patient Concerns: Patient would like to prevent readmission to the hospital    Patient Goals: Prevent readmission, remain at home with Palliative Care (through [facility])   Short-term Improve respiratory status    Long-term Prevent readmissions to hospital, be able to continue to live at home alone   Medical Goals   Short-term Improve respiratory status   Long-term Prevent COPD Exacerbations         Reason for Communication Hand-off Referral: Admission diagnoses: COPD    Discharge Plan:       Concern for non-adherence with plan of care:   Y/N Y- patient was experiencing respiratory problems and did not seek care for days   Discharge Needs Assessment:  Needs       Most Recent Value    Equipment Currently Used at Home raised toilet    Transportation Available family or friend will provide    Other Resources Other (see comment) [Possible home care, depending on course here]          Already enrolled in Tele-monitoring program and name of program:  No  Follow-up specialty is recommended: Yes- follows with Dr. Zhu for COPD    Follow-up plan:  Future Appointments  Date Time Provider Department Center   12/13/2017 9:00 AM NA ANTI COAGULATION [facility]       Any outstanding  tests or procedures:              Key Recommendations:  Patient is interested in having more eyes on him as he currently lives at home alone and has a limited support system. Having this extra support may help prevent future hospitalizations.     Janet Valdez    AVS/Discharge Summary is the source of truth; this is a helpful guide for improved communication of patient story

## 2017-12-02 ENCOUNTER — APPOINTMENT (OUTPATIENT)
Dept: PHYSICAL THERAPY | Facility: HOSPITAL | Age: 77
DRG: 190 | End: 2017-12-02
Attending: INTERNAL MEDICINE
Payer: COMMERCIAL

## 2017-12-02 ENCOUNTER — APPOINTMENT (OUTPATIENT)
Dept: OCCUPATIONAL THERAPY | Facility: HOSPITAL | Age: 77
DRG: 190 | End: 2017-12-02
Attending: INTERNAL MEDICINE
Payer: COMMERCIAL

## 2017-12-02 LAB
ANION GAP SERPL CALCULATED.3IONS-SCNC: 9 MMOL/L (ref 3–14)
BASOPHILS # BLD AUTO: 0 10E9/L (ref 0–0.2)
BASOPHILS NFR BLD AUTO: 0.2 %
BUN SERPL-MCNC: 17 MG/DL (ref 7–30)
CALCIUM SERPL-MCNC: 8.7 MG/DL (ref 8.5–10.1)
CHLORIDE SERPL-SCNC: 103 MMOL/L (ref 94–109)
CO2 SERPL-SCNC: 28 MMOL/L (ref 20–32)
CREAT SERPL-MCNC: 0.92 MG/DL (ref 0.66–1.25)
DIFFERENTIAL METHOD BLD: ABNORMAL
EOSINOPHIL # BLD AUTO: 0 10E9/L (ref 0–0.7)
EOSINOPHIL NFR BLD AUTO: 0 %
ERYTHROCYTE [DISTWIDTH] IN BLOOD BY AUTOMATED COUNT: 14.1 % (ref 10–15)
GFR SERPL CREATININE-BSD FRML MDRD: 80 ML/MIN/1.7M2
GLUCOSE SERPL-MCNC: 122 MG/DL (ref 70–99)
HCT VFR BLD AUTO: 38.8 % (ref 40–53)
HGB BLD-MCNC: 12.4 G/DL (ref 13.3–17.7)
IMM GRANULOCYTES # BLD: 0 10E9/L (ref 0–0.4)
IMM GRANULOCYTES NFR BLD: 0.6 %
INR PPP: 1.53 (ref 0.8–1.2)
LYMPHOCYTES # BLD AUTO: 0.3 10E9/L (ref 0.8–5.3)
LYMPHOCYTES NFR BLD AUTO: 4 %
MCH RBC QN AUTO: 30.6 PG (ref 26.5–33)
MCHC RBC AUTO-ENTMCNC: 32 G/DL (ref 31.5–36.5)
MCV RBC AUTO: 96 FL (ref 78–100)
MONOCYTES # BLD AUTO: 0.1 10E9/L (ref 0–1.3)
MONOCYTES NFR BLD AUTO: 1.5 %
NEUTROPHILS # BLD AUTO: 5.8 10E9/L (ref 1.6–8.3)
NEUTROPHILS NFR BLD AUTO: 93.7 %
NRBC # BLD AUTO: 0 10*3/UL
NRBC BLD AUTO-RTO: 0 /100
PLATELET # BLD AUTO: 307 10E9/L (ref 150–450)
POTASSIUM SERPL-SCNC: 4.4 MMOL/L (ref 3.4–5.3)
RBC # BLD AUTO: 4.05 10E12/L (ref 4.4–5.9)
SODIUM SERPL-SCNC: 140 MMOL/L (ref 133–144)
TROPONIN I SERPL-MCNC: <0.015 UG/L (ref 0–0.04)
TROPONIN I SERPL-MCNC: <0.015 UG/L (ref 0–0.04)
WBC # BLD AUTO: 6.2 10E9/L (ref 4–11)

## 2017-12-02 PROCEDURE — 80048 BASIC METABOLIC PNL TOTAL CA: CPT | Performed by: INTERNAL MEDICINE

## 2017-12-02 PROCEDURE — 25000128 H RX IP 250 OP 636: Performed by: INTERNAL MEDICINE

## 2017-12-02 PROCEDURE — 12000000 ZZH R&B MED SURG/OB

## 2017-12-02 PROCEDURE — 94640 AIRWAY INHALATION TREATMENT: CPT | Mod: 76

## 2017-12-02 PROCEDURE — 40000275 ZZH STATISTIC RCP TIME EA 10 MIN

## 2017-12-02 PROCEDURE — 40000193 ZZH STATISTIC PT WARD VISIT

## 2017-12-02 PROCEDURE — 97165 OT EVAL LOW COMPLEX 30 MIN: CPT | Mod: GO

## 2017-12-02 PROCEDURE — 85610 PROTHROMBIN TIME: CPT | Performed by: INTERNAL MEDICINE

## 2017-12-02 PROCEDURE — 85025 COMPLETE CBC W/AUTO DIFF WBC: CPT | Performed by: INTERNAL MEDICINE

## 2017-12-02 PROCEDURE — 99232 SBSQ HOSP IP/OBS MODERATE 35: CPT | Performed by: INTERNAL MEDICINE

## 2017-12-02 PROCEDURE — 25000125 ZZHC RX 250: Performed by: INTERNAL MEDICINE

## 2017-12-02 PROCEDURE — 97161 PT EVAL LOW COMPLEX 20 MIN: CPT | Mod: GP

## 2017-12-02 PROCEDURE — 25000132 ZZH RX MED GY IP 250 OP 250 PS 637: Performed by: INTERNAL MEDICINE

## 2017-12-02 PROCEDURE — 84484 ASSAY OF TROPONIN QUANT: CPT | Performed by: INTERNAL MEDICINE

## 2017-12-02 PROCEDURE — 36415 COLL VENOUS BLD VENIPUNCTURE: CPT | Performed by: INTERNAL MEDICINE

## 2017-12-02 PROCEDURE — 40000133 ZZH STATISTIC OT WARD VISIT

## 2017-12-02 PROCEDURE — 94640 AIRWAY INHALATION TREATMENT: CPT

## 2017-12-02 RX ORDER — PREDNISONE 20 MG/1
40 TABLET ORAL DAILY
Status: DISCONTINUED | OUTPATIENT
Start: 2017-12-03 | End: 2017-12-05 | Stop reason: HOSPADM

## 2017-12-02 RX ORDER — PREDNISONE 20 MG/1
20 TABLET ORAL ONCE
Status: COMPLETED | OUTPATIENT
Start: 2017-12-02 | End: 2017-12-02

## 2017-12-02 RX ORDER — WARFARIN SODIUM 4 MG/1
4 TABLET ORAL
Status: COMPLETED | OUTPATIENT
Start: 2017-12-02 | End: 2017-12-02

## 2017-12-02 RX ORDER — IPRATROPIUM BROMIDE AND ALBUTEROL SULFATE 2.5; .5 MG/3ML; MG/3ML
3 SOLUTION RESPIRATORY (INHALATION) EVERY 4 HOURS PRN
Status: DISCONTINUED | OUTPATIENT
Start: 2017-12-02 | End: 2017-12-05 | Stop reason: HOSPADM

## 2017-12-02 RX ORDER — SODIUM CHLORIDE, SODIUM LACTATE, POTASSIUM CHLORIDE, CALCIUM CHLORIDE 600; 310; 30; 20 MG/100ML; MG/100ML; MG/100ML; MG/100ML
INJECTION, SOLUTION INTRAVENOUS CONTINUOUS
Status: DISCONTINUED | OUTPATIENT
Start: 2017-12-02 | End: 2017-12-04

## 2017-12-02 RX ADMIN — IPRATROPIUM BROMIDE AND ALBUTEROL SULFATE 3 ML: .5; 3 SOLUTION RESPIRATORY (INHALATION) at 03:42

## 2017-12-02 RX ADMIN — DOXYCYCLINE HYCLATE 100 MG: 100 TABLET, FILM COATED ORAL at 20:52

## 2017-12-02 RX ADMIN — TAMSULOSIN HYDROCHLORIDE 0.4 MG: 0.4 CAPSULE ORAL at 08:54

## 2017-12-02 RX ADMIN — BUDESONIDE 0.25 MG: 0.25 INHALANT RESPIRATORY (INHALATION) at 19:38

## 2017-12-02 RX ADMIN — METHYLPREDNISOLONE SODIUM SUCCINATE 40 MG: 40 INJECTION, POWDER, FOR SOLUTION INTRAMUSCULAR; INTRAVENOUS at 08:00

## 2017-12-02 RX ADMIN — SODIUM CHLORIDE, POTASSIUM CHLORIDE, SODIUM LACTATE AND CALCIUM CHLORIDE: 600; 310; 30; 20 INJECTION, SOLUTION INTRAVENOUS at 16:17

## 2017-12-02 RX ADMIN — GUAIFENESIN 5 ML: 100 SOLUTION ORAL at 20:53

## 2017-12-02 RX ADMIN — IPRATROPIUM BROMIDE AND ALBUTEROL SULFATE 3 ML: .5; 3 SOLUTION RESPIRATORY (INHALATION) at 11:17

## 2017-12-02 RX ADMIN — METHYLPREDNISOLONE SODIUM SUCCINATE 40 MG: 40 INJECTION, POWDER, FOR SOLUTION INTRAMUSCULAR; INTRAVENOUS at 00:46

## 2017-12-02 RX ADMIN — IPRATROPIUM BROMIDE AND ALBUTEROL SULFATE 3 ML: .5; 3 SOLUTION RESPIRATORY (INHALATION) at 07:47

## 2017-12-02 RX ADMIN — SIMVASTATIN 10 MG: 10 TABLET, FILM COATED ORAL at 20:54

## 2017-12-02 RX ADMIN — WARFARIN SODIUM 4 MG: 4 TABLET ORAL at 17:44

## 2017-12-02 RX ADMIN — SODIUM CHLORIDE: 9 INJECTION, SOLUTION INTRAVENOUS at 05:53

## 2017-12-02 RX ADMIN — CILOSTAZOL 100 MG: 100 TABLET ORAL at 20:53

## 2017-12-02 RX ADMIN — ROFLUMILAST 500 MCG: 500 TABLET ORAL at 08:54

## 2017-12-02 RX ADMIN — PREDNISONE 20 MG: 20 TABLET ORAL at 17:45

## 2017-12-02 RX ADMIN — BUDESONIDE 0.25 MG: 0.25 INHALANT RESPIRATORY (INHALATION) at 07:48

## 2017-12-02 RX ADMIN — SODIUM CHLORIDE, POTASSIUM CHLORIDE, SODIUM LACTATE AND CALCIUM CHLORIDE: 600; 310; 30; 20 INJECTION, SOLUTION INTRAVENOUS at 22:58

## 2017-12-02 RX ADMIN — ARFORMOTEROL TARTRATE 15 MCG: 15 SOLUTION RESPIRATORY (INHALATION) at 19:39

## 2017-12-02 RX ADMIN — ASPIRIN 81 MG 81 MG: 81 TABLET ORAL at 08:54

## 2017-12-02 RX ADMIN — ARFORMOTEROL TARTRATE 15 MCG: 15 SOLUTION RESPIRATORY (INHALATION) at 07:55

## 2017-12-02 RX ADMIN — DOXYCYCLINE HYCLATE 100 MG: 100 TABLET, FILM COATED ORAL at 06:47

## 2017-12-02 RX ADMIN — IPRATROPIUM BROMIDE AND ALBUTEROL SULFATE 3 ML: .5; 3 SOLUTION RESPIRATORY (INHALATION) at 15:24

## 2017-12-02 RX ADMIN — CILOSTAZOL 100 MG: 100 TABLET ORAL at 08:53

## 2017-12-02 ASSESSMENT — ACTIVITIES OF DAILY LIVING (ADL): PREVIOUS_RESPONSIBILITIES: MEAL PREP;HOUSEKEEPING;LAUNDRY;SHOPPING;YARDWORK;MEDICATION MANAGEMENT;FINANCES;DRIVING

## 2017-12-02 NOTE — PLAN OF CARE
Problem: Patient Care Overview  Goal: Plan of Care/Patient Progress Review  Outcome: No Change   12/01/17 2252   OTHER   Plan Of Care Reviewed With patient   Plan of Care Review   Progress no change     Reason for hospital stay:  COPD Exac    Most recent vitals: /71  Pulse 74  Temp 98.6  F (37  C) (Tympanic)  Resp 20  SpO2 95%    Pain Management:  Denies any pain    Orientation:  A+O x4    Cardiac:  Tele on and reading A-Fib rate 90's with rare PVCs per ICU nurse    Respiratory:  Dyspnea with minimal exertion, lungs diminished throughout, sats 95% on 2LPM NC, occasional non-productive loose cough    GI:  BS present x4 - good appetite ate 100% supper. Had soft BM x1 this shift. Passing flatus    :  Voiding without difficulty    Skin Issues:  Purple scattered bruising to sacrum, left elbow, left forehead above left eye, and left back / flank. Patient states from previous fall at home.    IVF:  NS infusing at 100mL / hr    ABX:  Continues on PO vibramycin and IV rocephin    Mobility:  Up with standby assist with steady gait.    Safety:  Fall alarms on due to previous falls at home    12/1/2017  10:52 PM  Endy Ramsey        Problem: Chronic Obstructive Pulmonary Disease (Adult)  Goal: Signs and Symptoms of Listed Potential Problems Will be Absent, Minimized or Managed (Chronic Obstructive Pulmonary Disease)  Signs and symptoms of listed potential problems will be absent, minimized or managed by discharge/transition of care (reference Chronic Obstructive Pulmonary Disease (Adult) CPG).   Outcome: No Change   12/01/17 2252   Chronic Obstructive Pulmonary Disease   Problems Assessed (Chronic Obstructive Pulmonary Disease (COPD)) all   Problems Present (COPD, Bronch/Emphy) respiratory compromise

## 2017-12-02 NOTE — PHARMACY
[facility]    Pharmacy      Antimicrobial Stewardship Note     Current antimicrobial therapy:  Anti-infectives (Future)    Start     Dose/Rate Route Frequency Ordered Stop    12/01/17 1915  doxycycline (VIBRA-TABS) tablet 100 mg      100 mg Oral EVERY 12 HOURS 12/01/17 1848      12/01/17 0000  azithromycin (ZITHROMAX Z-MARIN) 250 MG tablet         12/01/17 1532 12/06/17 2359          Indication: Acute COPD exacerbation     Pertinent labs:  Creatinine   Creatinine   Date Value Ref [facility] Status   12/02/2017 0.92 0.66 - 1.25 mg/dL Final   12/01/2017 0.93 0.66 - 1.25 mg/dL Final   06/21/2017 1.04 0.66 - 1.25 mg/dL Final     WBC   WBC   Date Value Ref [facility] Status   12/02/2017 6.2 4.0 - 11.0 10e9/L Final   12/01/2017 12.2 (H) 4.0 - 11.0 10e9/L Final   06/21/2017 5.8 4.0 - 11.0 10e9/L Final     Procalcitonin   Procalcitonin   Date Value Ref [facility] Status   12/01/2017 0.19 ng/ml Final     Comment:     0.05-0.24 ng/ml Low risk of systemic bacterial infection. Local bacterial   infection possible.  Recommendation: Assess other clinical features of   infection. Discourage antibiotics unless strong clinical suspicion for serious   infection.       CRP   CRP Inflammation   Date Value Ref [facility] Status   06/20/2017 <2.9 0.0 - 8.0 mg/L Final       Culture Results: no growth     Recommendations/Interventions:  1. None.  Patient will be on doxycycline until discharge.  There is some confusion on whether he is on azithromycin 250 mg daily chronically at home.  This will need to be clarified prior to discharge.    Richelle Gupta [facility]  December 2, 2017

## 2017-12-02 NOTE — PLAN OF CARE
Face to face report given with opportunity to observe patient.    Report given to Sherice Ramsey   12/1/2017  11:30 PM

## 2017-12-02 NOTE — PHARMACY-ANTICOAGULATION SERVICE
INR = 1.53  (Goal: 2-3)  Note from Coumadin Clinic says patient is chronically on Zithromax 250 mg daily.  This is not listed on his PTA med list currently. He is on doxycycline (interaction) and Rocephin (not a true interaction usually).  However, patient is not on his chronic Zithromax, so I will dose at warfarin 4 mg today.    Richelle Gupta, PharmD  December 2, 2017

## 2017-12-02 NOTE — PROGRESS NOTES
[facility]    Hospitalist Progress Note    Date of Service (when I saw the patient): 12/02/2017    Assessment & Plan   Rm Duarte is a 76 year old  man who was admitted on 12/1/2017. Approximately 4 days ago he started himself on steroids, but then somewhat abruptly became more short of breath the morning before presenting to emergency department. He is on 2 L/m of oxygen at baseline, in review of medications indicates frequent exacerbations. He did not discuss starting steroids with his primary care physician or pulmonologist. Patient had some prednisone left from previous exacerbations are decided to start with. He has seen his pulmonologist in June 2017 when a new medication most likely roflumilast was added. ppatient indicates that this is soon to be discontinued as he showed no benefit.He has not been smoking. He has not noted in the relation of his COPD exacerbation 2.  Weather changes. Patient is not physically active at baseline. He has had no subjective fever, cough, or phlegm production, but has noticed worsening shortness of breath. No history of myocardial infarction. Patient fell 5 days ago however indicates that he has no recurrent history of falls    1. Acute COPD exacerbation  Acute on chronic hypoxemic respiratory failure  Improved, although he remains markedly dyspneic with even minimal activity. Presentation quite consistent with an exacerbation of chronic obstructive pulmonary disease.  Radiograph shows mild increase hyperinflation compared to prior study.  No evidence of any acute infiltrate.  Continuing chronic arformoterol and budesonide nebulized twice daily.  As needed.  Short acting bronchodilators.  Change steroids to oral prednisone given excellent oral absorption.   Doubt active infectious process, but not unreasonable to give antibiotics directed at possible infectious bronchitis.  Chronically treated with azithromycin,, probably because of frequent exacerbations or  concerns for bronchiectasis.  Doxycycline on admission.  We will continue this with a plan to resume azithromycin at the time of his discharge. Continue supplemental oxygen as needed.. Poor cough effort, largely because of mechanics.   Continue intravenous crystalloid fluid repletion and until dyspnea is somewhat decreased and oral intake is more consistent.     2.  Permanent atrial fibrillation  Good rate control.  He has not shown any concerning issues on telemetry.  Discontinue this.  Warfarin anticoagulation.  Appreciate pharmacy assistance in dosing.     3.  Peripheral arterial disease  Stable. Continue home medications. Continue to monitor.     4. frequent falls  Evaluated by physical therapy who do not find a need for continued in-hospital treatment.  Mobilization with assistance of nursing staff.  Depending on his course.  He may benefit from home PT at a minimum for home safety assessment.    DVT Prophylaxis: Warfarin  Code Status: DNR/DNI    Disposition: Expected discharge in 2-3 days .  Depending on the course of his respiratory failure.    Wade Bradley    Interval History   Sleeping but easily aroused. Myoclonic jerks while asleep, suggestive of allergic hypercapnia.  Minimally confused on awakening but clears rapidly.  Moderate dyspnea with even minimal exertion.  Anorexic without other GI symptoms.  No chest discomfort.    -Data reviewed today: I reviewed all new labs and imaging results over the last 24 hours. I personally reviewed electrocardiogram confirms atrial fibrillation.  Chest x-ray reviewed on the computer.  Compared to films of June and April of this year.  Mild hyperinflation.  Increased interstitial markings, not changed over this period of time.  No air space infiltrate.  No effusion.  Lateral film shows increased retrosternal air space consistent with hyperinflation.    Peripheral IV 12/01/17 Left (Active)   Site Assessment WDL 12/2/2017  7:57 AM   Line Status Infusing 12/2/2017   7:57 AM   Phlebitis Scale 0-->no symptoms 12/2/2017  7:57 AM   Infiltration Scale 0 12/2/2017  7:57 AM   Number of days:1     Line/device assessment(s) completed for medical necessity    Physical Exam   Temp: 97.6  F (36.4  C) Temp src: Tympanic BP: 114/55 Pulse: 74 Heart Rate: 85 Resp: 20 SpO2: 98 % O2 Device: Other (Comments) (pt recieving a neb) Oxygen Delivery: 2 LPM  There were no vitals filed for this visit.  Vital Signs with Ranges  Temp:  [97.4  F (36.3  C)-99.9  F (37.7  C)] 97.6  F (36.4  C)  Pulse:  [74-89] 74  Heart Rate:  [] 85  Resp:  [10-22] 20  BP: ()/(55-81) 114/55  SpO2:  [91 %-98 %] 98 %  I/O last 3 completed shifts:  In: 240 [P.O.:240]  Out: -     Easily aroused, concentration minimally diminished.  Oriented ×3.  HEENT: Pupils equal, conjugate. No icterus or nystagmus. Oral mucosa moist. No facial asymmetry.   Neck: Supple, jugular veins clearly elevated. Trachea midline   Chest: No chest wall movement asymmetry. Aeration globally diminished. Accessory muscles not in use. Expiratory time moderately increased. Rare upper field tidal wheezes. Scattered medium rhonchi. No discrete crackles.   Cardiac: PMI not displaced. S1, S2 unremarkable. No S3, S4. P2 not accentuated. No murmurs. Carotid upstroke preserved.   Abdomen: Soft, obese. No palpation or percussion tenderness. No distention. Normoactive bowel sounds. Liver and spleen not increased in size. No bruits, masses, or pulsations.   Extremities: moderate bilaterally equal lower extremity edema. Extremities warm distally.  Easily palpable peripheral pulses bilaterally equal. No eccymoses, clubbing.   Neurologic: Mental state above. Motor 5/5 and bilaterally equal. Tone preserved. No fasiculations or tremors. No asterixis.  Myoclonic jerks while sleeping. Sensation intact to light touch. DTR 2/4 and bilaterally equal.     Medications     lactated ringers       NaCl 100 mL/hr at 12/02/17 0553     - MEDICATION INSTRUCTIONS -        Warfarin Therapy Reminder         warfarin  4 mg Oral ONCE at 18:00     [START ON 12/3/2017] predniSONE  40 mg Oral Daily     predniSONE  20 mg Oral Once     sodium chloride (PF)  3 mL Intravenous Q8H     aspirin  81 mg Oral Daily     cilostazol  100 mg Oral BID     arformoterol  15 mcg Nebulization Q12H     roflumilast  500 mcg Oral Daily     simvastatin  10 mg Oral At Bedtime     tamsulosin  0.4 mg Oral Daily     doxycycline  100 mg Oral Q12H     budesonide  0.25 mg Nebulization BID           Data     Recent Labs  Lab 12/02/17  0740 12/02/17  0015 12/01/17  1150 12/01/17  1102   WBC 6.2  --  12.2*  --    HGB 12.4*  --  14.0  --    MCV 96  --  96  --      --  369  --    INR 1.53*  --  1.81* 2.1*     --  136  --    POTASSIUM 4.4  --  4.7  --    CHLORIDE 103  --  102  --    CO2 28  --  30  --    BUN 17  --  16  --    CR 0.92  --  0.93  --    ANIONGAP 9  --  4  --    KENDRICK 8.7  --  8.7  --    *  --  104*  --    ALBUMIN  --   --  2.8*  --    PROTTOTAL  --   --  7.5  --    BILITOTAL  --   --  0.5  --    ALKPHOS  --   --  73  --    ALT  --   --  34  --    AST  --   --  19  --    TROPI <0.015 <0.015 <0.015  --        Lactic Acid   Date Value Ref Range Status   12/01/2017 1.6 0.4 - 2.0 mmol/L Final       Recent Results (from the past 24 hour(s))   XR Chest 2 Views    Narrative    PROCEDURE: XR CHEST 2 VW 12/1/2017 12:46 PM    HISTORY: Fever;     COMPARISONS: 6/20/2017.    TECHNIQUE: 2 views.    FINDINGS: Heart is stable in size. There is bilateral interstitial  change, similar to the prior exam. Lungs are hyperinflated with  flattening of the diaphragm. Degenerative change is seen within the  spine.         Impression    IMPRESSION: Chronic appearing interstitial change.    MARCELLUS HARTMAN MD

## 2017-12-02 NOTE — PHARMACY-ANTICOAGULATION SERVICE
Clinical Pharmacy - Warfarin Dosing Consult     Pharmacy has been consulted to manage this patient s warfarin therapy.  Indication: Atrial Fibrillation  Therapy Goal: INR 2-3  Warfarin Prior to Admission: Yes  Warfarin PTA Regimen: 2.5 mg M,W, F & 3.75 mg ROW  Significant drug interactions: ASA, Rocephin, Doxycycline  Recent documented change in oral intake/nutrition: Unknown    INR   Date Value Ref Range Status   12/01/2017 1.81 (H) 0.80 - 1.20 Final     INR Point of Care   Date Value Ref Range Status   12/01/2017 2.1 (H) 0.86 - 1.14 Final     Comment:     This test is intended for monitoring Coumadin therapy.  Results are not   accurate in patients with prolonged INR due to factor deficiency.         Since patient starting on Rocephin and doxycycline, recommend warfarin 1.25  mg today.  Pharmacy will monitor Abdalla M Gerald daily and order warfarin doses to achieve specified goal.      Please contact pharmacy as soon as possible if the warfarin needs to be held for a procedure or if the warfarin goals change.

## 2017-12-02 NOTE — PLAN OF CARE
Problem: Patient Care Overview  Goal: Plan of Care/Patient Progress Review  Outcome: No Change  Discharge Planner OT   Patient plan for discharge: home  Current status: independent  Barriers to return to prior living situation: none  Recommendations for discharge: home  Rationale for recommendations:        Entered by: Caridad Browre 12/02/2017 9:34 AM

## 2017-12-02 NOTE — PLAN OF CARE
[facility] Inpatient Admission Note:    Patient admitted to 3104/3104-1 at approximately 1700 via wheel chair accompanied by transport tech from emergency room . Report received from Yulissa GARCIA in SBAR format at 1644 via telephone. Patient ambulated to bed via self.. Patient is alert and oriented X 3, denies pain; rates at 0 on 0-10 scale.  Patient oriented to room, unit, hourly rounding, and plan of care. Explained admission packet and patient handbook with patient bill of rights brochure. Will continue to monitor and document as needed.     Inpatient Nursing criteria listed below was met:      Health care directives status obtained and documented: Yes      Care Everywhere authorization obtained No      MRSA swab completed for patient 65 years and older: Yes      Patient identifies a surrogate decision maker: Yes If yes, who: Sister Patt  Contact Information: see file      Core Measure diagnosis present:No. If yes, state diagnosis: NA       Is initial lactic acid >2.0? No.       Vaccination assessment and education completed: Yes   Vaccinations received prior to admission: Pneumovax yes  Influenza(seasonal)  YES   Vaccination(s) ordered: not given today because patient up to date with influenza and pneumovax      Clergy visit ordered if patient requests: No - declines      Skin issues/needs documented: Yes      Isolation Patient: no Education given, correct sign in place and documentation row added to PCS:  No      Fall Prevention Yes: Care plan updated, education given and documented, sticker and magnet in place: Yes      Care Plan initiated: Yes      Education Documented (including assessment): Yes      Patient has discharge needs : No

## 2017-12-02 NOTE — PLAN OF CARE
Problem: Patient Care Overview  Goal: Plan of Care/Patient Progress Review  Outcome: Completed Date Met: 12/02/17  Discharge Planner PT   Patient plan for discharge: Home  Current status: Pt referred for PT due to fall off of toilet at home a few days ago. Pt was independent today with mobility. He does demonstrate decreased activity tolerance and O2 dependence but reports this is his baseline. Also demonstrates c very mild impaired strength. D/C recommendation is home. Pt may benefit from Home PT at least for a home safety eval. Encouraged mobility on the unit as tolerated  Barriers to return to prior living situation: None  Recommendations for discharge: Home PT at least for a home safety eval  Rationale for recommendations: See evaluation        Entered by: Roro Lucas 12/02/2017 11:01 AM

## 2017-12-02 NOTE — PLAN OF CARE
Problem: Chronic Obstructive Pulmonary Disease (Adult)  Goal: Signs and Symptoms of Listed Potential Problems Will be Absent, Minimized or Managed (Chronic Obstructive Pulmonary Disease)  Signs and symptoms of listed potential problems will be absent, minimized or managed by discharge/transition of care (reference Chronic Obstructive Pulmonary Disease (Adult) CPG).   Outcome: Improving   12/02/17 1524   Chronic Obstructive Pulmonary Disease   Problems Assessed (Chronic Obstructive Pulmonary Disease (COPD)) all   Problems Present (COPD, Bronch/Emphy) respiratory compromise     VSS, lungs are dim, heart rate is irregular, bowels are active Pt has scattered bruises more predominately on his left side. Oxygen at 1 L this shift.     Face to face report given with opportunity to observe patient.    Report given to RADHA Schumacher   12/2/2017  3:27 PM

## 2017-12-02 NOTE — PROGRESS NOTES
12/02/17 1050   Quick Adds   Type of Visit Initial PT Evaluation   Living Environment   Lives With alone   Living Arrangements house   Home Accessibility stairs to enter home;stairs within home;tub/shower is not walk in   Number of Stairs to Enter Home 3   Stair Railings at Home inside, present on right side   Transportation Available family or friend will provide   Self-Care   Usual Activity Tolerance fair   Current Activity Tolerance fair   Equipment Currently Used at Home raised toilet   Functional Level Prior   Ambulation 0-->independent   Transferring 0-->independent   Toileting 0-->independent   Bathing 0-->independent   Dressing 0-->independent   Eating 0-->independent   Communication 0-->understands/communicates without difficulty   Swallowing 0-->swallows foods/liquids without difficulty   Cognition 0 - no cognition issues reported   Fall history within last six months yes   Number of times patient has fallen within last six months 1   Which of the above functional risks had a recent onset or change? fall history   Prior Functional Level Comment Pt reports he is independent in his home but has poor activity tolerance. Reports when he fell he slid off of the toilet. Does not ambulate with any AD   General Information   Onset of Illness/Injury or Date of Surgery - Date 12/01/17   Referring Physician Dr. Scott Mittal   Patient/Family Goals Statement Return home   Pertinent History of Current Problem (include personal factors and/or comorbidities that impact the POC) Hospitalized c COPD exaccerbation   Precautions/Limitations oxygen therapy device and L/min  (2L)   Weight-Bearing Status - LUE full weight-bearing   Weight-Bearing Status - RUE full weight-bearing   Weight-Bearing Status - LLE full weight-bearing   Weight-Bearing Status - RLE full weight-bearing   Cognitive Status Examination   Orientation orientation to person, place and time   Level of Consciousness alert   Follows Commands and Answers  Questions 100% of the time   Personal Safety and Judgment intact   Memory intact   Pain Assessment   Patient Currently in Pain No   Integumentary/Edema   Integumentary/Edema no deficits were identifed   Posture    Posture Not impaired   Range of Motion (ROM)   ROM Quick Adds No deficits were identified   Strength   Strength Comments Mild LE weakness, grossly 4- to 4/5   Bed Mobility   Bed Mobility Comments Independent   Transfer Skills   Transfer Transfer Skill: Sit to Stand   Transfer Skill:  Sit to Stand   Level of [place]: Sit/Stand independent   Weightbearing Restrictions: Sit/Stand full weight-bearing   Gait   Gait Gait Skill   Gait Skills   Level of [place]: Gait independent   Physical Assist/Nonphysical Assist: Gait supervision   Weight-Bearing Restrictions: Gait full weight-bearing   Gait Distance 100 feet   Balance   Balance other (describe)   Sit-to-Stand Balance normal balance   Standing Balance: Static normal balance   Standing Balance: Dynamic good balance   Balance Quick Add Sit to stand balance;Standing balance: static;Standing balance: dynamic   Sensory Examination   Sensory Perception no deficits were identified   Coordination   Coordination no deficits were identified   Muscle Tone   Muscle Tone no deficits were identified   Clinical Impression   Criteria for Skilled Therapeutic Intervention evaluation only   PT Diagnosis Mild weakness, h/o fall   Influenced by the following impairments Mild weakness, decreased activity tolerance   Functional limitations due to impairments Decreased activity tolerance   Clinical Presentation Stable/Uncomplicated   Clinical Presentation Rationale Currently stable   Clinical Decision Making (Complexity) Low complexity   Predicted Duration of Therapy Intervention (days/wks) Today   Anticipated Discharge Disposition Home;Home with Home Therapy   Risk & Benefits of therapy have been explained Yes   Patient, Family & other staff in agreement with plan of care  "Yes   Clinical Impression Comments Pt referred for PT due to fall off of toilet at home a few days ago. Pt was independent today with mobility. He does demonstrate decreased activity tolerance and O2 dependence but reports this is his baseline. Also demonstrates c very mild impaired strength. D/C recommendation is home. Pt may benefit from Home PT at least for a home safety eval. Encouraged mobility on the unit as tolerated   [facility] TM \"6 Clicks\"   2016, Trustees of [facility], under license to [employer].  All rights reserved.   6 Clicks Short Forms Basic Mobility Inpatient Short Form   [facility]  \"6 Clicks\" V.2 Basic Mobility Inpatient Short Form   1. Turning from your back to your side while in a flat bed without using bedrails? 4 - None   2. Moving from lying on your back to sitting on the side of a flat bed without using bedrails? 4 - None   3. Moving to and from a bed to a chair (including a wheelchair)? 4 - None   4. Standing up from a chair using your arms (e.g., wheelchair, or bedside chair)? 4 - None   5. To walk in hospital room? 4 - None   6. Climbing 3-5 steps with a railing? 4 - None   Basic Mobility Raw Score (Score out of 24.Lower scores equate to lower levels of function) 24     "

## 2017-12-02 NOTE — PROGRESS NOTES
12/02/17 0909   Quick Adds   Type of Visit Initial Occupational Therapy Evaluation   Living Environment   Lives With alone   Living Arrangements house   Home Accessibility stairs to enter home;stairs within home;tub/shower is not walk in   Number of Stairs to Enter Home 3   Number of Stairs Within Home 13   Stair Railings at Home inside, present on right side   Transportation Available car   Self-Care   Dominant Hand right   Usual Activity Tolerance fair   Current Activity Tolerance poor   Regular Exercise no   Equipment Currently Used at Home raised toilet   Functional Level Prior   Ambulation 0-->independent   Transferring 0-->independent   Toileting 0-->independent   Bathing 0-->independent   Dressing 0-->independent   Eating 0-->independent   Communication 0-->understands/communicates without difficulty   Swallowing 0-->swallows foods/liquids without difficulty   Cognition 0 - no cognition issues reported   Fall history within last six months yes   Number of times patient has fallen within last six months 1   Which of the above functional risks had a recent onset or change? bathing;fall history   Prior Functional Level Comment Pt reports everything is getting harder to do as he just doesn't have the strength.   General Information   Onset of Illness/Injury or Date of Surgery - Date 12/02/17   Additional Occupational Profile Info/Pertinent History of Current Problem Maybe get nursing in the home 3 days a week or so   Precautions/Limitations oxygen therapy device and L/min;fall precautions   Weight-Bearing Status - LUE full weight-bearing   Weight-Bearing Status - RUE full weight-bearing   Weight-Bearing Status - LLE full weight-bearing   Weight-Bearing Status - RLE full weight-bearing   Heart Disease Risk Factors Lack of physical activity;Age   General Observations Pt reports he's feeling much better than when he came to the hospital   Cognitive Status Examination   Orientation orientation to person, place and  "time   Level of Consciousness alert   Memory intact   Attention No deficits were identified   Visual Perception   Visual Perception (reading glasses only)   Pain Assessment   Patient Currently in Pain No   Range of Motion (ROM)   ROM Quick Adds No deficits were identified   Strength   Manual Muscle Testing Quick Adds No deficits were identified   Coordination   Upper Extremity Coordination No deficits were identified   Transfer Skill: Bed to Chair/Chair to Bed   Level of [place]: Bed to Chair independent   Transfer Skill: Sit to Stand   Level of [place]: Sit/Stand independent   Transfer Skill: Toilet Transfer   Level of [place]: Toilet independent   Balance   Balance Quick Add No deficits identified   Lower Body Dressing   Level of [place]: Dress Lower Body independent   Toileting   Level of [place]: Toilet independent   Grooming   Level of [place]: Grooming independent   Eating/Self Feeding   Level of [place]: Eating independent   Instrumental Activities of Daily Living (IADL)   Previous Responsibilities meal prep;housekeeping;laundry;shopping;yardwork;medication management;finances;driving   Clinical Impression   Criteria for Skilled Therapeutic Interventions Met evaluation only   OT Diagnosis COPD exaccerbation   Risks and Benefits of Treatment have been explained. Yes   Patient, Family & other staff in agreement with plan of care Yes   [facility] AM-PAC  \"6 Clicks\" Daily Activity Inpatient Short Form   1. Putting on and taking off regular lower body clothing? 4 - None   2. Bathing (including washing, rinsing, drying)? 4 - None   3. Toileting, which includes using toilet, bedpan or urinal? 4 - None   4. Putting on and taking off regular upper body clothing? 4 - None   5. Taking care of personal grooming such as brushing teeth? 4 - None   6. Eating meals? 4 - None   Daily Activity Raw Score (Score out of 24.Lower scores equate to lower levels of function) 24   Total " Evaluation Time   Total Evaluation Time (Minutes) 15

## 2017-12-02 NOTE — PROGRESS NOTES
Assessment completed see flowsheet.      LOC: alert    Others present: Patient    Dx: COPD    Lives with: Lives With: alone    Living at: Living Arrangements: house    Equipment used:  02 concentrator through [employer], nebulizer machine, raised toilet    Support System: Description of Support System: Supportive, Involved    Primary PCP: Dominguez Maradiaga    POA/Guardian: Yes    Health Care Directive: NO- states he has the information and will provide a copy     Pharmacy: [facility] [place] [facility]    :  No    Homecare/County Services:   No    Adequate Resources for needs (housing, utilities, food/med): YES    Meds and appointments management: YES    Work: NO- retired  at [place] [employer]    Transportation: YES - drives, friend provided transport here    ADLs: independent    Falls: Yes  Reason for falls risk:  Mobility- slid off the raised toilet 5 days ago and was bruised, otherwise denies other injury. He feels future falls can be prevented by being more careful.    Able to Return to Prior Living Arrangements: Undetermined at this time, will monitor therapy evals and decide on appropriate plan. Patient open to SNF for rehab if necessary and would like to go to [place] in that case    : YES    Goals: To return home    Barriers: May benefit from short-term rehab before returning to home, depending on therapy evals and course here    ZINA: Average    ED Visits: 2    PCP is Dr. Maradiaga, has seen a dentist in [place] but can't remember the name, has seen an eye doctor in [place] but can't remember the name. He sees Dr. Zhu at [facility] in [place] for COPD. He denies any medication routine concerns, denies sleep or mood concerns. He is a  but is not established, so provided him with the application to get established. Patient denies tobacco use, states he drinks two beers a week and denies other drug use. If patient needs short-term rehab following hospitalization, he would  like to go to [place], either [facility] or [facility], no preference on one or the other. Patient would be interested in home care and home PT if it is not recommended to go for short-term rehab. Will remain available and make referrals as appropriate.

## 2017-12-02 NOTE — PLAN OF CARE
Problem: Patient Care Overview  Goal: Plan of Care/Patient Progress Review  Outcome: No Change  Pt A&Ox3. He denies pain. Afebrile. HR 80s. /65. RR 20 and sating greater than 92% on 1.5 LPM NC. He becomes very dyspneic with exertion and sats drop to the low 80s. He recovers quickly. Lung sounds are diminished with wheezes in the upper lobes. Troponins have been negative. He transfers with standby assist. He has been having brown, loose stools x3 this shift. Transfers with SBA. IVF and IV solumedrol continued. Alarms on and call light within reach throughout shift.     Face to face report given with opportunity to observe patient.  Report given to RADHA Man.    Joanie Covington  12/2/2017, 7:44 AM

## 2017-12-03 LAB
ANION GAP SERPL CALCULATED.3IONS-SCNC: 7 MMOL/L (ref 3–14)
BACTERIA SPEC CULT: NORMAL
BASOPHILS # BLD AUTO: 0 10E9/L (ref 0–0.2)
BASOPHILS NFR BLD AUTO: 0 %
BUN SERPL-MCNC: 22 MG/DL (ref 7–30)
CALCIUM SERPL-MCNC: 9.1 MG/DL (ref 8.5–10.1)
CHLORIDE SERPL-SCNC: 104 MMOL/L (ref 94–109)
CO2 SERPL-SCNC: 27 MMOL/L (ref 20–32)
CREAT SERPL-MCNC: 0.91 MG/DL (ref 0.66–1.25)
DIFFERENTIAL METHOD BLD: ABNORMAL
EOSINOPHIL # BLD AUTO: 0 10E9/L (ref 0–0.7)
EOSINOPHIL NFR BLD AUTO: 0 %
ERYTHROCYTE [DISTWIDTH] IN BLOOD BY AUTOMATED COUNT: 14.1 % (ref 10–15)
GFR SERPL CREATININE-BSD FRML MDRD: 81 ML/MIN/1.7M2
GLUCOSE SERPL-MCNC: 131 MG/DL (ref 70–99)
HCT VFR BLD AUTO: 37.8 % (ref 40–53)
HGB BLD-MCNC: 12 G/DL (ref 13.3–17.7)
IMM GRANULOCYTES # BLD: 0.1 10E9/L (ref 0–0.4)
IMM GRANULOCYTES NFR BLD: 0.6 %
INR PPP: 1.25 (ref 0.8–1.2)
LYMPHOCYTES # BLD AUTO: 0.4 10E9/L (ref 0.8–5.3)
LYMPHOCYTES NFR BLD AUTO: 5.1 %
MCH RBC QN AUTO: 30.2 PG (ref 26.5–33)
MCHC RBC AUTO-ENTMCNC: 31.7 G/DL (ref 31.5–36.5)
MCV RBC AUTO: 95 FL (ref 78–100)
MONOCYTES # BLD AUTO: 0.3 10E9/L (ref 0–1.3)
MONOCYTES NFR BLD AUTO: 3.8 %
NEUTROPHILS # BLD AUTO: 7.8 10E9/L (ref 1.6–8.3)
NEUTROPHILS NFR BLD AUTO: 90.5 %
NRBC # BLD AUTO: 0 10*3/UL
NRBC BLD AUTO-RTO: 0 /100
PLATELET # BLD AUTO: 338 10E9/L (ref 150–450)
POTASSIUM SERPL-SCNC: 4.8 MMOL/L (ref 3.4–5.3)
RBC # BLD AUTO: 3.98 10E12/L (ref 4.4–5.9)
SODIUM SERPL-SCNC: 138 MMOL/L (ref 133–144)
SPECIMEN SOURCE: NORMAL
WBC # BLD AUTO: 8.6 10E9/L (ref 4–11)

## 2017-12-03 PROCEDURE — 85025 COMPLETE CBC W/AUTO DIFF WBC: CPT | Performed by: INTERNAL MEDICINE

## 2017-12-03 PROCEDURE — 94799 UNLISTED PULMONARY SVC/PX: CPT

## 2017-12-03 PROCEDURE — 40000275 ZZH STATISTIC RCP TIME EA 10 MIN

## 2017-12-03 PROCEDURE — 25000125 ZZHC RX 250: Performed by: INTERNAL MEDICINE

## 2017-12-03 PROCEDURE — 25000132 ZZH RX MED GY IP 250 OP 250 PS 637: Performed by: INTERNAL MEDICINE

## 2017-12-03 PROCEDURE — 36415 COLL VENOUS BLD VENIPUNCTURE: CPT | Performed by: INTERNAL MEDICINE

## 2017-12-03 PROCEDURE — 94640 AIRWAY INHALATION TREATMENT: CPT | Mod: 76

## 2017-12-03 PROCEDURE — 80048 BASIC METABOLIC PNL TOTAL CA: CPT | Performed by: INTERNAL MEDICINE

## 2017-12-03 PROCEDURE — 12000000 ZZH R&B MED SURG/OB

## 2017-12-03 PROCEDURE — 85610 PROTHROMBIN TIME: CPT | Performed by: INTERNAL MEDICINE

## 2017-12-03 PROCEDURE — 99232 SBSQ HOSP IP/OBS MODERATE 35: CPT | Performed by: INTERNAL MEDICINE

## 2017-12-03 PROCEDURE — 25000128 H RX IP 250 OP 636: Performed by: INTERNAL MEDICINE

## 2017-12-03 PROCEDURE — 94640 AIRWAY INHALATION TREATMENT: CPT

## 2017-12-03 RX ORDER — LOPERAMIDE HCL 2 MG
2 CAPSULE ORAL 4 TIMES DAILY PRN
Status: DISCONTINUED | OUTPATIENT
Start: 2017-12-03 | End: 2017-12-05 | Stop reason: HOSPADM

## 2017-12-03 RX ORDER — WARFARIN SODIUM 7.5 MG/1
7.5 TABLET ORAL
Status: COMPLETED | OUTPATIENT
Start: 2017-12-03 | End: 2017-12-03

## 2017-12-03 RX ADMIN — PREDNISONE 40 MG: 20 TABLET ORAL at 10:24

## 2017-12-03 RX ADMIN — SODIUM CHLORIDE, POTASSIUM CHLORIDE, SODIUM LACTATE AND CALCIUM CHLORIDE: 600; 310; 30; 20 INJECTION, SOLUTION INTRAVENOUS at 18:58

## 2017-12-03 RX ADMIN — ASPIRIN 81 MG 81 MG: 81 TABLET ORAL at 10:23

## 2017-12-03 RX ADMIN — ARFORMOTEROL TARTRATE 15 MCG: 15 SOLUTION RESPIRATORY (INHALATION) at 07:16

## 2017-12-03 RX ADMIN — IPRATROPIUM BROMIDE AND ALBUTEROL SULFATE 3 ML: .5; 3 SOLUTION RESPIRATORY (INHALATION) at 15:50

## 2017-12-03 RX ADMIN — ROFLUMILAST 500 MCG: 500 TABLET ORAL at 10:23

## 2017-12-03 RX ADMIN — BUDESONIDE 0.25 MG: 0.25 INHALANT RESPIRATORY (INHALATION) at 19:16

## 2017-12-03 RX ADMIN — SIMVASTATIN 10 MG: 10 TABLET, FILM COATED ORAL at 20:09

## 2017-12-03 RX ADMIN — ARFORMOTEROL TARTRATE 15 MCG: 15 SOLUTION RESPIRATORY (INHALATION) at 19:15

## 2017-12-03 RX ADMIN — DOXYCYCLINE HYCLATE 100 MG: 100 TABLET, FILM COATED ORAL at 18:55

## 2017-12-03 RX ADMIN — BUDESONIDE 0.25 MG: 0.25 INHALANT RESPIRATORY (INHALATION) at 07:08

## 2017-12-03 RX ADMIN — TAMSULOSIN HYDROCHLORIDE 0.4 MG: 0.4 CAPSULE ORAL at 10:24

## 2017-12-03 RX ADMIN — SALINE NASAL SPRAY 1 SPRAY: 1.5 SOLUTION NASAL at 21:32

## 2017-12-03 RX ADMIN — CILOSTAZOL 100 MG: 100 TABLET ORAL at 10:23

## 2017-12-03 RX ADMIN — IPRATROPIUM BROMIDE AND ALBUTEROL SULFATE 3 ML: .5; 3 SOLUTION RESPIRATORY (INHALATION) at 11:11

## 2017-12-03 RX ADMIN — DOXYCYCLINE HYCLATE 100 MG: 100 TABLET, FILM COATED ORAL at 07:38

## 2017-12-03 RX ADMIN — SALINE NASAL SPRAY 1 SPRAY: 1.5 SOLUTION NASAL at 14:41

## 2017-12-03 RX ADMIN — CILOSTAZOL 100 MG: 100 TABLET ORAL at 20:09

## 2017-12-03 RX ADMIN — LOPERAMIDE HYDROCHLORIDE 2 MG: 2 CAPSULE ORAL at 12:12

## 2017-12-03 RX ADMIN — SODIUM CHLORIDE, POTASSIUM CHLORIDE, SODIUM LACTATE AND CALCIUM CHLORIDE: 600; 310; 30; 20 INJECTION, SOLUTION INTRAVENOUS at 05:14

## 2017-12-03 RX ADMIN — IPRATROPIUM BROMIDE AND ALBUTEROL SULFATE 3 ML: .5; 3 SOLUTION RESPIRATORY (INHALATION) at 07:08

## 2017-12-03 RX ADMIN — LOPERAMIDE HYDROCHLORIDE 2 MG: 2 CAPSULE ORAL at 20:09

## 2017-12-03 RX ADMIN — SODIUM CHLORIDE, POTASSIUM CHLORIDE, SODIUM LACTATE AND CALCIUM CHLORIDE: 600; 310; 30; 20 INJECTION, SOLUTION INTRAVENOUS at 12:12

## 2017-12-03 RX ADMIN — WARFARIN SODIUM 7.5 MG: 7.5 TABLET ORAL at 18:55

## 2017-12-03 RX ADMIN — GUAIFENESIN 5 ML: 100 SOLUTION ORAL at 20:09

## 2017-12-03 NOTE — PROGRESS NOTES
[facility]    Hospitalist Progress Note    Date of Service (when I saw the patient): 12/03/2017    Assessment & Plan   Rm Duarte is a 76 year old  man who was admitted on 12/1/2017. Approximately 4 days ago he started himself on steroids, but then somewhat abruptly became more short of breath the morning before presenting to emergency department. He is on 2 L/m of oxygen at baseline, in review of medications indicates frequent exacerbations. He did not discuss starting steroids with his primary care physician or pulmonologist. Patient had some prednisone left from previous exacerbations are decided to start with. He has seen his pulmonologist in June 2017 when a new medication most likely roflumilast was added. ppatient indicates that this is soon to be discontinued as he showed no benefit.He has not been smoking. He has not noted in the relation of his COPD exacerbation 2.  Weather changes. Patient is not physically active at baseline. He has had no subjective fever, cough, or phlegm production, but has noticed worsening shortness of breath. No history of myocardial infarction. Patient fell 5 days ago however indicates that he has no recurrent history of falls    1. Acute COPD exacerbation  Acute on chronic hypoxemic respiratory failure  Still dyspneic with minimal exertion. Some more difficulty with secretion clearance this morning. Continue oral prednisone, arformeterol/budesonide. Trial of accapella for secretion clearance. Needs further improvement in exertional dyspnea and secretion clearance, but may be fit for discharge in another 24-48 hours.  Presentation quite consistent with an exacerbation of chronic obstructive pulmonary disease.  Radiograph shows mild increase hyperinflation compared to prior study.  No evidence of any acute infiltrate.   Chronically treated with azithromycin, probably because of frequent exacerbations or concerns for bronchiectasis. Began roflumilast 3 months  ago-disappointed by lack of improvement of symptoms. Discussed role is of decreasing exacerbations rather than decreased symptoms; he is willing to continue at least until F/U with Dr. Zhu.     2.  Permanent atrial fibrillation  Good rate control.  He has not shown any concerning issues on telemetry.  Discontinue this.  Warfarin anticoagulation.  Appreciate pharmacy assistance in dosing.     3.  Peripheral arterial disease  Stable. Continue home medications. Continue to monitor.     4. Frequent falls  Evaluated by physical therapy who do not find a need for continued in-hospital treatment.  Mobilization with assistance of nursing staff.  Depending on his course.  He may benefit from home PT at a minimum for home safety assessment.    DVT Prophylaxis: Warfarin  Code Status: DNR/DNI    Disposition: Expected discharge in 1-2 days, depending on the course of his respiratory failure.    Wade Bradley    Interval History   Awake, alert.    -Data reviewed today: I reviewed all new labs and imaging results over the last 24 hours.     Peripheral IV 12/02/17 Left Lower forearm (Active)   Site Assessment WDL 12/3/2017  7:44 AM   Line Status Infusing 12/3/2017  7:44 AM   Phlebitis Scale 0-->no symptoms 12/3/2017  7:44 AM   Infiltration Scale 0 12/3/2017  7:44 AM   Infiltration Site Treatment Method  None 12/2/2017  9:17 PM   Extravasation? No 12/2/2017  9:17 PM   Dressing Intervention New dressing  12/2/2017  9:17 PM   Number of days:1     Line/device assessment(s) completed for medical necessity December 3    Physical Exam   Temp: 98.1  F (36.7  C) Temp src: Tympanic BP: 126/78   Heart Rate: 82 Resp: 20 SpO2: 94 % O2 Device: Nasal cannula Oxygen Delivery: 1 LPM  There were no vitals filed for this visit.  Vital Signs with Ranges  Temp:  [97.3  F (36.3  C)-98.1  F (36.7  C)] 98.1  F (36.7  C)  Heart Rate:  [82-92] 82  Resp:  [20-22] 20  BP: (126-128)/(66-78) 126/78  SpO2:  [92 %-94 %] 94 %  I/O last 3 completed  shifts:  In: 5266 [P.O.:1020; I.V.:4246]  Out: -     Easily aroused, concentration minimally diminished.  Oriented ×3.  HEENT: Pupils equal, conjugate. No icterus or nystagmus. Oral mucosa moist. No facial asymmetry.   Neck: Supple, jugular veins clearly elevated. Trachea midline   Chest: No chest wall movement asymmetry. Aeration globally diminished. Accessory muscles not in use. Expiratory time moderately increased. No tidal wheezes. Scattered medium rhonchi. No discrete crackles.   Cardiac: PMI not displaced. S1, S2 unremarkable. No S3, S4. P2 questionably accentuated. No murmurs. Carotid upstroke preserved.   Abdomen: Soft, obese. No palpation or percussion tenderness. No distention. Normoactive bowel sounds. Liver and spleen not increased in size. No bruits, masses, or pulsations.   Extremities: moderate bilaterally equal lower extremity edema. Extremities warm distally.  Easily palpable peripheral pulses bilaterally equal. No eccymoses, clubbing.   Neurologic: Mental state above. Motor 5/5 and bilaterally equal. Tone preserved. No fasiculations or tremors. No asterixis. DTR 2/4 and bilaterally equal.     Medications     lactated ringers 75 mL/hr at 12/03/17 0514     NaCl 75 mL/hr at 12/02/17 1327     - MEDICATION INSTRUCTIONS -       Warfarin Therapy Reminder         warfarin  7.5 mg Oral ONCE at 18:00     sodium chloride  1 spray Both Nostrils TID     predniSONE  40 mg Oral Daily     sodium chloride (PF)  3 mL Intravenous Q8H     aspirin  81 mg Oral Daily     cilostazol  100 mg Oral BID     arformoterol  15 mcg Nebulization Q12H     roflumilast  500 mcg Oral Daily     simvastatin  10 mg Oral At Bedtime     tamsulosin  0.4 mg Oral Daily     doxycycline  100 mg Oral Q12H     budesonide  0.25 mg Nebulization BID           Data     Recent Labs  Lab 12/03/17  0545 12/02/17  0740 12/02/17  0015 12/01/17  1150   WBC 8.6 6.2  --  12.2*   HGB 12.0* 12.4*  --  14.0   MCV 95 96  --  96    307  --  369   INR 1.25*  1.53*  --  1.81*    140  --  136   POTASSIUM 4.8 4.4  --  4.7   CHLORIDE 104 103  --  102   CO2 27 28  --  30   BUN 22 17  --  16   CR 0.91 0.92  --  0.93   ANIONGAP 7 9  --  4   KENDRICK 9.1 8.7  --  8.7   * 122*  --  104*   ALBUMIN  --   --   --  2.8*   PROTTOTAL  --   --   --  7.5   BILITOTAL  --   --   --  0.5   ALKPHOS  --   --   --  73   ALT  --   --   --  34   AST  --   --   --  19   TROPI  --  <0.015 <0.015 <0.015       Lactic Acid   Date Value Ref Range Status   12/01/2017 1.6 0.4 - 2.0 mmol/L Final       No results found for this or any previous visit (from the past 24 hour(s)).

## 2017-12-03 NOTE — PLAN OF CARE
Problem: Patient Care Overview  Goal: Plan of Care/Patient Progress Review  Outcome: Improving   12/03/17 1400   OTHER   Plan Of Care Reviewed With patient   Plan of Care Review   Progress improving       VSS, afebrile. Lungs are dim through out, pt has an occasional cough producing thin white sputum per pt, he is on 1L O2 via Nasal Cannula, bowels are active pt has had 1 loose brown stool this shift, pt received PRN Imodium 1x. Pt declines any pain. Pt skin is dry and flaky with scattered bruises. Pt is alert and oriented x 3, uses call light appropriately and is a stand by assist.     Problem: Chronic Obstructive Pulmonary Disease (Adult)  Goal: Signs and Symptoms of Listed Potential Problems Will be Absent, Minimized or Managed (Chronic Obstructive Pulmonary Disease)  Signs and symptoms of listed potential problems will be absent, minimized or managed by discharge/transition of care (reference Chronic Obstructive Pulmonary Disease (Adult) CPG).   Outcome: Improving   12/03/17 0744   Chronic Obstructive Pulmonary Disease   Problems Assessed (Chronic Obstructive Pulmonary Disease (COPD)) all   Problems Present (COPD, Bronch/Emphy) respiratory compromise

## 2017-12-03 NOTE — PLAN OF CARE
Face to face report given with opportunity to observe patient.    Report given to RADHA Schumacher   12/3/2017  3:17 PM

## 2017-12-03 NOTE — PHARMACY
[facility]    Pharmacy      Antimicrobial Stewardship Note     Current antimicrobial therapy:  Anti-infectives (Future)    Start     Dose/Rate Route Frequency Ordered Stop    12/01/17 1915  doxycycline (VIBRA-TABS) tablet 100 mg      100 mg Oral EVERY 12 HOURS 12/01/17 1848      12/01/17 0000  azithromycin (ZITHROMAX Z-MARIN) 250 MG tablet         12/01/17 1532 12/06/17 2359      Not on azithromycin inpatient    Indication: Acute COPD exacerbation     Pertinent labs:  Creatinine   Creatinine   Date Value Ref Range Status   12/03/2017 0.91 0.66 - 1.25 mg/dL Final   12/02/2017 0.92 0.66 - 1.25 mg/dL Final   12/01/2017 0.93 0.66 - 1.25 mg/dL Final     WBC   WBC   Date Value Ref Range Status   12/03/2017 8.6 4.0 - 11.0 10e9/L Final   12/02/2017 6.2 4.0 - 11.0 10e9/L Final   12/01/2017 12.2 (H) 4.0 - 11.0 10e9/L Final     Procalcitonin   Procalcitonin   Date Value Ref Range Status   12/01/2017 0.19 ng/ml Final     Comment:     0.05-0.24 ng/ml Low risk of systemic bacterial infection. Local bacterial   infection possible.  Recommendation: Assess other clinical features of   infection. Discourage antibiotics unless strong clinical suspicion for serious   infection.       CRP   CRP Inflammation   Date Value Ref Range Status   06/20/2017 <2.9 0.0 - 8.0 mg/L Final       Culture Results: no growth     Recommendations/Interventions:  1. none    Richelle Gupta RPH  December 3, 2017

## 2017-12-03 NOTE — PLAN OF CARE
Problem: Chronic Obstructive Pulmonary Disease (Adult)  Goal: Signs and Symptoms of Listed Potential Problems Will be Absent, Minimized or Managed (Chronic Obstructive Pulmonary Disease)  Signs and symptoms of listed potential problems will be absent, minimized or managed by discharge/transition of care (reference Chronic Obstructive Pulmonary Disease (Adult) CPG).   Outcome: No Change  Reason for hospital stay:  COPD Exac    Most recent vitals: /66  Pulse 74  Temp 97.3  F (36.3  C) (Tympanic)  Resp 20  SpO2 92%    Pain Management:  Denies any pain this shift    Orientation:  A+O x4    Cardiac:  AP irregular - Tele discontinued but read A-Fib in the 80's prior to removing.    Respiratory:  Lungs clear. C/o shortness of breath at times with dyspnea noted with minimal exertion. Occasional non-productive cough. Sats 94% on 1.5 LPM.    GI:  BS present x4 - good appetite ate 100% supper    Skin Issues:  Purple scattered bruising to sacrum, left elbow, left forehead above left eye, and left back / flank. Patient states from previous fall at home.    IVF:  LR infusing at 75mL/hr    ABX:  Continues on PO Doxycycline and IV Rocephin    Mobility:  Up independently ambulating in room and in hallway with steady gait.    Comments:        12/2/2017  9:49 PM  Endy Ramsey

## 2017-12-03 NOTE — PHARMACY-ANTICOAGULATION SERVICE
INR = 1.25  Recommend warfarin 7.5 mg today.  Likely usual dosing is not sufficient as he is not taking azithromycin while here.    Richelle Gupta, PharmD  December 3, 2017

## 2017-12-03 NOTE — PLAN OF CARE
Problem: Patient Care Overview  Goal: Plan of Care/Patient Progress Review  Outcome: No Change  Pt A&O x3. He denies pain. He has been having loose, brown frequent stools. Afebrile. HR 88. /78. RR 20-22 and sating greater than 90% on 1 LPM NC. Lung sounds are diminished. Reports coughing up white thick sputum. IVF continued. Transfers with standby assist. Call light within reach and pt calls appropriately.   PO doxycycline passed off to dayshift.     Face to face report given with opportunity to observe patient.  Report given to RADHA Man.    Joanie Covington  12/3/2017, 7:37 AM

## 2017-12-04 LAB — INR PPP: 1.52 (ref 0.8–1.2)

## 2017-12-04 PROCEDURE — 94640 AIRWAY INHALATION TREATMENT: CPT | Mod: 76

## 2017-12-04 PROCEDURE — 36415 COLL VENOUS BLD VENIPUNCTURE: CPT | Performed by: INTERNAL MEDICINE

## 2017-12-04 PROCEDURE — 25000132 ZZH RX MED GY IP 250 OP 250 PS 637: Performed by: INTERNAL MEDICINE

## 2017-12-04 PROCEDURE — 94640 AIRWAY INHALATION TREATMENT: CPT

## 2017-12-04 PROCEDURE — 40000275 ZZH STATISTIC RCP TIME EA 10 MIN

## 2017-12-04 PROCEDURE — 25000125 ZZHC RX 250: Performed by: INTERNAL MEDICINE

## 2017-12-04 PROCEDURE — 85610 PROTHROMBIN TIME: CPT | Performed by: INTERNAL MEDICINE

## 2017-12-04 PROCEDURE — 99232 SBSQ HOSP IP/OBS MODERATE 35: CPT | Performed by: INTERNAL MEDICINE

## 2017-12-04 PROCEDURE — 12000000 ZZH R&B MED SURG/OB

## 2017-12-04 PROCEDURE — 25000128 H RX IP 250 OP 636: Performed by: INTERNAL MEDICINE

## 2017-12-04 PROCEDURE — 94799 UNLISTED PULMONARY SVC/PX: CPT

## 2017-12-04 RX ORDER — WARFARIN SODIUM 5 MG/1
5 TABLET ORAL
Status: COMPLETED | OUTPATIENT
Start: 2017-12-04 | End: 2017-12-04

## 2017-12-04 RX ORDER — FORMOTEROL FUMARATE DIHYDRATE 20 UG/2ML
SOLUTION RESPIRATORY (INHALATION)
Qty: 360 ML | Refills: 0 | Status: SHIPPED | OUTPATIENT
Start: 2017-12-04 | End: 2018-03-07

## 2017-12-04 RX ORDER — AZITHROMYCIN 250 MG/1
250 TABLET, FILM COATED ORAL DAILY
Status: DISCONTINUED | OUTPATIENT
Start: 2017-12-04 | End: 2017-12-05 | Stop reason: HOSPADM

## 2017-12-04 RX ADMIN — LOPERAMIDE HYDROCHLORIDE 2 MG: 2 CAPSULE ORAL at 14:09

## 2017-12-04 RX ADMIN — IPRATROPIUM BROMIDE AND ALBUTEROL SULFATE 3 ML: .5; 3 SOLUTION RESPIRATORY (INHALATION) at 11:46

## 2017-12-04 RX ADMIN — WARFARIN SODIUM 5 MG: 5 TABLET ORAL at 18:09

## 2017-12-04 RX ADMIN — DOXYCYCLINE HYCLATE 100 MG: 100 TABLET, FILM COATED ORAL at 06:39

## 2017-12-04 RX ADMIN — GUAIFENESIN 5 ML: 100 SOLUTION ORAL at 18:11

## 2017-12-04 RX ADMIN — ROFLUMILAST 500 MCG: 500 TABLET ORAL at 08:08

## 2017-12-04 RX ADMIN — ARFORMOTEROL TARTRATE 15 MCG: 15 SOLUTION RESPIRATORY (INHALATION) at 07:20

## 2017-12-04 RX ADMIN — IPRATROPIUM BROMIDE AND ALBUTEROL SULFATE 3 ML: .5; 3 SOLUTION RESPIRATORY (INHALATION) at 19:14

## 2017-12-04 RX ADMIN — TAMSULOSIN HYDROCHLORIDE 0.4 MG: 0.4 CAPSULE ORAL at 08:08

## 2017-12-04 RX ADMIN — SALINE NASAL SPRAY 1 SPRAY: 1.5 SOLUTION NASAL at 08:10

## 2017-12-04 RX ADMIN — CILOSTAZOL 100 MG: 100 TABLET ORAL at 20:14

## 2017-12-04 RX ADMIN — ARFORMOTEROL TARTRATE 15 MCG: 15 SOLUTION RESPIRATORY (INHALATION) at 19:14

## 2017-12-04 RX ADMIN — ASPIRIN 81 MG 81 MG: 81 TABLET ORAL at 08:08

## 2017-12-04 RX ADMIN — SALINE NASAL SPRAY 1 SPRAY: 1.5 SOLUTION NASAL at 20:15

## 2017-12-04 RX ADMIN — SODIUM CHLORIDE, POTASSIUM CHLORIDE, SODIUM LACTATE AND CALCIUM CHLORIDE: 600; 310; 30; 20 INJECTION, SOLUTION INTRAVENOUS at 01:48

## 2017-12-04 RX ADMIN — AZITHROMYCIN 250 MG: 250 TABLET, FILM COATED ORAL at 12:10

## 2017-12-04 RX ADMIN — IPRATROPIUM BROMIDE AND ALBUTEROL SULFATE 3 ML: .5; 3 SOLUTION RESPIRATORY (INHALATION) at 15:15

## 2017-12-04 RX ADMIN — PREDNISONE 40 MG: 20 TABLET ORAL at 08:08

## 2017-12-04 RX ADMIN — BUDESONIDE 0.25 MG: 0.25 INHALANT RESPIRATORY (INHALATION) at 07:20

## 2017-12-04 RX ADMIN — SALINE NASAL SPRAY 1 SPRAY: 1.5 SOLUTION NASAL at 14:09

## 2017-12-04 RX ADMIN — SIMVASTATIN 10 MG: 10 TABLET, FILM COATED ORAL at 20:15

## 2017-12-04 RX ADMIN — LOPERAMIDE HYDROCHLORIDE 2 MG: 2 CAPSULE ORAL at 08:08

## 2017-12-04 RX ADMIN — CILOSTAZOL 100 MG: 100 TABLET ORAL at 08:08

## 2017-12-04 RX ADMIN — BUDESONIDE 0.25 MG: 0.25 INHALANT RESPIRATORY (INHALATION) at 19:14

## 2017-12-04 RX ADMIN — LOPERAMIDE HYDROCHLORIDE 2 MG: 2 CAPSULE ORAL at 00:30

## 2017-12-04 NOTE — PLAN OF CARE
Face to face report given with opportunity to observe patient.    Report given to Sherice Ramsey   12/3/2017  11:30 PM

## 2017-12-04 NOTE — PROGRESS NOTES
[facility]    Hospitalist Progress Note    Date of Service (when I saw the patient): 12/04/2017    Assessment & Plan   Rm Duarte is a 76 year old  man who was admitted on 12/1/2017. Approximately 4 days ago he started himself on steroids, but then somewhat abruptly became more short of breath the morning before presenting to emergency department. He is on 2 L/m of oxygen at baseline, in review of medications indicates frequent exacerbations. He did not discuss starting steroids with his primary care physician or pulmonologist. Patient had some prednisone left from previous exacerbations are decided to start with. He has seen his pulmonologist in June 2017 when a new medication most likely roflumilast was added. ppatient indicates that this is soon to be discontinued as he showed no benefit.He has not been smoking. He has not noted in the relation of his COPD exacerbation 2.  Weather changes. Patient is not physically active at baseline. He has had no subjective fever, cough, or phlegm production, but has noticed worsening shortness of breath. No history of myocardial infarction. Patient fell 5 days ago however indicates that he has no recurrent history of falls    1. Acute COPD exacerbation  Acute on chronic hypoxemic respiratory failure  Still dyspneic with minimal exertion, but improved.  Able to walk in the room and to a limited degree and the halls. Some significant difficulty with secretion clearance this morning; some improvement with flutter valve, wwhich in fact he has used in the past. Continue oral prednisone, arformeterol/budesonide. Still needs further improvement in exertional dyspnea and secretion clearance, but may be fit for discharge in another 24-48 hours.  Presentation quite consistent with an exacerbation of chronic obstructive pulmonary disease.  Radiograph shows mild increase hyperinflation compared to prior study.  No evidence of any acute infiltrate.   Chronically treated with  azithromycin, probably because of frequent exacerbations or concerns for bronchiectasis. Began roflumilast 3 months ago-disappointed by lack of improvement of symptoms. Discussed role is of decreasing exacerbations rather than decreased symptoms; he is willing to continue at least until F/U with Dr. Zhu.     2.  Permanent atrial fibrillation  Good rate control.  He has not shown any concerning issues on telemetry.  Discontinue this.  Warfarin anticoagulation.  Appreciate pharmacy assistance in dosing.     3.  Peripheral arterial disease  Stable. Continue home medications. Continue to monitor.     4. Frequent falls  Evaluated by physical therapy who do not find a need for continued in-hospital treatment.  Mobilization with assistance of nursing staff.  Depending on his course.  He may benefit from home PT at a minimum for home safety assessment.    DVT Prophylaxis: Warfarin  Code Status: DNR/DNI    Disposition: Expected discharge in 1-2 days, depending on the course of his respiratory failure.    Wade Bradley    Interval History   Improved but still difficulty in secretion clearance.  Exertional dyspnea still markedly worse from his baseline. Ferrous sleep.    -Data reviewed today: I reviewed all new labs and imaging results over the last 24 hours.     Peripheral IV 12/02/17 Left Lower forearm (Active)   Site Assessment WDL 12/4/2017 12:32 AM   Line Status Infusing 12/4/2017 12:32 AM   Phlebitis Scale 0-->no symptoms 12/4/2017 12:32 AM   Infiltration Scale 0 12/4/2017 12:32 AM   Infiltration Site Treatment Method  None 12/2/2017  9:17 PM   Extravasation? No 12/2/2017  9:17 PM   Dressing Intervention New dressing  12/2/2017  9:17 PM   Number of days:2     Line/device assessment(s) completed for medical necessity December 3    Physical Exam   Temp: 97  F (36.1  C) Temp src: Tympanic BP: 124/71   Heart Rate: 64 Resp: 20 SpO2: 94 % O2 Device: Nasal cannula Oxygen Delivery: 1/2 LPM  There were no vitals filed  for this visit.  Vital Signs with Ranges  Temp:  [97  F (36.1  C)-97.5  F (36.4  C)] 97  F (36.1  C)  Heart Rate:  [64-83] 64  Resp:  [20] 20  BP: (118-124)/(63-71) 124/71  SpO2:  [93 %-94 %] 94 %  I/O last 3 completed shifts:  In: 3032 [P.O.:1440; I.V.:1592]  Out: -     Easily aroused, concentration minimally diminished.  Oriented ×3.  HEENT: Pupils equal, conjugate. No icterus or nystagmus. Oral mucosa moist. No facial asymmetry.   Neck: Supple, jugular veins clearly elevated. Trachea midline   Chest: No chest wall movement asymmetry. Aeration globally diminished. Accessory muscles not in use. Expiratory time moderately increased. No tidal wheezes. Scattered medium rhonchi. No discrete crackles.   Cardiac: PMI not displaced. S1, S2 unremarkable. No S3, S4. P2 questionably accentuated. No murmurs. Carotid upstroke preserved.   Abdomen: Soft, obese. No palpation or percussion tenderness. No distention. Normoactive bowel sounds. Liver and spleen not increased in size. No bruits, masses, or pulsations.   Extremities: moderate bilaterally equal lower extremity edema. Extremities warm distally.  Easily palpable peripheral pulses bilaterally equal. No eccymoses, clubbing.   Neurologic: Mental state above. Motor 5/5 and bilaterally equal. Tone preserved. No fasiculations or tremors. No asterixis. DTR 2/4 and bilaterally equal.     Medications     - MEDICATION INSTRUCTIONS -       Warfarin Therapy Reminder         azithromycin  250 mg Oral Daily     warfarin  5 mg Oral ONCE at 18:00     sodium chloride  1 spray Both Nostrils TID     predniSONE  40 mg Oral Daily     sodium chloride (PF)  3 mL Intravenous Q8H     aspirin  81 mg Oral Daily     cilostazol  100 mg Oral BID     arformoterol  15 mcg Nebulization Q12H     roflumilast  500 mcg Oral Daily     simvastatin  10 mg Oral At Bedtime     tamsulosin  0.4 mg Oral Daily     budesonide  0.25 mg Nebulization BID           Data     Recent Labs  Lab 12/04/17  9224 12/03/17  0510  12/02/17  0740 12/02/17  0015 12/01/17  1150   WBC  --  8.6 6.2  --  12.2*   HGB  --  12.0* 12.4*  --  14.0   MCV  --  95 96  --  96   PLT  --  338 307  --  369   INR 1.52* 1.25* 1.53*  --  1.81*   NA  --  138 140  --  136   POTASSIUM  --  4.8 4.4  --  4.7   CHLORIDE  --  104 103  --  102   CO2  --  27 28  --  30   BUN  --  22 17  --  16   CR  --  0.91 0.92  --  0.93   ANIONGAP  --  7 9  --  4   KENDRICK  --  9.1 8.7  --  8.7   GLC  --  131* 122*  --  104*   ALBUMIN  --   --   --   --  2.8*   PROTTOTAL  --   --   --   --  7.5   BILITOTAL  --   --   --   --  0.5   ALKPHOS  --   --   --   --  73   ALT  --   --   --   --  34   AST  --   --   --   --  19   TROPI  --   --  <0.015 <0.015 <0.015       Lactic Acid   Date Value Ref Range Status   12/01/2017 1.6 0.4 - 2.0 mmol/L Final       No results found for this or any previous visit (from the past 24 hour(s)).

## 2017-12-04 NOTE — PLAN OF CARE
Problem: Patient Care Overview  Goal: Plan of Care/Patient Progress Review  Outcome: No Change  Reason for hospital stay:  COPD Exac    Most recent vitals: /63  Pulse 74  Temp 97.5  F (36.4  C) (Tympanic)  Resp 20  SpO2 94%    Pain Management:  Denies any pain    Orientation:  A+O x4    Cardiac:  AP irregular    Respiratory:  Lungs diminished throughout. Shallow breathing. Dyspnea with minimal exertion. Occasional cough with small amount white sputum per patient. Requested Robitussin at HS for cough. Sats 94% on 1 LPM. Encouraged coughing and deep breathing and use of flutter valve.    GI:  BS present x4 - Ate 100% supper. Complains of diarrhea at 1950 - gave imodium.    Skin Issues:  Purple scattered bruises to sacrum, left forehead above left eye, and left flank from previous falls at home.    IVF:  LR infusing at 75mL/hr    ABX:  Continues on PO Doxycycline    Mobility:  Up independently in room with steady gait. Ambulated in [place] x2 tonight.    12/3/2017  10:08 PM  Endy Ramsey        Problem: Chronic Obstructive Pulmonary Disease (Adult)  Goal: Signs and Symptoms of Listed Potential Problems Will be Absent, Minimized or Managed (Chronic Obstructive Pulmonary Disease)  Signs and symptoms of listed potential problems will be absent, minimized or managed by discharge/transition of care (reference Chronic Obstructive Pulmonary Disease (Adult) CPG).   Outcome: No Change   12/03/17 1544   Chronic Obstructive Pulmonary Disease   Problems Assessed (Chronic Obstructive Pulmonary Disease (COPD)) all   Problems Present (COPD, Bronch/Emphy) respiratory compromise

## 2017-12-04 NOTE — PHARMACY
INR = 1.52. Doxycycline discontinued and azithromycin restarted today. Will dose at 5 mg tonight. Anticipate increased INR from azithromycin restart. Will monitor.

## 2017-12-04 NOTE — PHARMACY
[facility]    Pharmacy      Antimicrobial Stewardship Note     Current antimicrobial therapy:  Anti-infectives (Future)    Start     Dose/Rate Route Frequency Ordered Stop    12/04/17 0930  azithromycin (ZITHROMAX) tablet 250 mg      250 mg Oral DAILY 12/04/17 0918      12/01/17 0000  azithromycin (ZITHROMAX Z-MARIN) 250 MG tablet         12/01/17 1532 12/06/17 2359          Indication: Acute COPD exacerbation     Pertinent labs:  Creatinine   Creatinine   Date Value Ref Range Status   12/03/2017 0.91 0.66 - 1.25 mg/dL Final   12/02/2017 0.92 0.66 - 1.25 mg/dL Final   12/01/2017 0.93 0.66 - 1.25 mg/dL Final     WBC   WBC   Date Value Ref Range Status   12/03/2017 8.6 4.0 - 11.0 10e9/L Final   12/02/2017 6.2 4.0 - 11.0 10e9/L Final   12/01/2017 12.2 (H) 4.0 - 11.0 10e9/L Final     Procalcitonin   Procalcitonin   Date Value Ref Range Status   12/01/2017 0.19 ng/ml Final     Comment:     0.05-0.24 ng/ml Low risk of systemic bacterial infection. Local bacterial   infection possible.  Recommendation: Assess other clinical features of   infection. Discourage antibiotics unless strong clinical suspicion for serious   infection.       CRP   CRP Inflammation   Date Value Ref Range Status   06/20/2017 <2.9 0.0 - 8.0 mg/L Final       Culture Results: no growth     Recommendations/Interventions:  1. None.  Pharmacy will monitor warfarin and adjust accordingly.    Richelle Gupta RPH  December 4, 2017

## 2017-12-04 NOTE — PLAN OF CARE
Problem: Patient Care Overview  Goal: Plan of Care/Patient Progress Review  Outcome: No Change  Pt A&Ox3. He denies pain. He is up frequently to the bathroom throughout the night, still having loose stools, reports this as normal for him at home. Imodium given with little change in frequency. Afebrile. HR 60-70s. /71. RR 20, sating 92-94% on 1/2 LPM NC. Dyspneic on exertion but recovers quickly. Lung sounds diminished throughout. IVF continued. Call light within reach and pt calls appropriately.     Face to face report given with opportunity to observe patient.  Report given to RADHA Stark.    Joanie Covington  12/4/2017, 7:50 AM

## 2017-12-04 NOTE — PROGRESS NOTES
Spoke with Jimy at [facility] and she states that patient currently qualifies for Palliative Skilled Care as he is currently homebound per Dr. Smart. Spoke with patient and he would like to pursue this and inquired about what happens when he stabalizes and is no longer homebound. Advised him that he could continue with Palliative maintenance but that would be a visit monthly and no A services with that. Advised him that if he completes his VA application and if he gets established with the [facility], he could request continued Palliative Care through the VA, which does not require homebound status. Advised him that Jimy from [facility] noted this for  to work on with patient to make sure this gets completed so that patient can get established with the [facility].  Patient verbalized an understanding and agreed to plan. Discussed with patient that he could have care coordination through [facility] and he agreed to that as well. Consult placed.

## 2017-12-05 VITALS
RESPIRATION RATE: 18 BRPM | SYSTOLIC BLOOD PRESSURE: 118 MMHG | OXYGEN SATURATION: 93 % | HEART RATE: 74 BPM | DIASTOLIC BLOOD PRESSURE: 70 MMHG | TEMPERATURE: 97.5 F

## 2017-12-05 LAB — INR PPP: 2.2 (ref 0.8–1.2)

## 2017-12-05 PROCEDURE — 94799 UNLISTED PULMONARY SVC/PX: CPT

## 2017-12-05 PROCEDURE — 85610 PROTHROMBIN TIME: CPT | Performed by: INTERNAL MEDICINE

## 2017-12-05 PROCEDURE — 25000132 ZZH RX MED GY IP 250 OP 250 PS 637: Performed by: INTERNAL MEDICINE

## 2017-12-05 PROCEDURE — 36415 COLL VENOUS BLD VENIPUNCTURE: CPT | Performed by: INTERNAL MEDICINE

## 2017-12-05 PROCEDURE — 40000275 ZZH STATISTIC RCP TIME EA 10 MIN

## 2017-12-05 PROCEDURE — 25000125 ZZHC RX 250: Performed by: INTERNAL MEDICINE

## 2017-12-05 PROCEDURE — 94640 AIRWAY INHALATION TREATMENT: CPT | Mod: 76

## 2017-12-05 PROCEDURE — 94640 AIRWAY INHALATION TREATMENT: CPT

## 2017-12-05 PROCEDURE — 99239 HOSP IP/OBS DSCHRG MGMT >30: CPT | Performed by: INTERNAL MEDICINE

## 2017-12-05 RX ORDER — PREDNISONE 10 MG/1
TABLET ORAL
Qty: 18 TABLET | Refills: 0 | Status: SHIPPED | OUTPATIENT
Start: 2017-12-05 | End: 2018-01-29

## 2017-12-05 RX ORDER — AZITHROMYCIN 250 MG/1
250 TABLET, FILM COATED ORAL DAILY
Qty: 30 TABLET | Refills: 11 | Status: SHIPPED | OUTPATIENT
Start: 2017-12-06 | End: 2017-12-05

## 2017-12-05 RX ORDER — AZITHROMYCIN 250 MG/1
250 TABLET, FILM COATED ORAL DAILY
Qty: 30 TABLET | Refills: 11 | COMMUNITY
Start: 2017-12-06 | End: 2018-07-18

## 2017-12-05 RX ADMIN — ROFLUMILAST 500 MCG: 500 TABLET ORAL at 09:17

## 2017-12-05 RX ADMIN — TAMSULOSIN HYDROCHLORIDE 0.4 MG: 0.4 CAPSULE ORAL at 09:16

## 2017-12-05 RX ADMIN — ARFORMOTEROL TARTRATE 15 MCG: 15 SOLUTION RESPIRATORY (INHALATION) at 07:11

## 2017-12-05 RX ADMIN — AZITHROMYCIN 250 MG: 250 TABLET, FILM COATED ORAL at 09:16

## 2017-12-05 RX ADMIN — ASPIRIN 81 MG 81 MG: 81 TABLET ORAL at 09:16

## 2017-12-05 RX ADMIN — CILOSTAZOL 100 MG: 100 TABLET ORAL at 09:16

## 2017-12-05 RX ADMIN — PREDNISONE 40 MG: 20 TABLET ORAL at 09:17

## 2017-12-05 RX ADMIN — BUDESONIDE 0.25 MG: 0.25 INHALANT RESPIRATORY (INHALATION) at 07:11

## 2017-12-05 RX ADMIN — SALINE NASAL SPRAY 1 SPRAY: 1.5 SOLUTION NASAL at 10:01

## 2017-12-05 RX ADMIN — IPRATROPIUM BROMIDE AND ALBUTEROL SULFATE 3 ML: .5; 3 SOLUTION RESPIRATORY (INHALATION) at 11:13

## 2017-12-05 NOTE — PLAN OF CARE
Problem: Patient Care Overview  Goal: Plan of Care/Patient Progress Review  Outcome: Improving   12/04/17 2120   OTHER   Plan Of Care Reviewed With patient   Plan of Care Review   Progress improving     Day shift and afternoon shift note.  VSS, afebrile.  Denied pain throughout both shifts.  IVF were discontinued on day shift, angiocatheter was saline locked.  AMB 3 times today.  Has been on 1/2LPM O2 with sats 92-94%.  Appetite good.  Was medicated with Lomotil x2 today for continued loose stools.  Pt states he has this happen at home all the time.  Is A&O, makes needs known appropriately.    Problem: Chronic Obstructive Pulmonary Disease (Adult)  Goal: Signs and Symptoms of Listed Potential Problems Will be Absent, Minimized or Managed (Chronic Obstructive Pulmonary Disease)  Signs and symptoms of listed potential problems will be absent, minimized or managed by discharge/transition of care (reference Chronic Obstructive Pulmonary Disease (Adult) CPG).   Outcome: Improving   12/04/17 1813   Chronic Obstructive Pulmonary Disease   Problems Assessed (Chronic Obstructive Pulmonary Disease (COPD)) all   Problems Present (COPD, Bronch/Emphy) respiratory compromise

## 2017-12-05 NOTE — DISCHARGE SUMMARY
[facility]    Discharge Summary  Hospitalist    Date of Admission:  12/1/2017  Date of Discharge:  12/5/2017  Discharging Provider: Wade Bradley  Date of Service (when I saw the patient): 12/05/17    Discharge Diagnoses   Acute on chronic hypercapnic respiratory failure.  Acute on chronic hypoxemic respiratory failure.  Chronic obstructive pulmonary disease in exacerbation predominantly chronic bronchitis  Permanent atrial fibrillation.  Peripheral arterial disease  Falls at home with unremarkable evaluation by physical therapy, likely fall related to acute illness    History of Present Illness   Rm Duarte is a 76 year old  man who was admitted on 12/1/2017. Approximately 4 days ago he started oral steroids, but then somewhat abruptly became more short of breath the morning before presenting to emergency department. He is on 2 L/m of oxygen at baseline, in review of medications indicates frequent exacerbations. He did not discuss starting steroids with his primary care physician or pulmonologist. Patient had some prednisone left from previous exacerbations which he decided to start with. He has seen his pulmonologist in June 2017 when a new roflumilast was added. ppatient indicates that this is soon to be discontinued as he showed no benefit.He has not been smoking. He has not noted in the relation of his COPD exacerbation 2.  Weather changes. Patient is not physically active at baseline. He has had no subjective fever, cough, or phlegm production, but has noticed worsening shortness of breath. No history of myocardial infarction. Patient fell 5 days ago however indicates that he has no recurrent history of falls.    Hospital Course   Rm Duarte was admitted on 12/1/2017.  The following problems were addressed during his hospitalization:         1. Acute COPD exacerbation  Acute oon chronichypoxemic respiratory failure  Acute on chronic hypercapnic respiratory failure  Presented with acute  respiratory failure and no evidence of lower respiratory tract infection. During the first several days, he required higher doses of supplemental oxygen, although by the time of discharge..  Resting daytime requirements were in fact less than those required previously.  Given the likelihood of progressive deterioration over time.  However, his chronic oxygen prescription was continued. Hypercapnia based on persistent metabolic alkalosis.   He was treated in the usual fashion including systemic steroids, changed to oral prednisone given excellent enteral absorption after the first day or so.  Continued short acting bronchodilators.  He chronically uses arformoterol and budesonide nebulized twice daily, which was continued.  Chronically treated with azithromycin for anti-inflammatory/suppressive effects.  The first several days.  He was treated with oral doxycycline.  Ceftriaxone given in emergency department was discontinued after the first hospital day.  He showed slow improvement in dyspnea and clinical evidence of exacerbation and by the time of discharge felt that the leave the hospital.  He showed an improvement in secretion clearance with Acapella percussion therapy, which in fact he has used in the past.  Instructed to continue to use this.  He has been disappointed with the effects of profile last.  I discussed with him that this is intended not to change his respiratory symptoms but to discontinue frequency of exacerbations.  He indicates that he will then continue to use this.  He has began this approximately 90 days ago. Radiograph showed mild increase in hyperinflation compared to prior study.  No evidence of any acute infiltrate.         2.  Permanent atrial fibrillation  Good rate control during this hospitalization..  Warfarin anticoagulation.  Appreciate pharmacy assistance in dosing.      3.  Peripheral arterial disease  Stable. Have continued home medications.       4. Frequent falls  Single fall at  "home prior to admission, which appears more related to his acute illness rather than any persistent deficit. Evaluated by physical therapy who do not find a need for continued in-hospital treatment.  Mobilization with assistance of nursing staff.      5.  Self care/end-of-life planning.  After discussion, he has opted for home nursing with palliative care.   was also able to discuss with him options for home care, including the possy of [place], benefits.  He is pursuing this.    Wade Bradley        Pending Results   These results will be followed up by Dr. Maradiaga  Unresulted Labs Ordered in the Past 30 Days of this Admission     Date and Time Order Name Status Description    12/1/2017 1156 Blood culture Preliminary     12/1/2017 1156 Blood culture Preliminary           Code Status   DNR / DNI       Primary Care Physician   Dominguez Maradiaga    Vital signs:  Temp: 97.5  F (36.4  C) Temp src: Tympanic BP: 118/70   Heart Rate: 89 Resp: 18 SpO2: 93 % O2 Device: Nasal cannula Oxygen Delivery: 1/2 LPM      Estimated body mass index is 28.87 kg/(m^2) as calculated from the following:    Height as of 7/13/17: 1.803 m (5' 11\").    Weight as of 7/13/17: 93.9 kg (207 lb).        Awake, alert, man sitting in chair in medical wards.  Somewhat more energetic this morning.  Appropriate affect.  Oriented ×3.   HEENT: Pupils equal, conjugate. No icterus or nystagmus. Oral mucosa moist. No facial asymmetry.   Neck: Supple, jugular veins not elevated. Trachea midline   Chest: No chest wall movement asymmetry. Aeration globally diminished, although markedly improved compared to admission examination. Accessory muscles not in use. Expiratory time not increased. No tidal wheezes. Scattered medium rhonchi. No discrete crackles.   Cardiac: PMI not displaced. S1, S2 unremarkable. No S3, S4. P2 not accentuated. No murmurs. Carotid upstroke preserved.    Abdomen: Soft. No palpation or percussion tenderness. No distention. " Bowel sounds in all quadrants. Liver and spleen not increased in size. No bruits, masses, or pulsations.   Extremities: No lower extremity edema. Extremities warm distally.   Neurologic: Mental state above. Motor 5/5 and bilaterally equal. Tone preserved. No fasiculations or tremors.     Discharge Disposition   Discharged to home with home services  Condition at discharge: Stable    Consultations This Hospital Stay   PHARMACY TO DOSE WARFARIN  CARE COORDINATOR IP CONSULT  RESPIRATORY CARE IP CONSULT  PHYSICAL THERAPY ADULT IP CONSULT  OCCUPATIONAL THERAPY ADULT IP CONSULT    Time Spent on this Encounter   IWade, personally saw the patient today and spent greater than 30 minutes discharging this patient.    Discharge Orders     Home care nursing referral     Reason for your hospital stay   Rm Duarte is a 76 year old  man who was admitted on 12/1/2017. Increasing cough and dyspnea for 4 days; he started oral steroids, but then abruptly became more short of breath the morning before presenting to emergency department. He was admitted and treated in the usual fashion for chronic obstructive pulmonary disease exacerbation.  No evidence of a lower respiratory tract infection.  He improved, although somewhat slowly, but by the day of discharge felt fit to leave the hospital.  Continuing tapering doses of steroids at the time of discharge.  Cardiac medications including  Low-dose daily azithromycin continued at the time of discharge.     Follow-up and recommended labs and tests    Follow up with primary care provider, Dominguez Maradiaga, within 7 days for hospital follow- up  INR in 2-3 days by home health nurse or at Dr. Maradiaga's office     Activity   Your activity upon discharge: activity as tolerated     MD face to face encounter   Documentation of Face to Face and Certification for Home Health Services    I certify that patient: Rm Duarte is under my care and that I, or a nurse practitioner or  physician's assistant working with me, had a face-to-face encounter that meets the physician face-to-face encounter requirements with this patient on: 12/5/2017.    This encounter with the patient was in whole, or in part, for the following medical condition, which is the primary reason for home health care: severe COPD in exacerbation, palliative care.    I certify that, based on my findings, the following services are medically necessary home health services: Nursing.    My clinical findings support the need for the above services because: Nurse is needed: To assess cardiac and respiratory function after changes in medications or other medical regimen, establish palliative care..  Leaving home is medically contraindicated for the following reason(s): Dyspnea on exertion that makes it so they cannot leave their home for needed services without clinical deterioration..  Further, I certify that my clinical findings support that this patient is homebound (i.e. absences from home require considerable and taxing effort and are for medical reasons or [employer] services or infrequently or of short duration when for other reasons) because: .    Based on the above findings. I certify that this patient is confined to the home and needs intermittent skilled nursing care, physical therapy and/or speech therapy.  The patient is under my care, and I have initiated the establishment of the plan of care.  This patient will be followed by a physician who will periodically review the plan of care.  Physician/Provider to provide follow up care: Dominguez Maradiaga    Attending [facility] physician (the Medicare certified [facility] provider): Wade Bradley  Physician Signature: See electronic signature associated with these discharge orders.  Date: 12/5/2017     DNR/DNI     Oxygen Adult   Renew Home Oxygen Order  Renew previous prescription.  Expected treatment length is indefinite (99 months).    Attending Provider: Wade BLUE  Kirk  Physician signature: See electronic signature associated with these discharge orders  Date of Order: December 5, 2017     Diet   Follow this diet upon discharge: Orders Placed This Encounter     Combination Diet Regular Diet Adult       Discharge Medications   Current Discharge Medication List      START taking these medications    Details   sodium chloride (OCEAN) 0.65 % nasal spray Spray 1 spray into both nostrils every hour as needed for congestion  Qty: 1 Bottle, Refills: 0    Associated Diagnoses: COPD exacerbation (H)      azithromycin (ZITHROMAX) 250 MG tablet Take 1 tablet (250 mg) by mouth daily  Qty: 30 tablet, Refills: 11    Associated Diagnoses: Obstructive chronic bronchitis with exacerbation (H)         CONTINUE these medications which have CHANGED    Details   predniSONE (DELTASONE) 10 MG tablet 3 tabs PO daily x3, then 2 tabs PO daily x3, then 1 tabs PO daily x3, then stop  Qty: 18 tablet, Refills: 0    Associated Diagnoses: COPD exacerbation (H)         CONTINUE these medications which have NOT CHANGED    Details   Dextromethorphan-Guaifenesin (ROBITUSSIN COUGH+CHEST EDVIN DM) 5-100 MG/5ML LIQD Take 20 mLs by mouth every 4 hours as needed      budesonide (PULMICORT) 0.25 MG/2ML neb solution Take 0.25 mg by nebulization 2 times daily      warfarin (COUMADIN) 2.5 MG tablet Take 2.5mg Mon/Wed/Fri; 3.7mg all other days or as directed by warfarin clinic  Qty: 30 tablet, Refills: 1    Associated Diagnoses: Long-term (current) use of anticoagulants; PVD (peripheral vascular disease) (H); Chronic atrial fibrillation (H)      simvastatin (ZOCOR) 10 MG tablet TAKE ONE TABLET BY MOUTH AT BEDTIME  Qty: 90 tablet, Refills: 0    Associated Diagnoses: PVD (peripheral vascular disease) (H)      roflumilast (DALIRESP) 500 MCG TABS tablet Take 500 mcg by mouth daily      ipratropium - albuterol 0.5 mg/2.5 mg/3 mL (DUONEB) 0.5-2.5 (3) MG/3ML neb solution USE ONE AMPULE IN NEBULIZER EVERY 6 HOURS AS NEEDED FOR  SHORTNESS OF BREATH /DYSPNEA  OR  WHEEZING  Qty: 360 vial, Refills: 1    Associated Diagnoses: COPD (chronic obstructive pulmonary disease) (H)      cilostazol (PLETAL) 100 MG tablet TAKE ONE TABLET BY MOUTH TWICE DAILY  Qty: 180 tablet, Refills: 3    Associated Diagnoses: PVD (peripheral vascular disease) (H)      COMBIVENT RESPIMAT  MCG/ACT inhaler INHALE ONE PUFF BY MOUTH 4 TIMES DAILY *MAX 6 DOSES PER DAY*  Qty: 12 g, Refills: 0    Associated Diagnoses: Chronic obstructive pulmonary disease, unspecified COPD type (H)      tamsulosin (FLOMAX) 0.4 MG 24 hr capsule Take 1 capsule (0.4 mg) by mouth daily  Qty: 30 capsule, Refills: 1    Associated Diagnoses: BPH (benign prostatic hyperplasia)      Multiple Vitamins-Minerals (CENTRUM SILVER) per tablet Take 1 tablet by mouth daily.      aspirin (ASPIRIN LOW DOSE) 81 MG tablet Take 1 tablet by mouth daily.      loperamide (IMODIUM A-D) 2 MG tablet Take 2 mg by mouth 4 times daily as needed. Take 2 tablet by oral route after 1st loose stool and 1 tablet after each subsequent bowel movement; do not exceed 16 mg in 24 hrs. As needed.      PERFOROMIST 20 MCG/2ML neb solution USE ONE VIAL IN NEBULIZER EVERY 12 HOURS  Qty: 360 mL, Refills: 0    Associated Diagnoses: Other emphysema (H)           Allergies   No Known Allergies  Data   Most Recent 3 CBC's:  Recent Labs   Lab Test  12/03/17   0545  12/02/17   0740  12/01/17   1150   WBC  8.6  6.2  12.2*   HGB  12.0*  12.4*  14.0   MCV  95  96  96   PLT  338  307  369      Most Recent 3 BMP's:  Recent Labs   Lab Test  12/03/17   0545  12/02/17   0740  12/01/17   1150   NA  138  140  136   POTASSIUM  4.8  4.4  4.7   CHLORIDE  104  103  102   CO2  27  28  30   BUN  22  17  16   CR  0.91  0.92  0.93   ANIONGAP  7  9  4   KENDRICK  9.1  8.7  8.7   GLC  131*  122*  104*     Most Recent 2 LFT's:  Recent Labs   Lab Test  12/01/17   1150  06/20/17   0931   AST  19  18   ALT  34  31   ALKPHOS  73  78   BILITOTAL  0.5  0.3     Most  Recent INR's and Anticoagulation Dosing History:  Anticoagulation Dose History     Recent Dosing and Labs Latest Ref Rng & Units 11/15/2017 11/29/2017 12/1/2017 12/2/2017 12/3/2017 12/4/2017 12/5/2017    Warfarin 1.25 mg - - - 1.25 mg - - - -    Warfarin 4 mg - - - - 4 mg - - -    Warfarin 5 mg - - - - - - 5 mg -    Warfarin 7.5 mg - - - - - 7.5 mg - -    INR 0.80 - 1.20 - - 1.81(H) 1.53(H) 1.25(H) 1.52(H) 2.20(H)    INR 0.86 - 1.14 3.0(A) - - - - - -    INR Point of Care 0.86 - 1.14 - 5.1(HH)  2.1(H) - - - -        Most Recent 3 Troponin's:  Recent Labs   Lab Test  12/02/17   0740  12/02/17   0015  12/01/17   1150   TROPI  <0.015  <0.015  <0.015     Most Recent Cholesterol Panel:  Recent Labs   Lab Test  10/13/15   0854   CHOL  120   LDL  52   HDL  53   TRIG  73     Most Recent 6 Bacteria Isolates From Any Culture (See EPIC Reports for Culture Details):  Recent Labs   Lab Test  12/01/17   2006  12/01/17   1226  12/01/17   1150  06/20/17   1232   CULT  No MRSA isolated  No growth after 4 days  No growth after 4 days  No MRSA isolated     Most Recent TSH, T4 and A1c Labs:No lab results found.  Results for orders placed or performed during the hospital encounter of 12/01/17   XR Chest 2 Views    Narrative    PROCEDURE: XR CHEST 2 VW 12/1/2017 12:46 PM    HISTORY: Fever;     COMPARISONS: 6/20/2017.    TECHNIQUE: 2 views.    FINDINGS: Heart is stable in size. There is bilateral interstitial  change, similar to the prior exam. Lungs are hyperinflated with  flattening of the diaphragm. Degenerative change is seen within the  spine.         Impression    IMPRESSION: Chronic appearing interstitial change.    MARCELLUS HARTMAN MD

## 2017-12-05 NOTE — PHARMACY-ANTICOAGULATION SERVICE
Clinical Pharmacy- Warfarin Discharge Note  This patient is currently on warfarin for the treatment of Atrial fibrillation.  INR Goal= 2-3    Anticoagulation Dose History     Recent Dosing and Labs Latest Ref Rng & Units 11/15/2017 11/29/2017 12/1/2017 12/2/2017 12/3/2017 12/4/2017 12/5/2017    Warfarin 1.25 mg - - - 1.25 mg - - - -    Warfarin 4 mg - - - - 4 mg - - -    Warfarin 5 mg - - - - - - 5 mg -    Warfarin 7.5 mg - - - - - 7.5 mg - -    INR 0.80 - 1.20 - - 1.81(H) 1.53(H) 1.25(H) 1.52(H) 2.20(H)    INR 0.86 - 1.14 3.0(A) - - - - - -    INR Point of Care 0.86 - 1.14 - 5.1(HH)  2.1(H) - - - -        Vitamin K doses administered during the last 7 days: 0  FFP administered during the last 7 days: 0    Spoke with Zena from [facility] and relayed all relevant warfarin information to her including INRs, subsequent doses, and antibiotics/other interacting medications during inpatient hospitalization and upon discharge. Informed her that the discharge paperwork states the patient will have his INR rechecked in 2-3 days by home health nurse or at Dr. Maradiaga's office. Zena had no further questions/concerns at this time.

## 2017-12-05 NOTE — PLAN OF CARE
Face to face report given with opportunity to observe patient.    Report given to SN Rowe RN    12/5/2017  11:53 AM

## 2017-12-05 NOTE — PLAN OF CARE
Patient discharged at 11:50 AM via wheel chair accompanied by [employer]. Prescriptions sent to patients preferred pharmacy. All belongings sent with patient.     Discharge instructions reviewed with Rm. Listed belongings gathered and returned to patient. Jeans, flannel shirt, glasses, tenners, casino chips, ocean nasal spray, robitussin cough medicine.     Patient discharged to home.   Report called to NA    Core Measures and Vaccines  Core Measures applicable during stay: No. If yes, state diagnosis: NA  Pneumonia and Influenza given prior to discharge, if indicated: N/A    Surgical Patient   Surgical Procedures during stay: NA  Did patient receive discharge instruction on wound care and recognition of infection symptoms? N/A    MISC  Follow up appointment made:  No, pt wishes to make his own appointment.  Home and hospital aquired medications returned to patient: Yes  Patient reports pain was well managed at discharge: Yes

## 2017-12-05 NOTE — PROGRESS NOTES
Name: Rm Duarte    MRN#: [id number]    Reason for Hospitalization: Falls frequently [R29.6]  COPD exacerbation (H) [J44.1]  Elevated brain natriuretic peptide (BNP) level [R79.89]    Discharge Date: December 5, 2017    Patient / Family response to discharge plan: agreeable     Follow-Up Appt: Future Appointments  Date Time Provider Department Center   12/13/2017 9:00 AM NA ANTI COAGULATION [facility]       Other Providers (Care Coordinator, County Services, PCA services etc): Yes: Jimy at [facility] updated on discharge plan, discharge orders faxed to Homecare    Discharge Disposition: home      Patient asked this writer to set up a ride with [facility], he is aware that this is a private pay expense and is agreeable. He has his portable oxygen at hospital to bring with during transport. [facility]. Ride arranged with [facility] at 11:30. Patient's nurse Mi updated on patient's discharge plan/time.    [place]

## 2017-12-05 NOTE — PLAN OF CARE
Face to face report given with opportunity to observe patient.    Report given to RADHA Evans   12/4/2017  10:59 PM

## 2017-12-05 NOTE — PLAN OF CARE
Problem: Patient Care Overview  Goal: Plan of Care/Patient Progress Review  Outcome: Adequate for Discharge Date Met: 12/05/17 12/05/17 0410   OTHER   Plan Of Care Reviewed With patient   Plan of Care Review   Progress no change       Shift assessment complete as charted.  VSS, afebrile. O2 remains at 1/2 with Spo2 94%. Discharge instructions given to Rm per Epic.  Questions answered.  Denied pain at time of discharge.       Problem: Chronic Obstructive Pulmonary Disease (Adult)  Goal: Signs and Symptoms of Listed Potential Problems Will be Absent, Minimized or Managed (Chronic Obstructive Pulmonary Disease)  Signs and symptoms of listed potential problems will be absent, minimized or managed by discharge/transition of care (reference Chronic Obstructive Pulmonary Disease (Adult) CPG).   Outcome: Adequate for Discharge Date Met: 12/05/17 12/05/17 6632   Chronic Obstructive Pulmonary Disease   Problems Assessed (Chronic Obstructive Pulmonary Disease (COPD)) all   Problems Present (COPD, Bronch/Emphy) respiratory compromise

## 2017-12-05 NOTE — PROGRESS NOTES
Met with patient this morning, he asked this writer for the address and phone number for [facility]. This writer will give patient information. He also states he will likely need a ride home.

## 2017-12-05 NOTE — PLAN OF CARE
Problem: Patient Care Overview  Goal: Plan of Care/Patient Progress Review  Outcome: No Change  Reason for hospital stay:  COPD exacerbation    Most recent vitals: /76  Pulse 74  Temp 97.1  F (36.2  C) (Tympanic)  Resp 16  SpO2 (!) 91%  Pain Management:  Denies  Orientation:  A&O  Cardiac:  HR irr, Hx Afib  Respiratory:  LS diminished. 1/2LPM o2 maintaining 91%. Home O2 use of 2LPM  GI:  BS active. Denies nausea  :  Voids without difficulty  Diet: Reg  Skin Issues:  Scattered bruises and scabs  IVF:  Saline locked  ABX:  None  Mobility:  Ind in room, steady on feet  Safety:  Call light within reach    Comments:    12/5/2017  4:11 AM  Paloma Paredes    Face to face report given with opportunity to observe patient.    Report given to RADHA Stark   12/5/2017  7:25 AM

## 2017-12-06 ENCOUNTER — ANTICOAGULATION THERAPY VISIT (OUTPATIENT)
Dept: ANTICOAGULATION | Facility: OTHER | Age: 77
End: 2017-12-06
Attending: FAMILY MEDICINE
Payer: COMMERCIAL

## 2017-12-06 ENCOUNTER — TELEPHONE (OUTPATIENT)
Dept: CASE MANAGEMENT | Facility: HOSPITAL | Age: 77
End: 2017-12-06

## 2017-12-06 DIAGNOSIS — I73.9 PVD (PERIPHERAL VASCULAR DISEASE) (H): ICD-10-CM

## 2017-12-06 DIAGNOSIS — I48.20 CHRONIC ATRIAL FIBRILLATION (H): ICD-10-CM

## 2017-12-06 DIAGNOSIS — J44.9 CHRONIC OBSTRUCTIVE PULMONARY DISEASE, UNSPECIFIED COPD TYPE (H): ICD-10-CM

## 2017-12-06 DIAGNOSIS — Z79.01 LONG-TERM (CURRENT) USE OF ANTICOAGULANTS: ICD-10-CM

## 2017-12-06 DIAGNOSIS — Z79.01 LONG TERM CURRENT USE OF ANTICOAGULANT THERAPY: Primary | ICD-10-CM

## 2017-12-06 LAB — INR POINT OF CARE: 2.1 (ref 0.86–1.14)

## 2017-12-06 PROCEDURE — 85610 PROTHROMBIN TIME: CPT | Mod: QW,ZL

## 2017-12-06 RX ORDER — WARFARIN SODIUM 2.5 MG/1
TABLET ORAL
Qty: 115 TABLET | Refills: 3 | Status: SHIPPED | OUTPATIENT
Start: 2017-12-06 | End: 2018-02-16

## 2017-12-06 NOTE — MR AVS SNAPSHOT
Rm Duarte   12/6/2017 10:00 AM   Anticoagulation Therapy Visit    Description:  76 year old male   Provider:   ANTI COAGULATION   Department:  [facility]           INR as of 12/6/2017     Today's INR 2.1      Anticoagulation Summary as of 12/6/2017     INR goal 2.0-3.0   Today's INR 2.1   Full instructions 12/7: 2.5 mg; 12/9: 2.5 mg; Otherwise 2.5 mg on Mon, Wed, Fri; 3.75 mg all other days   Next INR check 12/13/2017    Indications   Chronic atrial fibrillation (H) [I48.2]  PVD (peripheral vascular disease) (H) [I73.9]  Long-term (current) use of anticoagulants [Z79.01] [Z79.01]         Your next Anticoagulation Clinic appointment(s)     Dec 06, 2017 10:00 AM CST   Anticoagulation Visit with [facility] ANTI COAGULATION   [facility] ([facility] )    [address]  [facility] [place] [address]   [phone]            Dec 13, 2017  9:00 AM CST   Anticoagulation Visit with NA ANTI COAGULATION   [facility] ([facility] )    [address]  [facility] [place] [address]   [phone]              December 2017 Details    Sun Mon Tue Wed Thu Fri Sat          1               2                 3               4               5               6      2.5 mg   See details      7      2.5 mg         8      2.5 mg         9      2.5 mg           10      3.75 mg         11      2.5 mg         12      3.75 mg         13            14               15               16                 17               18               19               20               21               22               23                 24               25               26               27               28               29               30                 31                      Date Details   12/06 This INR check       Date of next INR:  12/13/2017         How to take your warfarin dose     To take:  2.5 mg Take 1 of the 2.5 mg tablets.    To take:  3.75 mg Take 1.5 of the 2.5 mg tablets.

## 2017-12-06 NOTE — PROGRESS NOTES
"  ANTICOAGULATION FOLLOW-UP CLINIC VISIT    Patient Name:  Rm Duarte  Date:  12/6/2017  Contact Type:  Face to Face    SUBJECTIVE:     Patient Findings     Positives Change in medications, Hospital admission    Comments COPD Exacerbation-hospital discharge 12/5/17.  Was given Prednisone while an inpt.  Pt discharged on Prednisone 30mg daily x 3 days; 20mg daily x 3 d; 10mg daily x 3 days, which began today.  Pt states homecare was suggested; pt declined this service at this time, due to the need to be homebound.  Pt states he \"is not ready to go that route yet - it would be like giving everything up\".  States he will be working on checking into [facility].  States he has a card to complete to secure an appt with the local [facility] .  Pt informed that if he receives [facility] the [facility], their program requires to manage the AC therapy portion.  Pt informed he can opt out of that participation by not filling his Coumadin prescription via the [facility], but is his choice to decide.  Pt stated understanding.             OBJECTIVE    INR Protime   Date Value Ref Range Status   12/06/2017 2.1 (A) 0.86 - 1.14 Final       ASSESSMENT / PLAN  INR assessment THER    Recheck INR In: 1 WEEK    INR Location Clinic      Anticoagulation Summary as of 12/6/2017     INR goal 2.0-3.0   Today's INR 2.1   Maintenance plan 2.5 mg (2.5 mg x 1) on Mon, Wed, Fri; 3.75 mg (2.5 mg x 1.5) all other days   Full instructions 12/7: 2.5 mg; 12/9: 2.5 mg; Otherwise 2.5 mg on Mon, Wed, Fri; 3.75 mg all other days   Weekly total 22.5 mg   Plan last modified Zena Burden RN (11/15/2017)   Next INR check 12/13/2017   Priority INR   Target end date Indefinite    Indications   Chronic atrial fibrillation (H) [I48.2]  PVD (peripheral vascular disease) (H) [I73.9]  Long-term (current) use of anticoagulants [Z79.01] [Z79.01]         Anticoagulation Episode Summary     INR check location     Preferred lab     Send INR " reminders to [facility] ANTICOAG POOL    Comments takes Azithromycin 250mg daily long term      Anticoagulation Care Providers     Provider Role Specialty Phone number    Doimnguez Maradiaga MD [facility] [phone]            See the Encounter Report to view Anticoagulation Flowsheet and Dosing Calendar (Go to Encounters tab in chart review, and find the Anticoagulation Therapy Visit)        Zena Burden RN

## 2017-12-07 DIAGNOSIS — N40.0 BPH (BENIGN PROSTATIC HYPERPLASIA): ICD-10-CM

## 2017-12-07 LAB
BACTERIA SPEC CULT: NORMAL
BACTERIA SPEC CULT: NORMAL
Lab: NORMAL
SPECIMEN SOURCE: NORMAL
SPECIMEN SOURCE: NORMAL

## 2017-12-07 NOTE — TELEPHONE ENCOUNTER
Combivent      Last Written Prescription Date: 1/27/17  Last Fill Quantity: 12g,  # refills: 0   Last Office Visit with [facility], [facility] or [facility] prescribing provider: 7/13/17                                         Next 5 appointments (look out 90 days)     Dec 12, 2017 11:15 AM CST   (Arrive by 11:00 AM)   SHORT with Dominguez Maradiaga MD   [facility] ([facility] )    [address]  [facility] [address]   [phone]

## 2017-12-07 NOTE — TELEPHONE ENCOUNTER
Flomax       Last Written Prescription Date: 10/12/15  Last Fill Quantity: 30,  # refills: 1   Last Office Visit with [facility], [facility] or [facility] prescribing provider: 7/13/17                                         Next 5 appointments (look out 90 days)     Dec 12, 2017 11:15 AM CST   (Arrive by 11:00 AM)   SHORT with Dominguez Maradiaga MD   [facility] ([facility] )    [address] [facility] [address]   [phone]

## 2017-12-08 RX ORDER — IPRATROPIUM BROMIDE AND ALBUTEROL 20; 100 UG/1; UG/1
SPRAY, METERED RESPIRATORY (INHALATION)
Qty: 12 G | Refills: 5 | Status: SHIPPED | OUTPATIENT
Start: 2017-12-08 | End: 2020-01-07

## 2017-12-08 RX ORDER — TAMSULOSIN HYDROCHLORIDE 0.4 MG/1
0.4 CAPSULE ORAL DAILY
Qty: 30 CAPSULE | Refills: 6 | Status: SHIPPED | OUTPATIENT
Start: 2017-12-08 | End: 2018-09-24

## 2017-12-09 ENCOUNTER — TELEPHONE (OUTPATIENT)
Dept: CASE MANAGEMENT | Facility: HOSPITAL | Age: 77
End: 2017-12-09

## 2017-12-11 ENCOUNTER — CARE COORDINATION (OUTPATIENT)
Dept: CARE COORDINATION | Facility: OTHER | Age: 77
End: 2017-12-11

## 2017-12-11 NOTE — PROGRESS NOTES
Clinic Care Coordination Contact  [facility]/Voicemail    Referral Source: Care Team  Clinical Data: Care Coordinator Outreach  Outreach attempted x 1.  Left message on voicemail with call back information and requested return call.  Plan: Care Coordinator mailed out care coordination introduction letter on 12/4/17. Care Coordinator will try to reach patient again in 3-5 business days.  VENESSA Collier-RN  CHF and General Care Coordinator  [phone] Option # 1

## 2017-12-12 ENCOUNTER — OFFICE VISIT (OUTPATIENT)
Dept: FAMILY MEDICINE | Facility: OTHER | Age: 77
End: 2017-12-12
Attending: FAMILY MEDICINE
Payer: COMMERCIAL

## 2017-12-12 ENCOUNTER — TELEPHONE (OUTPATIENT)
Dept: FAMILY MEDICINE | Facility: OTHER | Age: 77
End: 2017-12-12

## 2017-12-12 VITALS
WEIGHT: 203.6 LBS | RESPIRATION RATE: 24 BRPM | OXYGEN SATURATION: 94 % | HEIGHT: 71 IN | TEMPERATURE: 97.6 F | DIASTOLIC BLOOD PRESSURE: 54 MMHG | SYSTOLIC BLOOD PRESSURE: 117 MMHG | BODY MASS INDEX: 28.5 KG/M2 | HEART RATE: 91 BPM

## 2017-12-12 DIAGNOSIS — J44.1 COPD EXACERBATION (H): Primary | ICD-10-CM

## 2017-12-12 DIAGNOSIS — R35.1 BENIGN PROSTATIC HYPERPLASIA WITH NOCTURIA: ICD-10-CM

## 2017-12-12 DIAGNOSIS — N40.1 BENIGN PROSTATIC HYPERPLASIA WITH NOCTURIA: ICD-10-CM

## 2017-12-12 PROCEDURE — 99212 OFFICE O/P EST SF 10 MIN: CPT

## 2017-12-12 PROCEDURE — 99213 OFFICE O/P EST LOW 20 MIN: CPT | Performed by: FAMILY MEDICINE

## 2017-12-12 RX ORDER — PREDNISONE 20 MG/1
TABLET ORAL
Qty: 20 TABLET | Refills: 3 | Status: SHIPPED | OUTPATIENT
Start: 2017-12-12 | End: 2018-01-29

## 2017-12-12 ASSESSMENT — ANXIETY QUESTIONNAIRES
GAD7 TOTAL SCORE: 0
7. FEELING AFRAID AS IF SOMETHING AWFUL MIGHT HAPPEN: NOT AT ALL
5. BEING SO RESTLESS THAT IT IS HARD TO SIT STILL: NOT AT ALL
3. WORRYING TOO MUCH ABOUT DIFFERENT THINGS: NOT AT ALL
IF YOU CHECKED OFF ANY PROBLEMS ON THIS QUESTIONNAIRE, HOW DIFFICULT HAVE THESE PROBLEMS MADE IT FOR YOU TO DO YOUR WORK, TAKE CARE OF THINGS AT HOME, OR GET ALONG WITH OTHER PEOPLE: NOT DIFFICULT AT ALL
2. NOT BEING ABLE TO STOP OR CONTROL WORRYING: NOT AT ALL
1. FEELING NERVOUS, ANXIOUS, OR ON EDGE: NOT AT ALL
6. BECOMING EASILY ANNOYED OR IRRITABLE: NOT AT ALL

## 2017-12-12 ASSESSMENT — PATIENT HEALTH QUESTIONNAIRE - PHQ9
SUM OF ALL RESPONSES TO PHQ QUESTIONS 1-9: 0
5. POOR APPETITE OR OVEREATING: NOT AT ALL

## 2017-12-12 ASSESSMENT — PAIN SCALES - GENERAL: PAINLEVEL: NO PAIN (0)

## 2017-12-12 NOTE — PROGRESS NOTES
Rm Duarte    December 12, 2017    Chief Complaint   Patient presents with     Hospital F/U     Pt is in for a FU after hospitalization on 12/01/17 for COPD, chronic bronchitis, elevated BNP and frequent falls. Pt has improved. He has not been using O2 at home and Os at 93 -94%.       SUBJECTIVE:  Here for f/u.  Doing well.  Breathing back to baseline.  We discussed palliative care which he does not want.  The flomax isn't helping at all and we discussed.  See below.      Past Medical History:   Diagnosis Date     Atrial fibrillation (H) 3/23/2012     COPD 5/16/2011     PVD (peripheral vascular disease) (H)        Past Surgical History:   Procedure Laterality Date     COPD:FEV1-0.74/FVC-3.35 22%, DLCO-28%  3/1/2010    Severe COPD; 4/23/2008 PFT's done : 0.96/3.81 25% Fev1, DLCO 45%.  1991 were 4.31/6.20!!!!!!!??     ECHO: TDS, EF~50%, abnl septum, o/w NORMAL  5/20/2011     PAD: CTA> occlusion of L mid SFA with reconstitution  12/2/2010     S/P colonoscopy: tubular adenoma & tics  5/4/2010    Repeat in 2014; Dr Graham     Tobacco Abuse Ongoing         Current Outpatient Prescriptions   Medication Sig Dispense Refill     predniSONE (DELTASONE) 20 MG tablet Take 3 tabs (60 mg) by mouth daily x 3 days, 2 tabs (40 mg) daily x 3 days, 1 tab (20 mg) daily x 3 days, then 1/2 tab (10 mg) x 3 days. 20 tablet 3     COMBIVENT RESPIMAT  MCG/ACT inhaler INHALE ONE PUFF BY MOUTH 4 TIMES DAILY. MAX  SIX  DOSES  PER  DAY 12 g 5     tamsulosin (FLOMAX) 0.4 MG capsule Take 1 capsule (0.4 mg) by mouth daily 30 capsule 6     warfarin (COUMADIN) 2.5 MG tablet 2.5mg Mon,Wed,Fri and 3.75mg all other days or as directed by warfarin clinic 115 tablet 3     predniSONE (DELTASONE) 10 MG tablet 3 tabs PO daily x3, then 2 tabs PO daily x3, then 1 tabs PO daily x3, then stop 18 tablet 0     sodium chloride (OCEAN) 0.65 % nasal spray Spray 1 spray into both nostrils every hour as needed for congestion 1 Bottle 0     azithromycin  (ZITHROMAX) 250 MG tablet Take 1 tablet (250 mg) by mouth daily 30 tablet 11     PERFOROMIST 20 MCG/2ML neb solution USE ONE VIAL IN NEBULIZER EVERY 12 HOURS 360 mL 0     Dextromethorphan-Guaifenesin (ROBITUSSIN COUGH+CHEST EDVIN DM) 5-100 MG/5ML LIQD Take 20 mLs by mouth every 4 hours as needed       budesonide (PULMICORT) 0.25 MG/2ML neb solution Take 0.25 mg by nebulization 2 times daily       simvastatin (ZOCOR) 10 MG tablet TAKE ONE TABLET BY MOUTH AT BEDTIME 90 tablet 0     roflumilast (DALIRESP) 500 MCG TABS tablet Take 500 mcg by mouth daily       ipratropium - albuterol 0.5 mg/2.5 mg/3 mL (DUONEB) 0.5-2.5 (3) MG/3ML neb solution USE ONE AMPULE IN NEBULIZER EVERY 6 HOURS AS NEEDED FOR SHORTNESS OF BREATH /DYSPNEA  OR  WHEEZING 360 vial 1     cilostazol (PLETAL) 100 MG tablet TAKE ONE TABLET BY MOUTH TWICE DAILY 180 tablet 3     Multiple Vitamins-Minerals (CENTRUM SILVER) per tablet Take 1 tablet by mouth daily.       aspirin (ASPIRIN LOW DOSE) 81 MG tablet Take 1 tablet by mouth daily.       loperamide (IMODIUM A-D) 2 MG tablet Take 2 mg by mouth 4 times daily as needed. Take 2 tablet by oral route after 1st loose stool and 1 tablet after each subsequent bowel movement; do not exceed 16 mg in 24 hrs. As needed.         No Known Allergies    Family History   Problem Relation Age of Onset     Other - See Comments Mother      AAA, cause of death     Other - See Comments Other 69     AAA, family hx     Chronic Obstructive Pulmonary Disease Brother       MI age 60's     Family History Negative Sister        Social History     Social History     Marital status: Single     Spouse name: N/A     Number of children: N/A     Years of education: N/A     Occupational History      Retired     /[employer]     Social History Main Topics     Smoking status: Former Smoker     Types: Cigarettes     Smokeless tobacco: Never Used      Comment: Tried to quit; Quit      Alcohol use 0.0 oz/week     0  Standard drinks or equivalent per week      Comment: Beer; rarely     Drug use: Not on file     Sexual activity: Not on file     Other Topics Concern     Blood Transfusions Yes     Caffeine Concern No     Parent/Sibling W/ Cabg, Mi Or Angioplasty Before 65f 55m? No     Social History Narrative       5 point ROS negative except as noted above in HPI, including Gen., Resp., CV, GI &  system review.     OBJECTIVE:  B/P: 117/54, Temperature: 97.6, Pulse: 91, Respirations: 24    GENERAL APPEARANCE: Alert, no acute distress  CV: regular rate and rhythm, no murmur, rub or gallop  RESP: ldecreased breath sounds throughout.   ABDOMEN: normal bowel sounds, soft, nontender, no hepatosplenomegaly or other masses  SKIN: no suspicious lesions or rashes to visualized skin  NEURO: Alert, oriented x 3, speech and mentation normal    ASSESSMENT and PLAN:  (J44.1) COPD exacerbation (H)  (primary encounter diagnosis)  Comment: improved.   Plan: predniSONE (DELTASONE) 20 MG tablet, US Aorta Medicare AAA Screening        Prednisone sent to keep on hand.  Update aaa screen.  F/u routine.      (N40.1,  R35.1) Benign prostatic hyperplasia with nocturia  Comment: as above.   Plan: no help.  Give it a couple of weeks and stop if no help.

## 2017-12-12 NOTE — MR AVS SNAPSHOT
After Visit Summary   12/12/2017    Rm Duarte    MRN: [id number]           Patient Information     Date Of Birth          1940        Visit Information        Provider Department      12/12/2017 11:15 AM Dominguez Maradiaga MD [facility]        Today's Diagnoses     COPD exacerbation (H)    -  1    Benign prostatic hyperplasia with nocturia          Care Instructions    F/u with ongoing concerns.           Follow-ups after your visit        Your next 10 appointments already scheduled     Dec 13, 2017  9:00 AM CST   Anticoagulation Visit with NA ANTI COAGULATION   [facility] ([facility] )    [address]  [facility] [address]   [phone]            Dec 15, 2017  8:00 AM CST    AORTA MEDICARE AAA SCREENING with HIUS2   [facility] ULTRASOUND ([facility] )    [address] [place] [address] [phone]           Please bring a list of your medicines (including vitamins, minerals and over-the-counter drugs). Also, tell your doctor about any allergies you may have. Wear comfortable clothes and leave your valuables at home.  Adults: No eating or drinking for 8 hours before the exam. You may take medicine with a small sip of water.  Children: - Children 6+ years: No food or drink for 6 hours before exam. - Children 1-5 years: No food or drink for 4 hours before exam. - Infants, breast-fed: may have breast milk up to 2 hours before exam. - Infants, formula: may have bottle until 4 hours before exam.  Please call the Imaging Department at your exam site with any questions.              Future tests that were ordered for you today     Open Future Orders        Priority Expected Expires Ordered     Aorta Medicare AAA Screening Routine  12/12/2018 12/12/2017            Who to contact     If you have questions or need follow up information about today's clinic visit or your schedule please contact [facility] directly  "at [phone].  Normal or non-critical lab and imaging results will be communicated to you by MyChart, letter or phone within 4 business days after the clinic has received the results. If you do not hear from us within 7 days, please contact the clinic through MyChart or phone. If you have a critical or abnormal lab result, we will notify you by phone as soon as possible.  Submit refill requests through [employer] or call your pharmacy and they will forward the refill request to us. Please allow 3 business days for your refill to be completed.          Additional Information About Your Visit        Care EveryWhere ID     This is your Care EveryWhere ID. This could be used by other organizations to access your [facility] medical records  [id number]        Your Vitals Were     Pulse Temperature Respirations Height Pulse Oximetry BMI (Body Mass Index)    91 97.6  F (36.4  C) (Tympanic) 24 5' 11\" (1.803 m) 94% 28.4 kg/m2       Blood Pressure from Last 3 Encounters:   12/12/17 117/54   12/05/17 118/70   07/13/17 138/60    Weight from Last 3 Encounters:   12/12/17 203 lb 9.6 oz (92.4 kg)   07/13/17 207 lb (93.9 kg)   07/05/17 195 lb (88.5 kg)                 Today's Medication Changes          These changes are accurate as of: 12/12/17  1:07 PM.  If you have any questions, ask your nurse or doctor.               These medicines have changed or have updated prescriptions.        Dose/Directions    * predniSONE 10 MG tablet   Commonly known as:  DELTASONE   This may have changed:  Another medication with the same name was added. Make sure you understand how and when to take each.   Used for:  COPD exacerbation (H)        3 tabs PO daily x3, then 2 tabs PO daily x3, then 1 tabs PO daily x3, then stop   Quantity:  18 tablet   Refills:  0       * predniSONE 20 MG tablet   Commonly known as:  DELTASONE   This may have changed:  You were already taking a medication with the same name, and this prescription was added. Make sure " you understand how and when to take each.   Used for:  COPD exacerbation (H)   Changed by:  Dominguez Maradiaga MD        Take 3 tabs (60 mg) by mouth daily x 3 days, 2 tabs (40 mg) daily x 3 days, 1 tab (20 mg) daily x 3 days, then 1/2 tab (10 mg) x 3 days.   Quantity:  20 tablet   Refills:  3       * Notice:  This list has 2 medication(s) that are the same as other medications prescribed for you. Read the directions carefully, and ask your doctor or other care provider to review them with you.         Where to get your medicines      These medications were sent to [id number] - [place], [place] - [address]   [address] , [place] [address]     Phone:  [phone]     predniSONE 20 MG tablet                Primary Care Provider Office Phone # Fax #    Dominguez Maradiaga [phone]       [facility] [address]  [facility] [place] [address]        Equal Access to Services     [facility] AH: Hadii aad ku hadasho Soomaali, waaxda luqadaha, qaybta kaalmada adeegyada, waxay idiin hayaan adeeg kharash la'lore . So [facility] [place] [phone].    ATENCIÓN: Si habla español, tiene a dhillon disposición servicios gratuitos de asistencia lingüística. Llame al [phone].    We comply with applicable federal civil rights laws and [place] laws. We do not discriminate on the basis of race, color, national origin, age, disability, sex, sexual orientation, or gender identity.            Thank you!     Thank you for choosing [facility]  for your care. Our goal is always to provide you with excellent care. Hearing back from our patients is one way we can continue to improve our services. Please take a few minutes to complete the written survey that you may receive in the mail after your visit with us. Thank you!             Your Updated Medication List - Protect others around you: Learn how to safely use, store and throw away your medicines at www.disposemymeds.org.          This list is accurate  as of: 12/12/17  1:07 PM.  Always use your most recent med list.                   Brand Name Dispense Instructions for use Diagnosis    ASPIRIN LOW DOSE 81 MG tablet   Generic drug:  aspirin      Take 1 tablet by mouth daily.        azithromycin 250 MG tablet    ZITHROMAX    30 tablet    Take 1 tablet (250 mg) by mouth daily    Obstructive chronic bronchitis with exacerbation (H)       budesonide 0.25 MG/2ML neb solution    PULMICORT     Take 0.25 mg by nebulization 2 times daily        CENTRUM SILVER per tablet      Take 1 tablet by mouth daily.        cilostazol 100 MG tablet    PLETAL    180 tablet    TAKE ONE TABLET BY MOUTH TWICE DAILY    PVD (peripheral vascular disease) (H)       DALIRESP 500 MCG Tabs tablet   Generic drug:  roflumilast      Take 500 mcg by mouth daily        IMODIUM A-D 2 MG tablet   Generic drug:  loperamide      Take 2 mg by mouth 4 times daily as needed. Take 2 tablet by oral route after 1st loose stool and 1 tablet after each subsequent bowel movement; do not exceed 16 mg in 24 hrs. As needed.        * ipratropium - albuterol 0.5 mg/2.5 mg/3 mL 0.5-2.5 (3) MG/3ML neb solution    DUONEB    360 vial    USE ONE AMPULE IN NEBULIZER EVERY 6 HOURS AS NEEDED FOR SHORTNESS OF BREATH /DYSPNEA  OR  WHEEZING    COPD (chronic obstructive pulmonary disease) (H)       * COMBIVENT RESPIMAT  MCG/ACT inhaler   Generic drug:  Ipratropium-Albuterol     12 g    INHALE ONE PUFF BY MOUTH 4 TIMES DAILY. MAX  SIX  DOSES  PER  DAY    Chronic obstructive pulmonary disease, unspecified COPD type (H)       PERFOROMIST 20 MCG/2ML neb solution   Generic drug:  formoterol     360 mL    USE ONE VIAL IN NEBULIZER EVERY 12 HOURS    Other emphysema (H)       * predniSONE 10 MG tablet    DELTASONE    18 tablet    3 tabs PO daily x3, then 2 tabs PO daily x3, then 1 tabs PO daily x3, then stop    COPD exacerbation (H)       * predniSONE 20 MG tablet    DELTASONE    20 tablet    Take 3 tabs (60 mg) by mouth daily x 3  days, 2 tabs (40 mg) daily x 3 days, 1 tab (20 mg) daily x 3 days, then 1/2 tab (10 mg) x 3 days.    COPD exacerbation (H)       ROBITUSSIN COUGH+CHEST EDVIN DM 5-100 MG/5ML Liqd   Generic drug:  Dextromethorphan-Guaifenesin      Take 20 mLs by mouth every 4 hours as needed        simvastatin 10 MG tablet    ZOCOR    90 tablet    TAKE ONE TABLET BY MOUTH AT BEDTIME    PVD (peripheral vascular disease) (H)       sodium chloride 0.65 % nasal spray    OCEAN    1 Bottle    Spray 1 spray into both nostrils every hour as needed for congestion    COPD exacerbation (H)       tamsulosin 0.4 MG capsule    FLOMAX    30 capsule    Take 1 capsule (0.4 mg) by mouth daily    BPH (benign prostatic hyperplasia)       warfarin 2.5 MG tablet    COUMADIN    115 tablet    2.5mg Mon,Wed,Fri and 3.75mg all other days or as directed by warfarin clinic    Long term current use of anticoagulant therapy       * Notice:  This list has 4 medication(s) that are the same as other medications prescribed for you. Read the directions carefully, and ask your doctor or other care provider to review them with you.

## 2017-12-12 NOTE — TELEPHONE ENCOUNTER
Jimy Cadena from [facility] calls they have not been able to reach the pt to discuss palliative care referral. Pt did not think he would qualify for home care, but would most likely qualify for Paliiative care.

## 2017-12-12 NOTE — TELEPHONE ENCOUNTER
I spoke to the pt at his appt. He is not interested in services at this time and is going to [facility] with [facility] getting [facility]. I called Jimy and notified.

## 2017-12-12 NOTE — NURSING NOTE
"Chief Complaint   Patient presents with     Hospital F/U     Pt is in for a FU after hospitalization on 12/01/17 for COPD, chronic bronchitis, elevated BNP and frequent falls. Pt has improved. He has not been using O2 at home and Os at 93 -94%.       Initial /54 (BP Location: Left arm, Patient Position: Chair, Cuff Size: Adult Regular)  Pulse 91  Temp 97.6  F (36.4  C) (Tympanic)  Resp 24  Ht 5' 11\" (1.803 m)  Wt 203 lb 9.6 oz (92.4 kg)  SpO2 94%  BMI 28.4 kg/m2 Estimated body mass index is 28.4 kg/(m^2) as calculated from the following:    Height as of this encounter: 5' 11\" (1.803 m).    Weight as of this encounter: 203 lb 9.6 oz (92.4 kg).  Medication Reconciliation: complete   Joann Walls    "

## 2017-12-13 ENCOUNTER — ANTICOAGULATION THERAPY VISIT (OUTPATIENT)
Dept: ANTICOAGULATION | Facility: OTHER | Age: 77
End: 2017-12-13
Attending: FAMILY MEDICINE
Payer: COMMERCIAL

## 2017-12-13 DIAGNOSIS — Z79.01 LONG-TERM (CURRENT) USE OF ANTICOAGULANTS: ICD-10-CM

## 2017-12-13 DIAGNOSIS — I73.9 PVD (PERIPHERAL VASCULAR DISEASE) (H): ICD-10-CM

## 2017-12-13 DIAGNOSIS — I48.20 CHRONIC ATRIAL FIBRILLATION (H): ICD-10-CM

## 2017-12-13 LAB — INR POINT OF CARE: 1.3 (ref 0.86–1.14)

## 2017-12-13 PROCEDURE — 85610 PROTHROMBIN TIME: CPT | Mod: QW,ZL

## 2017-12-13 ASSESSMENT — ANXIETY QUESTIONNAIRES: GAD7 TOTAL SCORE: 0

## 2017-12-13 NOTE — MR AVS SNAPSHOT
Rm Duarte   12/13/2017 9:00 AM   Anticoagulation Therapy Visit    Description:  76 year old male   Provider:  [facility]   Department:  [facility]           INR as of 12/13/2017     Today's INR 1.3!      Anticoagulation Summary as of 12/13/2017     INR goal 2.0-3.0   Today's INR 1.3!   Full instructions 12/13: 3.75 mg; 12/15: 3.75 mg; Otherwise 2.5 mg on Mon, Wed, Fri; 3.75 mg all other days   Next INR check 12/27/2017    Indications   Chronic atrial fibrillation (H) [I48.2]  PVD (peripheral vascular disease) (H) [I73.9]  Long-term (current) use of anticoagulants [Z79.01] [Z79.01]         December 2017 Details    Sun Mon Tue Wed Thu Fri Sat          1               2                 3               4               5               6               7               8               9                 10               11               12               13      3.75 mg   See details      14      3.75 mg         15      3.75 mg         16      3.75 mg           17      3.75 mg         18      2.5 mg         19      3.75 mg         20      2.5 mg         21      3.75 mg         22      2.5 mg         23      3.75 mg           24      3.75 mg         25      2.5 mg         26      3.75 mg         27            28               29               30                 31                      Date Details   12/13 This INR check       Date of next INR:  12/27/2017         How to take your warfarin dose     To take:  2.5 mg Take 1 of the 2.5 mg tablets.    To take:  3.75 mg Take 1.5 of the 2.5 mg tablets.

## 2017-12-13 NOTE — PROGRESS NOTES
ANTICOAGULATION FOLLOW-UP CLINIC VISIT    Patient Name:  Rm Duarte  Date:  12/13/2017  Contact Type:  Face to Face    SUBJECTIVE:     Patient Findings     Positives Change in medications    Comments Last day Prednisone 12/14/17.             OBJECTIVE    INR Protime   Date Value Ref Range Status   12/13/2017 1.3 (A) 0.86 - 1.14 Final       ASSESSMENT / PLAN  INR assessment SUB    Recheck INR In: 2 WEEKS    INR Location Clinic      Anticoagulation Summary as of 12/13/2017     INR goal 2.0-3.0   Today's INR 1.3!   Maintenance plan 2.5 mg (2.5 mg x 1) on Mon, Wed, Fri; 3.75 mg (2.5 mg x 1.5) all other days   Full instructions 12/13: 3.75 mg; 12/15: 3.75 mg; Otherwise 2.5 mg on Mon, Wed, Fri; 3.75 mg all other days   Weekly total 22.5 mg   Plan last modified Zena Burden RN (11/15/2017)   Next INR check 12/27/2017   Priority INR   Target end date Indefinite    Indications   Chronic atrial fibrillation (H) [I48.2]  PVD (peripheral vascular disease) (H) [I73.9]  Long-term (current) use of anticoagulants [Z79.01] [Z79.01]         Anticoagulation Episode Summary     INR check location     Preferred lab     Send INR reminders to [facility] ANTICOAG POOL    Comments takes Azithromycin 250mg daily long term      Anticoagulation Care Providers     Provider Role Specialty Phone number    Dominguez Maradiaga MD [facility] [phone]            See the Encounter Report to view Anticoagulation Flowsheet and Dosing Calendar (Go to Encounters tab in chart review, and find the Anticoagulation Therapy Visit)        Zena Burden RN

## 2017-12-15 ENCOUNTER — HOSPITAL ENCOUNTER (OUTPATIENT)
Dept: ULTRASOUND IMAGING | Facility: HOSPITAL | Age: 77
Discharge: HOME | End: 2017-12-15
Attending: FAMILY MEDICINE | Admitting: FAMILY MEDICINE
Payer: COMMERCIAL

## 2017-12-15 DIAGNOSIS — J44.1 COPD EXACERBATION (H): ICD-10-CM

## 2017-12-15 PROCEDURE — 76706 US ABDL AORTA SCREEN AAA: CPT | Mod: TC

## 2017-12-18 DIAGNOSIS — J44.9 COPD (CHRONIC OBSTRUCTIVE PULMONARY DISEASE) (H): ICD-10-CM

## 2017-12-19 ENCOUNTER — CARE COORDINATION (OUTPATIENT)
Dept: CARE COORDINATION | Facility: OTHER | Age: 77
End: 2017-12-19

## 2017-12-19 NOTE — PROGRESS NOTES
Clinic Care Coordination Contact  OUTREACH    Referral Information:  Referral Source: Care Team  Reason for Contact: Called patient to enroll into CC for COPD this date and he accepted.                Clinical Concerns:  Current Medical Concerns: COPD    Current Behavioral Concerns: Denies at this time    Education Provided to patient: COPD Action plan-has plan by phone and checks it daily   Clinical Pathway Name: COPD  Clinical Pathway: Clinic Care Coordination COPD Follow up Assessment  Symptom Review:    Are you having any increased shortness of breath? Yes  When you cough are you coughing up anything? Yes not too much  Color white  Consistency thin  Frequency daily  What COPD Zone currently in:Green  What number would you call if you were in the YELLOW zones: CC patient read back number     Medications:   Were you started on any new medications since the last time we talked?  Yes, Flomax and it is hard to say if this is working or not.  Do you have all of your medications? Yes  Have you had trouble filling your prescriptions? No  Are you medications effective in controlling your symptoms? Yes but the inhalers not so much  Are you still on Prednisone (* does pt understand the tapering instructions)?  No but does have Rx is he needs it  For patients with DM:     Are you monitoring your blood sugars, if so how are your blood sugars?N/A  How often have you had to use your rescue medications? Daily    Oxygen/DME  What is the current liter flow you are using? 2 L NC at HS  Has there been any changes? No      Activity:  How much activity can you do before you are SOB? 50 ft and I am out of breath  Have you had to reduce your activities because of dyspnea or other symptoms? yes     Diet:  Do you weigh yourself daily? No    Has there been a recent change in your weight? Held pretty steady per patient     Emotions/Lifestyle:    Do you smoke (if not asked upon initial assessment)? No  If so, how many cigarettes a day are  you smoking? N/a  Would you like to try to quit smoking? N/a    Follow Up Plan:  Care Coordinator follow up plan: monthly calls            Medication Management:  All med's current and no refills at this time. Talked about the 340B program and [facility]. Jaswant .     Functional Status:  Mobility Status: Independent  Equipment Currently Used at Home: raised toilet, shower chair, oxygen  Transportation: Still drives           Psychosocial:  Current living arrangement:: I live alone  Financial/Insurance: Denies any at this time.        Resources and Interventions:  Current Resources:  ;          Advanced Care Plans/Directives on file:: In process        Goals: To stay in the G[facility] for COPD                   Patient/Caregiver understanding: patient given Smita name as Care Coordination and understands that he can call care coordination anytime with concerns or questions.              Plan: follow up monthly.    JACKELIN CollierN-RN  CHF and General Care Coordinator  [phone] Option # 1

## 2017-12-20 RX ORDER — IPRATROPIUM BROMIDE AND ALBUTEROL SULFATE 2.5; .5 MG/3ML; MG/3ML
SOLUTION RESPIRATORY (INHALATION)
Qty: 360 ML | Refills: 1 | Status: SHIPPED | OUTPATIENT
Start: 2017-12-20 | End: 2018-04-23

## 2017-12-20 NOTE — TELEPHONE ENCOUNTER
IPRATROPIUM/ ALBUTER SOL    Last Written Prescription Date: 7/31/2017  Last Fill Quantity: 360 vial,  # refills: 1   Last Office Visit with G, [facility] or [facility] prescribing provider: 12/12/2017

## 2017-12-28 ENCOUNTER — ANTICOAGULATION THERAPY VISIT (OUTPATIENT)
Dept: ANTICOAGULATION | Facility: OTHER | Age: 77
End: 2017-12-28
Payer: COMMERCIAL

## 2017-12-28 DIAGNOSIS — I48.20 CHRONIC ATRIAL FIBRILLATION (H): ICD-10-CM

## 2017-12-28 DIAGNOSIS — Z79.01 LONG-TERM (CURRENT) USE OF ANTICOAGULANTS: ICD-10-CM

## 2017-12-28 DIAGNOSIS — I73.9 PVD (PERIPHERAL VASCULAR DISEASE) (H): ICD-10-CM

## 2017-12-28 LAB — INR BLD: 1.7 (ref 0.86–1.14)

## 2017-12-28 PROCEDURE — 85610 PROTHROMBIN TIME: CPT | Mod: QW,ZL | Performed by: FAMILY MEDICINE

## 2017-12-28 PROCEDURE — 36416 COLLJ CAPILLARY BLOOD SPEC: CPT | Mod: ZL | Performed by: FAMILY MEDICINE

## 2017-12-28 NOTE — PROGRESS NOTES
ANTICOAGULATION FOLLOW-UP CLINIC VISIT    Patient Name:  Rm Duarte  Date:  12/28/2017  Contact Type:  Telephone    SUBJECTIVE:     Patient Findings     Positives No Problem Findings    Comments Call placed to patient home phone and spoke to patient re: INR result, warfarin dosing and INR recheck date. He states he is not on prednisone and only taking the daily zithromax as previous.  He verbalized understanding of warfarin dosing and INR recheck date and has no questions.            OBJECTIVE    INR Point of Care   Date Value Ref Range Status   12/28/2017 1.7 (H) 0.86 - 1.14 Final     Comment:     This test is intended for monitoring Coumadin therapy.  Results are not   accurate in patients with prolonged INR due to factor deficiency.         ASSESSMENT / PLAN  INR assessment SUB    Recheck INR In: 2 WEEKS    INR Location Clinic      Anticoagulation Summary as of 12/28/2017     INR goal 2.0-3.0   Today's INR 1.7!   Maintenance plan 2.5 mg (2.5 mg x 1) on Mon, Wed, Fri; 3.75 mg (2.5 mg x 1.5) all other days   Full instructions 12/28: 5 mg; Otherwise 2.5 mg on Mon, Wed, Fri; 3.75 mg all other days   Weekly total 22.5 mg   Plan last modified Zena Burden RN (11/15/2017)   Next INR check 1/11/2018   Priority INR   Target end date Indefinite    Indications   Chronic atrial fibrillation (H) [I48.2]  PVD (peripheral vascular disease) (H) [I73.9]  Long-term (current) use of anticoagulants [Z79.01] [Z79.01]         Anticoagulation Episode Summary     INR check location     Preferred lab     Send INR reminders to  ANTICOAG POOL    Comments takes Azithromycin 250mg daily long term      Anticoagulation Care Providers     Provider Role Specialty Phone number    Dominguez Maradiaga MD [facility] [phone]            See the Encounter Report to view Anticoagulation Flowsheet and Dosing Calendar (Go to Encounters tab in chart review, and find the Anticoagulation Therapy Visit)        Zena Haas,  RN

## 2017-12-28 NOTE — MR AVS SNAPSHOT
Rm Duarte   12/28/2017   Anticoagulation Therapy Visit    Description:  77 year old male   Provider:  Dominguez Maradiaga MD   Department:  Hc Anti Coagulation           INR as of 12/28/2017     Today's INR 1.7!      Anticoagulation Summary as of 12/28/2017     INR goal 2.0-3.0   Today's INR 1.7!   Full instructions 12/28: 5 mg; Otherwise 2.5 mg on Mon, Wed, Fri; 3.75 mg all other days   Next INR check 1/11/2018    Indications   Chronic atrial fibrillation (H) [I48.2]  PVD (peripheral vascular disease) (H) [I73.9]  Long-term (current) use of anticoagulants [Z79.01] [Z79.01]         December 2017 Details    Sun Mon Tue Wed Thu Fri Sat          1               2                 3               4               5               6               7               8               9                 10               11               12               13               14               15               16                 17               18               19               20               21               22               23                 24               25               26               27               28      5 mg   See details      29      2.5 mg         30      3.75 mg           31      3.75 mg                Date Details   12/28 This INR check               How to take your warfarin dose     To take:  2.5 mg Take 1 of the 2.5 mg tablets.    To take:  3.75 mg Take 1.5 of the 2.5 mg tablets.    To take:  5 mg Take 1 of the 5 mg tablets.           January 2018 Details    Sun Mon Tue Wed Thu Fri Sat      1      2.5 mg         2      3.75 mg         3      2.5 mg         4      3.75 mg         5      2.5 mg         6      3.75 mg           7      3.75 mg         8      2.5 mg         9      3.75 mg         10      2.5 mg         11            12               13                 14               15               16               17               18               19               20                 21               22                23               24               25               26               27                 28               29               30               31                   Date Details   No additional details    Date of next INR:  1/11/2018         How to take your warfarin dose     To take:  2.5 mg Take 1 of the 2.5 mg tablets.    To take:  3.75 mg Take 1.5 of the 2.5 mg tablets.

## 2018-01-09 ENCOUNTER — ANTICOAGULATION THERAPY VISIT (OUTPATIENT)
Dept: ANTICOAGULATION | Facility: OTHER | Age: 78
End: 2018-01-09
Payer: COMMERCIAL

## 2018-01-09 DIAGNOSIS — I73.9 PVD (PERIPHERAL VASCULAR DISEASE) (H): ICD-10-CM

## 2018-01-09 DIAGNOSIS — Z79.01 LONG-TERM (CURRENT) USE OF ANTICOAGULANTS: ICD-10-CM

## 2018-01-09 DIAGNOSIS — I48.20 CHRONIC ATRIAL FIBRILLATION (H): ICD-10-CM

## 2018-01-09 NOTE — MR AVS SNAPSHOT
Rm Duarte   1/9/2018   Anticoagulation Therapy Visit    Description:  77 year old male   Provider:  Dominguez Maradiaga MD   Department:  Hc Anti Coagulation           INR as of 1/9/2018     Today's INR No new INR was available at the time of this encounter.      Anticoagulation Summary as of 1/9/2018     INR goal 2.0-3.0   Today's INR No new INR was available at the time of this encounter.   Full instructions 1/9: 2.5 mg; Otherwise 2.5 mg on Mon, Wed, Fri; 3.75 mg all other days   Next INR check 1/10/2018    Indications   Chronic atrial fibrillation (H) [I48.2]  PVD (peripheral vascular disease) (H) [I73.9]  Long-term (current) use of anticoagulants [Z79.01] [Z79.01]         January 2018 Details    Sun Mon Tue Wed Thu Fri Sat      1               2               3               4               5               6                 7               8               9      2.5 mg   See details      10            11               12               13                 14               15               16               17               18               19               20                 21               22               23               24               25               26               27                 28               29               30               31                   Date Details   01/09 This INR check       Date of next INR:  1/10/2018         How to take your warfarin dose     To take:  2.5 mg Take 1 of the 2.5 mg tablets.

## 2018-01-09 NOTE — PROGRESS NOTES
ANTICOAGULATION FOLLOW-UP CLINIC VISIT    Patient Name:  Rm Duarte  Date:  1/9/2018  Contact Type:  Telephone    SUBJECTIVE:     Patient Findings     Comments Call from patient  Stating he will be starting prednisone again. He will be taking 60mg x 3 days (1/9,10,11), 40mg x 3 day (1/12,13,14), 20mg x 3 days (1/15,16,17), 10mg x 3 days, (1/18,19,20) and then done. We discussed warfarin dosing for today as he is having INR done tomorrow. He verbalized understanding of instruction and has no questions           OBJECTIVE    INR Point of Care   Date Value Ref Range Status   12/28/2017 1.7 (H) 0.86 - 1.14 Final     Comment:     This test is intended for monitoring Coumadin therapy.  Results are not   accurate in patients with prolonged INR due to factor deficiency.         ASSESSMENT / PLAN  No question data found.  Anticoagulation Summary as of 1/9/2018     INR goal 2.0-3.0   Today's INR No new INR was available at the time of this encounter.   Maintenance plan 2.5 mg (2.5 mg x 1) on Mon, Wed, Fri; 3.75 mg (2.5 mg x 1.5) all other days   Full instructions 1/9: 2.5 mg; Otherwise 2.5 mg on Mon, Wed, Fri; 3.75 mg all other days   Weekly total 22.5 mg   Plan last modified Zena Burden RN (11/15/2017)   Next INR check 1/10/2018   Priority INR   Target end date Indefinite    Indications   Chronic atrial fibrillation (H) [I48.2]  PVD (peripheral vascular disease) (H) [I73.9]  Long-term (current) use of anticoagulants [Z79.01] [Z79.01]         Anticoagulation Episode Summary     INR check location     Preferred lab     Send INR reminders to  ANTICOAG POOL    Comments takes Azithromycin 250mg daily long term  prednisone 60x 3d (1/9), 20xgz4s(1/12), 20mgx 3d(1/15), 10mg x 3 (1/18) d. Done on 1/20/18      Anticoagulation Care Providers     Provider Role Specialty Phone number    Dominguez Maradiaga MD [facility] [phone]            See the Encounter Report to view Anticoagulation Flowsheet and  Dosing Calendar (Go to Encounters tab in chart review, and find the Anticoagulation Therapy Visit)        Zena Haas RN

## 2018-01-10 ENCOUNTER — ANTICOAGULATION THERAPY VISIT (OUTPATIENT)
Dept: ANTICOAGULATION | Facility: OTHER | Age: 78
End: 2018-01-10
Payer: COMMERCIAL

## 2018-01-10 DIAGNOSIS — I73.9 PVD (PERIPHERAL VASCULAR DISEASE) (H): ICD-10-CM

## 2018-01-10 DIAGNOSIS — I48.20 CHRONIC ATRIAL FIBRILLATION (H): ICD-10-CM

## 2018-01-10 DIAGNOSIS — Z79.01 LONG-TERM (CURRENT) USE OF ANTICOAGULANTS: ICD-10-CM

## 2018-01-10 LAB — INR BLD: 1.6 (ref 0.86–1.14)

## 2018-01-10 PROCEDURE — 85610 PROTHROMBIN TIME: CPT | Mod: QW,ZL | Performed by: FAMILY MEDICINE

## 2018-01-10 PROCEDURE — 36416 COLLJ CAPILLARY BLOOD SPEC: CPT | Mod: ZL | Performed by: FAMILY MEDICINE

## 2018-01-10 NOTE — PROGRESS NOTES
ANTICOAGULATION FOLLOW-UP CLINIC VISIT    Patient Name:  Rm Duarte  Date:  1/10/2018  Contact Type:  Telephone    SUBJECTIVE:     Patient Findings     Positives Other complaints    Comments Call placed to patient and spoke to him via phone re: INR result, warfarin dosing and INR recheck date. He restarted prednisone and is currently taking 60mg daily. Will decrease prednisone to 40mg daily on 1/12, 20mg daily on 1/15 and 10 mg. Daily on 1/18/18. He has not had a huge improvement in resp. Status yet. He verbalized understanding of warfarin dosing and INR recheck date and has no questions.            OBJECTIVE    INR Point of Care   Date Value Ref Range Status   01/10/2018 1.6 (H) 0.86 - 1.14 Final     Comment:     This test is intended for monitoring Coumadin therapy.  Results are not   accurate in patients with prolonged INR due to factor deficiency.         ASSESSMENT / PLAN  INR assessment SUB    Recheck INR In: 5 DAYS    INR Location Clinic      Anticoagulation Summary as of 1/10/2018     INR goal 2.0-3.0   Today's INR 1.6!   Maintenance plan 2.5 mg (2.5 mg x 1) on Mon, Fri; 3.75 mg (2.5 mg x 1.5) all other days   Full instructions 1/10: 2.5 mg; 1/11: 2.5 mg; 1/13: 2.5 mg; 1/14: 2.5 mg; Otherwise 2.5 mg on Mon, Fri; 3.75 mg all other days   Weekly total 23.75 mg   Plan last modified Zena Haas RN (1/10/2018)   Next INR check 1/15/2018   Priority INR   Target end date Indefinite    Indications   Chronic atrial fibrillation (H) [I48.2]  PVD (peripheral vascular disease) (H) [I73.9]  Long-term (current) use of anticoagulants [Z79.01] [Z79.01]         Anticoagulation Episode Summary     INR check location     Preferred lab     Send INR reminders to  ANTICOAG POOL    Comments takes Azithromycin 250mg daily long term  prednisone 60x 3d (1/9), 54uqo9d(1/12), 20mgx 3d(1/15), 10mg x 3 (1/18) d. Done on 1/20/18      Anticoagulation Care Providers     Provider Role Specialty Phone number    Dominguez Maradiaga,  MD [facility] [phone]            See the Encounter Report to view Anticoagulation Flowsheet and Dosing Calendar (Go to Encounters tab in chart review, and find the Anticoagulation Therapy Visit)        Zena Haas RN

## 2018-01-10 NOTE — MR AVS SNAPSHOT
Rm Duarte   1/10/2018   Anticoagulation Therapy Visit    Description:  77 year old male   Provider:  Dominguez Maradiaga MD   Department:  Hc Anti Coagulation           INR as of 1/10/2018     Today's INR 1.6!      Anticoagulation Summary as of 1/10/2018     INR goal 2.0-3.0   Today's INR 1.6!   Full instructions 1/10: 2.5 mg; 1/11: 2.5 mg; 1/13: 2.5 mg; 1/14: 2.5 mg; Otherwise 2.5 mg on Mon, Fri; 3.75 mg all other days   Next INR check 1/15/2018    Indications   Chronic atrial fibrillation (H) [I48.2]  PVD (peripheral vascular disease) (H) [I73.9]  Long-term (current) use of anticoagulants [Z79.01] [Z79.01]         January 2018 Details    Sun Mon Tue Wed Thu Fri Sat      1               2               3               4               5               6                 7               8               9               10      2.5 mg   See details      11      2.5 mg         12      2.5 mg         13      2.5 mg           14      2.5 mg         15            16               17               18               19               20                 21               22               23               24               25               26               27                 28               29               30               31                   Date Details   01/10 This INR check       Date of next INR:  1/15/2018         How to take your warfarin dose     To take:  2.5 mg Take 1 of the 2.5 mg tablets.

## 2018-01-11 ENCOUNTER — CARE COORDINATION (OUTPATIENT)
Dept: CARE COORDINATION | Facility: OTHER | Age: 78
End: 2018-01-11

## 2018-01-11 NOTE — PROGRESS NOTES
"Clinic Care Coordination Contact  OUTREACH    Referral Information:     Reason for Contact: follow up on COPD status        Universal Utilization:   ED visits past year 2 (admitted to hospital both times)  Hospitalization past year 2                      Clinical Concerns:  Current Medical Concerns: Patient is having more issues with his COPD. He states he is unable to walk across a room without getting short of breath. He is back on prednisone taper, started it again 2 days ago.  He states that he has had to use his home O2 during day and at night now.  He has no fever/chills.      Current Behavioral Concerns: none    Education Provided to patient: We discussed prednisone taper and effect on warfarin. We discussed having to not do some things because of the increased symptoms and to take short rests between doing things.       Clinical Pathway: Clinic Care Coordination COPD Follow up Assessment  Symptom Review:    Are you having any increased shortness of breath? Yes  When you cough are you coughing up anything? Yes  Color white  Consistency   Frequency daily  What COPD Zone currently in:Yellow  What number would you call if you were in the YELLOW zones: he has number for care coordination and MD     Medications:   Were you started on any new medications since the last time we talked?  Yes, he restarted prednisone taper for increased COPD symptoms  Do you have all of your medications? Yes,   Have you had trouble filling your prescriptions? [facility] him. He states he talked to the recently and will know next week if he will be able to [facility]  Are you medications effective in controlling your symptoms? Started prednisone only 2 days ago.  He \"usually\" gets better when on prednisone  Are you still on Prednisone   Yes  For patients with DM:     Are you monitoring your blood sugars, if so how are your blood sugars? N/A  How " "often have you had to use your rescue medications? N/A    Oxygen/DME  What is the current liter flow you are using? 2 L NC  Has there been any changes? No      Activity:  How much activity can you do before you are SOB? States he is having shortness of breath and cannot walk across a room or get to his mailbox without increased shortness of breath  Have you had to reduce your activities because of dyspnea or other symptoms? Yes     Diet:  Do you weigh yourself daily? No    Has there been a recent change in your weight? States he weighs 203 pounds     Emotions/Lifestyle:    Do you smoke (if not asked upon initial assessment)? no  If so, how many cigarettes a day are you smoking? N/A  Would you like to try to quit smoking? N/A    Follow Up Plan:  Care Coordinator follow up plan: Will plan to follow up with patient in 4 weeks or sooner if he has increased issues  Referrals to consider: None at this time          Medication Management:  [facility] can get [facility] at a lower cost. He states he will hear next week. He does have prescription coverage with his supplemental insurance.      Functional Status: Lives alone in his home       Financial/Insurance:   [facility] to see if he can get medications for less cost.     Resources and Interventions:  Current Resources:  ;  PCP, care coordination                 Goals:  To get back to the \"green\" COPD zone                 Barriers: ongoing issues with COPD  Strengths: follows up with MD when needs to. Has prednisone for \"flare ups\" of COPD  Patient/Caregiver understanding: he verbalized understanding of conversation, will let care coordinator know if any issues           Plan: follow up with patient in 4 weeks                      "

## 2018-01-15 ENCOUNTER — ANTICOAGULATION THERAPY VISIT (OUTPATIENT)
Dept: ANTICOAGULATION | Facility: OTHER | Age: 78
End: 2018-01-15
Payer: COMMERCIAL

## 2018-01-15 DIAGNOSIS — I73.9 PVD (PERIPHERAL VASCULAR DISEASE) (H): ICD-10-CM

## 2018-01-15 DIAGNOSIS — I48.20 CHRONIC ATRIAL FIBRILLATION (H): ICD-10-CM

## 2018-01-15 DIAGNOSIS — Z79.01 LONG-TERM (CURRENT) USE OF ANTICOAGULANTS: ICD-10-CM

## 2018-01-15 LAB — INR BLD: 1.3 (ref 0.86–1.14)

## 2018-01-15 PROCEDURE — 85610 PROTHROMBIN TIME: CPT | Mod: QW,ZL | Performed by: FAMILY MEDICINE

## 2018-01-15 PROCEDURE — 36416 COLLJ CAPILLARY BLOOD SPEC: CPT | Mod: ZL | Performed by: FAMILY MEDICINE

## 2018-01-15 NOTE — PROGRESS NOTES
ANTICOAGULATION FOLLOW-UP CLINIC VISIT    Patient Name:  Rm Duarte  Date:  1/15/2018  Contact Type:  Telephone/ message left on home phone voicemail    SUBJECTIVE:     Patient Findings     Comments Call placed to patient home phone and message left re: INR result, warfarin dosing and INR recheck date. He should be starting the prednisone 20mg daily for 3 days today. He is to notify warfarin clinic if any new changes in diet/meds/activity or questions.            OBJECTIVE    INR Point of Care   Date Value Ref Range Status   01/15/2018 1.3 (H) 0.86 - 1.14 Final     Comment:     This test is intended for monitoring Coumadin therapy.  Results are not   accurate in patients with prolonged INR due to factor deficiency.         ASSESSMENT / PLAN  INR assessment SUB    Recheck INR In: 3 DAYS    INR Location Clinic      Anticoagulation Summary as of 1/15/2018     INR goal 2.0-3.0   Today's INR 1.3!   Maintenance plan 2.5 mg (2.5 mg x 1) on Mon, Fri; 3.75 mg (2.5 mg x 1.5) all other days   Full instructions 1/15: 3.75 mg; 1/17: 2.5 mg; Otherwise 2.5 mg on Mon, Fri; 3.75 mg all other days   Weekly total 23.75 mg   Plan last modified Zena Haas RN (1/10/2018)   Next INR check 1/18/2018   Priority INR   Target end date Indefinite    Indications   Chronic atrial fibrillation (H) [I48.2]  PVD (peripheral vascular disease) (H) [I73.9]  Long-term (current) use of anticoagulants [Z79.01] [Z79.01]         Anticoagulation Episode Summary     INR check location     Preferred lab     Send INR reminders to  ANTICO POOL    Comments takes Azithromycin 250mg daily long term  prednisone 60x 3d (1/9), 18kmc8u(1/12), 20mgx 3d(1/15), 10mg x 3 (1/18) d. Done on 1/20/18      Anticoagulation Care Providers     Provider Role Specialty Phone number    Dominguez Maradiaga MD [facility] [phone]            See the Encounter Report to view Anticoagulation Flowsheet and Dosing Calendar (Go to Encounters tab in chart  review, and find the Anticoagulation Therapy Visit)        Zena Haas RN

## 2018-01-15 NOTE — MR AVS SNAPSHOT
Rm Duarte   1/15/2018   Anticoagulation Therapy Visit    Description:  77 year old male   Provider:  Dominguez Maradiaga MD   Department:  Hc Anti Coagulation           INR as of 1/15/2018     Today's INR 1.3!      Anticoagulation Summary as of 1/15/2018     INR goal 2.0-3.0   Today's INR 1.3!   Full instructions 1/15: 3.75 mg; 1/17: 2.5 mg; Otherwise 2.5 mg on Mon, Fri; 3.75 mg all other days   Next INR check 1/18/2018    Indications   Chronic atrial fibrillation (H) [I48.2]  PVD (peripheral vascular disease) (H) [I73.9]  Long-term (current) use of anticoagulants [Z79.01] [Z79.01]         January 2018 Details    Sun Mon Tue Wed Thu Fri Sat      1               2               3               4               5               6                 7               8               9               10               11               12               13                 14               15      3.75 mg   See details      16      3.75 mg         17      2.5 mg         18            19               20                 21               22               23               24               25               26               27                 28               29               30               31                   Date Details   01/15 This INR check       Date of next INR:  1/18/2018         How to take your warfarin dose     To take:  2.5 mg Take 1 of the 2.5 mg tablets.    To take:  3.75 mg Take 1.5 of the 2.5 mg tablets.

## 2018-01-18 ENCOUNTER — TELEPHONE (OUTPATIENT)
Dept: FAMILY MEDICINE | Facility: OTHER | Age: 78
End: 2018-01-18

## 2018-01-18 ENCOUNTER — ANTICOAGULATION THERAPY VISIT (OUTPATIENT)
Dept: ANTICOAGULATION | Facility: OTHER | Age: 78
End: 2018-01-18
Payer: COMMERCIAL

## 2018-01-18 DIAGNOSIS — I73.9 PVD (PERIPHERAL VASCULAR DISEASE) (H): ICD-10-CM

## 2018-01-18 DIAGNOSIS — I48.20 CHRONIC ATRIAL FIBRILLATION (H): ICD-10-CM

## 2018-01-18 DIAGNOSIS — J44.9 END STAGE COPD (H): Primary | ICD-10-CM

## 2018-01-18 DIAGNOSIS — Z79.01 LONG-TERM (CURRENT) USE OF ANTICOAGULANTS: ICD-10-CM

## 2018-01-18 LAB — INR BLD: 1.5 (ref 0.86–1.14)

## 2018-01-18 PROCEDURE — 85610 PROTHROMBIN TIME: CPT | Mod: QW,ZL | Performed by: FAMILY MEDICINE

## 2018-01-18 PROCEDURE — 36416 COLLJ CAPILLARY BLOOD SPEC: CPT | Mod: ZL | Performed by: FAMILY MEDICINE

## 2018-01-18 NOTE — PROGRESS NOTES
ANTICOAGULATION FOLLOW-UP CLINIC VISIT    Patient Name:  Rm Duarte  Date:  1/18/2018  Contact Type:  Telephone    SUBJECTIVE:     Patient Findings     Positives Change in medications    Comments Call placed to patient and spoke to him re: INR result, warfarin dosing and INR recheck date. He states he is now on his last 2 days of prednisone 10 mg and will be done. He states his breathing is better. He verbalized understanding of warfarin dosing and INR recheck date and has no questions.            OBJECTIVE    INR Point of Care   Date Value Ref Range Status   01/18/2018 1.5 (H) 0.86 - 1.14 Final     Comment:     This test is intended for monitoring Coumadin therapy.  Results are not   accurate in patients with prolonged INR due to factor deficiency.         ASSESSMENT / PLAN  INR assessment SUB    Recheck INR In: 4 DAYS    INR Location Clinic      Anticoagulation Summary as of 1/18/2018     INR goal 2.0-3.0   Today's INR 1.5!   Maintenance plan 2.5 mg (2.5 mg x 1) on Mon, Fri; 3.75 mg (2.5 mg x 1.5) all other days   Full instructions 1/18: 5 mg; 1/20: 5 mg; 1/21: 5 mg; Otherwise 2.5 mg on Mon, Fri; 3.75 mg all other days   Weekly total 23.75 mg   Plan last modified Zena Haas RN (1/10/2018)   Next INR check 1/22/2018   Priority INR   Target end date Indefinite    Indications   Chronic atrial fibrillation (H) [I48.2]  PVD (peripheral vascular disease) (H) [I73.9]  Long-term (current) use of anticoagulants [Z79.01] [Z79.01]         Anticoagulation Episode Summary     INR check location     Preferred lab     Send INR reminders to [facility] ANTICOAG POOL    Comments takes Azithromycin 250mg daily long term  prednisone 60x 3d (1/9), 24vbr9g(1/12), 20mgx 3d(1/15), 10mg x 3 (1/18) d. Done on 1/20/18      Anticoagulation Care Providers     Provider Role Specialty Phone number    Dominguez Maradiaga MD [facility] [phone]            See the Encounter Report to view Anticoagulation Flowsheet and Dosing  Calendar (Go to Encounters tab in chart review, and find the Anticoagulation Therapy Visit)        Zena Haas RN

## 2018-01-18 NOTE — MR AVS SNAPSHOT
Rm Duarte   1/18/2018   Anticoagulation Therapy Visit    Description:  77 year old male   Provider:  Dominguez Maradiaga MD   Department:  [facility] Anti Coagulation           INR as of 1/18/2018     Today's INR 1.5!      Anticoagulation Summary as of 1/18/2018     INR goal 2.0-3.0   Today's INR 1.5!   Full instructions 1/18: 5 mg; 1/20: 5 mg; 1/21: 5 mg; Otherwise 2.5 mg on Mon, Fri; 3.75 mg all other days   Next INR check 1/22/2018    Indications   Chronic atrial fibrillation (H) [I48.2]  PVD (peripheral vascular disease) (H) [I73.9]  Long-term (current) use of anticoagulants [Z79.01] [Z79.01]         January 2018 Details    Sun Mon Tue Wed Thu Fri Sat      1               2               3               4               5               6                 7               8               9               10               11               12               13                 14               15               16               17               18      5 mg   See details      19      2.5 mg         20      5 mg           21      5 mg         22            23               24               25               26               27                 28               29               30               31                   Date Details   01/18 This INR check       Date of next INR:  1/22/2018         How to take your warfarin dose     To take:  2.5 mg Take 1 of the 2.5 mg tablets.    To take:  5 mg Take 1 of the 5 mg tablets.

## 2018-01-18 NOTE — TELEPHONE ENCOUNTER
Email received from Zena [place] [facility] [place] get an Rx for a shower chair.

## 2018-01-23 ENCOUNTER — ANTICOAGULATION THERAPY VISIT (OUTPATIENT)
Dept: ANTICOAGULATION | Facility: OTHER | Age: 78
End: 2018-01-23
Payer: COMMERCIAL

## 2018-01-23 DIAGNOSIS — Z79.01 LONG-TERM (CURRENT) USE OF ANTICOAGULANTS: ICD-10-CM

## 2018-01-23 DIAGNOSIS — I48.20 CHRONIC ATRIAL FIBRILLATION (H): ICD-10-CM

## 2018-01-23 DIAGNOSIS — I73.9 PVD (PERIPHERAL VASCULAR DISEASE) (H): ICD-10-CM

## 2018-01-23 LAB — INR BLD: 2.1 (ref 0.86–1.14)

## 2018-01-23 PROCEDURE — 85610 PROTHROMBIN TIME: CPT | Mod: QW,ZL | Performed by: FAMILY MEDICINE

## 2018-01-23 PROCEDURE — 36416 COLLJ CAPILLARY BLOOD SPEC: CPT | Mod: ZL | Performed by: FAMILY MEDICINE

## 2018-01-23 NOTE — PROGRESS NOTES
ANTICOAGULATION FOLLOW-UP CLINIC VISIT    Patient Name:  Rm Duarte  Date:  1/23/2018  Contact Type:  Telephone    SUBJECTIVE:     Patient Findings     Positives No Problem Findings           OBJECTIVE    INR Point of Care   Date Value Ref Range Status   01/23/2018 2.1 (H) 0.86 - 1.14 Final     Comment:     This test is intended for monitoring Coumadin therapy.  Results are not   accurate in patients with prolonged INR due to factor deficiency.         ASSESSMENT / PLAN  INR assessment THER    Recheck INR In: 1 WEEK    INR Location Clinic      Anticoagulation Summary as of 1/23/2018     INR goal 2.0-3.0   Today's INR 2.1   Maintenance plan 2.5 mg (2.5 mg x 1) on Mon, Fri; 5 mg (5 mg x 1) all other days   Full instructions 2.5 mg on Mon, Fri; 5 mg all other days   Weekly total 30 mg   Plan last modified Elizabeth Estrada, RN (1/23/2018)   Next INR check 1/30/2018   Priority INR   Target end date Indefinite    Indications   Chronic atrial fibrillation (H) [I48.2]  PVD (peripheral vascular disease) (H) [I73.9]  Long-term (current) use of anticoagulants [Z79.01] [Z79.01]         Anticoagulation Episode Summary     INR check location     Preferred lab     Send INR reminders to  ANTICOAG POOL    Comments takes Azithromycin 250mg daily long term  prednisone 60x 3d (1/9), 35fni3b(1/12), 20mgx 3d(1/15), 10mg x 3 (1/18) d. Done on 1/20/18      Anticoagulation Care Providers     Provider Role Specialty Phone number    Dominguez Maradiaga MD [facility] [phone]            See the Encounter Report to view Anticoagulation Flowsheet and Dosing Calendar (Go to Encounters tab in chart review, and find the Anticoagulation Therapy Visit)    INR result, Warfarin dosing, and INR recheck date discussed via telephone this date.    ELIZABETH ESTRADA, RN

## 2018-01-23 NOTE — MR AVS SNAPSHOT
Rm Duarte   1/23/2018   Anticoagulation Therapy Visit    Description:  77 year old male   Provider:  Dominguez Maradaiga MD   Department:  [facility] Anti Coagulation           INR as of 1/23/2018     Today's INR 2.1      Anticoagulation Summary as of 1/23/2018     INR goal 2.0-3.0   Today's INR 2.1   Full instructions 2.5 mg on Mon, Fri; 5 mg all other days   Next INR check 1/30/2018    Indications   Chronic atrial fibrillation (H) [I48.2]  PVD (peripheral vascular disease) (H) [I73.9]  Long-term (current) use of anticoagulants [Z79.01] [Z79.01]         January 2018 Details    Sun Mon Tue Wed Thu Fri Sat      1               2               3               4               5               6                 7               8               9               10               11               12               13                 14               15               16               17               18               19               20                 21               22               23      5 mg   See details      24      5 mg         25      5 mg         26      2.5 mg         27      5 mg           28      5 mg         29      2.5 mg         30            31                   Date Details   01/23 This INR check       Date of next INR:  1/30/2018         How to take your warfarin dose     To take:  2.5 mg Take 1 of the 2.5 mg tablets.    To take:  5 mg Take 1 of the 5 mg tablets.

## 2018-01-29 ENCOUNTER — ANTICOAGULATION THERAPY VISIT (OUTPATIENT)
Dept: ANTICOAGULATION | Facility: OTHER | Age: 78
End: 2018-01-29
Payer: COMMERCIAL

## 2018-01-29 DIAGNOSIS — Z79.01 LONG-TERM (CURRENT) USE OF ANTICOAGULANTS: ICD-10-CM

## 2018-01-29 DIAGNOSIS — I48.20 CHRONIC ATRIAL FIBRILLATION (H): ICD-10-CM

## 2018-01-29 DIAGNOSIS — I73.9 PVD (PERIPHERAL VASCULAR DISEASE) (H): ICD-10-CM

## 2018-01-29 LAB — INR BLD: 3.1 (ref 0.86–1.14)

## 2018-01-29 PROCEDURE — 36416 COLLJ CAPILLARY BLOOD SPEC: CPT | Mod: ZL | Performed by: FAMILY MEDICINE

## 2018-01-29 PROCEDURE — 85610 PROTHROMBIN TIME: CPT | Mod: QW,ZL | Performed by: FAMILY MEDICINE

## 2018-01-29 NOTE — PROGRESS NOTES
ANTICOAGULATION FOLLOW-UP CLINIC VISIT    Patient Name:  Rm Duarte  Date:  1/29/2018  Contact Type:  Telephone    SUBJECTIVE:     Patient Findings     Positives No Problem Findings    Comments INR done by lab and result noted by warfarin clinic nurse. Call placed to patient and spoke to him re: INR result, warfarin dosing and INR recheck date. He completed the prednisone dosing and is no longer taking it. We discussed warfarin dosing and INR recheck date and he verbalized understanding and has no questions.            OBJECTIVE    INR Point of Care   Date Value Ref Range Status   01/29/2018 3.1 (H) 0.86 - 1.14 Final     Comment:     This test is intended for monitoring Coumadin therapy.  Results are not   accurate in patients with prolonged INR due to factor deficiency.         ASSESSMENT / PLAN  INR assessment THER    Recheck INR In: 10 DAYS    INR Location Clinic      Anticoagulation Summary as of 1/29/2018     INR goal 2.0-3.0   Today's INR 3.1!   Maintenance plan 2.5 mg (2.5 mg x 1) on Mon, Wed, Fri; 5 mg (5 mg x 1) all other days   Full instructions 2.5 mg on Mon, Wed, Fri; 5 mg all other days   Weekly total 27.5 mg   Plan last modified Zena Haas RN (1/29/2018)   Next INR check 2/7/2018   Priority INR   Target end date Indefinite    Indications   Chronic atrial fibrillation (H) [I48.2]  PVD (peripheral vascular disease) (H) [I73.9]  Long-term (current) use of anticoagulants [Z79.01] [Z79.01]         Anticoagulation Episode Summary     INR check location     Preferred lab     Send INR reminders to [facility] POOL    Comments takes Azithromycin 250mg daily long term  prednisone 60x 3d (1/9), 21yru1h(1/12), 20mgx 3d(1/15), 10mg x 3 (1/18) d. Done on 1/20/18      Anticoagulation Care Providers     Provider Role Specialty Phone number    Dominguez Maradiaga MD [facility] [phone]            See the Encounter Report to view Anticoagulation Flowsheet and Dosing Calendar (Go to  Encounters tab in chart review, and find the Anticoagulation Therapy Visit)        Zena Haas RN

## 2018-01-29 NOTE — MR AVS SNAPSHOT
Rm Duarte   1/29/2018   Anticoagulation Therapy Visit    Description:  77 year old male   Provider:  Dominguez Maradiaga MD   Department:  Hc Anti Coagulation           INR as of 1/29/2018     Today's INR 3.1!      Anticoagulation Summary as of 1/29/2018     INR goal 2.0-3.0   Today's INR 3.1!   Full instructions 2.5 mg on Mon, Wed, Fri; 5 mg all other days   Next INR check 2/7/2018    Indications   Chronic atrial fibrillation (H) [I48.2]  PVD (peripheral vascular disease) (H) [I73.9]  Long-term (current) use of anticoagulants [Z79.01] [Z79.01]         January 2018 Details    Sun Mon Tue Wed Thu Fri Sat      1               2               3               4               5               6                 7               8               9               10               11               12               13                 14               15               16               17               18               19               20                 21               22               23               24               25               26               27                 28               29      2.5 mg   See details      30      5 mg         31      2.5 mg             Date Details   01/29 This INR check               How to take your warfarin dose     To take:  2.5 mg Take 1 of the 2.5 mg tablets.    To take:  5 mg Take 1 of the 5 mg tablets.           February 2018 Details    Sun Mon Tue Wed Thu Fri Sat         1      5 mg         2      2.5 mg         3      5 mg           4      5 mg         5      2.5 mg         6      5 mg         7            8               9               10                 11               12               13               14               15               16               17                 18               19               20               21               22               23               24                 25               26 27 28                   Date Details    No additional details    Date of next INR:  2/7/2018         How to take your warfarin dose     To take:  2.5 mg Take 1 of the 2.5 mg tablets.    To take:  5 mg Take 1 of the 5 mg tablets.

## 2018-02-07 ENCOUNTER — ANTICOAGULATION THERAPY VISIT (OUTPATIENT)
Dept: ANTICOAGULATION | Facility: OTHER | Age: 78
End: 2018-02-07
Payer: COMMERCIAL

## 2018-02-07 DIAGNOSIS — Z79.01 LONG-TERM (CURRENT) USE OF ANTICOAGULANTS: ICD-10-CM

## 2018-02-07 DIAGNOSIS — I48.20 CHRONIC ATRIAL FIBRILLATION (H): ICD-10-CM

## 2018-02-07 DIAGNOSIS — I73.9 PVD (PERIPHERAL VASCULAR DISEASE) (H): ICD-10-CM

## 2018-02-07 LAB — INR BLD: 2.3 (ref 0.86–1.14)

## 2018-02-07 PROCEDURE — 85610 PROTHROMBIN TIME: CPT | Mod: QW,ZL | Performed by: FAMILY MEDICINE

## 2018-02-07 PROCEDURE — 36416 COLLJ CAPILLARY BLOOD SPEC: CPT | Mod: ZL | Performed by: FAMILY MEDICINE

## 2018-02-07 NOTE — MR AVS SNAPSHOT
Rm Duarte   2/7/2018   Anticoagulation Therapy Visit    Description:  77 year old male   Provider:  Dominguez Maradiaga MD   Department:  Hc Anti Coagulation           INR as of 2/7/2018     Today's INR 2.3      Anticoagulation Summary as of 2/7/2018     INR goal 2.0-3.0   Today's INR 2.3   Full instructions 2.5 mg on Mon, Wed, Fri; 5 mg all other days   Next INR check 2/14/2018    Indications   Chronic atrial fibrillation (H) [I48.2]  PVD (peripheral vascular disease) (H) [I73.9]  Long-term (current) use of anticoagulants [Z79.01] [Z79.01]         February 2018 Details    Sun Mon Tue Wed Thu Fri Sat         1               2               3                 4               5               6               7      2.5 mg   See details      8      5 mg         9      2.5 mg         10      5 mg           11      5 mg         12      2.5 mg         13      5 mg         14            15               16               17                 18               19               20               21               22               23               24                 25               26               27               28                   Date Details   02/07 This INR check       Date of next INR:  2/14/2018         How to take your warfarin dose     To take:  2.5 mg Take 1 of the 2.5 mg tablets.    To take:  5 mg Take 1 of the 5 mg tablets.

## 2018-02-07 NOTE — PROGRESS NOTES
"  ANTICOAGULATION FOLLOW-UP CLINIC VISIT    Patient Name:  Rm Duarte   Date:  2/7/2018  Contact Type:  Telephone    SUBJECTIVE:     Patient Findings     Positives No Problem Findings    Comments Patient returned call to warfarin clinic. He has no new medication changes, no bleeding/bruising. He states his breathing is \"not real good\" and is hoping he does not have to restart any prednisone. We discussed warfarin dosing and INR recheck date and he verbalized understanding and has no questions.            OBJECTIVE    INR Point of Care   Date Value Ref Range Status   02/07/2018 2.3 (H) 0.86 - 1.14 Final     Comment:     This test is intended for monitoring Coumadin therapy.  Results are not   accurate in patients with prolonged INR due to factor deficiency.         ASSESSMENT / PLAN  INR assessment THER    Recheck INR In: 1 WEEK    INR Location Clinic      Anticoagulation Summary as of 2/7/2018     INR goal 2.0-3.0   Today's INR 2.3   Maintenance plan 2.5 mg (2.5 mg x 1) on Mon, Wed, Fri; 5 mg (5 mg x 1) all other days   Full instructions 2.5 mg on Mon, Wed, Fri; 5 mg all other days   Weekly total 27.5 mg   No change documented Zena Haas, RN   Plan last modified Zena Haas, RN (1/29/2018)   Next INR check 2/14/2018   Priority INR   Target end date Indefinite    Indications   Chronic atrial fibrillation (H) [I48.2]  PVD (peripheral vascular disease) (H) [I73.9]  Long-term (current) use of anticoagulants [Z79.01] [Z79.01]         Anticoagulation Episode Summary     INR check location     Preferred lab     Send INR reminders to  ANTICOAG POOL    Comments takes Azithromycin 250mg daily long term      Anticoagulation Care Providers     Provider Role Specialty Phone number    Dominguez Maradiaga MD [facility] [phone]            See the Encounter Report to view Anticoagulation Flowsheet and Dosing Calendar (Go to Encounters tab in chart review, and find the Anticoagulation Therapy " Visit)        Zena Haas RN

## 2018-02-14 ENCOUNTER — ANTICOAGULATION THERAPY VISIT (OUTPATIENT)
Dept: ANTICOAGULATION | Facility: OTHER | Age: 78
End: 2018-02-14
Payer: COMMERCIAL

## 2018-02-14 DIAGNOSIS — Z79.01 LONG-TERM (CURRENT) USE OF ANTICOAGULANTS: ICD-10-CM

## 2018-02-14 DIAGNOSIS — I48.20 CHRONIC ATRIAL FIBRILLATION (H): ICD-10-CM

## 2018-02-14 DIAGNOSIS — I73.9 PVD (PERIPHERAL VASCULAR DISEASE) (H): ICD-10-CM

## 2018-02-14 LAB — INR BLD: 2.7 (ref 0.86–1.14)

## 2018-02-14 PROCEDURE — 85610 PROTHROMBIN TIME: CPT | Mod: QW,ZL | Performed by: FAMILY MEDICINE

## 2018-02-14 PROCEDURE — 36416 COLLJ CAPILLARY BLOOD SPEC: CPT | Mod: ZL | Performed by: FAMILY MEDICINE

## 2018-02-14 NOTE — MR AVS SNAPSHOT
Rm Duarte   2/14/2018   Anticoagulation Therapy Visit    Description:  77 year old male   Provider:  Dominguez Maradiaga MD   Department:  Hc Anti Coagulation           INR as of 2/14/2018     Today's INR 2.7      Anticoagulation Summary as of 2/14/2018     INR goal 2.0-3.0   Today's INR 2.7   Full instructions 2.5 mg on Mon, Wed, Fri; 5 mg all other days   Next INR check 2/21/2018    Indications   Chronic atrial fibrillation (H) [I48.2]  PVD (peripheral vascular disease) (H) [I73.9]  Long-term (current) use of anticoagulants [Z79.01] [Z79.01]         February 2018 Details    Sun Mon Tue Wed Thu Fri Sat         1               2               3                 4               5               6               7               8               9               10                 11               12               13               14      2.5 mg   See details      15      5 mg         16      2.5 mg         17      5 mg           18      5 mg         19      2.5 mg         20      5 mg         21            22               23               24                 25               26               27               28                   Date Details   02/14 This INR check       Date of next INR:  2/21/2018         How to take your warfarin dose     To take:  2.5 mg Take 1 of the 2.5 mg tablets.    To take:  5 mg Take 1 of the 5 mg tablets.

## 2018-02-14 NOTE — PROGRESS NOTES
ANTICOAGULATION FOLLOW-UP CLINIC VISIT    Patient Name:  Rm Duarte  Date:  2/14/2018  Contact Type:  Face to Face    SUBJECTIVE:     Patient Findings     Comments INR done by lab and result noted by warfarin clinic. Call placed to patient and message left to call warfarin clinic back.  Patient called warfarin clinic back at 1445 on 2/14/18. He states he will increase vit K intake a few more times/week. No bleeding/bruising and no med changes. He verbalized understanding of warfarin dosing and INR recheck date and has no questions.           OBJECTIVE    INR Point of Care   Date Value Ref Range Status   02/14/2018 2.7 (H) 0.86 - 1.14 Final     Comment:     This test is intended for monitoring Coumadin therapy.  Results are not   accurate in patients with prolonged INR due to factor deficiency.         ASSESSMENT / PLAN  INR assessment THER    Recheck INR In: 1 WEEK    INR Location Clinic      Anticoagulation Summary as of 2/14/2018     INR goal 2.0-3.0   Today's INR 2.7   Maintenance plan 2.5 mg (2.5 mg x 1) on Mon, Wed, Fri; 5 mg (5 mg x 1) all other days   Full instructions 2.5 mg on Mon, Wed, Fri; 5 mg all other days   Weekly total 27.5 mg   No change documented Zena Haas, RN   Plan last modified Zena Haas, RN (1/29/2018)   Next INR check 2/21/2018   Priority INR   Target end date Indefinite    Indications   Chronic atrial fibrillation (H) [I48.2]  PVD (peripheral vascular disease) (H) [I73.9]  Long-term (current) use of anticoagulants [Z79.01] [Z79.01]         Anticoagulation Episode Summary     INR check location     Preferred lab     Send INR reminders to  ANTICOAG POOL    Comments takes Azithromycin 250mg daily long term      Anticoagulation Care Providers     Provider Role Specialty Phone number    Dominguez Maradiaga MD [facility] [phone]            See the Encounter Report to view Anticoagulation Flowsheet and Dosing Calendar (Go to Encounters tab in chart review, and  find the Anticoagulation Therapy Visit)        Zena Haas RN

## 2018-02-16 ENCOUNTER — ANTICOAGULATION THERAPY VISIT (OUTPATIENT)
Dept: ANTICOAGULATION | Facility: OTHER | Age: 78
End: 2018-02-16
Payer: COMMERCIAL

## 2018-02-16 DIAGNOSIS — I73.9 PVD (PERIPHERAL VASCULAR DISEASE) (H): ICD-10-CM

## 2018-02-16 DIAGNOSIS — Z79.01 LONG TERM CURRENT USE OF ANTICOAGULANT THERAPY: ICD-10-CM

## 2018-02-16 DIAGNOSIS — I48.20 CHRONIC ATRIAL FIBRILLATION (H): ICD-10-CM

## 2018-02-16 DIAGNOSIS — Z79.01 LONG-TERM (CURRENT) USE OF ANTICOAGULANTS: ICD-10-CM

## 2018-02-16 RX ORDER — WARFARIN SODIUM 2.5 MG/1
TABLET ORAL
Qty: 115 TABLET | Refills: 3 | COMMUNITY
Start: 2018-02-16 | End: 2018-11-13

## 2018-02-16 NOTE — PROGRESS NOTES
ANTICOAGULATION FOLLOW-UP CLINIC VISIT    Patient Name:  Rm Duarte  Date:  2/16/2018  Contact Type:  Telephone    SUBJECTIVE:     Patient Findings     Positives Change in diet/appetite    Comments Pt telephoned the AC clinic inquiring about Vit K consumption.  States he will try eating more consistently coleslaw and broccoli.  States he will increase to 1 cup on the days he is high and lesser of the the days when he is in range.  Pt informed to try eating a balance of these foods consistently then he would not have to worry as much about increasing or decreasing or waiting until he is checked to make a dietary adjustment.  Pt agreed; call ended with questions answered.             OBJECTIVE    INR Point of Care   Date Value Ref Range Status   02/14/2018 2.7 (H) 0.86 - 1.14 Final     Comment:     This test is intended for monitoring Coumadin therapy.  Results are not   accurate in patients with prolonged INR due to factor deficiency.         ASSESSMENT / PLAN  No question data found.  Anticoagulation Summary as of 2/16/2018     INR goal 2.0-3.0   Today's INR No new INR was available at the time of this encounter.   Maintenance plan 2.5 mg (2.5 mg x 1) on Mon, Wed, Fri; 5 mg (5 mg x 1) all other days   Full instructions 2.5 mg on Mon, Wed, Fri; 5 mg all other days   Weekly total 27.5 mg   No change documented Zena Burden RN   Plan last modified Zena Haas RN (1/29/2018)   Next INR check 2/21/2018   Priority INR   Target end date Indefinite    Indications   Chronic atrial fibrillation (H) [I48.2]  PVD (peripheral vascular disease) (H) [I73.9]  Long-term (current) use of anticoagulants [Z79.01] [Z79.01]         Anticoagulation Episode Summary     INR check location     Preferred lab     Send INR reminders to  ANTICOAG POOL    Comments takes Azithromycin 250mg daily long term      Anticoagulation Care Providers     Provider Role Specialty Phone number    Dominguez Maradiaga MD Responsible Family  Practice [phone]            See the Encounter Report to view Anticoagulation Flowsheet and Dosing Calendar (Go to Encounters tab in chart review, and find the Anticoagulation Therapy Visit)        Zena Burden RN

## 2018-02-16 NOTE — MR AVS SNAPSHOT
Rm Duarte   2/16/2018   Anticoagulation Therapy Visit    Description:  77 year old male   Provider:  Dominguez Maradiaga MD   Department:  Hc Anti Coagulation           INR as of 2/16/2018     Today's INR No new INR was available at the time of this encounter.      Anticoagulation Summary as of 2/16/2018     INR goal 2.0-3.0   Today's INR No new INR was available at the time of this encounter.   Full instructions 2.5 mg on Mon, Wed, Fri; 5 mg all other days   Next INR check 2/21/2018    Indications   Chronic atrial fibrillation (H) [I48.2]  PVD (peripheral vascular disease) (H) [I73.9]  Long-term (current) use of anticoagulants [Z79.01] [Z79.01]         February 2018 Details    Sun Mon Tue Wed Thu Fri Sat         1               2               3                 4               5               6               7               8               9               10                 11               12               13               14               15               16      2.5 mg   See details      17      5 mg           18      5 mg         19      2.5 mg         20      5 mg         21            22               23               24                 25               26               27               28                   Date Details   02/16 This INR check       Date of next INR:  2/21/2018         How to take your warfarin dose     To take:  2.5 mg Take 1 of the 2.5 mg tablets.    To take:  5 mg Take 1 of the 5 mg tablets.

## 2018-02-21 ENCOUNTER — ANTICOAGULATION THERAPY VISIT (OUTPATIENT)
Dept: ANTICOAGULATION | Facility: OTHER | Age: 78
End: 2018-02-21
Payer: COMMERCIAL

## 2018-02-21 DIAGNOSIS — Z79.01 LONG-TERM (CURRENT) USE OF ANTICOAGULANTS: ICD-10-CM

## 2018-02-21 DIAGNOSIS — I73.9 PVD (PERIPHERAL VASCULAR DISEASE) (H): ICD-10-CM

## 2018-02-21 DIAGNOSIS — I48.20 CHRONIC ATRIAL FIBRILLATION (H): ICD-10-CM

## 2018-02-21 LAB — INR BLD: 1.9 (ref 0.86–1.14)

## 2018-02-21 PROCEDURE — 85610 PROTHROMBIN TIME: CPT | Mod: QW,ZL | Performed by: FAMILY MEDICINE

## 2018-02-21 PROCEDURE — 36416 COLLJ CAPILLARY BLOOD SPEC: CPT | Mod: ZL | Performed by: FAMILY MEDICINE

## 2018-02-21 NOTE — PROGRESS NOTES
ANTICOAGULATION FOLLOW-UP CLINIC VISIT    Patient Name:  Rm Duarte  Date:  2/21/2018  Contact Type:  Telephone/ message left on home phone voicemail    SUBJECTIVE:     Patient Findings     Positives No Problem Findings    Comments INR done by lab and result noted by warfarin clinic nurse. Call placed to patient and message left re: INR result , warfarin dosing and INR recheck date. He is to notify warfarin clinic if he has any bleeding/bruising, changes in diet/meds/activity or questions           OBJECTIVE    INR Point of Care   Date Value Ref Range Status   02/21/2018 1.9 (H) 0.86 - 1.14 Final     Comment:     This test is intended for monitoring Coumadin therapy.  Results are not   accurate in patients with prolonged INR due to factor deficiency.         ASSESSMENT / PLAN  INR assessment THER    Recheck INR In: 2 WEEKS    INR Location Clinic      Anticoagulation Summary as of 2/21/2018     INR goal 2.0-3.0   Today's INR 1.9!   Maintenance plan 2.5 mg (2.5 mg x 1) on Mon, Wed, Fri; 5 mg (5 mg x 1) all other days   Full instructions 2/21: 3.75 mg; Otherwise 2.5 mg on Mon, Wed, Fri; 5 mg all other days   Weekly total 27.5 mg   Plan last modified Zena Haas RN (1/29/2018)   Next INR check 3/7/2018   Priority INR   Target end date Indefinite    Indications   Chronic atrial fibrillation (H) [I48.2]  PVD (peripheral vascular disease) (H) [I73.9]  Long-term (current) use of anticoagulants [Z79.01] [Z79.01]         Anticoagulation Episode Summary     INR check location     Preferred lab     Send INR reminders to  ANTICOAG POOL    Comments takes Azithromycin 250mg daily long term      Anticoagulation Care Providers     Provider Role Specialty Phone number    Dominguez Maradiaga MD [facility] [phone]            See the Encounter Report to view Anticoagulation Flowsheet and Dosing Calendar (Go to Encounters tab in chart review, and find the Anticoagulation Therapy Visit)        Zena FARAH  Waldo RN

## 2018-02-21 NOTE — MR AVS SNAPSHOT
Rm Duarte   2/21/2018   Anticoagulation Therapy Visit    Description:  77 year old male   Provider:  Dominguez Maradiaga MD   Department:  [facility]           INR as of 2/21/2018     Today's INR 1.9!      Anticoagulation Summary as of 2/21/2018     INR goal 2.0-3.0   Today's INR 1.9!   Full instructions 2/21: 3.75 mg; Otherwise 2.5 mg on Mon, Wed, Fri; 5 mg all other days   Next INR check 3/7/2018    Indications   Chronic atrial fibrillation (H) [I48.2]  PVD (peripheral vascular disease) (H) [I73.9]  Long-term (current) use of anticoagulants [Z79.01] [Z79.01]         February 2018 Details    Sun Mon Tue Wed Thu Fri Sat         1               2               3                 4               5               6               7               8               9               10                 11               12               13               14               15               16               17                 18               19               20               21      3.75 mg   See details      22      5 mg         23      2.5 mg         24      5 mg           25      5 mg         26      2.5 mg         27      5 mg         28      2.5 mg             Date Details   02/21 This INR check               How to take your warfarin dose     To take:  2.5 mg Take 1 of the 2.5 mg tablets.    To take:  3.75 mg Take 1.5 of the 2.5 mg tablets.    To take:  5 mg Take 1 of the 5 mg tablets.           March 2018 Details    Sun Mon Tue Wed Thu Fri Sat         1      5 mg         2      2.5 mg         3      5 mg           4      5 mg         5      2.5 mg         6      5 mg         7            8               9               10                 11               12               13               14               15               16               17                 18               19               20               21               22               23               24                 25               26                27               28               29               30               31                Date Details   No additional details    Date of next INR:  3/7/2018         How to take your warfarin dose     To take:  2.5 mg Take 1 of the 2.5 mg tablets.    To take:  5 mg Take 1 of the 5 mg tablets.

## 2018-03-07 ENCOUNTER — ANTICOAGULATION THERAPY VISIT (OUTPATIENT)
Dept: ANTICOAGULATION | Facility: OTHER | Age: 78
End: 2018-03-07
Payer: COMMERCIAL

## 2018-03-07 DIAGNOSIS — J43.8 OTHER EMPHYSEMA (H): ICD-10-CM

## 2018-03-07 DIAGNOSIS — I48.20 CHRONIC ATRIAL FIBRILLATION (H): ICD-10-CM

## 2018-03-07 DIAGNOSIS — Z79.01 LONG-TERM (CURRENT) USE OF ANTICOAGULANTS: ICD-10-CM

## 2018-03-07 DIAGNOSIS — I73.9 PVD (PERIPHERAL VASCULAR DISEASE) (H): ICD-10-CM

## 2018-03-07 LAB — INR BLD: 3 (ref 0.86–1.14)

## 2018-03-07 PROCEDURE — 85610 PROTHROMBIN TIME: CPT | Mod: QW,ZL | Performed by: FAMILY MEDICINE

## 2018-03-07 PROCEDURE — 36416 COLLJ CAPILLARY BLOOD SPEC: CPT | Mod: ZL | Performed by: FAMILY MEDICINE

## 2018-03-07 NOTE — PROGRESS NOTES
ANTICOAGULATION FOLLOW-UP CLINIC VISIT    Patient Name:  Rm Duarte  Date:  3/7/2018  Contact Type:  Telephone/ message left on home phone voicemail    SUBJECTIVE:     Patient Findings     Positives No Problem Findings    Comments INR done by lab and result noted by warfarin clinic nurse. Call placed to patient home and message left re: INR result, warfarin dosing and INR recheck date. Requested patient increased vit K some versus cutting warfarin dose today. He is to notify warfarin clinic if he has had any bleeding/bruising, changes in diet/meds/activity or questions           OBJECTIVE    INR Point of Care   Date Value Ref Range Status   03/07/2018 3.0 (H) 0.86 - 1.14 Final     Comment:     This test is intended for monitoring Coumadin therapy.  Results are not   accurate in patients with prolonged INR due to factor deficiency.         ASSESSMENT / PLAN  INR assessment THER    Recheck INR In: 3 WEEKS    INR Location Clinic      Anticoagulation Summary as of 3/7/2018     INR goal 2.0-3.0   Today's INR 3.0   Maintenance plan 2.5 mg (2.5 mg x 1) on Mon, Wed, Fri; 5 mg (5 mg x 1) all other days   Full instructions 2.5 mg on Mon, Wed, Fri; 5 mg all other days   Weekly total 27.5 mg   No change documented Zena Haas, RN   Plan last modified Zena Haas, RN (1/29/2018)   Next INR check 3/28/2018   Priority INR   Target end date Indefinite    Indications   Chronic atrial fibrillation (H) [I48.2]  PVD (peripheral vascular disease) (H) [I73.9]  Long-term (current) use of anticoagulants [Z79.01] [Z79.01]         Anticoagulation Episode Summary     INR check location     Preferred lab     Send INR reminders to  DAVID POOL    Comments takes Azithromycin 250mg daily long term      Anticoagulation Care Providers     Provider Role Specialty Phone number    Dominguez Maradiaga MD [facility] [phone]            See the Encounter Report to view Anticoagulation Flowsheet and Dosing Calendar (Go  to Encounters tab in chart review, and find the Anticoagulation Therapy Visit)        Zena Haas RN

## 2018-03-07 NOTE — MR AVS SNAPSHOT
Rm Duarte   3/7/2018   Anticoagulation Therapy Visit    Description:  77 year old male   Provider:  Dominguez Maradiaga MD   Department:  [facility]           INR as of 3/7/2018     Today's INR 3.0      Anticoagulation Summary as of 3/7/2018     INR goal 2.0-3.0   Today's INR 3.0   Full instructions 2.5 mg on Mon, Wed, Fri; 5 mg all other days   Next INR check 3/28/2018    Indications   Chronic atrial fibrillation (H) [I48.2]  PVD (peripheral vascular disease) (H) [I73.9]  Long-term (current) use of anticoagulants [Z79.01] [Z79.01]         March 2018 Details    Sun Mon Tue Wed Thu Fri Sat         1               2               3                 4               5               6               7      2.5 mg   See details      8      5 mg         9      2.5 mg         10      5 mg           11      5 mg         12      2.5 mg         13      5 mg         14      2.5 mg         15      5 mg         16      2.5 mg         17      5 mg           18      5 mg         19      2.5 mg         20      5 mg         21      2.5 mg         22      5 mg         23      2.5 mg         24      5 mg           25      5 mg         26      2.5 mg         27      5 mg         28            29               30               31                Date Details   03/07 This INR check       Date of next INR:  3/28/2018         How to take your warfarin dose     To take:  2.5 mg Take 1 of the 2.5 mg tablets.    To take:  5 mg Take 1 of the 5 mg tablets.

## 2018-03-08 RX ORDER — FORMOTEROL FUMARATE DIHYDRATE 20 UG/2ML
SOLUTION RESPIRATORY (INHALATION)
Qty: 360 ML | Refills: 1 | Status: SHIPPED | OUTPATIENT
Start: 2018-03-08 | End: 2018-11-06

## 2018-03-15 ENCOUNTER — CARE COORDINATION (OUTPATIENT)
Dept: CARE COORDINATION | Facility: OTHER | Age: 78
End: 2018-03-15

## 2018-03-15 NOTE — PROGRESS NOTES
Clinic Care Coordination Contact  [facility]/Voicemail       Clinical Data: Care Coordinator Outreach  Outreach attempted x 1.  Left message on voicemail with call back information and requested return call.  Plan: Care Coordinator will await return call. Care Coordinator will try to reach patient again in 3-5 business days if no call is received.    Lauren Pipkin, BS-RN   CHF and General Care Coordinator  [phone] Option # 1

## 2018-03-23 ENCOUNTER — PATIENT OUTREACH (OUTPATIENT)
Dept: CARE COORDINATION | Facility: OTHER | Age: 78
End: 2018-03-23

## 2018-03-23 NOTE — PROGRESS NOTES
Clinic Care Coordination Contact  [facility]/Voicemail    Referral Source: Care Team  Clinical Data: Care Coordinator Outreach  Outreach attempted x 1.  Left message on voicemail with call back information and requested return call.  Plan: Care Coordinator will await return call. Care Coordinator will try to reach patient again in 3-5 business days if no call is received.    Lauren Pipkin, BS-RN   CHF and General Care Coordinator  [phone] Option # 1

## 2018-03-27 ENCOUNTER — PATIENT OUTREACH (OUTPATIENT)
Dept: CARE COORDINATION | Facility: OTHER | Age: 78
End: 2018-03-27

## 2018-03-27 NOTE — PROGRESS NOTES
Clinic Care Coordination Contact    OUTREACH    Referral Information:  Referral Source: Care Team    Primary Diagnosis: COPD    Chief Complaint   Patient presents with     Clinic Care Coordination - Follow-up        Universal Utilization:   Utilization    Last refreshed: 3/23/2018 10:13 AM:  No Show Count (past year) 0       Last refreshed: 3/23/2018 10:13 AM:  ED visits 0       Last refreshed: 3/23/2018 10:13 AM:  Hospital admissions 2          Current as of: 3/23/2018 10:13 AM             Clinical Concerns:  Current Medical Concerns:  COPD    Current Behavioral Concerns: denies increase in depression/anxiety    Education Provided to patient: Educated on when to call CC with symptoms of exacerbation   Clinical Pathway Name: COPD  Clinic Care Coordination COPD Follow up Assessment  Symptom Review:      Are you having any increased shortness of breath? No, did have increase in symptoms last week but this has resolved and he is reporting a return to baseline    When you cough are you coughing up anything? Yes  - Color: clear  - Consistency: thick, difficult to cough up  - Frequency: daily    What COPD Zone currently in: Green    What number would you call if you were in the YELLOW zones: [phone]     Medications:     Were you started on any new medications since the last time we talked?  No    Do you have all of your medications? Yes    Have you had trouble filling your prescriptions? No, [facility]    Are you medications effective in controlling your symptoms? Yes    Are you still on Prednisone (* does pt understand the tapering instructions)?  No    For patients with DM:       Are you monitoring your blood sugars, if so how are your blood sugars? N/A    How often have you had to use your rescue medications? N/A    Oxygen/DME    What is the current liter flow you are using? 2 L NC as needed- if patient leaves home he brings his O2, otherwise would not typically use at home for walking around to bathroom,  kitchen, etc    Has there been any changes? No      Activity:    How much activity can you do before you are SOB? minimal    Have you had to reduce your activities because of dyspnea or other symptoms? yes    Diet:    Do you weigh yourself daily? No      Has there been a recent change in your weight? No     Emotions/Lifestyle:      Do you smoke (if not asked upon initial assessment)? No  - If so, how many cigarettes a day are you smoking? N/A  - Would you like to try to quit smoking? N/A    Follow Up Plan:    Care Coordinator follow up plan: Care Coordinator to continue to reach out monthly    Referrals to consider: N/A        Medication Management:  Patient manages his own medications and denies need for refills at this time.     Functional Status:  Mobility Status: Independent  Equipment Currently Used at Home: raised toilet, shower chair, oxygen  Transportation:  Transportation means:: Regular car     Psychosocial:  Current living arrangement:: I live alone  Financial/Insurance: denies issues a this time       Resources and Interventions:  Current Resources:  PCP; CC; Anti-Coag CC  Advanced Care Plans/Directives on file:: In process        Goals:   Goals        General    Improve chronic symptoms (pt-stated)     Notes - Note created  3/27/2018  2:18 PM by Pipkin, Lauren N, RN    Continue to monitor COPD symptoms and report signs of exacerbation to CC              Barriers: none identified  Strengths: verbalized understanding of use of COPD Action plan and expressed interest in working with CC   Patient/Caregiver understanding: verbalized understanding of services CC can provide  Outreach Frequency: monthly  Future Appointments              Tomorrow [facility] Cl           Plan: Care Coordinator will continue to reach out monthly.  Patient to call sooner if he develops symptoms of exacerbation.    Lauren Pipkin, BS-RN   CHF and General Care Coordinator  [phone] Option #  1

## 2018-03-28 ENCOUNTER — ANTICOAGULATION THERAPY VISIT (OUTPATIENT)
Dept: ANTICOAGULATION | Facility: OTHER | Age: 78
End: 2018-03-28
Payer: COMMERCIAL

## 2018-03-28 DIAGNOSIS — I73.9 PVD (PERIPHERAL VASCULAR DISEASE) (H): ICD-10-CM

## 2018-03-28 DIAGNOSIS — I48.20 CHRONIC ATRIAL FIBRILLATION (H): ICD-10-CM

## 2018-03-28 DIAGNOSIS — Z79.01 LONG-TERM (CURRENT) USE OF ANTICOAGULANTS: ICD-10-CM

## 2018-03-28 LAB — INR BLD: 3.4 (ref 0.86–1.14)

## 2018-03-28 PROCEDURE — 36416 COLLJ CAPILLARY BLOOD SPEC: CPT | Mod: ZL | Performed by: FAMILY MEDICINE

## 2018-03-28 PROCEDURE — 85610 PROTHROMBIN TIME: CPT | Mod: QW,ZL | Performed by: FAMILY MEDICINE

## 2018-03-28 NOTE — PROGRESS NOTES
ANTICOAGULATION FOLLOW-UP CLINIC VISIT    Patient Name:  Rm Duarte  Date:  3/28/2018  Contact Type:  Telephone    SUBJECTIVE:     Patient Findings     Positives No Problem Findings    Comments INR done by lab and result noted by warfarin clinic. Call placed to patient and talked to him re: INR result, warfarin dosing and INR recheck date. He verbalized understanding and has no questions.            OBJECTIVE    INR Point of Care   Date Value Ref Range Status   03/28/2018 3.4 (H) 0.86 - 1.14 Final     Comment:     This test is intended for monitoring Coumadin therapy.  Results are not   accurate in patients with prolonged INR due to factor deficiency.         ASSESSMENT / PLAN  INR assessment SUPRA    Recheck INR In: 2 WEEKS    INR Location Clinic      Anticoagulation Summary as of 3/28/2018     INR goal 2.0-3.0   Today's INR 3.4!   Maintenance plan 5 mg (5 mg x 1) on Mon, Wed, Fri; 2.5 mg (5 mg x 0.5) all other days   Full instructions 3/28: Hold; Otherwise 5 mg on Mon, Wed, Fri; 2.5 mg all other days   Weekly total 25 mg   Plan last modified Zena Haas RN (3/28/2018)   Next INR check 4/16/2018   Priority INR   Target end date Indefinite    Indications   Chronic atrial fibrillation (H) [I48.2]  PVD (peripheral vascular disease) (H) [I73.9]  Long-term (current) use of anticoagulants [Z79.01] [Z79.01]         Anticoagulation Episode Summary     INR check location     Preferred lab     Send INR reminders to  ANTICOAG POOL    Comments takes Azithromycin 250mg daily long term      Anticoagulation Care Providers     Provider Role Specialty Phone number    Dominguez Maradiaga MD [facility] [phone]            See the Encounter Report to view Anticoagulation Flowsheet and Dosing Calendar (Go to Encounters tab in chart review, and find the Anticoagulation Therapy Visit)        Zena Haas, RN

## 2018-03-28 NOTE — MR AVS SNAPSHOT
Rm GARCIA Gerald   3/28/2018   Anticoagulation Therapy Visit    Description:  77 year old male   Provider:  Dominguez Maradiaga MD   Department:  [facility] Anti Coagulation           INR as of 3/28/2018     Today's INR 3.4!      Anticoagulation Summary as of 3/28/2018     INR goal 2.0-3.0   Today's INR 3.4!   Full instructions 3/28: Hold; Otherwise 5 mg on Mon, Wed, Fri; 2.5 mg all other days   Next INR check 4/16/2018    Indications   Chronic atrial fibrillation (H) [I48.2]  PVD (peripheral vascular disease) (H) [I73.9]  Long-term (current) use of anticoagulants [Z79.01] [Z79.01]         March 2018 Details    Sun Mon Tue Wed Thu Fri Sat         1               2               3                 4               5               6               7               8               9               10                 11               12               13               14               15               16               17                 18               19               20               21               22               23               24                 25               26               27               28      Hold   See details      29      2.5 mg         30      5 mg         31      2.5 mg          Date Details   03/28 This INR check               How to take your warfarin dose     To take:  2.5 mg Take 0.5 of a 5 mg tablet.    To take:  5 mg Take 1 of the 5 mg tablets.    Hold Do not take your warfarin dose. See the Details table to the right for additional instructions.                April 2018 Details    Sun Mon Tue Wed Thu Fri Sat     1      2.5 mg         2      5 mg         3      2.5 mg         4      5 mg         5      2.5 mg         6      5 mg         7      2.5 mg           8      2.5 mg         9      5 mg         10      2.5 mg         11      5 mg         12      2.5 mg         13      5 mg         14      2.5 mg           15      2.5 mg         16            17               18               19               20                21                 22               23               24               25               26               27               28                 29               30                     Date Details   No additional details    Date of next INR:  4/16/2018         How to take your warfarin dose     To take:  2.5 mg Take 0.5 of a 5 mg tablet.    To take:  5 mg Take 1 of the 5 mg tablets.

## 2018-04-16 ENCOUNTER — ANTICOAGULATION THERAPY VISIT (OUTPATIENT)
Dept: ANTICOAGULATION | Facility: OTHER | Age: 78
End: 2018-04-16
Payer: COMMERCIAL

## 2018-04-16 DIAGNOSIS — Z79.01 LONG-TERM (CURRENT) USE OF ANTICOAGULANTS: ICD-10-CM

## 2018-04-16 DIAGNOSIS — I48.20 CHRONIC ATRIAL FIBRILLATION (H): ICD-10-CM

## 2018-04-16 DIAGNOSIS — I73.9 PVD (PERIPHERAL VASCULAR DISEASE) (H): ICD-10-CM

## 2018-04-16 LAB — INR BLD: 1.5 (ref 0.86–1.14)

## 2018-04-16 PROCEDURE — 85610 PROTHROMBIN TIME: CPT | Mod: QW,ZL | Performed by: FAMILY MEDICINE

## 2018-04-16 PROCEDURE — 36416 COLLJ CAPILLARY BLOOD SPEC: CPT | Mod: ZL | Performed by: FAMILY MEDICINE

## 2018-04-16 NOTE — PROGRESS NOTES
ANTICOAGULATION FOLLOW-UP CLINIC VISIT    Patient Name:  Rm Duarte  Date:  4/16/2018  Contact Type:  Telephone    SUBJECTIVE:     Patient Findings     Positives Change in diet/appetite    Comments Call from patient. He is wondering what to do with his warfarin. He reports that he has increased vit K intake and is eating something green daily. We discussed that this is probably too much and to limit intake of vit K to 3 times/week. He has no bleeding/bruising and no changes in diet/meds/activity. He verbalized understanding of warfarin dosing and INR recheck date and has no questions.            OBJECTIVE    INR Point of Care   Date Value Ref Range Status   04/16/2018 1.5 (H) 0.86 - 1.14 Final     Comment:     This test is intended for monitoring Coumadin therapy.  Results are not   accurate in patients with prolonged INR due to factor deficiency.         ASSESSMENT / PLAN  INR assessment SUB    Recheck INR In: 10 DAYS    INR Location Clinic      Anticoagulation Summary as of 4/16/2018     INR goal 2.0-3.0   Today's INR 1.5!   Maintenance plan 5 mg (5 mg x 1) on Mon, Wed, Fri; 2.5 mg (5 mg x 0.5) all other days   Full instructions 4/16: 7.5 mg; 4/17: 3.75 mg; Otherwise 5 mg on Mon, Wed, Fri; 2.5 mg all other days   Weekly total 25 mg   Plan last modified Zena Haas RN (3/28/2018)   Next INR check 4/26/2018   Priority INR   Target end date Indefinite    Indications   Chronic atrial fibrillation (H) [I48.2]  PVD (peripheral vascular disease) (H) [I73.9]  Long-term (current) use of anticoagulants [Z79.01] [Z79.01]         Anticoagulation Episode Summary     INR check location     Preferred lab     Send INR reminders to  ANTICOAG POOL    Comments takes Azithromycin 250mg daily long term      Anticoagulation Care Providers     Provider Role Specialty Phone number    Dominguez Maradiaga MD [facility] [phone]            See the Encounter Report to view Anticoagulation Flowsheet and Dosing  Calendar (Go to Encounters tab in chart review, and find the Anticoagulation Therapy Visit)        Zena Haas RN

## 2018-04-16 NOTE — MR AVS SNAPSHOT
Rm Duarte   4/16/2018   Anticoagulation Therapy Visit    Description:  77 year old male   Provider:  Dominguez Maradiaga MD   Department:  [facility] Anti Coagulation           INR as of 4/16/2018     Today's INR 1.5!      Anticoagulation Summary as of 4/16/2018     INR goal 2.0-3.0   Today's INR 1.5!   Full instructions 4/16: 7.5 mg; 4/17: 3.75 mg; Otherwise 5 mg on Mon, Wed, Fri; 2.5 mg all other days   Next INR check 4/26/2018    Indications   Chronic atrial fibrillation (H) [I48.2]  PVD (peripheral vascular disease) (H) [I73.9]  Long-term (current) use of anticoagulants [Z79.01] [Z79.01]         Your next Anticoagulation Clinic appointment(s)     Apr 26, 2018  8:45 AM CDT   Anticoagulation Visit with  ANTI COAGULATION   [facility] [place] ([facility] [place] )    [address] [place] [address]   [phone]              April 2018 Details    Sun Mon Tue Wed Thu Fri Sat     1               2               3               4               5               6               7                 8               9               10               11               12               13               14                 15               16      7.5 mg   See details      17      3.75 mg         18      5 mg         19      2.5 mg         20      5 mg         21      2.5 mg           22      2.5 mg         23      5 mg         24      2.5 mg         25      5 mg         26            27               28                 29               30                     Date Details   04/16 This INR check       Date of next INR:  4/26/2018         How to take your warfarin dose     To take:  2.5 mg Take 0.5 of a 5 mg tablet.    To take:  3.75 mg Take 1.5 of the 2.5 mg tablets.    To take:  5 mg Take 1 of the 5 mg tablets.    To take:  7.5 mg Take 1.5 of the 5 mg tablets.

## 2018-04-22 ENCOUNTER — APPOINTMENT (OUTPATIENT)
Dept: GENERAL RADIOLOGY | Facility: HOSPITAL | Age: 78
End: 2018-04-22
Attending: PHYSICIAN ASSISTANT
Payer: COMMERCIAL

## 2018-04-22 ENCOUNTER — HOSPITAL ENCOUNTER (EMERGENCY)
Facility: HOSPITAL | Age: 78
Discharge: HOME | End: 2018-04-22
Attending: PHYSICIAN ASSISTANT | Admitting: PHYSICIAN ASSISTANT
Payer: COMMERCIAL

## 2018-04-22 VITALS
BODY MASS INDEX: 28.7 KG/M2 | HEART RATE: 88 BPM | OXYGEN SATURATION: 97 % | TEMPERATURE: 97 F | HEIGHT: 71 IN | RESPIRATION RATE: 22 BRPM | WEIGHT: 205 LBS | DIASTOLIC BLOOD PRESSURE: 79 MMHG | SYSTOLIC BLOOD PRESSURE: 108 MMHG

## 2018-04-22 DIAGNOSIS — J44.1 COPD EXACERBATION (H): ICD-10-CM

## 2018-04-22 LAB
ANION GAP SERPL CALCULATED.3IONS-SCNC: 7 MMOL/L (ref 3–14)
BUN SERPL-MCNC: 22 MG/DL (ref 7–30)
CALCIUM SERPL-MCNC: 9.1 MG/DL (ref 8.5–10.1)
CHLORIDE SERPL-SCNC: 104 MMOL/L (ref 94–109)
CO2 SERPL-SCNC: 28 MMOL/L (ref 20–32)
CREAT SERPL-MCNC: 1.13 MG/DL (ref 0.66–1.25)
ERYTHROCYTE [DISTWIDTH] IN BLOOD BY AUTOMATED COUNT: 15.4 % (ref 10–15)
GFR SERPL CREATININE-BSD FRML MDRD: 63 ML/MIN/1.7M2
GLUCOSE SERPL-MCNC: 189 MG/DL (ref 70–99)
HCT VFR BLD AUTO: 45.6 % (ref 40–53)
HGB BLD-MCNC: 14.7 G/DL (ref 13.3–17.7)
INR PPP: 1.55 (ref 0.8–1.2)
MCH RBC QN AUTO: 29.6 PG (ref 26.5–33)
MCHC RBC AUTO-ENTMCNC: 32.2 G/DL (ref 31.5–36.5)
MCV RBC AUTO: 92 FL (ref 78–100)
NT-PROBNP SERPL-MCNC: 798 PG/ML (ref 0–1800)
PLATELET # BLD AUTO: 352 10E9/L (ref 150–450)
POTASSIUM SERPL-SCNC: 3.9 MMOL/L (ref 3.4–5.3)
RBC # BLD AUTO: 4.96 10E12/L (ref 4.4–5.9)
SODIUM SERPL-SCNC: 139 MMOL/L (ref 133–144)
TROPONIN I SERPL-MCNC: <0.015 UG/L (ref 0–0.04)
WBC # BLD AUTO: 6.5 10E9/L (ref 4–11)

## 2018-04-22 PROCEDURE — 93010 ELECTROCARDIOGRAM REPORT: CPT | Performed by: INTERNAL MEDICINE

## 2018-04-22 PROCEDURE — 25000132 ZZH RX MED GY IP 250 OP 250 PS 637: Performed by: PHYSICIAN ASSISTANT

## 2018-04-22 PROCEDURE — 36415 COLL VENOUS BLD VENIPUNCTURE: CPT | Performed by: PHYSICIAN ASSISTANT

## 2018-04-22 PROCEDURE — 83880 ASSAY OF NATRIURETIC PEPTIDE: CPT | Performed by: PHYSICIAN ASSISTANT

## 2018-04-22 PROCEDURE — 84484 ASSAY OF TROPONIN QUANT: CPT | Performed by: PHYSICIAN ASSISTANT

## 2018-04-22 PROCEDURE — 94640 AIRWAY INHALATION TREATMENT: CPT

## 2018-04-22 PROCEDURE — 99284 EMERGENCY DEPT VISIT MOD MDM: CPT | Performed by: PHYSICIAN ASSISTANT

## 2018-04-22 PROCEDURE — 40000275 ZZH STATISTIC RCP TIME EA 10 MIN

## 2018-04-22 PROCEDURE — 25000128 H RX IP 250 OP 636: Performed by: PHYSICIAN ASSISTANT

## 2018-04-22 PROCEDURE — 93005 ELECTROCARDIOGRAM TRACING: CPT

## 2018-04-22 PROCEDURE — 25000125 ZZHC RX 250: Performed by: PHYSICIAN ASSISTANT

## 2018-04-22 PROCEDURE — 85610 PROTHROMBIN TIME: CPT | Performed by: PHYSICIAN ASSISTANT

## 2018-04-22 PROCEDURE — 80048 BASIC METABOLIC PNL TOTAL CA: CPT | Performed by: PHYSICIAN ASSISTANT

## 2018-04-22 PROCEDURE — 99285 EMERGENCY DEPT VISIT HI MDM: CPT | Mod: 25

## 2018-04-22 PROCEDURE — 85027 COMPLETE CBC AUTOMATED: CPT | Performed by: PHYSICIAN ASSISTANT

## 2018-04-22 PROCEDURE — 71046 X-RAY EXAM CHEST 2 VIEWS: CPT | Mod: TC

## 2018-04-22 RX ORDER — PREDNISONE 20 MG/1
40 TABLET ORAL ONCE
Status: COMPLETED | OUTPATIENT
Start: 2018-04-22 | End: 2018-04-22

## 2018-04-22 RX ORDER — PREDNISONE 20 MG/1
40 TABLET ORAL DAILY
Qty: 8 TABLET | Refills: 0 | Status: SHIPPED | OUTPATIENT
Start: 2018-04-23 | End: 2018-04-27

## 2018-04-22 RX ORDER — IPRATROPIUM BROMIDE AND ALBUTEROL SULFATE 2.5; .5 MG/3ML; MG/3ML
3 SOLUTION RESPIRATORY (INHALATION) ONCE
Status: COMPLETED | OUTPATIENT
Start: 2018-04-22 | End: 2018-04-22

## 2018-04-22 RX ORDER — LOPERAMIDE HCL 2 MG
2 CAPSULE ORAL ONCE
Status: COMPLETED | OUTPATIENT
Start: 2018-04-22 | End: 2018-04-22

## 2018-04-22 RX ADMIN — PREDNISONE 40 MG: 20 TABLET ORAL at 12:11

## 2018-04-22 RX ADMIN — LOPERAMIDE HYDROCHLORIDE 2 MG: 2 CAPSULE ORAL at 11:21

## 2018-04-22 RX ADMIN — IPRATROPIUM BROMIDE AND ALBUTEROL SULFATE 3 ML: .5; 3 SOLUTION RESPIRATORY (INHALATION) at 10:20

## 2018-04-22 RX ADMIN — SODIUM CHLORIDE 1000 ML: 9 INJECTION, SOLUTION INTRAVENOUS at 14:42

## 2018-04-22 ASSESSMENT — ENCOUNTER SYMPTOMS
NEUROLOGICAL NEGATIVE: 1
CHEST TIGHTNESS: 1
FATIGUE: 1
PSYCHIATRIC NEGATIVE: 1
WHEEZING: 1
GASTROINTESTINAL NEGATIVE: 1
PALPITATIONS: 0
COUGH: 1
CARDIOVASCULAR NEGATIVE: 1
HEADACHES: 0
DIZZINESS: 0

## 2018-04-22 NOTE — ED NOTES
Pt brought to er by friend.  Pt lives alone and states that he is to weak and dizzy and sob to remain at home and needs to be placed in nursing home.  Pt denies fevers, chills, or cough.  Pt reports chronic loose stools.  Pt placed on cardiac and resp monitors.  Pt wears 2 l nc at home.  Call ight  With in reach.  Awaiting md lennon

## 2018-04-22 NOTE — ED AVS SNAPSHOT
[facility]    [address] [place] [id number]    Phone:  [phone]                                       Rm Duarte   MRN: [id number]    Department:  [facility]   Date of Visit:  4/22/2018           After Visit Summary Signature Page     I have received my discharge instructions, and my questions have been answered. I have discussed any challenges I see with this plan with the nurse or doctor.    ..........................................................................................................................................  Patient/Patient Representative Signature      ..........................................................................................................................................  Patient Representative Print Name and Relationship to Patient    ..................................................               ................................................  Date                                            Time    ..........................................................................................................................................  Reviewed by Signature/Title    ...................................................              ..............................................  Date                                                            Time

## 2018-04-22 NOTE — ED AVS SNAPSHOT
[facility]    [address] [address]    Phone:  [phone]                                       Rm Duarte   MRN: [id number]    Department:  [facility]   Date of Visit:  4/22/2018           Patient Information     Date Of Birth          1940        Your diagnoses for this visit were:     COPD exacerbation (H)        You were seen by Johnnie Rivera PA.      Follow-up Information     Follow up with Dominguez Maradiaga MD.    Specialty:  Family Practice    Why:  3-5 days ideally    Contact information:    [address]  [facility] [address] [phone]          Follow up with [facility].    Specialty:  EMERGENCY MEDICINE    Why:  If symptoms worsen    Contact information:    [address]  [facility] [address] [phone]    Additional information:    From [facility]: Take [address]. Turn left at [address]. Turn left at the first stoplight on [address] [place] [address]. At the first stop sign, take a right onto [address]. Take a left into the parking lot and continue through until you reach the North enterance of the building.       From [place]: Take [address]. Take the [address] ramp towards [place]. Turn left onto [address]. Take a slight right onto [address]. Turn left at the first stoplight on [address]. At the first stop sign, take a right onto [address]. Take a left into the parking lot and continue through until you reach the North enterance of the building.       From [place]: Take [address]. Take a right at [address]. At the first stop sign, take a right onto [address]. Take a left into the parking lot and continue through until you reach the North enterance of the building.         Discharge Instructions       - Keep on schedule with the nebulizers.   - You received the first dose of prednisone here. Take 2 tabs (40mg) in the  morning for 4 more days starting tomorrow.   - [facility] in [place] for short term care/rehab and we can go from there.   - TAKE YOUR TIME getting up and moving.     * Back here with fevers or productive cough.     Discharge References/Attachments     CHRONIC OBSTRUCTIVE PULMONARY DISEASE (COPD), DISCHARGE INSTRUCTIONS (ENGLISH)      Your next 10 appointments already scheduled     Apr 26, 2018  8:45 AM CDT   Anticoagulation Visit with HC ANTI COAGULATION   [facility] ([facility] )    [address] [place] [address] [phone]            Apr 26, 2018  8:45 AM CDT   LAB with NA LAB   [facility] ([facility] )    [address]  [facility] [address]   [phone]                 Review of your medicines      START taking        Dose / Directions Last dose taken    predniSONE 20 MG tablet   Commonly known as:  DELTASONE   Dose:  40 mg   Quantity:  8 tablet   Start taking on:  4/23/2018        Take 2 tablets (40 mg) by mouth daily for 4 days   Refills:  0          Our records show that you are taking the medicines listed below. If these are incorrect, please call your family doctor or clinic.        Dose / Directions Last dose taken    ASPIRIN LOW DOSE 81 MG tablet   Dose:  1 tablet   Generic drug:  aspirin        Take 1 tablet by mouth daily.   Refills:  0        azithromycin 250 MG tablet   Commonly known as:  ZITHROMAX   Dose:  250 mg   Indication:  chronic suppressive treatment   Quantity:  30 tablet        Take 1 tablet (250 mg) by mouth daily   Refills:  11        budesonide 0.25 MG/2ML neb solution   Commonly known as:  PULMICORT   Dose:  0.25 mg        Take 0.25 mg by nebulization 2 times daily   Refills:  0        CENTRUM SILVER per tablet   Dose:  1 tablet        Take 1 tablet by mouth daily.   Refills:  0        cilostazol 100 MG tablet   Commonly known as:  PLETAL   Quantity:  180 tablet        TAKE ONE TABLET BY  MOUTH TWICE DAILY   Refills:  0        * COMBIVENT RESPIMAT  MCG/ACT inhaler   Quantity:  12 g   Generic drug:  Ipratropium-Albuterol        INHALE ONE PUFF BY MOUTH 4 TIMES DAILY. MAX  SIX  DOSES  PER  DAY   Refills:  5        * ipratropium - albuterol 0.5 mg/2.5 mg/3 mL 0.5-2.5 (3) MG/3ML neb solution   Commonly known as:  DUONEB   Quantity:  360 mL        USE ONE AMPULE IN NEBULIZER EVERY 6 HOURS AS NEEDED FOR SHORTNESS OF BREATH /DYSPNEA  OR  WHEEZING   Refills:  1        DALIRESP 500 MCG Tabs tablet   Dose:  500 mcg   Generic drug:  roflumilast        Take 500 mcg by mouth daily   Refills:  0        IMODIUM A-D 2 MG tablet   Dose:  2 mg   Generic drug:  loperamide        Take 2 mg by mouth 4 times daily as needed. Take 2 tablet by oral route after 1st loose stool and 1 tablet after each subsequent bowel movement; do not exceed 16 mg in 24 hrs. As needed.   Refills:  0        order for DME   Quantity:  1 each        Equipment being ordered: shower chair   Refills:  0        PERFOROMIST 20 MCG/2ML neb solution   Quantity:  360 mL   Generic drug:  formoterol        USE ONE VIAL IN NEBULIZER EVERY 12 HOURS   Refills:  1        ROBITUSSIN COUGH+CHEST EDVIN DM 5-100 MG/5ML Liqd   Dose:  20 mL   Indication:  Cough   Generic drug:  Dextromethorphan-Guaifenesin        Take 20 mLs by mouth every 4 hours as needed   Refills:  0        simvastatin 10 MG tablet   Commonly known as:  ZOCOR   Quantity:  90 tablet        TAKE ONE TABLET BY MOUTH AT BEDTIME   Refills:  0        sodium chloride 0.65 % nasal spray   Commonly known as:  OCEAN   Dose:  1 spray   Quantity:  1 Bottle        Spray 1 spray into both nostrils every hour as needed for congestion   Refills:  0        tamsulosin 0.4 MG capsule   Commonly known as:  FLOMAX   Dose:  0.4 mg   Quantity:  30 capsule        Take 1 capsule (0.4 mg) by mouth daily   Refills:  6        warfarin 2.5 MG tablet   Commonly known as:  COUMADIN   Quantity:  115 tablet        2.5mg  "Mon,Wed,Fri and 5mg all other days or as directed by warfarin clinic   Refills:  3        * Notice:  This list has 2 medication(s) that are the same as other medications prescribed for you. Read the directions carefully, and ask your doctor or other care provider to review them with you.            Prescriptions were sent or printed at these locations (1 Prescription)                   [facility] [address] [place], [place] [address] [address] [address]   [address] , [place] [address]    Telephone:  [phone]   Fax:  [phone]   Hours:                  E-Prescribed (1 of 1)         predniSONE (DELTASONE) 20 MG tablet                Procedures and tests performed during your visit     Basic metabolic panel    CBC with platelets    Cardiac Continuous Monitoring    EKG 12 lead    INR    NT pro BNP    Pulse oximetry nursing    Troponin I    Vital signs    XR Chest 2 Views      Orders Needing Specimen Collection     None      Pending Results     No orders found from 4/20/2018 to 4/23/2018.            Pending Culture Results     No orders found from 4/20/2018 to 4/23/2018.            Thank you for choosing [facility]       Thank you for choosing [facility] for your care. Our goal is always to provide you with excellent care. Hearing back from our patients is one way we can continue to improve our services. Please take a few minutes to complete the written survey that you may receive in the mail after you visit with us. Thank you!        [facility] Information     [facility] lets you send messages to your doctor, view your test results, renew your prescriptions, schedule appointments and more. To sign up, go to [email or web address] . Click on \"Log in\" on the left side of the screen, which will take you to the Welcome page. Then click on \"Sign up Now\" on the right side of the page.     You will be asked to enter the access code listed below, as well as some personal information. Please follow the directions to create your username and " password.     Your access code is: [email or web address]  Expires: 2018  3:32 PM     Your access code will  in 90 days. If you need help or a new code, please call your [facility] clinic or [phone].        Care EveryWhere ID     This is your Care EveryWhere ID. This could be used by other organizations to access your [facility] medical records  [id number]        Equal Access to Services     DARA SIMPSON : Hadii bette fragoso hadasho Soomaali, waaxda luqadaha, qaybta kaalmada adeegyada, karina shell . So [facility] [phone].    ATENCIÓN: Si habla español, tiene a dhillon disposición servicios gratuitos de asistencia lingüística. Llame al [phone].    We comply with applicable federal civil rights laws and [place] laws. We do not discriminate on the basis of race, color, national origin, age, disability, sex, sexual orientation, or gender identity.            After Visit Summary       This is your record. Keep this with you and show to your community pharmacist(s) and doctor(s) at your next visit.

## 2018-04-22 NOTE — PROGRESS NOTES
This writer was asked to see patient as he has concerns about going home alone. Patient lives alone in a house and states he has been struggling with self cares, such as bathing, getting out of the house to get his medications, and activity tolerance d/t his COPD. Patient was previously set up with palliative care with [facility] but states he called them off as he states he qualified for minimal services and didn't see the benefit at the time. Patient's goal would be to return home with extra support if possible. Patient stated his sister plans to come to his house to help him work on the plan tomorrow. This writer placed referrals to [facility] and [facility], both in [place], as they are the Located [facility] nursing facilities that accept weekend admissions. Marcia, a nurse from [facility], screened patient and stated his [facility] would cover him to come for admission tomorrow, Monday 4/23/18, for short-term rehab. Updated patient on this and patient stated he would like to do that and get stronger, and then have help to make a good plan for returning home following the short-term rehab. Advised him that the  at [facility] can arrange the transition of care at that point to get him extra support at home. Updated Marcia at [facility] and she advised for patient to call before he comes tomorrow and they will have his bed available and ready. Patient agreed to contact there before he goes there tomorrow and states his sister will give him a ride there. He states his friend will come  and bring him home today. Patient denies any other needs at this time.

## 2018-04-22 NOTE — ED TRIAGE NOTES
Pt brought in by friend for evaluation of worsening sob. Reports hx of copd with oxygen use at home of 4 lpm. Reports he is having some difficulty managing medications and copd at home. States his sister is supposed to be coming to help him get into a nursing home tomorrow.

## 2018-04-22 NOTE — ED NOTES
All discharge instructions and avs discussed with patient .  Pt able to verbalize admission to [facility] tomorrow as well as home care, follow up and medication management.  All questions answered.  Vital stable.  All belongings sent home.

## 2018-04-22 NOTE — DISCHARGE INSTRUCTIONS
- Keep on schedule with the nebulizers.   - You received the first dose of prednisone here. Take 2 tabs (40mg) in the morning for 4 more days starting tomorrow.   - [facility] in [place] for short term care/rehab and we can go from there.   - TAKE YOUR TIME getting up and moving.     * Back here with fevers or productive cough.

## 2018-04-22 NOTE — ED PROVIDER NOTES
"  History     Chief Complaint   Patient presents with     Shortness of Breath     worsening over the last few weeks     HPI  Rm Duarte is a 77 year old male with Hx COPD on home O2, chronic a fib, who presents to the ED ambulatory with worsening shortness of breath and weakness over the past \"few weeks\". He denies increase in baseline cough. Cough is nonproductive. He reports he has not been taking his meds right and he has been too lightheaded to stand independently. His sister is coming to [place] to hopefully help him transition into NH. She is not present at this time.     Problem List:    Patient Active Problem List    Diagnosis Date Noted     Acute exacerbation of chronic obstructive pulmonary disease (H) 12/01/2017     Priority: Medium     ACP (advance care planning) 06/28/2017     Priority: Medium     Advance Care Planning 6/28/2017: ACP Review of Chart / Resources Provided:  Reviewed chart for advance care plan.  Rm Duarte has been provided information and resources to begin or update their advance care plan.  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  1991 were 4.31/6.20!!!!!!!??     ECHO: TDS, EF~50%, abnl septum, o/w NORMAL  2011     PAD: CTA> occlusion of L mid SFA with reconstitution  2010     S/P colonoscopy: tubular adenoma & tics  2010    Repeat in ; Dr Graham     Tobacco Abuse Ongoing         Family History:    Family History   Problem Relation Age of Onset     Other - See Comments Mother      AAA, cause of death     Other - See Comments Other 69     AAA, family hx     Chronic Obstructive Pulmonary Disease Brother       MI age 60's     Family History Negative Sister        Social History:  Marital Status:  Single [1]  Social History   Substance Use Topics     Smoking status: Former Smoker     Types: Cigarettes     Smokeless tobacco: Never Used      Comment: Tried to quit; Quit      Alcohol use 0.0 oz/week     0 Standard drinks or equivalent per week      Comment: Beer; rarely        Medications:      aspirin (ASPIRIN LOW DOSE) 81 MG tablet   azithromycin (ZITHROMAX) 250 MG tablet   budesonide (PULMICORT) 0.25 MG/2ML neb solution   cilostazol (PLETAL) 100 MG tablet   COMBIVENT RESPIMAT  MCG/ACT inhaler   ipratropium - albuterol 0.5 mg/2.5 mg/3 mL (DUONEB) 0.5-2.5 (3) MG/3ML neb solution   Multiple Vitamins-Minerals (CENTRUM SILVER) per tablet   PERFOROMIST 20 MCG/2ML neb solution   [START ON 2018] predniSONE (DELTASONE) 20 MG tablet   roflumilast (DALIRESP) 500 MCG TABS tablet   simvastatin (ZOCOR) 10 MG tablet   tamsulosin (FLOMAX) 0.4 MG capsule   warfarin (COUMADIN) 2.5 MG tablet   Dextromethorphan-Guaifenesin (ROBITUSSIN COUGH+CHEST EDVIN DM) 5-100 MG/5ML LIQD   loperamide (IMODIUM A-D) 2 MG tablet   order for DME   sodium chloride (OCEAN) 0.65 % nasal spray         Review of Systems   Constitutional: Positive for fatigue.   Respiratory: Positive for cough, chest tightness and wheezing.    Cardiovascular: Negative.  Negative for chest pain,  "palpitations and leg swelling.   Gastrointestinal: Negative.    Skin: Negative.    Neurological: Negative.  Negative for dizziness, syncope and headaches.   Psychiatric/Behavioral: Negative.        Physical Exam   BP: 114/91  Pulse: 88  Heart Rate: 82  Temp: 97.5  F (36.4  C)  Resp: 18  Height: 180.3 cm (5' 11\")  Weight: 93 kg (205 lb)  SpO2: 92 %      Physical Exam   Constitutional: He is oriented to person, place, and time. No distress (chronically ill appearing male, presents on home O2).   HENT:   Head: Normocephalic and atraumatic.   Mouth/Throat: Oropharynx is clear and moist.   Cardiovascular: Normal rate and normal heart sounds.    Pulmonary/Chest: Effort normal. He has wheezes. He has rales (fine basilar crackles).   Abdominal: Soft. Bowel sounds are normal. There is no tenderness.   Neurological: He is alert and oriented to person, place, and time.   Skin: Skin is warm and dry.   Psychiatric: He has a normal mood and affect.   Nursing note and vitals reviewed.      ED Course     ED Course     Procedures               EKG Interpretation:      Interpreted by Johnnie Rivera  Time reviewed: 1136  Symptoms at time of EKG: dyspnea   Rhythm: normal sinus, occasional PAC  Rate: 88  Axis: Normal  Ectopy: none  Conduction: normal  ST Segments/ T Waves: No acute ischemic changes  Q Waves: no pathologic Q wave  Comparison to prior: Grossly unchanged - t wave changes in V6 compared to 12/2017    Clinical Impression: no acute changes      Results for orders placed or performed during the hospital encounter of 04/22/18 (from the past 24 hour(s))   CBC with platelets   Result Value Ref Range    WBC 6.5 4.0 - 11.0 10e9/L    RBC Count 4.96 4.4 - 5.9 10e12/L    Hemoglobin 14.7 13.3 - 17.7 g/dL    Hematocrit 45.6 40.0 - 53.0 %    MCV 92 78 - 100 fl    MCH 29.6 26.5 - 33.0 pg    MCHC 32.2 31.5 - 36.5 g/dL    RDW 15.4 (H) 10.0 - 15.0 %    Platelet Count 352 150 - 450 10e9/L   Basic metabolic panel   Result Value Ref Range    " Sodium 139 133 - 144 mmol/L    Potassium 3.9 3.4 - 5.3 mmol/L    Chloride 104 94 - 109 mmol/L    Carbon Dioxide 28 20 - 32 mmol/L    Anion Gap 7 3 - 14 mmol/L    Glucose 189 (H) 70 - 99 mg/dL    Urea Nitrogen 22 7 - 30 mg/dL    Creatinine 1.13 0.66 - 1.25 mg/dL    GFR Estimate 63 >60 mL/min/1.7m2    GFR Estimate If Black 76 >60 mL/min/1.7m2    Calcium 9.1 8.5 - 10.1 mg/dL   INR   Result Value Ref Range    INR 1.55 (H) 0.80 - 1.20   Troponin I   Result Value Ref Range    Troponin I ES <0.015 0.000 - 0.045 ug/L   NT pro BNP   Result Value Ref Range    N-Terminal Pro BNP Inpatient 798 0 - 1800 pg/mL   XR Chest 2 Views    Narrative    Procedure:XR CHEST 2 VW    Clinical history:Male, 77 years, Shortness of breath;     Technique: Two views are submitted.    Comparison: 12/1/2017    Findings: The cardiac silhouette is normal. The pulmonary vasculature  is normal.    The lungs are hyperinflated. Interstitial prominence is again seen in  the left upper lobe. Bony structures are unremarkable.      Impression    Impression:  1.   No acute abnormality. No significant change compared to  12/1/2017.    CHANELL TOUSSAINT MD       Medications   0.9% sodium chloride BOLUS (1,000 mLs Intravenous New Bag 4/22/18 1442)   ipratropium - albuterol 0.5 mg/2.5 mg/3 mL (DUONEB) neb solution 3 mL (3 mLs Nebulization Given 4/22/18 1020)   loperamide (IMODIUM) capsule 2 mg (2 mg Oral Given 4/22/18 1121)   predniSONE (DELTASONE) tablet 40 mg (40 mg Oral Given 4/22/18 1211)       Assessments & Plan (with Medical Decision Making)     I have reviewed the nursing notes.  I have reviewed the findings, diagnosis, plan and need for follow up with the patient.    New Prescriptions    PREDNISONE (DELTASONE) 20 MG TABLET    Take 2 tablets (40 mg) by mouth daily for 4 days       Final diagnoses:   COPD exacerbation (H)   I spoke with Dr Hatch regarding observation, however we were able to contact  and they have set up short term care/rehab with  [facility] tomorrow. Rm's sister will be with him tomorrow and he feels well to return home. He is given today's dose of prednisone, received duoneb and will remain on 4liters O2.     4/22/2018   [facility]     Johnnie Rivera PA  04/23/18 0109

## 2018-04-23 ENCOUNTER — OFFICE VISIT (OUTPATIENT)
Dept: FAMILY MEDICINE | Facility: OTHER | Age: 78
End: 2018-04-23
Attending: FAMILY MEDICINE
Payer: COMMERCIAL

## 2018-04-23 ENCOUNTER — TELEPHONE (OUTPATIENT)
Dept: FAMILY MEDICINE | Facility: OTHER | Age: 78
End: 2018-04-23

## 2018-04-23 VITALS
HEART RATE: 95 BPM | WEIGHT: 205 LBS | SYSTOLIC BLOOD PRESSURE: 110 MMHG | RESPIRATION RATE: 16 BRPM | OXYGEN SATURATION: 93 % | DIASTOLIC BLOOD PRESSURE: 56 MMHG | BODY MASS INDEX: 28.7 KG/M2 | HEIGHT: 71 IN | TEMPERATURE: 97.8 F

## 2018-04-23 DIAGNOSIS — J44.1 OBSTRUCTIVE CHRONIC BRONCHITIS WITH EXACERBATION (H): Primary | ICD-10-CM

## 2018-04-23 PROCEDURE — G0463 HOSPITAL OUTPT CLINIC VISIT: HCPCS

## 2018-04-23 PROCEDURE — 99214 OFFICE O/P EST MOD 30 MIN: CPT | Performed by: FAMILY MEDICINE

## 2018-04-23 ASSESSMENT — ANXIETY QUESTIONNAIRES
GAD7 TOTAL SCORE: 0
3. WORRYING TOO MUCH ABOUT DIFFERENT THINGS: NOT AT ALL
6. BECOMING EASILY ANNOYED OR IRRITABLE: NOT AT ALL
2. NOT BEING ABLE TO STOP OR CONTROL WORRYING: NOT AT ALL
1. FEELING NERVOUS, ANXIOUS, OR ON EDGE: NOT AT ALL
5. BEING SO RESTLESS THAT IT IS HARD TO SIT STILL: NOT AT ALL
7. FEELING AFRAID AS IF SOMETHING AWFUL MIGHT HAPPEN: NOT AT ALL
4. TROUBLE RELAXING: NOT AT ALL

## 2018-04-23 ASSESSMENT — PAIN SCALES - GENERAL: PAINLEVEL: NO PAIN (0)

## 2018-04-23 NOTE — PROGRESS NOTES
Abdalla RADHA Gerald    April 23, 2018    Chief Complaint   Patient presents with     RECHECK     Emergency Room- 4/22/18       SUBJECTIVE:  Here with sister who is very worried about him.  He is not safe at home.  He has copd exacerbation, and the steroid is minimally helping as it has for some time in the recent past.  Just frustrated.  Family is worried.  He was placed at [facility], but he wants to have cares at home.  We reviewed and discussed costs, etc.  See below.      Past Medical History:   Diagnosis Date     Atrial fibrillation (H) 3/23/2012     COPD 5/16/2011     PVD (peripheral vascular disease) (H)        Past Surgical History:   Procedure Laterality Date     COPD:FEV1-0.74/FVC-3.35 22%, DLCO-28%  3/1/2010    Severe COPD; 4/23/2008 PFT's done : 0.96/3.81 25% Fev1, DLCO 45%.  1991 were 4.31/6.20!!!!!!!??     ECHO: TDS, EF~50%, abnl septum, o/w NORMAL  5/20/2011     PAD: CTA> occlusion of L mid SFA with reconstitution  12/2/2010     S/P colonoscopy: tubular adenoma & tics  5/4/2010    Repeat in 2014; Dr Graham     Tobacco Abuse Ongoing         Current Outpatient Prescriptions   Medication Sig Dispense Refill     aspirin (ASPIRIN LOW DOSE) 81 MG tablet Take 1 tablet by mouth daily.       azithromycin (ZITHROMAX) 250 MG tablet Take 1 tablet (250 mg) by mouth daily 30 tablet 11     budesonide (PULMICORT) 0.25 MG/2ML neb solution Take 0.25 mg by nebulization 2 times daily       cilostazol (PLETAL) 100 MG tablet TAKE ONE TABLET BY MOUTH TWICE DAILY 180 tablet 0     COMBIVENT RESPIMAT  MCG/ACT inhaler INHALE ONE PUFF BY MOUTH 4 TIMES DAILY. MAX  SIX  DOSES  PER  DAY 12 g 5     Dextromethorphan-Guaifenesin (ROBITUSSIN COUGH+CHEST EDVIN DM) 5-100 MG/5ML LIQD Take 20 mLs by mouth every 4 hours as needed       ipratropium - albuterol 0.5 mg/2.5 mg/3 mL (DUONEB) 0.5-2.5 (3) MG/3ML neb solution USE ONE AMPULE IN NEBULIZER EVERY 6 HOURS AS NEEDED FOR SHORTNESS OF BREATH /DYSPNEA  OR  WHEEZING 360 mL 1      loperamide (IMODIUM A-D) 2 MG tablet Take 2 mg by mouth 4 times daily as needed. Take 2 tablet by oral route after 1st loose stool and 1 tablet after each subsequent bowel movement; do not exceed 16 mg in 24 hrs. As needed.       Multiple Vitamins-Minerals (CENTRUM SILVER) per tablet Take 1 tablet by mouth daily.       order for DME Equipment being ordered: shower chair 1 each 0     PERFOROMIST 20 MCG/2ML neb solution USE ONE VIAL IN NEBULIZER EVERY 12 HOURS 360 mL 1     predniSONE (DELTASONE) 20 MG tablet Take 2 tablets (40 mg) by mouth daily for 4 days 8 tablet 0     roflumilast (DALIRESP) 500 MCG TABS tablet Take 500 mcg by mouth daily       simvastatin (ZOCOR) 10 MG tablet TAKE ONE TABLET BY MOUTH AT BEDTIME 90 tablet 0     sodium chloride (OCEAN) 0.65 % nasal spray Spray 1 spray into both nostrils every hour as needed for congestion 1 Bottle 0     tamsulosin (FLOMAX) 0.4 MG capsule Take 1 capsule (0.4 mg) by mouth daily 30 capsule 6     warfarin (COUMADIN) 2.5 MG tablet 2.5mg Mon,Wed,Fri and 5mg all other days or as directed by warfarin clinic 115 tablet 3       No Known Allergies    Family History   Problem Relation Age of Onset     Other - See Comments Mother      AAA, cause of death     Other - See Comments Other 69     AAA, family hx     Chronic Obstructive Pulmonary Disease Brother       MI age 60's     Family History Negative Sister        Social History     Social History     Marital status: Single     Spouse name: N/A     Number of children: N/A     Years of education: N/A     Occupational History      Retired     /[employer]     Social History Main Topics     Smoking status: Former Smoker     Types: Cigarettes     Smokeless tobacco: Never Used      Comment: Tried to quit; Quit      Alcohol use 0.0 oz/week     0 Standard drinks or equivalent per week      Comment: Beer; rarely     Drug use: Not on file     Sexual activity: Not on file     Other Topics Concern     Blood  Transfusions Yes     Caffeine Concern No     Parent/Sibling W/ Cabg, Mi Or Angioplasty Before 65f 55m? No     Social History Narrative       5 point ROS negative except as noted above in HPI, including Gen., Resp., CV, GI &  system review.     OBJECTIVE:  B/P: 110/56, Temperature: 97.8, Pulse: 95, Respirations: 16    GENERAL APPEARANCE: Alert, no acute distress  CV: regular rate and rhythm, no murmur, rub or gallop  RESP: lungs clear to auscultation bilaterally with decreased breath sounds throughout.    ABDOMEN: normal bowel sounds, soft, nontender, no hepatosplenomegaly or other masses  SKIN: no suspicious lesions or rashes to visualized skin  NEURO: Alert, oriented x 3, speech and mentation normal    ASSESSMENT and PLAN:  (J44.1) Obstructive chronic bronchitis with exacerbation (H)  (primary encounter diagnosis)  Comment: discussed at length.    Plan: his sister will not take him home as she is so worried.  He expected to be admitted yesterday but was not.  I explained the process to him at some length and sister as well.  He is going to go to [place] today.  I anticipate getting some orders to sign or something.  Appreciate SS through the ER.      25 minutes spent with patient and sister, over 50%in counseling regarding the copd, treatment options, and also NH placement issues, assisted living, home cares, and the like.

## 2018-04-23 NOTE — MR AVS SNAPSHOT
"              After Visit Summary   4/23/2018    Rm Duarte    MRN: [id number]           Patient Information     Date Of Birth          1940        Visit Information        Provider Department      4/23/2018 11:15 AM Dominguez Maradiaga MD [facility]        Today's Diagnoses     Obstructive chronic bronchitis with exacerbation (H)    -  1      Care Instructions    F/u with ongoing concerns.           Follow-ups after your visit        Your next 10 appointments already scheduled     Apr 26, 2018  8:45 AM CDT   Anticoagulation Visit with HC ANTI COAGULATION   [facility] ([facility] )    [address]  [facility] [id number] [phone]            Apr 26, 2018  8:45 AM CDT   LAB with NA LAB   [facility] )    [address]  [facility] [id number] [phone]              Who to contact     If you have questions or need follow up information about today's clinic visit or your schedule please contact [facility] directly at [phone].  Normal or non-critical lab and imaging results will be communicated to you by Digital Harbor[employer], letter or phone within 4 business days after the clinic has received the results. If you do not hear from us within 7 days, please contact the clinic through [employer] or phone. If you have a critical or abnormal lab result, we will notify you by phone as soon as possible.  Submit refill requests through Inf[employer]low or call your pharmacy and they will forward the refill request to us. Please allow 3 business days for your refill to be completed.          Additional Information About Your Visit        MyChart Information     Infoflow lets you send messages to your doctor, view your test results, renew your prescriptions, schedule appointments and more. To sign up, go to [email or web address] . Click on \"Log in\" on the left side of the screen, which will take you to the Welcome page. " "Then click on \"Sign up Now\" on the right side of the page.     You will be asked to enter the access code listed below, as well as some personal information. Please follow the directions to create your username and password.     Your access code is: [email or web address]  Expires: 2018  3:32 PM     Your access code will  in 90 days. If you need help or a new code, please call your [facility] clinic or [phone].        Care EveryWhere ID     This is your Care EveryWhere ID. This could be used by other organizations to access your [facility] medical records  [id number]        Your Vitals Were     Pulse Temperature Respirations Height Pulse Oximetry BMI (Body Mass Index)    95 97.8  F (36.6  C) (Tympanic) 16 5' 11\" (1.803 m) 93% 28.59 kg/m2       Blood Pressure from Last 3 Encounters:   18 110/56   18 108/79   17 117/54    Weight from Last 3 Encounters:   18 205 lb (93 kg)   18 205 lb (93 kg)   17 203 lb 9.6 oz (92.4 kg)              Today, you had the following     No orders found for display       Primary Care Provider Office Phone # Fax #    Dominguez Maradiaga [phone]       [address] [place] [id number]        Goals        General    Improve chronic symptoms (pt-stated)     Notes - Note created  3/27/2018  2:18 PM by Pipkin, Lauren N, RN    Continue to monitor COPD symptoms and report signs of exacerbation to CC        Equal Access to Services     [facility]: Hadii bette cameron Sopenny, waaxda luqadaha, qaybta kaalkarina alegre . So [facility] [phone].    ATENCIÓN: Si habla español, tiene a dhillon disposición servicios gratuitos de asistencia lingüística. Llame al [phone].    We comply with applicable federal civil rights laws and [place] laws. We do not discriminate on the basis of race, color, national origin, age, disability, sex, sexual orientation, or gender identity.            Thank you!     " Thank you for choosing [facility]  for your care. Our goal is always to provide you with excellent care. Hearing back from our patients is one way we can continue to improve our services. Please take a few minutes to complete the written survey that you may receive in the mail after your visit with us. Thank you!             Your Updated Medication List - Protect others around you: Learn how to safely use, store and throw away your medicines at www.disposemymeds.org.          This list is accurate as of 4/23/18 11:52 AM.  Always use your most recent med list.                   Brand Name Dispense Instructions for use Diagnosis    ASPIRIN LOW DOSE 81 MG tablet   Generic drug:  aspirin      Take 1 tablet by mouth daily.        azithromycin 250 MG tablet    ZITHROMAX    30 tablet    Take 1 tablet (250 mg) by mouth daily    Obstructive chronic bronchitis with exacerbation (H)       budesonide 0.25 MG/2ML neb solution    PULMICORT     Take 0.25 mg by nebulization 2 times daily        CENTRUM SILVER per tablet      Take 1 tablet by mouth daily.        cilostazol 100 MG tablet    PLETAL    180 tablet    TAKE ONE TABLET BY MOUTH TWICE DAILY    PVD (peripheral vascular disease) (H)       * COMBIVENT RESPIMAT  MCG/ACT inhaler   Generic drug:  Ipratropium-Albuterol     12 g    INHALE ONE PUFF BY MOUTH 4 TIMES DAILY. MAX  SIX  DOSES  PER  DAY    Chronic obstructive pulmonary disease, unspecified COPD type (H)       * ipratropium - albuterol 0.5 mg/2.5 mg/3 mL 0.5-2.5 (3) MG/3ML neb solution    DUONEB    360 mL    USE ONE AMPULE IN NEBULIZER EVERY 6 HOURS AS NEEDED FOR SHORTNESS OF BREATH /DYSPNEA  OR  WHEEZING    COPD (chronic obstructive pulmonary disease) (H)       DALIRESP 500 MCG Tabs tablet   Generic drug:  roflumilast      Take 500 mcg by mouth daily        IMODIUM A-D 2 MG tablet   Generic drug:  loperamide      Take 2 mg by mouth 4 times daily as needed. Take 2 tablet by oral route after 1st loose  stool and 1 tablet after each subsequent bowel movement; do not exceed 16 mg in 24 hrs. As needed.        order for DME     1 each    Equipment being ordered: shower chair    End stage COPD (H)       PERFOROMIST 20 MCG/2ML neb solution   Generic drug:  formoterol     360 mL    USE ONE VIAL IN NEBULIZER EVERY 12 HOURS    Other emphysema (H)       predniSONE 20 MG tablet    DELTASONE    8 tablet    Take 2 tablets (40 mg) by mouth daily for 4 days        ROBITUSSIN COUGH+CHEST EDVIN DM 5-100 MG/5ML Liqd   Generic drug:  Dextromethorphan-Guaifenesin      Take 20 mLs by mouth every 4 hours as needed        simvastatin 10 MG tablet    ZOCOR    90 tablet    TAKE ONE TABLET BY MOUTH AT BEDTIME    PVD (peripheral vascular disease) (H)       sodium chloride 0.65 % nasal spray    OCEAN    1 Bottle    Spray 1 spray into both nostrils every hour as needed for congestion    COPD exacerbation (H)       tamsulosin 0.4 MG capsule    FLOMAX    30 capsule    Take 1 capsule (0.4 mg) by mouth daily    BPH (benign prostatic hyperplasia)       warfarin 2.5 MG tablet    COUMADIN    115 tablet    2.5mg Mon,Wed,Fri and 5mg all other days or as directed by warfarin clinic    Long term current use of anticoagulant therapy       * Notice:  This list has 2 medication(s) that are the same as other medications prescribed for you. Read the directions carefully, and ask your doctor or other care provider to review them with you.

## 2018-04-23 NOTE — NURSING NOTE
"Chief Complaint   Patient presents with     RECHECK     Emergency Room- 4/22/18       Initial /56 (BP Location: Right arm, Patient Position: Chair, Cuff Size: Adult Regular)  Pulse 95  Temp 97.8  F (36.6  C) (Tympanic)  Resp 16  Ht 5' 11\" (1.803 m)  Wt 205 lb (93 kg)  SpO2 93%  BMI 28.59 kg/m2 Estimated body mass index is 28.59 kg/(m^2) as calculated from the following:    Height as of this encounter: 5' 11\" (1.803 m).    Weight as of this encounter: 205 lb (93 kg).  Medication Reconciliation: complete   .Cortney Santacruz LPN  "

## 2018-04-23 NOTE — TELEPHONE ENCOUNTER
12:19 PM    Reason for Call: Phone Call    Description: Debra from [facility] calling and wanting orders for admission. Please call her back. Thank you    Was an appointment offered for this call? No  If yes : Appointment type              Date    Preferred method for responding to this message: Telephone Call  What is your phone number ? [phone]    If we cannot reach you directly, may we leave a detailed response at the number you provided? Yes    Can this message wait until your PCP/provider returns, if available today?     Joann Maxwell

## 2018-04-24 ASSESSMENT — ANXIETY QUESTIONNAIRES: GAD7 TOTAL SCORE: 0

## 2018-04-24 ASSESSMENT — PATIENT HEALTH QUESTIONNAIRE - PHQ9: SUM OF ALL RESPONSES TO PHQ QUESTIONS 1-9: 4

## 2018-04-25 ENCOUNTER — PATIENT OUTREACH (OUTPATIENT)
Dept: CARE COORDINATION | Facility: OTHER | Age: 78
End: 2018-04-25

## 2018-04-25 NOTE — PROGRESS NOTES
Clinic Care Coordination Contact  Care Team Conversations    CC spoke at length with Rm's sister Patt this afternoon.  Patt states Rm is now at [facility] for therapy.  He needs to have help with strength due to extreme weakness and COPD exacerbation.  She states the staff and therapists are doing a great job of encouraging him and he has shown significant improvement already.  His breathing has improved and he is able to walk down the hallway there.  She states they are planning a care conference in a few weeks to determine what the plan will be moving forward.  She does not feel he should continue to live in his home independently.  She states he lives on [address] in [place] and has been a bachelor his whole life.  The house is a huge mess with garbage everywhere.  He does not take care of himself and he eats convenience foods.  She believes his home is not healthy and may have mold in the basement which seems to make his respiratory issues even worse.  She is very happy that Care Coordination is involved with his care and she provided his cell phone number.  She states he would really appreciate a call as he really values those who are caring for him.  CC plans to reach out to him tomorrow morning.  CC encouraged Patt to contact CC as needed moving forward.  CC offered to assist with support/resources as they determine where he may go after [facility] as she would like to see him move to assisted living.      Lauren Pipkin, BS-RN   CHF and General Care Coordinator  [phone] Option # 1

## 2018-04-25 NOTE — PROGRESS NOTES
Clinic Care Coordination Contact  Care Team Conversations    Care Coordinator attempted phone call to patient's sister Patt today to request update on Abdalla.  Left message for her to return call.    Lauren Pipkin, BS-RN   CHF and General Care Coordinator  [phone] Option # 1

## 2018-04-26 ENCOUNTER — ANTICOAGULATION THERAPY VISIT (OUTPATIENT)
Dept: ANTICOAGULATION | Facility: OTHER | Age: 78
End: 2018-04-26

## 2018-04-26 ENCOUNTER — RESULTS ONLY (OUTPATIENT)
Dept: LAB | Age: 78
End: 2018-04-26

## 2018-04-26 ENCOUNTER — MEDICAL CORRESPONDENCE (OUTPATIENT)
Dept: LAB | Facility: OTHER | Age: 78
End: 2018-04-26

## 2018-04-26 ENCOUNTER — TRANSFERRED RECORDS (OUTPATIENT)
Dept: HEALTH INFORMATION MANAGEMENT | Facility: OTHER | Age: 78
End: 2018-04-26

## 2018-04-26 ENCOUNTER — PATIENT OUTREACH (OUTPATIENT)
Dept: CARE COORDINATION | Facility: OTHER | Age: 78
End: 2018-04-26

## 2018-04-26 DIAGNOSIS — I48.20 CHRONIC ATRIAL FIBRILLATION (H): ICD-10-CM

## 2018-04-26 DIAGNOSIS — Z79.01 LONG-TERM (CURRENT) USE OF ANTICOAGULANTS: ICD-10-CM

## 2018-04-26 DIAGNOSIS — I73.9 PVD (PERIPHERAL VASCULAR DISEASE) (H): ICD-10-CM

## 2018-04-26 LAB
INR PPP: 1.45 (ref 0–1.3)
PROTHROMBIN TIME: 17.3 S (ref 11.9–15.2)

## 2018-04-26 NOTE — MR AVS SNAPSHOT
Rm Duarte   4/26/2018   Anticoagulation Therapy Visit    Description:  77 year old male   Provider:  Joanna Cisneros MD   Department:  [facility]           INR as of 4/26/2018     Today's INR 1.45!      Anticoagulation Summary as of 4/26/2018     INR goal 2.0-3.0   Today's INR 1.45!   Full instructions 4/26: 5 mg; Otherwise 5 mg on Mon, Wed, Fri; 2.5 mg all other days   Next INR check 4/30/2018    Indications   Chronic atrial fibrillation (H) [I48.2]  PVD (peripheral vascular disease) (H) [I73.9]  Long-term (current) use of anticoagulants [Z79.01] [Z79.01]         Description     Take 5 mg x 2 days and 2.5 mg x 2 days recheck INR on 4/30/18. Eula Swain RN    4/26/2018  9:13 AM        April 2018 Details    Sun Mon Tue Wed Thu Fri Sat     1               2               3               4               5               6               7                 8               9               10               11               12               13               14                 15               16               17               18               19               20               21                 22               23               24               25               26      5 mg   See details      27      5 mg         28      2.5 mg           29      2.5 mg         30                  Date Details   04/26 This INR check       Date of next INR:  4/30/2018         How to take your warfarin dose     To take:  2.5 mg Take 0.5 of a 5 mg tablet.    To take:  5 mg Take 1 of the 5 mg tablets.

## 2018-04-26 NOTE — PROGRESS NOTES
Clinic Care Coordination Contact  Care Team Conversations    Care Coordinator phoned Abdalla today.  He states he just started his therapy session this morning at [facility].  He would like to talk with CC so he will call back when he is finished.    Lauren Pipkin, BS-RN   CHF and General Care Coordinator  [phone] Option # 1

## 2018-04-26 NOTE — PROGRESS NOTES
ANTICOAGULATION FOLLOW-UP CLINIC VISIT    Patient Name:  Rm Duarte  Date:  4/26/2018  Contact Type:  fax from [facility] per Marguerite GARCIA    SUBJECTIVE:     Patient Findings     Positives No Problem Findings           OBJECTIVE    INR   Date Value Ref Range Status   04/26/2018 1.45 (H) 0 - 1.3 Final       ASSESSMENT / PLAN  INR assessment SUB    Recheck INR In: 5 DAYS    INR Location [facility]      Anticoagulation Summary as of 4/26/2018     INR goal 2.0-3.0   Today's INR 1.45!   Maintenance plan 5 mg (5 mg x 1) on Mon, Wed, Fri; 2.5 mg (5 mg x 0.5) all other days   Full instructions 4/26: 5 mg; Otherwise 5 mg on Mon, Wed, Fri; 2.5 mg all other days   Weekly total 25 mg   Plan last modified Zena Haas RN (3/28/2018)   Next INR check 4/30/2018   Priority INR   Target end date Indefinite    Indications   Chronic atrial fibrillation (H) [I48.2]  PVD (peripheral vascular disease) (H) [I73.9]  Long-term (current) use of anticoagulants [Z79.01] [Z79.01]         Anticoagulation Episode Summary     INR check location     Preferred lab     Send INR reminders to  INR    Comments takes Azithromycin 250mg daily long term. Resides at [facility]      Anticoagulation Care Providers     Provider Role Specialty Phone number    Joanna Cisneros MD [facility] [phone]            See the Encounter Report to view Anticoagulation Flowsheet and Dosing Calendar (Go to Encounters tab in chart review, and find the Anticoagulation Therapy Visit)        Eula Swain RN

## 2018-04-27 NOTE — PROGRESS NOTES
Clinic Care Coordination Contact  Care Team Conversations    Care Coordinator spoke with Abdalla today.  He states things are going well at the rehab facility.  He feels his breathing is improving.  He states their plan is to have him there for about 2 more weeks.  They will be discussing where he will be going when it gets closer to discharge.  He would like to avoid having to go to 'a nursing home' but he understands he does have significant health concerns.  CC asked if he has ever considered assisted living and he states he has not but understands that he should discuss all of his options with the social workers at the facility and with his family to determine what would be best for him.  He denies further questions or needs from CC today.  CC will wait for further updates at this time.      Lauren Pipkin, BS-RN   CHF and General Care Coordinator  [phone] Option # 1

## 2018-04-30 ENCOUNTER — TRANSFERRED RECORDS (OUTPATIENT)
Dept: HEALTH INFORMATION MANAGEMENT | Facility: OTHER | Age: 78
End: 2018-04-30

## 2018-04-30 ENCOUNTER — ANTICOAGULATION THERAPY VISIT (OUTPATIENT)
Dept: ANTICOAGULATION | Facility: OTHER | Age: 78
End: 2018-04-30

## 2018-04-30 ENCOUNTER — RESULTS ONLY (OUTPATIENT)
Dept: LAB | Age: 78
End: 2018-04-30

## 2018-04-30 ENCOUNTER — NURSING HOME VISIT (OUTPATIENT)
Dept: GERIATRICS | Facility: OTHER | Age: 78
End: 2018-04-30
Payer: COMMERCIAL

## 2018-04-30 VITALS
DIASTOLIC BLOOD PRESSURE: 62 MMHG | HEART RATE: 77 BPM | TEMPERATURE: 97.1 F | OXYGEN SATURATION: 96 % | RESPIRATION RATE: 18 BRPM | SYSTOLIC BLOOD PRESSURE: 107 MMHG

## 2018-04-30 DIAGNOSIS — I48.20 CHRONIC ATRIAL FIBRILLATION (H): ICD-10-CM

## 2018-04-30 DIAGNOSIS — I25.10 ARTERIOSCLEROTIC CARDIOVASCULAR DISEASE (ASCVD): ICD-10-CM

## 2018-04-30 DIAGNOSIS — J44.1 OBSTRUCTIVE CHRONIC BRONCHITIS WITH EXACERBATION (H): Primary | ICD-10-CM

## 2018-04-30 LAB — INR PPP: 1.59 (ref 0–1.3)

## 2018-04-30 PROCEDURE — 99315 NF DSCHRG MGMT 30 MIN/LESS: CPT | Performed by: NURSE PRACTITIONER

## 2018-04-30 NOTE — PROGRESS NOTES
ANTICOAGULATION FOLLOW-UP CLINIC VISIT    Patient Name:  Rm Duarte  Date:  4/30/2018  Contact Type:  Face to Face    SUBJECTIVE:     Patient Findings     Positives No Problem Findings    Comments Per fax from Marguerite Carranza RN, [facility].             OBJECTIVE    INR   Date Value Ref Range Status   04/30/2018 1.59 (H) 0 - 1.3 Final       ASSESSMENT / PLAN  INR assessment SUB    Recheck INR In: 1 WEEK    INR Location [facility]      Anticoagulation Summary as of 4/30/2018     INR goal 2.0-3.0   Today's INR 1.59!   Maintenance plan 2.5 mg (5 mg x 0.5) on Sun, Thu; 5 mg (5 mg x 1) all other days   Full instructions 2.5 mg on Sun, Thu; 5 mg all other days   Weekly total 30 mg   Plan last modified Francoise Adorno RN (4/30/2018)   Next INR check 5/7/2018   Priority INR   Target end date Indefinite    Indications   Chronic atrial fibrillation (H) [I48.2]  PVD (peripheral vascular disease) (H) [I73.9]  Long-term (current) use of anticoagulants [Z79.01] [Z79.01]         Anticoagulation Episode Summary     INR check location     Preferred lab     Send INR reminders to  INR    Comments takes Azithromycin 250mg daily long term. Resides at [facility]      Anticoagulation Care Providers     Provider Role Specialty Phone number    Joanna Cisneros MD [facility] [phone]            See the Encounter Report to view Anticoagulation Flowsheet and Dosing Calendar (Go to Encounters tab in chart review, and find the Anticoagulation Therapy Visit)     No encounter,  assessment received via fax.  Instructions faxed back to RADHA.      Francoise Adorno RN

## 2018-04-30 NOTE — PROGRESS NOTES
[facility]    Chief Complaint   Patient presents with     Nursing Home Acute       HPI:    Rm Duarte is a 77 year old  (1940), who is being seen today for an episodic care visit at [facility].  HPI information obtained from: facility staff and resident. Today's concern is: Nursing home discharge    Patient was admitted to [facility] on April 23 with weakness secondary to COPD exacerbation.  He was evaluated in the [place] emergency department on April 22 and treated for COPD exacerbation.  His labs showed stable CBC and basic metabolic panel and negative troponin.  He was treated with outpatient prednisone.  There was also plans to admit him the next morning to [facility].  He was seen in the next day in clinic in [facility] by primary doctor and nursing home admission orders were written.  He is chronically on 2 L of oxygen.  He states that his breathing has improved.  He has gained more strength and no longer feels weak during ambulation.  He is planning to discharge next week.  He voices no new concerns with his health.  His vital signs been stable since admission.    Past medical history, past surgical history, social history, allergies and medication list available through [facility] records are reviewed.    REVIEW OF SYSTEMS:  Review of Systems  Complete review of systems as nursing staff and resident, see HPI for positive findings    Physical Exam:  /62  Pulse 77  Temp 97.1  F (36.2  C)  Resp 18  SpO2 96%  Physical Exam  Pleasant gentleman, no acute distress.  Affect normal.  Alert and oriented ×4.  Oxygen at 2 L/min via nasal cannula.  Skin color pink.  Sclera nonicteric.  Mucous members moist.  Neck supple without adenopathy.  No thyromegaly.  Lung fields clear to auscultation throughout.  Cardiovascular Irregularly irregular with controlled rate.  No S3 auscultated.  Abdomen soft without masses, tenderness and organomegaly.  Extremities  without edema.  Skin without rashes.  Joints without swelling and erythema.  Neck  Recent Labs:   Reviewed and discussed in HPI    CBC RESULTS:   Recent Labs   Lab Test  04/22/18   0958  12/03/17   0545   WBC  6.5  8.6   RBC  4.96  3.98*   HGB  14.7  12.0*   HCT  45.6  37.8*   MCV  92  95   MCH  29.6  30.2   MCHC  32.2  31.7   RDW  15.4*  14.1   PLT  352  338       Last Basic Metabolic Panel:  Recent Labs   Lab Test  04/22/18   0958  12/03/17   0545   NA  139  138   POTASSIUM  3.9  4.8   CHLORIDE  104  104   KENDRICK  9.1  9.1   CO2  28  27   BUN  22  22   CR  1.13  0.91   GLC  189*  131*       Liver Function Studies -   Recent Labs   Lab Test  12/01/17   1150  06/20/17   0931   PROTTOTAL  7.5  7.3   ALBUMIN  2.8*  3.1*   BILITOTAL  0.5  0.3   ALKPHOS  73  78   AST  19  18   ALT  34  31             Assessment    ICD-10-CM    1. Obstructive chronic bronchitis with exacerbation (H) J44.1    2. Arteriosclerotic cardiovascular disease (ASCVD) I25.10    3. Chronic atrial fibrillation (H) I48.2        Plan:  Patient may discharge to home next week with current medications and treatments.  He is oxygen dependent on 2 L/min.  He will follow-up in 1 week after discharge with primary physician, sooner with problems.        Electronically signed by  KATE Tran   4/30/2018  4:25 PM

## 2018-04-30 NOTE — MR AVS SNAPSHOT
Rm GARCIA Gerald   4/30/2018   Anticoagulation Therapy Visit    Description:  77 year old male   Provider:  Joanna Cisneros MD   Department:  [facility] [place]           INR as of 4/30/2018     Today's INR 1.59!      Anticoagulation Summary as of 4/30/2018     INR goal 2.0-3.0   Today's INR 1.59!   Full instructions 2.5 mg on Sun, Thu; 5 mg all other days   Next INR check 5/7/2018    Indications   Chronic atrial fibrillation (H) [I48.2]  PVD (peripheral vascular disease) (H) [I73.9]  Long-term (current) use of anticoagulants [Z79.01] [Z79.01]         Description     Increase Warfarin/Coumadin and recheck INR in 1 week.  Francoise Adorno ....................  4/30/2018   2:23 PM          April 2018 Details    Sun Mon Tue Wed Thu Fri Sat     1               2               3               4               5               6               7                 8               9               10               11               12               13               14                 15               16               17               18               19               20               21                 22               23               24               25               26               27               28                 29               30      5 mg   See details            Date Details   04/30 This INR check               How to take your warfarin dose     To take:  5 mg Take 1 of the 5 mg tablets.           May 2018 Details    Sun Mon Tue Wed Thu Fri Sat       1      5 mg         2      5 mg         3      2.5 mg         4      5 mg         5      5 mg           6      2.5 mg         7            8               9               10               11               12                 13               14               15               16               17               18               19                 20               21               22               23               24               25               26                  27               28               29               30               31                  Date Details   No additional details    Date of next INR:  5/7/2018         How to take your warfarin dose     To take:  2.5 mg Take 0.5 of a 5 mg tablet.    To take:  5 mg Take 1 of the 5 mg tablets.

## 2018-04-30 NOTE — MR AVS SNAPSHOT
"              After Visit Summary   2018    Rm Duarte    MRN: [id number]           Patient Information     Date Of Birth          1940        Visit Information        Provider Department      2018 4:24 PM Nancy Chase NP [facility]        Today's Diagnoses     Obstructive chronic bronchitis with exacerbation (H)    -  1    Arteriosclerotic cardiovascular disease (ASCVD)        Chronic atrial fibrillation (H)           Follow-ups after your visit        Who to contact     If you have questions or need follow up information about today's clinic visit or your schedule please contact [facility] directly at [phone].  Normal or non-critical lab and imaging results will be communicated to you by Ever[employer]art, letter or phone within 4 business days after the clinic has received the results. If you do not hear from us within 7 days, please contact the clinic through Ever[employer]er[employer] or phone. If you have a critical or abnormal lab result, we will notify you by phone as soon as possible.  Submit refill requests through [employer] or call your pharmacy and they will forward the refill request to us. Please allow 3 business days for your refill to be completed.          Additional Information About Your Visit        MyChart Information     [employer] lets you send messages to your doctor, view your test results, renew your prescriptions, schedule appointments and more. To sign up, go to [email or web address] . Click on \"Log in\" on the left side of the screen, which will take you to the Welcome page. Then click on \"Sign up Now\" on the right side of the page.     You will be asked to enter the access code listed below, as well as some personal information. Please follow the directions to create your username and password.     Your access code is: [email or web address]  Expires: 2018  3:32 PM     Your access code will  in 90 days. If you need help or a new code, please " call your [place] clinic or [phone].        Care EveryWhere ID     This is your Care EveryWhere ID. This could be used by other organizations to access your [place] medical records  [id number]        Your Vitals Were     Pulse Temperature Respirations Pulse Oximetry          77 97.1  F (36.2  C) 18 96%         Blood Pressure from Last 3 Encounters:   04/30/18 107/62   04/23/18 110/56   04/22/18 108/79    Weight from Last 3 Encounters:   04/23/18 205 lb (93 kg)   04/22/18 205 lb (93 kg)   12/12/17 203 lb 9.6 oz (92.4 kg)              Today, you had the following     No orders found for display       Primary Care Provider Office Phone # Fax #    Joanna Mccoy [phone]       [address]  [facility] [address]        Goals        General    Improve chronic symptoms (pt-stated)     Notes - Note created  3/27/2018  2:18 PM by Pipkin, Lauren N, RN    Continue to monitor COPD symptoms and report signs of exacerbation to CC        Equal Access to Services     [facility]: Hadii bette Gregorio, waaxda sudhakar, qaybta karina anderson . So [facility] [phone].    ATENCIÓN: Si habla español, tiene a dhillon disposición servicios gratuitos de asistencia lingüística. Suzi al [phone].    We comply with applicable federal civil rights laws and [place] laws. We do not discriminate on the basis of race, color, national origin, age, disability, sex, sexual orientation, or gender identity.            Thank you!     Thank you for choosing [facility]  for your care. Our goal is always to provide you with excellent care. Hearing back from our patients is one way we can continue to improve our services. Please take a few minutes to complete the written survey that you may receive in the mail after your visit with us. Thank you!             Your Updated Medication List - Protect others around you: Learn how  to safely use, store and throw away your medicines at www.disposemymeds.org.          This list is accurate as of 4/30/18  4:29 PM.  Always use your most recent med list.                   Brand Name Dispense Instructions for use Diagnosis    ASPIRIN LOW DOSE 81 MG tablet   Generic drug:  aspirin      Take 1 tablet by mouth daily.        azithromycin 250 MG tablet    ZITHROMAX    30 tablet    Take 1 tablet (250 mg) by mouth daily    Obstructive chronic bronchitis with exacerbation (H)       budesonide 0.25 MG/2ML neb solution    PULMICORT     Take 0.25 mg by nebulization 2 times daily        CENTRUM SILVER per tablet      Take 1 tablet by mouth daily.        cilostazol 100 MG tablet    PLETAL    180 tablet    TAKE ONE TABLET BY MOUTH TWICE DAILY    PVD (peripheral vascular disease) (H)       * COMBIVENT RESPIMAT  MCG/ACT inhaler   Generic drug:  Ipratropium-Albuterol     12 g    INHALE ONE PUFF BY MOUTH 4 TIMES DAILY. MAX  SIX  DOSES  PER  DAY    Chronic obstructive pulmonary disease, unspecified COPD type (H)       * ipratropium - albuterol 0.5 mg/2.5 mg/3 mL 0.5-2.5 (3) MG/3ML neb solution    DUONEB    360 mL    USE ONE AMPULE IN NEBULIZER EVERY 6 HOURS AS NEEDED FOR SHORTNESS OF BREATH/DYSPNEA  OR  WHEEZING    COPD (chronic obstructive pulmonary disease) (H)       DALIRESP 500 MCG Tabs tablet   Generic drug:  roflumilast      Take 500 mcg by mouth daily        IMODIUM A-D 2 MG tablet   Generic drug:  loperamide      Take 2 mg by mouth 4 times daily as needed. Take 2 tablet by oral route after 1st loose stool and 1 tablet after each subsequent bowel movement; do not exceed 16 mg in 24 hrs. As needed.        order for DME     1 each    Equipment being ordered: shower chair    End stage COPD (H)       PERFOROMIST 20 MCG/2ML neb solution   Generic drug:  formoterol     360 mL    USE ONE VIAL IN NEBULIZER EVERY 12 HOURS    Other emphysema (H)       ROBITUSSIN COUGH+CHEST EDVIN DM 5-100 MG/5ML Liqd   Generic drug:   Dextromethorphan-Guaifenesin      Take 20 mLs by mouth every 4 hours as needed        simvastatin 10 MG tablet    ZOCOR    90 tablet    TAKE ONE TABLET BY MOUTH AT BEDTIME    PVD (peripheral vascular disease) (H)       sodium chloride 0.65 % nasal spray    OCEAN    1 Bottle    Spray 1 spray into both nostrils every hour as needed for congestion    COPD exacerbation (H)       tamsulosin 0.4 MG capsule    FLOMAX    30 capsule    Take 1 capsule (0.4 mg) by mouth daily    BPH (benign prostatic hyperplasia)       warfarin 2.5 MG tablet    COUMADIN    115 tablet    5 mg x five days/week and 2.5 mg x two days/week.  Or as directed by the [facility] clinic.    Long term current use of anticoagulant therapy       * Notice:  This list has 2 medication(s) that are the same as other medications prescribed for you. Read the directions carefully, and ask your doctor or other care provider to review them with you.

## 2018-05-07 ENCOUNTER — MEDICAL CORRESPONDENCE (OUTPATIENT)
Dept: HEALTH INFORMATION MANAGEMENT | Facility: OTHER | Age: 78
End: 2018-05-07

## 2018-05-07 ENCOUNTER — RESULTS ONLY (OUTPATIENT)
Dept: LAB | Age: 78
End: 2018-05-07

## 2018-05-07 ENCOUNTER — TRANSFERRED RECORDS (OUTPATIENT)
Dept: HEALTH INFORMATION MANAGEMENT | Facility: OTHER | Age: 78
End: 2018-05-07

## 2018-05-07 ENCOUNTER — ANTICOAGULATION THERAPY VISIT (OUTPATIENT)
Dept: ANTICOAGULATION | Facility: OTHER | Age: 78
End: 2018-05-07

## 2018-05-07 DIAGNOSIS — Z79.01 LONG-TERM (CURRENT) USE OF ANTICOAGULANTS: ICD-10-CM

## 2018-05-07 DIAGNOSIS — I73.9 PVD (PERIPHERAL VASCULAR DISEASE) (H): ICD-10-CM

## 2018-05-07 DIAGNOSIS — I48.20 CHRONIC ATRIAL FIBRILLATION (H): ICD-10-CM

## 2018-05-07 LAB
INR PPP: 2.04 (ref 0–1.3)
PROTHROMBIN TIME: 24.4 S (ref 11.9–15.2)

## 2018-05-07 NOTE — PROGRESS NOTES
ANTICOAGULATION FOLLOW-UP CLINIC VISIT    Patient Name:  Rm Duarte  Date:  5/7/2018  Contact Type:  fax from [facility] per Marguerite GARCIA    SUBJECTIVE:     Patient Findings     Positives No Problem Findings           OBJECTIVE    INR   Date Value Ref Range Status   05/07/2018 2.04 (H) 0 - 1.3 Final       ASSESSMENT / PLAN  INR assessment THER    Recheck INR In: 2 WEEKS    INR Location [facility]      Anticoagulation Summary as of 5/7/2018     INR goal 2.0-3.0   Today's INR 2.04   Maintenance plan 2.5 mg (5 mg x 0.5) on Sun, Thu; 5 mg (5 mg x 1) all other days   Full instructions 2.5 mg on Sun, Thu; 5 mg all other days   Weekly total 30 mg   No change documented Eula Swain RN   Plan last modified Francoise Adorno RN (4/30/2018)   Next INR check 5/16/2018   Priority INR   Target end date Indefinite    Indications   Chronic atrial fibrillation (H) [I48.2]  PVD (peripheral vascular disease) (H) [I73.9]  Long-term (current) use of anticoagulants [Z79.01] [Z79.01]         Anticoagulation Episode Summary     INR check location     Preferred lab     Send INR reminders to  INR    Comments takes Azithromycin 250mg daily long term. Resides at [facility]      Anticoagulation Care Providers     Provider Role Specialty Phone number    Joanna Cisneros MD [facility] [phone]            See the Encounter Report to view Anticoagulation Flowsheet and Dosing Calendar (Go to Encounters tab in chart review, and find the Anticoagulation Therapy Visit)        Eula Swain RN

## 2018-05-07 NOTE — MR AVS SNAPSHOT
Abdalla RADHA Duarte   5/7/2018   Anticoagulation Therapy Visit    Description:  77 year old male   Provider:  Joanna Cisneros MD   Department:  [facility] [place]           INR as of 5/7/2018     Today's INR 2.04      Anticoagulation Summary as of 5/7/2018     INR goal 2.0-3.0   Today's INR 2.04   Full instructions 2.5 mg on Sun, Thu; 5 mg all other days   Next INR check 5/16/2018    Indications   Chronic atrial fibrillation (H) [I48.2]  PVD (peripheral vascular disease) (H) [I73.9]  Long-term (current) use of anticoagulants [Z79.01] [Z79.01]         Description     Continue same dose and recheck in 1 weeks. ...............Eula Swain RN    5/7/2018    2:09 PM        May 2018 Details    Sun Mon Tue Wed Thu Fri Sat       1               2               3               4               5                 6               7      5 mg   See details      8      5 mg         9      5 mg         10      2.5 mg         11      5 mg         12      5 mg           13      2.5 mg         14      5 mg         15      5 mg         16            17               18               19                 20               21               22               23               24               25               26                 27               28               29               30               31                  Date Details   05/07 This INR check       Date of next INR:  5/16/2018         How to take your warfarin dose     To take:  2.5 mg Take 0.5 of a 5 mg tablet.    To take:  5 mg Take 1 of the 5 mg tablets.

## 2018-05-21 ENCOUNTER — ANTICOAGULATION THERAPY VISIT (OUTPATIENT)
Dept: ANTICOAGULATION | Facility: OTHER | Age: 78
End: 2018-05-21
Payer: COMMERCIAL

## 2018-05-21 DIAGNOSIS — I48.20 CHRONIC ATRIAL FIBRILLATION (H): ICD-10-CM

## 2018-05-21 DIAGNOSIS — Z79.01 LONG-TERM (CURRENT) USE OF ANTICOAGULANTS: ICD-10-CM

## 2018-05-21 DIAGNOSIS — I73.9 PVD (PERIPHERAL VASCULAR DISEASE) (H): ICD-10-CM

## 2018-05-21 LAB — INR BLD: 2.2 (ref 0.86–1.14)

## 2018-05-21 PROCEDURE — 85610 PROTHROMBIN TIME: CPT | Mod: QW,ZL | Performed by: FAMILY MEDICINE

## 2018-05-21 PROCEDURE — 36416 COLLJ CAPILLARY BLOOD SPEC: CPT | Mod: ZL | Performed by: FAMILY MEDICINE

## 2018-05-21 NOTE — MR AVS SNAPSHOT
Rm Duarte   5/21/2018   Anticoagulation Therapy Visit    Description:  77 year old male   Provider:  Dominguez Maradiaga MD   Department:  [facility]           INR as of 5/21/2018     Today's INR 2.2      Anticoagulation Summary as of 5/21/2018     INR goal 2.0-3.0   Today's INR 2.2   Full instructions 5 mg on Mon, Wed, Fri; 2.5 mg all other days   Next INR check 6/4/2018    Indications   Chronic atrial fibrillation (H) [I48.2]  PVD (peripheral vascular disease) (H) [I73.9]  Long-term (current) use of anticoagulants [Z79.01] [Z79.01]         Your next Anticoagulation Clinic appointment(s)     Jun 04, 2018  9:00 AM CDT   Anticoagulation Visit with HC ANTI COAGULATION   [facility] ([facility]    [address] [facility] [address]   [phone]              May 2018 Details    Sun Mon Tue Wed Thu Fri Sat       1               2               3               4               5                 6               7               8               9               10               11               12                 13               14               15               16               17               18               19                 20               21      5 mg   See details      22      2.5 mg         23      5 mg         24      2.5 mg         25      5 mg         26      2.5 mg           27      2.5 mg         28      5 mg         29      2.5 mg         30      5 mg         31      2.5 mg            Date Details   05/21 This INR check               How to take your warfarin dose     To take:  2.5 mg Take 0.5 of a 5 mg tablet.    To take:  5 mg Take 1 of the 5 mg tablets.           June 2018 Details    Sun Mon Tue Wed Thu Fri Sat          1      5 mg         2      2.5 mg           3      2.5 mg         4            5               6               7               8               9                 10               11               12               13               14                15               16                 17               18               19               20               21               22               23                 24               25               26               27               28               29               30                Date Details   No additional details    Date of next INR:  6/4/2018         How to take your warfarin dose     To take:  2.5 mg Take 0.5 of a 5 mg tablet.    To take:  5 mg Take 1 of the 5 mg tablets.

## 2018-05-21 NOTE — PROGRESS NOTES
ANTICOAGULATION FOLLOW-UP CLINIC VISIT    Patient Name:  Rm Duarte  Date:  5/21/2018  Contact Type:  Telephone    SUBJECTIVE:     Patient Findings     Positives No Problem Findings    Comments INR done by lab. Call placed to patient and spoke to him re: INR result, warfarin dosing and INR recheck date. Patient just returned home from LTC. He states he has been taking warfarin 5mg Mon,Wed,Fri and 2.5mg all other days.  He verbalized understanding of instruction and has no questions.            OBJECTIVE    INR Point of Care   Date Value Ref Range Status   05/21/2018 2.2 (H) 0.86 - 1.14 Final     Comment:     This test is intended for monitoring Coumadin therapy.  Results are not   accurate in patients with prolonged INR due to factor deficiency.         ASSESSMENT / PLAN  INR assessment THER    Recheck INR In: 2 WEEKS    INR Location Clinic      Anticoagulation Summary as of 5/21/2018     INR goal 2.0-3.0   Today's INR 2.2   Maintenance plan 5 mg (5 mg x 1) on Mon, Wed, Fri; 2.5 mg (5 mg x 0.5) all other days   Full instructions 5 mg on Mon, Wed, Fri; 2.5 mg all other days   Weekly total 25 mg   Plan last modified Zena Haas RN (5/21/2018)   Next INR check 6/4/2018   Priority INR   Target end date Indefinite    Indications   Chronic atrial fibrillation (H) [I48.2]  PVD (peripheral vascular disease) (H) [I73.9]  Long-term (current) use of anticoagulants [Z79.01] [Z79.01]         Anticoagulation Episode Summary     INR check location     Preferred lab     Send INR reminders to  INR    Comments takes Azithromycin 250mg daily long term. Resides at [facility]      Anticoagulation Care Providers     Provider Role Specialty Phone number    Joanna Cisneros MD [facility] [phone]            See the Encounter Report to view Anticoagulation Flowsheet and Dosing Calendar (Go to Encounters tab in chart review, and find the Anticoagulation Therapy Visit)        Zena FARAH  Waldo RN

## 2018-06-04 ENCOUNTER — ANTICOAGULATION THERAPY VISIT (OUTPATIENT)
Dept: ANTICOAGULATION | Facility: OTHER | Age: 78
End: 2018-06-04
Attending: FAMILY MEDICINE
Payer: COMMERCIAL

## 2018-06-04 DIAGNOSIS — I48.20 CHRONIC ATRIAL FIBRILLATION (H): ICD-10-CM

## 2018-06-04 DIAGNOSIS — I73.9 PVD (PERIPHERAL VASCULAR DISEASE) (H): ICD-10-CM

## 2018-06-04 DIAGNOSIS — Z79.01 LONG-TERM (CURRENT) USE OF ANTICOAGULANTS: ICD-10-CM

## 2018-06-04 NOTE — MR AVS SNAPSHOT
Rm Duarte   6/4/2018 9:00 AM   Anticoagulation Therapy Visit    Description:  77 year old male   Provider:   ANTI COAGULATION   Department:  [facility] Anti [facility]agulation           INR as of 6/4/2018     Today's INR 1.3!      Anticoagulation Summary as of 6/4/2018     INR goal 2.0-3.0   Today's INR 1.3!   Full warfarin instructions 6/4: 7.5 mg; 6/5: 3.75 mg; Otherwise 5 mg on Mon, Wed, Fri; 2.5 mg all other days   Next INR check 6/11/2018    Indications   Chronic atrial fibrillation (H) [I48.2]  PVD (peripheral vascular disease) (H) [I73.9]  Long-term (current) use of anticoagulants [Z79.01] [Z79.01]         Your next Anticoagulation Clinic appointment(s)     Jun 11, 2018  9:15 AM CDT   Anticoagulation Visit with HC ANTI COAGULATION   [facility]    [address] [facility] [address]   [phone]              June 2018 Details    Sun Mon Tue Wed Thu Fri Sat          1               2                 3               4      7.5 mg   See details      5      3.75 mg         6      5 mg         7      2.5 mg         8      5 mg         9      2.5 mg           10      2.5 mg         11            12               13               14               15               16                 17               18               19               20               21               22               23                 24               25               26               27               28               29               30                Date Details   06/04 This INR check       Date of next INR:  6/11/2018         How to take your warfarin dose     To take:  2.5 mg Take 0.5 of a 5 mg tablet.    To take:  3.75 mg Take 1.5 of the 2.5 mg tablets.    To take:  5 mg Take 1 of the 5 mg tablets.    To take:  7.5 mg Take 1.5 of the 5 mg tablets.

## 2018-06-04 NOTE — PROGRESS NOTES
ANTICOAGULATION FOLLOW-UP CLINIC VISIT    Patient Name:  Rm Duarte  Date:  6/4/2018  Contact Type:  Telephone    SUBJECTIVE:     Patient Findings     Positives Change in diet/appetite    Comments INR done by lab. Call placed to patient and spoke to him re: INR result, warfarin dosing and INR recheck date. He states he is eating coleslaw daily, we discussed he should decrease intake to every other day. He states he is on no prednisone, no antibiotics. No change in activity. He verbalized understadning of instruction and has no questions           OBJECTIVE    INR Point of Care   Date Value Ref Range Status   06/04/2018 1.3 (H) 0.86 - 1.14 Final     Comment:     This test is intended for monitoring Coumadin therapy.  Results are not   accurate in patients with prolonged INR due to factor deficiency.         ASSESSMENT / PLAN  INR assessment SUB    Recheck INR In: 1 WEEK    INR Location Clinic      Anticoagulation Summary as of 6/4/2018     INR goal 2.0-3.0   Today's INR 1.3!   Warfarin maintenance plan 5 mg (5 mg x 1) on Mon, Wed, Fri; 2.5 mg (5 mg x 0.5) all other days   Full warfarin instructions 6/4: 7.5 mg; 6/5: 3.75 mg; Otherwise 5 mg on Mon, Wed, Fri; 2.5 mg all other days   Weekly warfarin total 25 mg   Plan last modified Zena Haas RN (5/21/2018)   Next INR check 6/11/2018   Priority INR   Target end date Indefinite    Indications   Chronic atrial fibrillation (H) [I48.2]  PVD (peripheral vascular disease) (H) [I73.9]  Long-term (current) use of anticoagulants [Z79.01] [Z79.01]         Anticoagulation Episode Summary     INR check location     Preferred lab     Send INR reminders to  INR    Comments takes Azithromycin 250mg daily long term. Resides at [facility]      Anticoagulation Care Providers     Provider Role Specialty Phone number    Joanna Cisneros MD [facility] [phone]            See the Encounter Report to view Anticoagulation Flowsheet and  Dosing Calendar (Go to Encounters tab in chart review, and find the Anticoagulation Therapy Visit)        Zena Haas RN

## 2018-06-11 ENCOUNTER — ANTICOAGULATION THERAPY VISIT (OUTPATIENT)
Dept: ANTICOAGULATION | Facility: OTHER | Age: 78
End: 2018-06-11
Attending: FAMILY MEDICINE
Payer: COMMERCIAL

## 2018-06-11 DIAGNOSIS — I48.20 CHRONIC ATRIAL FIBRILLATION (H): ICD-10-CM

## 2018-06-11 DIAGNOSIS — Z79.01 LONG-TERM (CURRENT) USE OF ANTICOAGULANTS: ICD-10-CM

## 2018-06-11 DIAGNOSIS — I73.9 PVD (PERIPHERAL VASCULAR DISEASE) (H): ICD-10-CM

## 2018-06-11 LAB — INR BLD: 2.2 (ref 0.86–1.14)

## 2018-06-11 PROCEDURE — 36416 COLLJ CAPILLARY BLOOD SPEC: CPT | Mod: ZL | Performed by: FAMILY MEDICINE

## 2018-06-11 PROCEDURE — 85610 PROTHROMBIN TIME: CPT | Mod: QW,ZL | Performed by: FAMILY MEDICINE

## 2018-06-11 NOTE — PROGRESS NOTES
ANTICOAGULATION FOLLOW-UP CLINIC VISIT    Patient Name:  Rm Duarte  Date:  6/11/2018  Contact Type:  Telephone    SUBJECTIVE:     Patient Findings     Positives No Problem Findings    Comments INR done by lab. Call placed to patient home phone and spoke to him re: INR result, warfarin dosing and INR recheck date. He verbalized understanding and has no questions. He has no bleeding/bruising and no changes in diet/meds/activity. No bleeding/bruising. Will call warfarin clinic if any issues           OBJECTIVE    INR Point of Care   Date Value Ref Range Status   06/11/2018 2.2 (H) 0.86 - 1.14 Final     Comment:     This test is intended for monitoring Coumadin therapy.  Results are not   accurate in patients with prolonged INR due to factor deficiency.         ASSESSMENT / PLAN  INR assessment THER    Recheck INR In: 1 WEEK    INR Location Clinic      Anticoagulation Summary as of 6/11/2018     INR goal 2.0-3.0   Today's INR 2.2   Warfarin maintenance plan 5 mg (5 mg x 1) on Mon, Wed, Fri; 2.5 mg (5 mg x 0.5) all other days   Full warfarin instructions 5 mg on Mon, Wed, Fri; 2.5 mg all other days   Weekly warfarin total 25 mg   No change documented Zena Haas, RN   Plan last modified Zena Haas, RN (5/21/2018)   Next INR check 6/18/2018   Priority INR   Target end date Indefinite    Indications   Chronic atrial fibrillation (H) [I48.2]  PVD (peripheral vascular disease) (H) [I73.9]  Long-term (current) use of anticoagulants [Z79.01] [Z79.01]         Anticoagulation Episode Summary     INR check location     Preferred lab     Send INR reminders to  ANTICOAG POOL    Comments       Anticoagulation Care Providers     Provider Role Specialty Phone number    Dominguez Maradiaga MD [facility] [phone]            See the Encounter Report to view Anticoagulation Flowsheet and Dosing Calendar (Go to Encounters tab in chart review, and find the Anticoagulation Therapy Visit)        Znea FARAH  Waldo RN

## 2018-06-18 ENCOUNTER — ANTICOAGULATION THERAPY VISIT (OUTPATIENT)
Dept: ANTICOAGULATION | Facility: OTHER | Age: 78
End: 2018-06-18
Attending: FAMILY MEDICINE
Payer: COMMERCIAL

## 2018-06-18 DIAGNOSIS — I73.9 PVD (PERIPHERAL VASCULAR DISEASE) (H): ICD-10-CM

## 2018-06-18 DIAGNOSIS — I48.20 CHRONIC ATRIAL FIBRILLATION (H): ICD-10-CM

## 2018-06-18 DIAGNOSIS — Z79.01 LONG-TERM (CURRENT) USE OF ANTICOAGULANTS: ICD-10-CM

## 2018-06-18 LAB — INR BLD: 1.7 (ref 0.86–1.14)

## 2018-06-18 PROCEDURE — 36416 COLLJ CAPILLARY BLOOD SPEC: CPT | Mod: ZL | Performed by: FAMILY MEDICINE

## 2018-06-18 PROCEDURE — 85610 PROTHROMBIN TIME: CPT | Mod: QW,ZL | Performed by: FAMILY MEDICINE

## 2018-06-18 NOTE — PROGRESS NOTES
ANTICOAGULATION FOLLOW-UP CLINIC VISIT    Patient Name:  Rm Duarte  Date:  6/18/2018  Contact Type:  Telephone    SUBJECTIVE:     Patient Findings     Positives Other complaints    Comments INR done by lab. Call placed to patient and spoke to him re: INR result, warfarin dosing and INR recheck date. He verbalized understanding and has no questions. He states he did take a fall this AM outside of the clinic. States he got out of the car and didn't wait long enough before walking and lost his balance. States he has some scrapes on his knees but other than that is fine. He states not much bleeding from scrapes. No other changes. He will call if any issues           OBJECTIVE    INR Point of Care   Date Value Ref Range Status   06/18/2018 1.7 (H) 0.86 - 1.14 Final     Comment:     This test is intended for monitoring Coumadin therapy.  Results are not   accurate in patients with prolonged INR due to factor deficiency.         ASSESSMENT / PLAN  INR assessment SUB    Recheck INR In: 10 DAYS    INR Location Clinic      Anticoagulation Summary as of 6/18/2018     INR goal 2.0-3.0   Today's INR 1.7!   Warfarin maintenance plan 2.5 mg (5 mg x 0.5) on Mon, Wed, Fri; 5 mg (5 mg x 1) all other days   Full warfarin instructions 6/18: 7.5 mg; Otherwise 2.5 mg on Mon, Wed, Fri; 5 mg all other days   Weekly warfarin total 27.5 mg   Plan last modified Zena Haas RN (6/18/2018)   Next INR check 6/28/2018   Priority INR   Target end date Indefinite    Indications   Chronic atrial fibrillation (H) [I48.2]  PVD (peripheral vascular disease) (H) [I73.9]  Long-term (current) use of anticoagulants [Z79.01] [Z79.01]         Anticoagulation Episode Summary     INR check location     Preferred lab     Send INR reminders to  ANTICOAG POOL    Comments       Anticoagulation Care Providers     Provider Role Specialty Phone number    Dominguez Maradiaga MD [facility] [phone]            See the Encounter Report to  view Anticoagulation Flowsheet and Dosing Calendar (Go to Encounters tab in chart review, and find the Anticoagulation Therapy Visit)        Zena Haas RN

## 2018-06-18 NOTE — MR AVS SNAPSHOT
Rm Duarte   6/18/2018 9:00 AM   Anticoagulation Therapy Visit    Description:  77 year old male   Provider:   ANTI COAGULATION   Department:  Hc Anti Coagulation           INR as of 6/18/2018     Today's INR 1.7!      Anticoagulation Summary as of 6/18/2018     INR goal 2.0-3.0   Today's INR 1.7!   Full warfarin instructions 6/18: 7.5 mg; Otherwise 2.5 mg on Mon, Wed, Fri; 5 mg all other days   Next INR check 6/28/2018    Indications   Chronic atrial fibrillation (H) [I48.2]  PVD (peripheral vascular disease) (H) [I73.9]  Long-term (current) use of anticoagulants [Z79.01] [Z79.01]         Your next Anticoagulation Clinic appointment(s)     Jun 28, 2018  9:00 AM CDT   Anticoagulation Visit with  ANTI COAGULATION   [facility] ([facility] [place] )    [address]  [place] [address]   [phone]              June 2018 Details    Sun Mon Tue Wed Thu Fri Sat          1               2                 3               4               5               6               7               8               9                 10               11               12               13               14               15               16                 17               18      7.5 mg   See details      19      5 mg         20      2.5 mg         21      5 mg         22      2.5 mg         23      5 mg           24      5 mg         25      2.5 mg         26      5 mg         27      2.5 mg         28            29               30                Date Details   06/18 This INR check       Date of next INR:  6/28/2018         How to take your warfarin dose     To take:  2.5 mg Take 0.5 of a 5 mg tablet.    To take:  5 mg Take 1 of the 5 mg tablets.    To take:  7.5 mg Take 1.5 of the 5 mg tablets.

## 2018-06-25 ENCOUNTER — PATIENT OUTREACH (OUTPATIENT)
Dept: CARE COORDINATION | Facility: OTHER | Age: 78
End: 2018-06-25

## 2018-06-25 ENCOUNTER — OFFICE VISIT (OUTPATIENT)
Dept: FAMILY MEDICINE | Facility: OTHER | Age: 78
End: 2018-06-25
Attending: FAMILY MEDICINE
Payer: COMMERCIAL

## 2018-06-25 VITALS
HEIGHT: 71 IN | WEIGHT: 205 LBS | HEART RATE: 72 BPM | TEMPERATURE: 98 F | DIASTOLIC BLOOD PRESSURE: 62 MMHG | RESPIRATION RATE: 16 BRPM | OXYGEN SATURATION: 96 % | SYSTOLIC BLOOD PRESSURE: 110 MMHG | BODY MASS INDEX: 28.7 KG/M2

## 2018-06-25 DIAGNOSIS — S80.212A KNEE ABRASION, LEFT, INITIAL ENCOUNTER: ICD-10-CM

## 2018-06-25 DIAGNOSIS — J44.1 OBSTRUCTIVE CHRONIC BRONCHITIS WITH EXACERBATION (H): ICD-10-CM

## 2018-06-25 DIAGNOSIS — L03.211 CELLULITIS OF FACE: ICD-10-CM

## 2018-06-25 DIAGNOSIS — I48.20 CHRONIC ATRIAL FIBRILLATION (H): ICD-10-CM

## 2018-06-25 DIAGNOSIS — R29.6 FALLS FREQUENTLY: ICD-10-CM

## 2018-06-25 DIAGNOSIS — H10.32 ACUTE BACTERIAL CONJUNCTIVITIS OF LEFT EYE: Primary | ICD-10-CM

## 2018-06-25 LAB
BASOPHILS # BLD AUTO: 0 10E9/L (ref 0–0.2)
BASOPHILS NFR BLD AUTO: 0.2 %
DIFFERENTIAL METHOD BLD: ABNORMAL
EOSINOPHIL # BLD AUTO: 0 10E9/L (ref 0–0.7)
EOSINOPHIL NFR BLD AUTO: 0.2 %
ERYTHROCYTE [DISTWIDTH] IN BLOOD BY AUTOMATED COUNT: 16.6 % (ref 10–15)
HCT VFR BLD AUTO: 43.8 % (ref 40–53)
HGB BLD-MCNC: 14.5 G/DL (ref 13.3–17.7)
LYMPHOCYTES # BLD AUTO: 0.8 10E9/L (ref 0.8–5.3)
LYMPHOCYTES NFR BLD AUTO: 14.4 %
MCH RBC QN AUTO: 29.7 PG (ref 26.5–33)
MCHC RBC AUTO-ENTMCNC: 33.1 G/DL (ref 31.5–36.5)
MCV RBC AUTO: 90 FL (ref 78–100)
MONOCYTES # BLD AUTO: 0.7 10E9/L (ref 0–1.3)
MONOCYTES NFR BLD AUTO: 11.9 %
NEUTROPHILS # BLD AUTO: 4 10E9/L (ref 1.6–8.3)
NEUTROPHILS NFR BLD AUTO: 73.3 %
PLATELET # BLD AUTO: 335 10E9/L (ref 150–450)
RBC # BLD AUTO: 4.88 10E12/L (ref 4.4–5.9)
WBC # BLD AUTO: 5.5 10E9/L (ref 4–11)

## 2018-06-25 PROCEDURE — 99214 OFFICE O/P EST MOD 30 MIN: CPT | Performed by: FAMILY MEDICINE

## 2018-06-25 PROCEDURE — 85025 COMPLETE CBC W/AUTO DIFF WBC: CPT | Mod: ZL | Performed by: FAMILY MEDICINE

## 2018-06-25 PROCEDURE — G0463 HOSPITAL OUTPT CLINIC VISIT: HCPCS

## 2018-06-25 PROCEDURE — 36415 COLL VENOUS BLD VENIPUNCTURE: CPT | Mod: ZL | Performed by: FAMILY MEDICINE

## 2018-06-25 RX ORDER — CLINDAMYCIN HCL 300 MG
300 CAPSULE ORAL 4 TIMES DAILY
Qty: 30 CAPSULE | Refills: 0 | Status: SHIPPED | OUTPATIENT
Start: 2018-06-25 | End: 2018-07-10

## 2018-06-25 RX ORDER — TOBRAMYCIN 3 MG/ML
1 SOLUTION/ DROPS OPHTHALMIC EVERY 4 HOURS
Qty: 1 BOTTLE | Refills: 0 | Status: SHIPPED | OUTPATIENT
Start: 2018-06-25 | End: 2018-07-02

## 2018-06-25 ASSESSMENT — PAIN SCALES - GENERAL: PAINLEVEL: NO PAIN (0)

## 2018-06-25 ASSESSMENT — ACTIVITIES OF DAILY LIVING (ADL): DEPENDENT_IADLS:: INDEPENDENT

## 2018-06-25 NOTE — PROGRESS NOTES
Clinic Care Coordination Contact  Care Team Conversations    Care Coordinator spoke with Rm today.  He states he was discharged to home from [facility] 3 wks ago.  He states he was seen today by Dr Maradigaa in [place].  He had a couple of falls.  He reports these are due to unsteadiness, not fainting spells, lightheadeness, dizziness, etc.  Reports only becomes unsteady on his feet.  He also reports he has woke up to find himself hitting himself in the eye.  He states Dr Maradiaga doesn't know what this is from.    Rm continues to use 2L/min O2 continuously.  He states his COPD is stable.  O2 runs 93-94% on the 2L.    CC inquired about his functional ability at home and his living arrangement.  He continues to reside alone and his sister had expressed concern in the past about his living arrangement.  He states he is doing ok.  Is able to cook and clean 'well enough'.  He reports his nephew is looking for a job here and, if he finds one, will move in with him to help him care for his home.    CC inquired about his interest in home care services such as [facility] but he is not interested in paying out of pocket for such services.  He reports he can manage his own medications so he doesn't have a skilled need at this time.  He also is not interested in discussing assisted living now since he is optimistic about his nephew moving in with him.  CC will continue to monitor the situation.  Will continue regular outreach and offer support/resources.  CC will update PCP on today's conversation.    Lauren Pipkin, BS-RN   CHF and General Care Coordinator  [phone] Option # 1

## 2018-06-25 NOTE — NURSING NOTE
"Chief Complaint   Patient presents with     Fall       Initial /62  Pulse 72  Temp 98  F (36.7  C) (Tympanic)  Resp 16  Ht 5' 11\" (1.803 m)  Wt 205 lb (93 kg)  SpO2 96%  BMI 28.59 kg/m2 Estimated body mass index is 28.59 kg/(m^2) as calculated from the following:    Height as of this encounter: 5' 11\" (1.803 m).    Weight as of this encounter: 205 lb (93 kg).  Medication Reconciliation: complete    Nunu Mao, MATTY    "

## 2018-06-25 NOTE — PROGRESS NOTES
SUBJECTIVE:   Rm Duarte is a 77 year old male who presents to clinic today for the following health issues:      Fall      Duration: 2 weeks ago and Monday night    Description (location/character/radiation): left side of face, left knee wound    Intensity:  moderate    Accompanying signs and symptoms: pain.     History (similar episodes/previous evaluation): None    Precipitating or alleviating factors: None    Therapies tried and outcome: rehab       PROBLEMS TO ADD ON...    Problem list and histories reviewed & adjusted, as indicated.  Additional history: had a very itchy eye that was draining.  Wouldn't go to the doctor and scratched it for a week.  No eye pain.  Now scalp and face red.  Knee with abrasion.  Falling frequently.  Lengthy review today.      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  1991 were 4.31/6.20!!!!!!!??     ECHO: TDS, EF~50%, abnl septum, o/w NORMAL  5/20/2011     PAD: CTA> occlusion of L mid SFA with reconstitution  12/2/2010     S/P colonoscopy: tubular adenoma & tics  5/4/2010    Repeat in 2014; Dr Graham     Tobacco Abuse Ongoing         Social History   Substance Use Topics     Smoking status: Former Smoker     Types: Cigarettes     Smokeless tobacco: Never Used      Comment: Tried to quit; Quit 2011     Alcohol use 0.0 oz/week     0 Standard drinks or equivalent per week      Comment: Beer;  rarely     Family History   Problem Relation Age of Onset     Other - See Comments Mother      AAA, cause of death     Other - See Comments Other 69     AAA, family hx     Chronic Obstructive Pulmonary Disease Brother       MI age 60's     Family History Negative Sister          Current Outpatient Prescriptions   Medication Sig Dispense Refill     aspirin (ASPIRIN LOW DOSE) 81 MG tablet Take 1 tablet by mouth daily.       azithromycin (ZITHROMAX) 250 MG tablet Take 1 tablet (250 mg) by mouth daily 30 tablet 11     budesonide (PULMICORT) 0.25 MG/2ML neb solution Take 0.25 mg by nebulization 2 times daily       cilostazol (PLETAL) 100 MG tablet TAKE ONE TABLET BY MOUTH TWICE DAILY 180 tablet 0     clindamycin (CLEOCIN) 300 MG capsule Take 1 capsule (300 mg) by mouth 4 times daily 30 capsule 0     COMBIVENT RESPIMAT  MCG/ACT inhaler INHALE ONE PUFF BY MOUTH 4 TIMES DAILY. MAX  SIX  DOSES  PER  DAY 12 g 5     Dextromethorphan-Guaifenesin (ROBITUSSIN COUGH+CHEST EDVIN DM) 5-100 MG/5ML LIQD Take 20 mLs by mouth every 4 hours as needed       ipratropium - albuterol 0.5 mg/2.5 mg/3 mL (DUONEB) 0.5-2.5 (3) MG/3ML neb solution USE ONE AMPULE IN NEBULIZER EVERY 6 HOURS AS NEEDED FOR SHORTNESS OF BREATH/DYSPNEA  OR  WHEEZING 360 mL 10     loperamide (IMODIUM A-D) 2 MG tablet Take 2 mg by mouth 4 times daily as needed. Take 2 tablet by oral route after 1st loose stool and 1 tablet after each subsequent bowel movement; do not exceed 16 mg in 24 hrs. As needed.       Multiple Vitamins-Minerals (CENTRUM SILVER) per tablet Take 1 tablet by mouth daily.       order for DME Equipment being ordered: shower chair 1 each 0     PERFOROMIST 20 MCG/2ML neb solution USE ONE VIAL IN NEBULIZER EVERY 12 HOURS 360 mL 1     roflumilast (DALIRESP) 500 MCG TABS tablet Take 500 mcg by mouth daily       simvastatin (ZOCOR) 10 MG tablet TAKE ONE TABLET BY MOUTH AT BEDTIME 90 tablet 0     sodium chloride (OCEAN) 0.65 % nasal spray Spray 1  "spray into both nostrils every hour as needed for congestion 1 Bottle 0     tamsulosin (FLOMAX) 0.4 MG capsule Take 1 capsule (0.4 mg) by mouth daily 30 capsule 6     tobramycin (TOBREX) 0.3 % ophthalmic solution Apply 1 drop to eye every 4 hours for 7 days 1 Bottle 0     warfarin (COUMADIN) 2.5 MG tablet 5 mg x five days/week and 2.5 mg x two days/week.  Or as directed by the [facility] clinic.  115 tablet 3     No Known Allergies    Reviewed and updated as needed this visit by clinical staff       Reviewed and updated as needed this visit by Provider         ROS:  Constitutional, HEENT, cardiovascular, pulmonary, gi and gu systems are negative, except as otherwise noted.    OBJECTIVE:                                                    /62  Pulse 72  Temp 98  F (36.7  C) (Tympanic)  Resp 16  Ht 5' 11\" (1.803 m)  Wt 205 lb (93 kg)  SpO2 96%  BMI 28.59 kg/m2  Body mass index is 28.59 kg/(m^2).  GENERAL APPEARANCE: Alert, no acute distress  HEENT:  Conjunctivitis with full ROM and PERRL left eye.    CV: regular rate and rhythm, no murmur, rub or gallop  RESP: lungs clear to auscultation bilaterally with decreased sounds throughout.   ABDOMEN: normal bowel sounds, soft, nontender, no hepatosplenomegaly or other masses  SKIN: likely cellulitis with abrasions and excoriations around left scalp down around eye.    NEURO: Alert, oriented x 3, speech and mentation normal      Cbc stable.       ASSESSMENT/PLAN:                                                    1. Acute bacterial conjunctivitis of left eye  Reviewed at length.  He does not want any other intervention at this time.  No vision change and no pain.  Going with the drops and the oral and f/u.   - tobramycin (TOBREX) 0.3 % ophthalmic solution; Apply 1 drop to eye every 4 hours for 7 days  Dispense: 1 Bottle; Refill: 0    2. Cellulitis of face  As above.  Not sure it's clear cut cellulitis but tx with clinda.    - clindamycin (CLEOCIN) 300 MG capsule; " Take 1 capsule (300 mg) by mouth 4 times daily  Dispense: 30 capsule; Refill: 0  - CBC with platelets and differential    3. Obstructive chronic bronchitis with exacerbation (H)  Chronic.  I told him with the falls he qualifies for hospice for his end stage copd and desperately needs the help.  He declines at this time.  I keep trying to get him help.      4. Chronic atrial fibrillation (H)  As above.  Fall risk with the coumadin.  We didn't stop it but are talking about it.     5. Falls frequently  As above.     6. Knee abrasion, left, initial encounter  As above.  No big issue there.            Dominguez Maradiaga MD  [facility]

## 2018-06-25 NOTE — MR AVS SNAPSHOT
After Visit Summary   6/25/2018    Rm Duarte    MRN: [id number]           Patient Information     Date Of Birth          1940        Visit Information        Provider Department      6/25/2018 2:30 PM Dominguez Maradiaga MD [facility]        Today's Diagnoses     Acute bacterial conjunctivitis of left eye    -  1    Cellulitis of face        Obstructive chronic bronchitis with exacerbation (H)        Chronic atrial fibrillation (H)        Falls frequently        Knee abrasion, left, initial encounter          Care Instructions    F/u with ongoing concerns.           Follow-ups after your visit        Your next 10 appointments already scheduled     Jun 28, 2018  9:00 AM CDT   Anticoagulation Visit with HC ANTI COAGULATION   [facility] ([facility] )    [address]  [facility] [id number] [phone]            Jun 28, 2018  9:00 AM CDT   LAB with NA LAB   [facility] ([facility] )    [address]  [facility] [address]   [phone]              Who to contact     If you have questions or need follow up information about today's clinic visit or your schedule please contact [facility] directly at [phone].  Normal or non-critical lab and imaging results will be communicated to you by MyChart, letter or phone within 4 business days after the clinic has received the results. If you do not hear from us within 7 days, please contact the clinic through MyChart or phone. If you have a critical or abnormal lab result, we will notify you by phone as soon as possible.  Submit refill requests through [employer] or call your pharmacy and they will forward the refill request to us. Please allow 3 business days for your refill to be completed.          Additional Information About Your Visit        Care EveryWhere ID     This is your Care EveryWhere ID. This could be used by other organizations to access  "your Jessie medical records  [id number]        Your Vitals Were     Pulse Temperature Respirations Height Pulse Oximetry BMI (Body Mass Index)    72 98  F (36.7  C) (Tympanic) 16 5' 11\" (1.803 m) 96% 28.59 kg/m2       Blood Pressure from Last 3 Encounters:   06/25/18 110/62   04/30/18 107/62   04/23/18 110/56    Weight from Last 3 Encounters:   06/25/18 205 lb (93 kg)   04/23/18 205 lb (93 kg)   04/22/18 205 lb (93 kg)              We Performed the Following     CBC with platelets and differential          Today's Medication Changes          These changes are accurate as of 6/25/18  5:16 PM.  If you have any questions, ask your nurse or doctor.               Start taking these medicines.        Dose/Directions    clindamycin 300 MG capsule   Commonly known as:  CLEOCIN   Used for:  Cellulitis of face   Started by:  Dominguez Maradiaga MD        Dose:  300 mg   Take 1 capsule (300 mg) by mouth 4 times daily   Quantity:  30 capsule   Refills:  0       tobramycin 0.3 % ophthalmic solution   Commonly known as:  TOBREX   Used for:  Acute bacterial conjunctivitis of left eye   Started by:  Dominguez Maradiaga MD        Dose:  1 drop   Apply 1 drop to eye every 4 hours for 7 days   Quantity:  1 Bottle   Refills:  0            Where to get your medicines      These medications were sent to [facility] [address] [facility] [place] - [address]   [id number] , [place] [address]     Phone:  [phone]     clindamycin 300 MG capsule    tobramycin 0.3 % ophthalmic solution                Primary Care Provider Office Phone # Fax #    Dominguez Maradiaga [phone]       [address] [place] [address]        Goals        General    Improve chronic symptoms (pt-stated)     Notes - Note created  3/27/2018  2:18 PM by Pipkin, Lauren N, RADHA    Continue to monitor COPD symptoms and report signs of exacerbation to CC        Equal Access to Services     DARA SIMPSON AH: sofiya Huddleston, " akrina douglass ah. So [facility] [phone].    ATENCIÓN: Si linda cope, tiene a dhillon disposición servicios gratuitos de asistencia lingüística. Suzi al [phone].    We comply with applicable federal civil rights laws and [place] laws. We do not discriminate on the basis of race, color, national origin, age, disability, sex, sexual orientation, or gender identity.            Thank you!     Thank you for choosing [facility]  for your care. Our goal is always to provide you with excellent care. Hearing back from our patients is one way we can continue to improve our services. Please take a few minutes to complete the written survey that you may receive in the mail after your visit with us. Thank you!             Your Updated Medication List - Protect others around you: Learn how to safely use, store and throw away your medicines at www.disposemymeds.org.          This list is accurate as of 6/25/18  5:16 PM.  Always use your most recent med list.                   Brand Name Dispense Instructions for use Diagnosis    ASPIRIN LOW DOSE 81 MG tablet   Generic drug:  aspirin      Take 1 tablet by mouth daily.        azithromycin 250 MG tablet    ZITHROMAX    30 tablet    Take 1 tablet (250 mg) by mouth daily    Obstructive chronic bronchitis with exacerbation (H)       budesonide 0.25 MG/2ML neb solution    PULMICORT     Take 0.25 mg by nebulization 2 times daily        CENTRUM SILVER per tablet      Take 1 tablet by mouth daily.        cilostazol 100 MG tablet    PLETAL    180 tablet    TAKE ONE TABLET BY MOUTH TWICE DAILY    PVD (peripheral vascular disease) (H)       clindamycin 300 MG capsule    CLEOCIN    30 capsule    Take 1 capsule (300 mg) by mouth 4 times daily    Cellulitis of face       * COMBIVENT RESPIMAT  MCG/ACT inhaler   Generic drug:  Ipratropium-Albuterol     12 g    INHALE ONE PUFF BY MOUTH 4 TIMES DAILY. MAX  SIX  DOSES  PER   DAY    Chronic obstructive pulmonary disease, unspecified COPD type (H)       * ipratropium - albuterol 0.5 mg/2.5 mg/3 mL 0.5-2.5 (3) MG/3ML neb solution    DUONEB    360 mL    USE ONE AMPULE IN NEBULIZER EVERY 6 HOURS AS NEEDED FOR SHORTNESS OF BREATH/DYSPNEA  OR  WHEEZING    COPD (chronic obstructive pulmonary disease) (H)       DALIRESP 500 MCG Tabs tablet   Generic drug:  roflumilast      Take 500 mcg by mouth daily        IMODIUM A-D 2 MG tablet   Generic drug:  loperamide      Take 2 mg by mouth 4 times daily as needed. Take 2 tablet by oral route after 1st loose stool and 1 tablet after each subsequent bowel movement; do not exceed 16 mg in 24 hrs. As needed.        order for DME     1 each    Equipment being ordered: shower chair    End stage COPD (H)       PERFOROMIST 20 MCG/2ML neb solution   Generic drug:  formoterol     360 mL    USE ONE VIAL IN NEBULIZER EVERY 12 HOURS    Other emphysema (H)       ROBITUSSIN COUGH+CHEST EDVIN DM 5-100 MG/5ML Liqd   Generic drug:  Dextromethorphan-Guaifenesin      Take 20 mLs by mouth every 4 hours as needed        simvastatin 10 MG tablet    ZOCOR    90 tablet    TAKE ONE TABLET BY MOUTH AT BEDTIME    PVD (peripheral vascular disease) (H)       sodium chloride 0.65 % nasal spray    OCEAN    1 Bottle    Spray 1 spray into both nostrils every hour as needed for congestion    COPD exacerbation (H)       tamsulosin 0.4 MG capsule    FLOMAX    30 capsule    Take 1 capsule (0.4 mg) by mouth daily    BPH (benign prostatic hyperplasia)       tobramycin 0.3 % ophthalmic solution    TOBREX    1 Bottle    Apply 1 drop to eye every 4 hours for 7 days    Acute bacterial conjunctivitis of left eye       warfarin 2.5 MG tablet    COUMADIN    115 tablet    5 mg x five days/week and 2.5 mg x two days/week.  Or as directed by the [facility].    Long term current use of anticoagulant therapy       * Notice:  This list has 2 medication(s) that are the same as other medications  prescribed for you. Read the directions carefully, and ask your doctor or other care provider to review them with you.

## 2018-06-26 ASSESSMENT — ANXIETY QUESTIONNAIRES
1. FEELING NERVOUS, ANXIOUS, OR ON EDGE: NOT AT ALL
5. BEING SO RESTLESS THAT IT IS HARD TO SIT STILL: NOT AT ALL
3. WORRYING TOO MUCH ABOUT DIFFERENT THINGS: NOT AT ALL
7. FEELING AFRAID AS IF SOMETHING AWFUL MIGHT HAPPEN: NOT AT ALL
GAD7 TOTAL SCORE: 0
6. BECOMING EASILY ANNOYED OR IRRITABLE: NOT AT ALL
4. TROUBLE RELAXING: NOT AT ALL
2. NOT BEING ABLE TO STOP OR CONTROL WORRYING: NOT AT ALL

## 2018-06-26 ASSESSMENT — PATIENT HEALTH QUESTIONNAIRE - PHQ9: SUM OF ALL RESPONSES TO PHQ QUESTIONS 1-9: 2

## 2018-06-27 ASSESSMENT — ANXIETY QUESTIONNAIRES: GAD7 TOTAL SCORE: 0

## 2018-06-28 ENCOUNTER — ANTICOAGULATION THERAPY VISIT (OUTPATIENT)
Dept: ANTICOAGULATION | Facility: OTHER | Age: 78
End: 2018-06-28
Attending: FAMILY MEDICINE
Payer: COMMERCIAL

## 2018-06-28 DIAGNOSIS — I73.9 PVD (PERIPHERAL VASCULAR DISEASE) (H): ICD-10-CM

## 2018-06-28 DIAGNOSIS — I48.20 CHRONIC ATRIAL FIBRILLATION (H): ICD-10-CM

## 2018-06-28 DIAGNOSIS — Z79.01 LONG-TERM (CURRENT) USE OF ANTICOAGULANTS: ICD-10-CM

## 2018-06-28 LAB — INR BLD: 2.6 (ref 0.86–1.14)

## 2018-06-28 PROCEDURE — 36416 COLLJ CAPILLARY BLOOD SPEC: CPT | Mod: ZL | Performed by: FAMILY MEDICINE

## 2018-06-28 PROCEDURE — 85610 PROTHROMBIN TIME: CPT | Mod: QW,ZL | Performed by: FAMILY MEDICINE

## 2018-06-28 NOTE — MR AVS SNAPSHOT
Rm GARCIA Gerald   6/28/2018 9:00 AM   Anticoagulation Therapy Visit    Description:  77 year old male   Provider:  [facility]   Department:   Anti Coagulation           INR as of 6/28/2018     Today's INR 2.6      Anticoagulation Summary as of 6/28/2018     INR goal 2.0-3.0   Today's INR 2.6   Full warfarin instructions 2.5 mg on Mon, Wed, Fri; 5 mg all other days   Next INR check 7/12/2018    Indications   Chronic atrial fibrillation (H) [I48.2]  PVD (peripheral vascular disease) (H) [I73.9]  Long-term (current) use of anticoagulants [Z79.01] [Z79.01]         June 2018 Details    Sun Mon Tue Wed Thu Fri Sat          1               2                 3               4               5               6               7               8               9                 10               11               12               13               14               15               16                 17               18               19               20               21               22               23                 24               25               26               27               28      5 mg   See details      29      2.5 mg         30      5 mg          Date Details   06/28 This INR check               How to take your warfarin dose     To take:  2.5 mg Take 0.5 of a 5 mg tablet.    To take:  5 mg Take 1 of the 5 mg tablets.           July 2018 Details    Sun Mon Tue Wed Thu Fri Sat     1      5 mg         2      2.5 mg         3      5 mg         4      2.5 mg         5      5 mg         6      2.5 mg         7      5 mg           8      5 mg         9      2.5 mg         10      5 mg         11      2.5 mg         12            13               14                 15               16               17               18               19               20               21                 22               23               24               25               26               27               28                 29                30               31                    Date Details   No additional details    Date of next INR:  7/12/2018         How to take your warfarin dose     To take:  2.5 mg Take 0.5 of a 5 mg tablet.    To take:  5 mg Take 1 of the 5 mg tablets.

## 2018-06-28 NOTE — PROGRESS NOTES
ANTICOAGULATION FOLLOW-UP CLINIC VISIT    Patient Name:  Rm Duarte  Date:  6/28/2018  Contact Type:  Telephone    SUBJECTIVE:     Patient Findings     Comments LM to notify AC clinic of any bleeding/bruising, any changes in meds/diet/activity, or with any questions or concerns.           OBJECTIVE    INR Point of Care   Date Value Ref Range Status   06/28/2018 2.6 (H) 0.86 - 1.14 Final     Comment:     This test is intended for monitoring Coumadin therapy.  Results are not   accurate in patients with prolonged INR due to factor deficiency.         ASSESSMENT / PLAN  INR assessment THER    Recheck INR In: 2 WEEKS    INR Location Clinic      Anticoagulation Summary as of 6/28/2018     INR goal 2.0-3.0   Today's INR 2.6   Warfarin maintenance plan 2.5 mg (5 mg x 0.5) on Mon, Wed, Fri; 5 mg (5 mg x 1) all other days   Full warfarin instructions 2.5 mg on Mon, Wed, Fri; 5 mg all other days   Weekly warfarin total 27.5 mg   No change documented Laz David RN   Plan last modified Zena Haas RN (6/18/2018)   Next INR check 7/12/2018   Priority INR   Target end date Indefinite    Indications   Chronic atrial fibrillation (H) [I48.2]  PVD (peripheral vascular disease) (H) [I73.9]  Long-term (current) use of anticoagulants [Z79.01] [Z79.01]         Anticoagulation Episode Summary     INR check location     Preferred lab     Send INR reminders to  ANTICOAG POOL    Comments       Anticoagulation Care Providers     Provider Role Specialty Phone number    Dominguez Maradiaga MD [facility] [phone]            See the Encounter Report to view Anticoagulation Flowsheet and Dosing Calendar (Go to Encounters tab in chart review, and find the Anticoagulation Therapy Visit)    LM with INR result, Warfarin dosing, and INR recheck date.    LAZ DAVID RN

## 2018-07-02 DIAGNOSIS — H10.32 ACUTE BACTERIAL CONJUNCTIVITIS OF LEFT EYE: ICD-10-CM

## 2018-07-03 RX ORDER — TOBRAMYCIN 3 MG/ML
SOLUTION/ DROPS OPHTHALMIC
Qty: 5 ML | Refills: 0 | OUTPATIENT
Start: 2018-07-03

## 2018-07-03 NOTE — TELEPHONE ENCOUNTER
Please let the patient know she needs an appointment if she really needs this refilled.  Thank you

## 2018-07-10 ENCOUNTER — OFFICE VISIT (OUTPATIENT)
Dept: FAMILY MEDICINE | Facility: OTHER | Age: 78
End: 2018-07-10
Attending: FAMILY MEDICINE
Payer: COMMERCIAL

## 2018-07-10 VITALS
DIASTOLIC BLOOD PRESSURE: 54 MMHG | HEART RATE: 64 BPM | WEIGHT: 174 LBS | SYSTOLIC BLOOD PRESSURE: 86 MMHG | BODY MASS INDEX: 24.27 KG/M2 | RESPIRATION RATE: 28 BRPM | TEMPERATURE: 97.9 F | OXYGEN SATURATION: 94 %

## 2018-07-10 DIAGNOSIS — H57.12 LEFT EYE PAIN: Primary | ICD-10-CM

## 2018-07-10 DIAGNOSIS — H10.32 ACUTE BACTERIAL CONJUNCTIVITIS OF LEFT EYE: ICD-10-CM

## 2018-07-10 DIAGNOSIS — L03.211 CELLULITIS OF FACE: ICD-10-CM

## 2018-07-10 PROCEDURE — G0463 HOSPITAL OUTPT CLINIC VISIT: HCPCS

## 2018-07-10 PROCEDURE — 99213 OFFICE O/P EST LOW 20 MIN: CPT | Performed by: FAMILY MEDICINE

## 2018-07-10 RX ORDER — TOBRAMYCIN 3 MG/ML
1 SOLUTION/ DROPS OPHTHALMIC EVERY 4 HOURS
Qty: 1 BOTTLE | Refills: 0 | Status: SHIPPED | OUTPATIENT
Start: 2018-07-10 | End: 2018-07-17

## 2018-07-10 RX ORDER — CLINDAMYCIN HCL 300 MG
300 CAPSULE ORAL 4 TIMES DAILY
Qty: 30 CAPSULE | Refills: 0 | Status: SHIPPED | OUTPATIENT
Start: 2018-07-10 | End: 2018-07-31

## 2018-07-10 RX ORDER — CLINDAMYCIN HCL 300 MG
300 CAPSULE ORAL 3 TIMES DAILY
Qty: 30 CAPSULE | Refills: 0 | Status: SHIPPED | OUTPATIENT
Start: 2018-07-10 | End: 2018-07-31

## 2018-07-10 ASSESSMENT — PAIN SCALES - GENERAL: PAINLEVEL: WORST PAIN (10)

## 2018-07-10 NOTE — LETTER
My COPD Action Plan     Name: Rm Duarte    YOB: 1940   Date: 7/10/2018    My doctor: Dominguez Maradiaga MD   My clinic: [address] [address]  [phone]  My Controller Medicine: Perforomiist 20mcg , Cobivent Respimat 20/100mcg,    Dose:      My Rescue Medicine: Duonebs PRN, Pulmoicort Nebs   Dose: as needed     My Flare Up Medicine: Azithromycin (Zithromax or Zithromax Z-marjorie)   Dose: 250mg daily     My COPD Severity: Very Severe = FeV1 < 30 %      Use of Oxygen: 2 Liters continuously     Make sure you've had your pneumonia   vaccines.          GREEN ZONE       Doing well today      Usual level of activity and exercise    Usual amount of cough and mucus    No shortness of breath    Usual level of health (thinking clearly, sleeping well, feel like eating) Actions:      Take daily medicines    Use oxygen as prescribed    Follow regular exercise and diet plan    Avoid cigarette smoke and other irritants that harm the lungs           YELLOW ZONE          Having a bad day or flare up      Short of breath more than usual    A lot more sputum (mucus) than usual    Sputum looks yellow, green, tan, brown or bloody    More coughing or wheezing    Fever or chills    Less energy; trouble completing activities    Trouble thinking or focusing    Using quick relief inhaler or nebulizer more often    Poor sleep; symptoms wake me up    Do not feel like eating Actions:      Get plenty of rest    Take daily medicines    Use quick relief inhaler every 6 hours    If you use oxygen, call you doctor to see if you should adjust your oxygen    Do breathing exercises or other things to help you relax    Let a loved one, friend or neighbor know you are feeling worse    Call your care team if you have 2 or more symptoms.  Start taking steroids or antibiotics if directed by your care team           RED ZONE       Need medical care now      Severe shortness of breath (feel you  can't breathe)    Fever, chills    Not enough breath to do any activity    Trouble coughing up mucus, walking or talking    Blood in mucus    Frequent coughing   Rescue medicines are not working    Not able to sleep because of breathing    Feel confused or drowsy    Chest pain    Actions:      Call your health care team.  If you cannot reach your care team, call 911 or go to the emergency room.        Annual Reminders:  Meet with Care Team, Flu Shot every Fall  Pharmacy:    [id number] - [place], [place] - [address]   [id number] - [place], [place] - [address] AT [facility] [address]

## 2018-07-10 NOTE — PROGRESS NOTES
SUBJECTIVE:   Rm Duarte is a 77 year old male who presents to clinic today for the following health issues:      Eye(s) Problem      Duration: 6/25/18    Description:  Location: left  Pain: YES  Redness: YES  Discharge: unknown    Accompanying signs and symptoms: none    History (Trauma, foreign body exposure,): follow up from 6/25 bacterial conjunctivitis    Precipitating or alleviating factors (contact use): None    Therapies tried and outcome: finished Clinadymcin oral and Tobramycin eye drops      PROBLEMS TO ADD ON...    Problem list and histories reviewed & adjusted, as indicated.  Additional history: was better with oral clinda and the drops.  When they ran out pain and redness are returning.  His vision seems less now, and the pain is more.      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  1991 were 4.31/6.20!!!!!!!??     ECHO: TDS, EF~50%, abnl septum, o/w NORMAL  5/20/2011     PAD: CTA> occlusion of L mid SFA with reconstitution  12/2/2010     S/P colonoscopy: tubular adenoma & tics  5/4/2010    Repeat in 2014; Dr Graham     Tobacco Abuse Ongoing         Social History   Substance Use Topics     Smoking status: Former Smoker     Packs/day: 1.00     Years: 51.00     Types: Cigarettes     Start date: 1/1/1960     Quit date: 1/1/2011     Smokeless tobacco: Never Used      Comment: Tried to quit; Quit 2011     Alcohol use  0.0 oz/week     0 Standard drinks or equivalent per week      Comment: Beer; rarely     Family History   Problem Relation Age of Onset     Other - See Comments Mother      AAA, cause of death     Other - See Comments Other 69     AAA, family hx     Chronic Obstructive Pulmonary Disease Brother       MI age 60's     Family History Negative Sister          Current Outpatient Prescriptions   Medication Sig Dispense Refill     aspirin (ASPIRIN LOW DOSE) 81 MG tablet Take 1 tablet by mouth daily.       azithromycin (ZITHROMAX) 250 MG tablet Take 1 tablet (250 mg) by mouth daily 30 tablet 11     budesonide (PULMICORT) 0.25 MG/2ML neb solution Take 0.25 mg by nebulization 2 times daily       cilostazol (PLETAL) 100 MG tablet TAKE ONE TABLET BY MOUTH TWICE DAILY 180 tablet 0     clindamycin (CLEOCIN) 300 MG capsule Take 1 capsule (300 mg) by mouth 3 times daily 30 capsule 0     clindamycin (CLEOCIN) 300 MG capsule Take 1 capsule (300 mg) by mouth 4 times daily 30 capsule 0     COMBIVENT RESPIMAT  MCG/ACT inhaler INHALE ONE PUFF BY MOUTH 4 TIMES DAILY. MAX  SIX  DOSES  PER  DAY 12 g 5     Dextromethorphan-Guaifenesin (ROBITUSSIN COUGH+CHEST EDVIN DM) 5-100 MG/5ML LIQD Take 20 mLs by mouth every 4 hours as needed       ipratropium - albuterol 0.5 mg/2.5 mg/3 mL (DUONEB) 0.5-2.5 (3) MG/3ML neb solution USE ONE AMPULE IN NEBULIZER EVERY 6 HOURS AS NEEDED FOR SHORTNESS OF BREATH/DYSPNEA  OR  WHEEZING 360 mL 10     loperamide (IMODIUM A-D) 2 MG tablet Take 2 mg by mouth 4 times daily as needed. Take 2 tablet by oral route after 1st loose stool and 1 tablet after each subsequent bowel movement; do not exceed 16 mg in 24 hrs. As needed.       Multiple Vitamins-Minerals (CENTRUM SILVER) per tablet Take 1 tablet by mouth daily.       order for DME Equipment being ordered: shower chair 1 each 0     PERFOROMIST 20 MCG/2ML neb solution USE ONE VIAL IN NEBULIZER EVERY 12 HOURS 360 mL 1     roflumilast (DALIRESP) 500 MCG TABS  tablet Take 500 mcg by mouth daily       simvastatin (ZOCOR) 10 MG tablet TAKE ONE TABLET BY MOUTH AT BEDTIME 90 tablet 0     sodium chloride (OCEAN) 0.65 % nasal spray Spray 1 spray into both nostrils every hour as needed for congestion 1 Bottle 0     tamsulosin (FLOMAX) 0.4 MG capsule Take 1 capsule (0.4 mg) by mouth daily 30 capsule 6     tobramycin (TOBREX) 0.3 % ophthalmic solution Apply 1 drop to eye every 4 hours for 7 days 1 Bottle 0     warfarin (COUMADIN) 2.5 MG tablet 5 mg x five days/week and 2.5 mg x two days/week.  [facility]  115 tablet 3     No Known Allergies    Reviewed and updated as needed this visit by clinical staff       Reviewed and updated as needed this visit by Provider         ROS:  Constitutional, HEENT, cardiovascular, pulmonary, gi and gu systems are negative, except as otherwise noted.    OBJECTIVE:                                                    BP (!) 86/54 (BP Location: Left arm, Patient Position: Sitting, Cuff Size: Adult Regular)  Pulse 64  Temp 97.9  F (36.6  C) (Tympanic)  Resp 28  Wt 174 lb (78.9 kg)  SpO2 94%  BMI 24.27 kg/m2  Body mass index is 24.27 kg/(m^2).  GENERAL APPEARANCE: Alert, no acute distress  HEENT;  Normal except left eye redness slight medial drainage.  Cellulitis seems also to be coming back on the skin around the eye and up on the forehead.\  SKIN: no suspicious lesions or rashes to visualized skin  NEURO: Alert, oriented x 3, speech and mentation normal           ASSESSMENT/PLAN:                                                    1. Left eye pain  With recurrent infection.  With the pain worry about other etiologies such as increased pressure, etc, especially with his steroid use for his end stage copd.  Set up with stat appt tomorrow with ophthalmology.  Restarted on the same drops and clinda orally in the meantime.    - clindamycin (CLEOCIN) 300 MG capsule; Take 1 capsule (300 mg) by mouth 3 times daily  Dispense: 30  capsule; Refill: 0  - tobramycin (TOBREX) 0.3 % ophthalmic solution; Apply 1 drop to eye every 4 hours for 7 days  Dispense: 1 Bottle; Refill: 0  - OPHTHALMOLOGY ADULT REFERRAL    2. Cellulitis of face  As above.   - tobramycin (TOBREX) 0.3 % ophthalmic solution; Apply 1 drop to eye every 4 hours for 7 days  Dispense: 1 Bottle; Refill: 0  - clindamycin (CLEOCIN) 300 MG capsule; Take 1 capsule (300 mg) by mouth 4 times daily  Dispense: 30 capsule; Refill: 0    3. Acute bacterial conjunctivitis of left eye  As above.   - OPHTHALMOLOGY ADULT REFERRAL          Dominguez Maradiaga MD  [facility]

## 2018-07-10 NOTE — MR AVS SNAPSHOT
After Visit Summary   7/10/2018    Rm Duarte    MRN: [id number]           Patient Information     Date Of Birth          1940        Visit Information        Provider Department      7/10/2018 3:00 PM Dominguez Maradiaga MD [facility]        Today's Diagnoses     Left eye pain    -  1    Cellulitis of face        Acute bacterial conjunctivitis of left eye          Care Instructions    F/u with ongoing concerns.           Follow-ups after your visit        Additional Services     OPHTHALMOLOGY ADULT REFERRAL       Your provider has referred you to: [facility]    Please be aware that coverage of these services is subject to the terms and limitations of your health insurance plan.  Call member services at your health plan with any benefit or coverage questions.      Please bring the following with you to your appointment:    (1) Any X-Rays, CTs or MRIs which have been performed.  Contact the facility where they were done to arrange for  prior to your scheduled appointment.    (2) List of current medications  (3) This referral request   (4) Any documents/labs given to you for this referral                  Your next 10 appointments already scheduled     Jul 31, 2018  8:15 AM CDT   (Arrive by 8:00 AM)   SHORT with Dominguez Maradiaga MD   [facility] ([facility] )    [address] [facility] [facility] [address]   [phone]              Who to contact     If you have questions or need follow up information about today's clinic visit or your schedule please contact [facility] directly at [phone].  Normal or non-critical lab and imaging results will be communicated to you by MyChart, letter or phone within 4 business days after the clinic has received the results. If you do not hear from us within 7 days, please contact the clinic through MyChart or phone. If you have a critical or abnormal lab result, we will notify you by  phone as soon as possible.  Submit refill requests through [employer] or call your pharmacy and they will forward the refill request to us. Please allow 3 business days for your refill to be completed.          Additional Information About Your Visit        Care EveryWhere ID     This is your Care EveryWhere ID. This could be used by other organizations to access your [place] medical records  [id number]        Your Vitals Were     Pulse Temperature Respirations Pulse Oximetry BMI (Body Mass Index)       64 97.9  F (36.6  C) (Tympanic) 28 94% 24.27 kg/m2        Blood Pressure from Last 3 Encounters:   07/10/18 (!) 86/54   06/25/18 110/62   04/30/18 107/62    Weight from Last 3 Encounters:   07/10/18 174 lb (78.9 kg)   06/25/18 205 lb (93 kg)   04/23/18 205 lb (93 kg)              We Performed the Following     OPHTHALMOLOGY ADULT REFERRAL          Today's Medication Changes          These changes are accurate as of 7/10/18  3:23 PM.  If you have any questions, ask your nurse or doctor.               Start taking these medicines.        Dose/Directions    tobramycin 0.3 % ophthalmic solution   Commonly known as:  TOBREX   Used for:  Left eye pain, Cellulitis of face   Started by:  Dominguez Maradiaga MD        Dose:  1 drop   Apply 1 drop to eye every 4 hours for 7 days   Quantity:  1 Bottle   Refills:  0         These medicines have changed or have updated prescriptions.        Dose/Directions    * clindamycin 300 MG capsule   Commonly known as:  CLEOCIN   This may have changed:  You were already taking a medication with the same name, and this prescription was added. Make sure you understand how and when to take each.   Used for:  Left eye pain   Changed by:  Dominguez Maradiaga MD        Dose:  300 mg   Take 1 capsule (300 mg) by mouth 3 times daily   Quantity:  30 capsule   Refills:  0       * clindamycin 300 MG capsule   Commonly known as:  CLEOCIN   This may have changed:  Another medication with the same name was  added. Make sure you understand how and when to take each.   Used for:  Cellulitis of face   Changed by:  Dominguez Maradiaga MD        Dose:  300 mg   Take 1 capsule (300 mg) by mouth 4 times daily   Quantity:  30 capsule   Refills:  0       * Notice:  This list has 2 medication(s) that are the same as other medications prescribed for you. Read the directions carefully, and ask your doctor or other care provider to review them with you.         Where to get your medicines      These medications were sent to [facility] [address] [facility], [place] - [address]  AT [facility] [address], [facility] [address]     Phone:  [phone]     clindamycin 300 MG capsule    tobramycin 0.3 % ophthalmic solution         These medications were sent to [id number] [place] [facility] [address]   [address], [place] [address]     Phone:  [phone]     clindamycin 300 MG capsule                Primary Care Provider Office Phone # Fax #    Dominguez Maradiaga [phone]       [address] [place] [address]        Goals        General    Improve chronic symptoms (pt-stated)     Notes - Note created  3/27/2018  2:18 PM by Pipkin, Lauren N, RADHA    Continue to monitor COPD symptoms and report signs of exacerbation to CC        Equal Access to Services     DARA SIMPSON AH: Hadii bette ku hadasho Soomaali, waaxda luqadaha, qaybta kaalmada adeegyada, waxay clint haylore shell . So [facility] [phone].    ATENCIÓN: Si habla español, tiene a dhillon disposición servicios gratuitos de asistencia lingüística. Llame al [phone].    We comply with applicable federal civil rights laws and [place] laws. We do not discriminate on the basis of race, color, national origin, age, disability, sex, sexual orientation, or gender identity.            Thank you!     Thank you for choosing [facility]  for your care. Our goal is always to provide you with excellent care.  Hearing back from our patients is one way we can continue to improve our services. Please take a few minutes to complete the written survey that you may receive in the mail after your visit with us. Thank you!             Your Updated Medication List - Protect others around you: Learn how to safely use, store and throw away your medicines at www.disposemymeds.org.          This list is accurate as of 7/10/18  3:23 PM.  Always use your most recent med list.                   Brand Name Dispense Instructions for use Diagnosis    ASPIRIN LOW DOSE 81 MG tablet   Generic drug:  aspirin      Take 1 tablet by mouth daily.        azithromycin 250 MG tablet    ZITHROMAX    30 tablet    Take 1 tablet (250 mg) by mouth daily    Obstructive chronic bronchitis with exacerbation (H)       budesonide 0.25 MG/2ML neb solution    PULMICORT     Take 0.25 mg by nebulization 2 times daily        CENTRUM SILVER per tablet      Take 1 tablet by mouth daily.        cilostazol 100 MG tablet    PLETAL    180 tablet    TAKE ONE TABLET BY MOUTH TWICE DAILY    PVD (peripheral vascular disease) (H)       * clindamycin 300 MG capsule    CLEOCIN    30 capsule    Take 1 capsule (300 mg) by mouth 3 times daily    Left eye pain       * clindamycin 300 MG capsule    CLEOCIN    30 capsule    Take 1 capsule (300 mg) by mouth 4 times daily    Cellulitis of face       * COMBIVENT RESPIMAT  MCG/ACT inhaler   Generic drug:  Ipratropium-Albuterol     12 g    INHALE ONE PUFF BY MOUTH 4 TIMES DAILY. MAX  SIX  DOSES  PER  DAY    Chronic obstructive pulmonary disease, unspecified COPD type (H)       * ipratropium - albuterol 0.5 mg/2.5 mg/3 mL 0.5-2.5 (3) MG/3ML neb solution    DUONEB    360 mL    USE ONE AMPULE IN NEBULIZER EVERY 6 HOURS AS NEEDED FOR SHORTNESS OF BREATH/DYSPNEA  OR  WHEEZING    COPD (chronic obstructive pulmonary disease) (H)       DALIRESP 500 MCG Tabs tablet   Generic drug:  roflumilast      Take 500 mcg by mouth daily        IMODIUM  A-D 2 MG tablet   Generic drug:  loperamide      Take 2 mg by mouth 4 times daily as needed. Take 2 tablet by oral route after 1st loose stool and 1 tablet after each subsequent bowel movement; do not exceed 16 mg in 24 hrs. As needed.        order for DME     1 each    Equipment being ordered: shower chair    End stage COPD (H)       PERFOROMIST 20 MCG/2ML neb solution   Generic drug:  formoterol     360 mL    USE ONE VIAL IN NEBULIZER EVERY 12 HOURS    Other emphysema (H)       ROBITUSSIN COUGH+CHEST EDVIN DM 5-100 MG/5ML Liqd   Generic drug:  Dextromethorphan-Guaifenesin      Take 20 mLs by mouth every 4 hours as needed        simvastatin 10 MG tablet    ZOCOR    90 tablet    TAKE ONE TABLET BY MOUTH AT BEDTIME    PVD (peripheral vascular disease) (H)       sodium chloride 0.65 % nasal spray    OCEAN    1 Bottle    Spray 1 spray into both nostrils every hour as needed for congestion    COPD exacerbation (H)       tamsulosin 0.4 MG capsule    FLOMAX    30 capsule    Take 1 capsule (0.4 mg) by mouth daily    BPH (benign prostatic hyperplasia)       tobramycin 0.3 % ophthalmic solution    TOBREX    1 Bottle    Apply 1 drop to eye every 4 hours for 7 days    Left eye pain, Cellulitis of face       warfarin 2.5 MG tablet    COUMADIN    115 tablet    5 mg x five days/week and 2.5 mg x two days/week.  Or as directed by the [facility] clinic.    Long term current use of anticoagulant therapy       * Notice:  This list has 4 medication(s) that are the same as other medications prescribed for you. Read the directions carefully, and ask your doctor or other care provider to review them with you.

## 2018-07-10 NOTE — NURSING NOTE
"Chief Complaint   Patient presents with     Eye Problem       Initial BP (!) 86/54 (BP Location: Left arm, Patient Position: Sitting, Cuff Size: Adult Regular)  Pulse 64  Temp 97.9  F (36.6  C) (Tympanic)  Resp 28  Wt 174 lb (78.9 kg)  SpO2 94%  BMI 24.27 kg/m2 Estimated body mass index is 24.27 kg/(m^2) as calculated from the following:    Height as of 6/25/18: 5' 11\" (1.803 m).    Weight as of this encounter: 174 lb (78.9 kg).  Medication Reconciliation: complete    Earnestine Oneil LPN  "

## 2018-07-11 ENCOUNTER — TRANSFERRED RECORDS (OUTPATIENT)
Dept: HEALTH INFORMATION MANAGEMENT | Facility: CLINIC | Age: 78
End: 2018-07-11

## 2018-07-11 ENCOUNTER — ANTICOAGULATION THERAPY VISIT (OUTPATIENT)
Dept: ANTICOAGULATION | Facility: OTHER | Age: 78
End: 2018-07-11
Attending: FAMILY MEDICINE
Payer: COMMERCIAL

## 2018-07-11 DIAGNOSIS — Z79.01 LONG-TERM (CURRENT) USE OF ANTICOAGULANTS: ICD-10-CM

## 2018-07-11 DIAGNOSIS — I73.9 PVD (PERIPHERAL VASCULAR DISEASE) (H): ICD-10-CM

## 2018-07-11 DIAGNOSIS — I48.20 CHRONIC ATRIAL FIBRILLATION (H): ICD-10-CM

## 2018-07-11 LAB — INR BLD: 1.6 (ref 0.86–1.14)

## 2018-07-11 PROCEDURE — 36416 COLLJ CAPILLARY BLOOD SPEC: CPT | Mod: ZL | Performed by: FAMILY MEDICINE

## 2018-07-11 PROCEDURE — 85610 PROTHROMBIN TIME: CPT | Mod: QW,ZL | Performed by: FAMILY MEDICINE

## 2018-07-11 NOTE — PROGRESS NOTES
ANTICOAGULATION FOLLOW-UP CLINIC VISIT    Patient Name:  Rm Duarte  Date:  7/11/2018  Contact Type:  Telephone/ message left on home phone voicemail    SUBJECTIVE:     Patient Findings     Positives No Problem Findings    Comments INR done by lab. Call placed to patient and message left re: INR result, warfarin dosing and INR recheck date. He is to call warfarin clinic if he has any bleeding/bruising, changes in diet/meds/activity or questions.            OBJECTIVE    INR Point of Care   Date Value Ref Range Status   07/11/2018 1.6 (H) 0.86 - 1.14 Final     Comment:     This test is intended for monitoring Coumadin therapy.  Results are not   accurate in patients with prolonged INR due to factor deficiency.         ASSESSMENT / PLAN  INR assessment SUB    Recheck INR In: 2 WEEKS    INR Location Clinic      Anticoagulation Summary as of 7/11/2018     INR goal 2.0-3.0   Today's INR 1.6!   Warfarin maintenance plan 2.5 mg (5 mg x 0.5) on Mon, Wed, Fri; 5 mg (5 mg x 1) all other days   Full warfarin instructions 7/11: 7.5 mg; Otherwise 2.5 mg on Mon, Wed, Fri; 5 mg all other days   Weekly warfarin total 27.5 mg   Plan last modified Zena Haas, RN (6/18/2018)   Next INR check 7/25/2018   Priority INR   Target end date Indefinite    Indications   Chronic atrial fibrillation (H) [I48.2]  PVD (peripheral vascular disease) (H) [I73.9]  Long-term (current) use of anticoagulants [Z79.01] [Z79.01]         Anticoagulation Episode Summary     INR check location     Preferred lab     Send INR reminders to  ANTICOAG POOL    Comments       Anticoagulation Care Providers     Provider Role Specialty Phone number    Dominguez Maradiaga MD [facility] [phone]            See the Encounter Report to view Anticoagulation Flowsheet and Dosing Calendar (Go to Encounters tab in chart review, and find the Anticoagulation Therapy Visit)        Zena Haas, RN

## 2018-07-11 NOTE — MR AVS SNAPSHOT
Rm Duarte   7/11/2018 9:30 AM   Anticoagulation Therapy Visit    Description:  77 year old male   Provider:   ANTI COAGULATION   Department:   Anti Coagulation           INR as of 7/11/2018     Today's INR 1.6!      Anticoagulation Summary as of 7/11/2018     INR goal 2.0-3.0   Today's INR 1.6!   Full warfarin instructions 7/11: 7.5 mg; Otherwise 2.5 mg on Mon, Wed, Fri; 5 mg all other days   Next INR check 7/25/2018    Indications   Chronic atrial fibrillation (H) [I48.2]  PVD (peripheral vascular disease) (H) [I73.9]  Long-term (current) use of anticoagulants [Z79.01] [Z79.01]         Your next Anticoagulation Clinic appointment(s)     Jul 11, 2018  9:30 AM CDT   Anticoagulation Visit with  ANTI COAGULATION   [facility] ([facility] )    [address]  [place] [address]   [phone]            Jul 25, 2018  9:00 AM CDT   Anticoagulation Visit with  ANTI COAGULATION   [facility]    [address] [facility] [place] [address]   [phone]              July 2018 Details    Sun Mon Tue Wed Thu Fri Sat     1               2               3               4               5               6               7                 8               9               10               11      7.5 mg   See details      12      5 mg         13      2.5 mg         14      5 mg           15      5 mg         16      2.5 mg         17      5 mg         18      2.5 mg         19      5 mg         20      2.5 mg         21      5 mg           22      5 mg         23      2.5 mg         24      5 mg         25            26               27               28                 29               30               31                    Date Details   07/11 This INR check       Date of next INR:  7/25/2018         How to take your warfarin dose     To take:  2.5 mg Take 0.5 of a 5 mg tablet.    To take:  5 mg Take 1 of the 5 mg tablets.    To take:  7.5 mg  Take 1.5 of the 5 mg tablets.

## 2018-07-12 ENCOUNTER — ANTICOAGULATION THERAPY VISIT (OUTPATIENT)
Dept: ANTICOAGULATION | Facility: OTHER | Age: 78
End: 2018-07-12
Payer: COMMERCIAL

## 2018-07-12 DIAGNOSIS — I73.9 PVD (PERIPHERAL VASCULAR DISEASE) (H): ICD-10-CM

## 2018-07-12 DIAGNOSIS — I48.20 CHRONIC ATRIAL FIBRILLATION (H): ICD-10-CM

## 2018-07-12 DIAGNOSIS — Z79.01 LONG-TERM (CURRENT) USE OF ANTICOAGULANTS: ICD-10-CM

## 2018-07-12 NOTE — MR AVS SNAPSHOT
Rm Duarte   7/12/2018   Anticoagulation Therapy Visit    Description:  77 year old male   Provider:  Dominguez Maradiaga MD   Department:  Hc Anti Coagulation           INR as of 7/12/2018     Today's INR No new INR was available at the time of this encounter.      Anticoagulation Summary as of 7/12/2018     INR goal 2.0-3.0   Today's INR No new INR was available at the time of this encounter.   Full warfarin instructions 7/12: 7.5 mg; Otherwise 2.5 mg on Mon, Wed, Fri; 5 mg all other days   Next INR check 7/16/2018    Indications   Chronic atrial fibrillation (H) [I48.2]  PVD (peripheral vascular disease) (H) [I73.9]  Long-term (current) use of anticoagulants [Z79.01] [Z79.01]         Your next Anticoagulation Clinic appointment(s)     Jul 16, 2018 10:00 AM CDT   Anticoagulation Visit with HC ANTI COAGULATION   [facility] )    [address] [facility] [address]   [phone]              July 2018 Details    Sun Mon Tue Wed Thu Fri Sat     1               2               3               4               5               6               7                 8               9               10               11               12      7.5 mg   See details      13      2.5 mg         14      5 mg           15      5 mg         16            17               18               19               20               21                 22               23               24               25               26               27               28                 29               30               31                    Date Details   07/12 This INR check       Date of next INR:  7/16/2018         How to take your warfarin dose     To take:  2.5 mg Take 0.5 of a 5 mg tablet.    To take:  5 mg Take 1 of the 5 mg tablets.    To take:  7.5 mg Take 1.5 of the 5 mg tablets.

## 2018-07-12 NOTE — PROGRESS NOTES
ANTICOAGULATION FOLLOW-UP CLINIC VISIT    Patient Name:  Rm Duarte  Date:  7/12/2018  Contact Type:  Telephone    SUBJECTIVE:     Patient Findings     Positives Antibiotic use or infection    Comments Call returned to patient again. He states he is taking clindamycin and is on day 2 of a 10 day course of antibiotics. He has no bleeding/bruising and no other changes in medications. We discussed warfarin dosing and INR recheck date and he verbalized understanding and had no questions. He states he did not take 7.5mg warfarin yesterday but took regular dose of 2.5mg.            OBJECTIVE    INR Point of Care   Date Value Ref Range Status   07/11/2018 1.6 (H) 0.86 - 1.14 Final     Comment:     This test is intended for monitoring Coumadin therapy.  Results are not   accurate in patients with prolonged INR due to factor deficiency.         ASSESSMENT / PLAN  No question data found.  Anticoagulation Summary as of 7/12/2018     INR goal 2.0-3.0   Today's INR No new INR was available at the time of this encounter.   Warfarin maintenance plan 2.5 mg (5 mg x 0.5) on Mon, Wed, Fri; 5 mg (5 mg x 1) all other days   Full warfarin instructions 7/12: 7.5 mg; Otherwise 2.5 mg on Mon, Wed, Fri; 5 mg all other days   Weekly warfarin total 27.5 mg   Plan last modified Zena Haas RN (6/18/2018)   Next INR check 7/16/2018   Priority INR   Target end date Indefinite    Indications   Chronic atrial fibrillation (H) [I48.2]  PVD (peripheral vascular disease) (H) [I73.9]  Long-term (current) use of anticoagulants [Z79.01] [Z79.01]         Anticoagulation Episode Summary     INR check location     Preferred lab     Send INR reminders to  ANTICOAG POOL    Comments       Anticoagulation Care Providers     Provider Role Specialty Phone number    Dominguez Maradiaga MD [facility] [phone]            See the Encounter Report to view Anticoagulation Flowsheet and Dosing Calendar (Go to Encounters tab in chart  review, and find the Anticoagulation Therapy Visit)        Zena Haas RN

## 2018-07-16 ENCOUNTER — ANTICOAGULATION THERAPY VISIT (OUTPATIENT)
Dept: ANTICOAGULATION | Facility: OTHER | Age: 78
End: 2018-07-16
Attending: FAMILY MEDICINE
Payer: COMMERCIAL

## 2018-07-16 ENCOUNTER — TELEPHONE (OUTPATIENT)
Dept: FAMILY MEDICINE | Facility: OTHER | Age: 78
End: 2018-07-16

## 2018-07-16 DIAGNOSIS — Z79.01 LONG-TERM (CURRENT) USE OF ANTICOAGULANTS: ICD-10-CM

## 2018-07-16 DIAGNOSIS — I48.20 CHRONIC ATRIAL FIBRILLATION (H): ICD-10-CM

## 2018-07-16 DIAGNOSIS — I73.9 PVD (PERIPHERAL VASCULAR DISEASE) (H): ICD-10-CM

## 2018-07-16 DIAGNOSIS — H57.12 LEFT EYE PAIN: Primary | ICD-10-CM

## 2018-07-16 LAB
INR BLD: >8 (ref 0.86–1.14)
INR PPP: 5.84 (ref 0.8–1.2)

## 2018-07-16 PROCEDURE — 36416 COLLJ CAPILLARY BLOOD SPEC: CPT | Mod: ZL | Performed by: FAMILY MEDICINE

## 2018-07-16 PROCEDURE — 85610 PROTHROMBIN TIME: CPT | Mod: ZL | Performed by: FAMILY MEDICINE

## 2018-07-16 PROCEDURE — 85610 PROTHROMBIN TIME: CPT | Mod: QW,ZL | Performed by: FAMILY MEDICINE

## 2018-07-16 RX ORDER — TRAMADOL HYDROCHLORIDE 50 MG/1
50 TABLET ORAL EVERY 6 HOURS PRN
Qty: 20 TABLET | Refills: 0 | Status: SHIPPED | OUTPATIENT
Start: 2018-07-16 | End: 2018-07-18

## 2018-07-16 NOTE — Clinical Note
Rm Duarte had INR over 8.0 today. He has no bleeding/bruising and has no med changes per report. I have no idea why INR is over 8.0 today. I am holding warfarin x 2 days then having INR checked on Friday 7/20/18. I told him if he has any bleeding he cannot get to stop in 15 min with pressure he is to go to ER. He said he would do that.  Zena Haas RN Anticoagulation clinic

## 2018-07-16 NOTE — TELEPHONE ENCOUNTER
Noted.  That is an improvement.  He follows with coumadin clinic.  Thanks. . Dominguez Maradiaga

## 2018-07-16 NOTE — PROGRESS NOTES
ANTICOAGULATION FOLLOW-UP CLINIC VISIT    Patient Name:  Rm Duarte  Date:  7/16/2018  Contact Type:  Telephone    SUBJECTIVE:     Patient Findings     Positives Antibiotic use or infection, Unexplained INR or factor level change    Comments INR done by lab. Call from  [facility] stating that INR is over 8, first one 7.8, second one over 8.  She did a venipuncture to verify INR result. I spoke to patient via phone prior to him leaving clinic. He denies any bleeding/bruising, has no changes in activity. States very little vit K intake. He is going to  a new pain med so will call me when he gets home to let me know what it is prior to me dosing his warfarin. I did tell him if he has any bleeding he cannot control within 15 min.with pressure that he HAS to go to the ER. He states he will do that. Note sent to Dr. Maradiaga informing him of INR result and dosing plan. Per note from Dr. Maradiaga patient is on Clindamycin for cellulitis. Patient called when he got home. Patient states he forgot about the antibiotic.. He verbalized understanding of warfarin dosing and INR recheck date and has no questions.            OBJECTIVE    INR Point of Care   Date Value Ref Range Status   07/16/2018 >8.0 (HH) 0.86 - 1.14 Corrected     Comment:     Critical Value called to and read back by  ELIAZAR CHE 7.16.18 9:10 CS  Result is outside of 's reportable range.  If clinically   indicated, recollecting a sample and sending it to the laboratory is   recommended.  This test is intended for monitoring Coumadin therapy.  Results are not   accurate in patients with prolonged INR due to factor deficiency.  CORRECTED ON 07/16 AT 0940: PREVIOUSLY REPORTED AS >8.0 Result is outside of   's reportable range.  If clinically indicated, recollecting a   sample and sending it to the laboratory is recommended. This test is intended   for monitoring Coumadin therapy.  Results are not accurate in patients with    prolonged INR due to factor deficiency.         ASSESSMENT / PLAN  INR assessment SUPRA antibiotics   Recheck INR In: 4 DAYS    INR Location Clinic      Anticoagulation Summary as of 7/16/2018     INR goal 2.0-3.0   Today's INR >8.0!   Warfarin maintenance plan 2.5 mg (5 mg x 0.5) on Mon, Wed, Fri; 5 mg (5 mg x 1) all other days   Full warfarin instructions 7/16: Hold; 7/17: Hold; 7/18: 1.25 mg; Otherwise 2.5 mg on Mon, Wed, Fri; 5 mg all other days   Weekly warfarin total 27.5 mg   Plan last modified Zena Haas RN (6/18/2018)   Next INR check 7/19/2018   Priority INR   Target end date Indefinite    Indications   Chronic atrial fibrillation (H) [I48.2]  PVD (peripheral vascular disease) (H) [I73.9]  Long-term (current) use of anticoagulants [Z79.01] [Z79.01]         Anticoagulation Episode Summary     INR check location     Preferred lab     Send INR reminders to [facility] POOL    Comments       Anticoagulation Care Providers     Provider Role Specialty Phone number    Dominguez Maradiaga MD [facility] [phone]            See the Encounter Report to view Anticoagulation Flowsheet and Dosing Calendar (Go to Encounters tab in chart review, and find the Anticoagulation Therapy Visit)        Zena Haas, RN

## 2018-07-16 NOTE — TELEPHONE ENCOUNTER
DATE:  7/16/2018   TIME OF RECEIPT FROM LAB:  4:30  LAB TEST:  INR  LAB VALUE:  Over 5  RESULTS GIVEN WITH READ-BACK TO (PROVIDER):  Dr. Maradiaga  TIME LAB VALUE REPORTED TO PROVIDER:   4:31

## 2018-07-16 NOTE — MR AVS SNAPSHOT
Rm GARCIA Gerald   7/16/2018 10:00 AM   Anticoagulation Therapy Visit    Description:  77 year old male   Provider:   ANTI COAGULATION   Department:  Hc Anti Coagulation           INR as of 7/16/2018     Today's INR >8.0!      Anticoagulation Summary as of 7/16/2018     INR goal 2.0-3.0   Today's INR >8.0!   Full warfarin instructions 7/16: Hold; 7/17: Hold; 7/18: 1.25 mg; Otherwise 2.5 mg on Mon, Wed, Fri; 5 mg all other days   Next INR check 7/19/2018    Indications   Chronic atrial fibrillation (H) [I48.2]  PVD (peripheral vascular disease) (H) [I73.9]  Long-term (current) use of anticoagulants [Z79.01] [Z79.01]         Your next Anticoagulation Clinic appointment(s)     Jul 19, 2018  9:15 AM CDT   Anticoagulation Visit with HC ANTI COAGULATION   [facility] ([facility] )    [address] [facility] [place] [address]   [phone]              July 2018 Details    Sun Mon Tue Wed Thu Fri Sat     1               2               3               4               5               6               7                 8               9               10               11               12               13               14                 15               16      Hold   See details      17      Hold         18      1.25 mg         19            20               21                 22               23               24               25               26               27               28                 29               30               31                    Date Details   07/16 This INR check       Date of next INR:  7/19/2018         How to take your warfarin dose     To take:  1.25 mg Take 0.5 of a 2.5 mg tablet.    To take:  5 mg Take 1 of the 5 mg tablets.    Hold Do not take your warfarin dose. See the Details table to the right for additional instructions.

## 2018-07-18 ENCOUNTER — TELEPHONE (OUTPATIENT)
Dept: FAMILY MEDICINE | Facility: OTHER | Age: 78
End: 2018-07-18

## 2018-07-18 ENCOUNTER — OFFICE VISIT (OUTPATIENT)
Dept: FAMILY MEDICINE | Facility: OTHER | Age: 78
End: 2018-07-18
Attending: FAMILY MEDICINE
Payer: COMMERCIAL

## 2018-07-18 VITALS
TEMPERATURE: 98.1 F | HEIGHT: 71 IN | SYSTOLIC BLOOD PRESSURE: 88 MMHG | WEIGHT: 172 LBS | HEART RATE: 58 BPM | OXYGEN SATURATION: 93 % | BODY MASS INDEX: 24.08 KG/M2 | DIASTOLIC BLOOD PRESSURE: 60 MMHG

## 2018-07-18 DIAGNOSIS — R21 RASH AND NONSPECIFIC SKIN ERUPTION: Primary | ICD-10-CM

## 2018-07-18 PROCEDURE — G0463 HOSPITAL OUTPT CLINIC VISIT: HCPCS

## 2018-07-18 PROCEDURE — 99213 OFFICE O/P EST LOW 20 MIN: CPT | Performed by: FAMILY MEDICINE

## 2018-07-18 RX ORDER — HYDROCODONE BITARTRATE AND ACETAMINOPHEN 5; 325 MG/1; MG/1
1 TABLET ORAL EVERY 6 HOURS PRN
Qty: 30 TABLET | Refills: 0 | Status: SHIPPED | OUTPATIENT
Start: 2018-07-18 | End: 2018-07-25

## 2018-07-18 RX ORDER — AZITHROMYCIN 250 MG/1
1 TABLET, FILM COATED ORAL DAILY
COMMUNITY
Start: 2018-06-16 | End: 2018-07-31

## 2018-07-18 ASSESSMENT — PAIN SCALES - GENERAL: PAINLEVEL: MODERATE PAIN (4)

## 2018-07-18 NOTE — PROGRESS NOTES
I called Zena Waldobryan in the Coumadin dept and left a message that the pt is on Clindamycin, Norco is new and has been Rx's a new eye gtt by his eye doctor. Pt has an appt tomorrow and will discuss at that time.   Joann Walls

## 2018-07-18 NOTE — PROGRESS NOTES
SUBJECTIVE:                                                    Rm Duarte is a 77 year old male who presents to clinic today for the following health issues:    Rash      Duration: 3 weeks    Description  Location: left side of face and scalp to back of neck  Itching: moderate    Intensity:  moderate    Accompanying signs and symptoms: discharge, watery red eyes    History (similar episodes/previous evaluation): None    Precipitating or alleviating factors:  New exposures:  None  Recent travel: no      Therapies tried and outcome: eye drops, pain meds, abx          Problem list and histories reviewed & adjusted, as indicated.  Additional history: saw ophthal today.  Put on different drops.  Going to get.  Skin remains painful.  He asked her about shingles and she said no not in the eye.      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  1991 were 4.31/6.20!!!!!!!??     ECHO: TDS, EF~50%, abnl septum, o/w NORMAL  5/20/2011     PAD: CTA> occlusion of L mid SFA with reconstitution  12/2/2010     S/P colonoscopy: tubular adenoma & tics  5/4/2010    Repeat in 2014; Dr Graham     Tobacco Abuse Ongoing         Social History   Substance Use Topics     Smoking status: Former Smoker     Packs/day: 1.00     Years: 51.00     Types: Cigarettes     Start date: 1/1/1960     Quit date: 1/1/2011     Smokeless tobacco: Never Used      Comment:  Tried to quit; Quit      Alcohol use 0.0 oz/week     0 Standard drinks or equivalent per week      Comment: Beer; rarely     Family History   Problem Relation Age of Onset     Other - See Comments Mother      AAA, cause of death     Other - See Comments Other 69     AAA, family hx     Chronic Obstructive Pulmonary Disease Brother       MI age 60's     Family History Negative Sister          Current Outpatient Prescriptions   Medication Sig Dispense Refill     aspirin (ASPIRIN LOW DOSE) 81 MG tablet Take 1 tablet by mouth daily.       budesonide (PULMICORT) 0.25 MG/2ML neb solution Take 0.25 mg by nebulization 2 times daily       cilostazol (PLETAL) 100 MG tablet TAKE ONE TABLET BY MOUTH TWICE DAILY 180 tablet 0     clindamycin (CLEOCIN) 300 MG capsule Take 1 capsule (300 mg) by mouth 3 times daily 30 capsule 0     clindamycin (CLEOCIN) 300 MG capsule Take 1 capsule (300 mg) by mouth 4 times daily 30 capsule 0     COMBIVENT RESPIMAT  MCG/ACT inhaler INHALE ONE PUFF BY MOUTH 4 TIMES DAILY. MAX  SIX  DOSES  PER  DAY 12 g 5     Dextromethorphan-Guaifenesin (ROBITUSSIN COUGH+CHEST EDVIN DM) 5-100 MG/5ML LIQD Take 20 mLs by mouth every 4 hours as needed       HYDROcodone-acetaminophen (NORCO) 5-325 MG per tablet Take 1 tablet by mouth every 6 hours as needed for severe pain 30 tablet 0     ipratropium - albuterol 0.5 mg/2.5 mg/3 mL (DUONEB) 0.5-2.5 (3) MG/3ML neb solution USE ONE AMPULE IN NEBULIZER EVERY 6 HOURS AS NEEDED FOR SHORTNESS OF BREATH/DYSPNEA  OR  WHEEZING 360 mL 10     loperamide (IMODIUM A-D) 2 MG tablet Take 2 mg by mouth 4 times daily as needed. Take 2 tablet by oral route after 1st loose stool and 1 tablet after each subsequent bowel movement; do not exceed 16 mg in 24 hrs. As needed.       Multiple Vitamins-Minerals (CENTRUM SILVER) per tablet Take 1 tablet by mouth daily.       order for DME Equipment being ordered: shower chair 1 each 0     PERFOROMIST 20 MCG/2ML neb solution USE ONE  "VIAL IN NEBULIZER EVERY 12 HOURS 360 mL 1     roflumilast (DALIRESP) 500 MCG TABS tablet Take 500 mcg by mouth daily       simvastatin (ZOCOR) 10 MG tablet TAKE ONE TABLET BY MOUTH AT BEDTIME 90 tablet 0     sodium chloride (OCEAN) 0.65 % nasal spray Spray 1 spray into both nostrils every hour as needed for congestion 1 Bottle 0     tamsulosin (FLOMAX) 0.4 MG capsule Take 1 capsule (0.4 mg) by mouth daily 30 capsule 6     warfarin (COUMADIN) 2.5 MG tablet 5 mg x five days/week and 2.5 mg x two days/week.  Or as directed by the protime clinic.  115 tablet 3     azithromycin (ZITHROMAX) 250 MG tablet Take 1 tablet by mouth daily       No Known Allergies    ROS:  Constitutional, HEENT, cardiovascular, pulmonary, gi and gu systems are negative, except as otherwise noted.    OBJECTIVE:                                                    BP (!) 88/60  Pulse 58  Temp 98.1  F (36.7  C)  Ht 5' 11\" (1.803 m)  Wt 172 lb (78 kg)  SpO2 93%  BMI 23.99 kg/m2  Body mass index is 23.99 kg/(m^2).  GENERAL APPEARANCE: Alert, no acute distress  SKIN: no suspicious lesions or rashes to visualized skin.  Erythematous rash over the skin left side of head, improved.  No papules.  Eye with some discharge present.   NEURO: Alert, oriented x 3, speech and mentation normal           ASSESSMENT/PLAN:                                                    1. Rash and nonspecific skin eruption  Reviewed.  Ok for lortab.  Warned about SE.  Very painful.  Consider shingles for sure.  Glad eye doctor involved.  Sent to derm for wait list.  Let coumadin clinic know about the lortab, as his INR has been high with the clinda.  Talked about shingles and it's possible but I doubt and again biggest concern would be cornea and that was apparently r/o by ophthalmology.    - HYDROcodone-acetaminophen (NORCO) 5-325 MG per tablet; Take 1 tablet by mouth every 6 hours as needed for severe pain  Dispense: 30 tablet; Refill: 0  - DERMATOLOGY " REFERRAL          Dominguez Maradiaga MD  [facility]

## 2018-07-18 NOTE — TELEPHONE ENCOUNTER
9:20 AM    Reason for Call: OVERBOOK    Patient is having the following symptoms: Rash / possible shingles  for  3 weeks    The patient is requesting an appointment for today  with Dr. Maradiaga    Was an appointment offered for this call?   No    Preferred method for responding to this message: [phone]    If we cannot reach you directly, may we leave a detailed response at the number you provided?    Yes        Zena Roldan

## 2018-07-18 NOTE — NURSING NOTE
"Chief Complaint   Patient presents with     Rash       Initial BP (!) 88/60  Pulse 58  Temp 98.1  F (36.7  C)  Ht 5' 11\" (1.803 m)  Wt 172 lb (78 kg)  SpO2 93%  BMI 23.99 kg/m2 Estimated body mass index is 23.99 kg/(m^2) as calculated from the following:    Height as of this encounter: 5' 11\" (1.803 m).    Weight as of this encounter: 172 lb (78 kg).  Medication Reconciliation: complete    Lila Serrano, LPN  "

## 2018-07-19 ENCOUNTER — ANTICOAGULATION THERAPY VISIT (OUTPATIENT)
Dept: ANTICOAGULATION | Facility: OTHER | Age: 78
End: 2018-07-19
Attending: FAMILY MEDICINE
Payer: COMMERCIAL

## 2018-07-19 DIAGNOSIS — I73.9 PVD (PERIPHERAL VASCULAR DISEASE) (H): ICD-10-CM

## 2018-07-19 DIAGNOSIS — Z79.01 LONG-TERM (CURRENT) USE OF ANTICOAGULANTS: ICD-10-CM

## 2018-07-19 DIAGNOSIS — I48.20 CHRONIC ATRIAL FIBRILLATION (H): ICD-10-CM

## 2018-07-19 LAB — INR BLD: 1.6 (ref 0.86–1.14)

## 2018-07-19 PROCEDURE — 85610 PROTHROMBIN TIME: CPT | Mod: QW,ZL | Performed by: FAMILY MEDICINE

## 2018-07-19 PROCEDURE — 36416 COLLJ CAPILLARY BLOOD SPEC: CPT | Mod: ZL | Performed by: FAMILY MEDICINE

## 2018-07-19 NOTE — PROGRESS NOTES
ANTICOAGULATION FOLLOW-UP CLINIC VISIT    Patient Name:  Rm Duarte  Date:  7/19/2018  Contact Type:  Telephone    SUBJECTIVE:     Patient Findings     Positives Change in medications, Antibiotic use or infection    Comments INR done by lab. Call from clinic nurse Reva, that patient is on clindamycin for another 10 days ordered on 7/18/18 and Norco for pain. Call placed to patient. We discussed antibiotic use, INR result, warfarin dosing and INR recheck date. He verbalized understanding of instruction and has no questions.            OBJECTIVE    INR Point of Care   Date Value Ref Range Status   07/19/2018 1.6 (H) 0.86 - 1.14 Final     Comment:     This test is intended for monitoring Coumadin therapy.  Results are not   accurate in patients with prolonged INR due to factor deficiency.         ASSESSMENT / PLAN  INR assessment SUB previous dose hold  for high INR and antibiotics   Recheck INR In: 1 WEEK    INR Location Clinic      Anticoagulation Summary as of 7/19/2018     INR goal 2.0-3.0   Today's INR 1.6!   Warfarin maintenance plan 2.5 mg (5 mg x 0.5) on Mon, Wed, Fri; 5 mg (5 mg x 1) all other days   Full warfarin instructions 7/21: 2.5 mg; 7/22: 2.5 mg; 7/24: 2.5 mg; 7/26: 2.5 mg; Otherwise 2.5 mg on Mon, Wed, Fri; 5 mg all other days   Weekly warfarin total 27.5 mg   Plan last modified Zena Haas RN (6/18/2018)   Next INR check 7/26/2018   Priority INR   Target end date Indefinite    Indications   Chronic atrial fibrillation (H) [I48.2]  PVD (peripheral vascular disease) (H) [I73.9]  Long-term (current) use of anticoagulants [Z79.01] [Z79.01]         Anticoagulation Episode Summary     INR check location     Preferred lab     Send INR reminders to  ANTICOAG POOL    Comments       Anticoagulation Care Providers     Provider Role Specialty Phone number    Dominguez Maradiaga MD [facility] [phone]            See the Encounter Report to view Anticoagulation Flowsheet and Dosing  Calendar (Go to Encounters tab in chart review, and find the Anticoagulation Therapy Visit)        Zena Haas RN

## 2018-07-19 NOTE — MR AVS SNAPSHOT
Rm Duarte   7/19/2018 9:15 AM   Anticoagulation Therapy Visit    Description:  77 year old male   Provider:   ANTI COAGULATION   Department:  Hc Anti Coagulation           INR as of 7/19/2018     Today's INR 1.6!      Anticoagulation Summary as of 7/19/2018     INR goal 2.0-3.0   Today's INR 1.6!   Full warfarin instructions 7/21: 2.5 mg; 7/22: 2.5 mg; 7/24: 2.5 mg; 7/26: 2.5 mg; Otherwise 2.5 mg on Mon, Wed, Fri; 5 mg all other days   Next INR check 7/26/2018    Indications   Chronic atrial fibrillation (H) [I48.2]  PVD (peripheral vascular disease) (H) [I73.9]  Long-term (current) use of anticoagulants [Z79.01] [Z79.01]         Your next Anticoagulation Clinic appointment(s)     Jul 26, 2018  9:00 AM CDT   Anticoagulation Visit with HC ANTI COAGULATION   [facility] [place] ([facility] )    [address]  [place] [address]   [phone]              July 2018 Details    Sun Mon Tue Wed Thu Fri Sat     1               2               3               4               5               6               7                 8               9               10               11               12               13               14                 15               16               17               18               19      5 mg   See details      20      2.5 mg         21      2.5 mg           22      2.5 mg         23      2.5 mg         24      2.5 mg         25      2.5 mg         26            27               28                 29               30               31                    Date Details   07/19 This INR check       Date of next INR:  7/26/2018         How to take your warfarin dose     To take:  2.5 mg Take 1 of the 2.5 mg tablets.    To take:  2.5 mg Take 0.5 of a 5 mg tablet.    To take:  5 mg Take 1 of the 5 mg tablets.

## 2018-07-25 ENCOUNTER — OFFICE VISIT (OUTPATIENT)
Dept: FAMILY MEDICINE | Facility: OTHER | Age: 78
End: 2018-07-25
Attending: FAMILY MEDICINE
Payer: COMMERCIAL

## 2018-07-25 VITALS
SYSTOLIC BLOOD PRESSURE: 92 MMHG | HEIGHT: 71 IN | DIASTOLIC BLOOD PRESSURE: 60 MMHG | WEIGHT: 179 LBS | OXYGEN SATURATION: 96 % | BODY MASS INDEX: 25.06 KG/M2 | TEMPERATURE: 97.3 F | HEART RATE: 76 BPM

## 2018-07-25 DIAGNOSIS — R21 RASH AND NONSPECIFIC SKIN ERUPTION: ICD-10-CM

## 2018-07-25 DIAGNOSIS — R51.9 FACIAL PAIN: Primary | ICD-10-CM

## 2018-07-25 PROCEDURE — G0463 HOSPITAL OUTPT CLINIC VISIT: HCPCS

## 2018-07-25 PROCEDURE — 99213 OFFICE O/P EST LOW 20 MIN: CPT | Performed by: FAMILY MEDICINE

## 2018-07-25 RX ORDER — MOXIFLOXACIN 5 MG/ML
1 SOLUTION/ DROPS OPHTHALMIC 4 TIMES DAILY
Refills: 1 | COMMUNITY
Start: 2018-07-18 | End: 2018-10-29

## 2018-07-25 RX ORDER — HYDROCODONE BITARTRATE AND ACETAMINOPHEN 5; 325 MG/1; MG/1
1 TABLET ORAL EVERY 6 HOURS PRN
Qty: 30 TABLET | Refills: 0 | Status: SHIPPED | OUTPATIENT
Start: 2018-07-25 | End: 2018-08-03

## 2018-07-25 RX ORDER — TRAMADOL HYDROCHLORIDE 50 MG/1
1 TABLET ORAL
COMMUNITY
Start: 2018-07-16 | End: 2018-09-24

## 2018-07-25 RX ORDER — GABAPENTIN 100 MG/1
100 CAPSULE ORAL 3 TIMES DAILY
Qty: 90 CAPSULE | Refills: 1 | Status: SHIPPED | OUTPATIENT
Start: 2018-07-25 | End: 2018-09-26

## 2018-07-25 ASSESSMENT — PAIN SCALES - GENERAL: PAINLEVEL: NO PAIN (0)

## 2018-07-25 NOTE — MR AVS SNAPSHOT
After Visit Summary   7/25/2018    Rm Duarte    MRN: [id number]           Patient Information     Date Of Birth          1940        Visit Information        Provider Department      7/25/2018 10:00 AM Dominguez Maradiaga MD [facility]        Today's Diagnoses     Facial pain    -  1    Rash and nonspecific skin eruption          Care Instructions    F/u with ongoing concerns.             Follow-ups after your visit        Additional Services     NEUROLOGY ADULT REFERRAL       Your provider has referred you for the following:   In [place]    Please be aware that coverage of these services is subject to the terms and limitations of your health insurance plan.  Call member services at your health plan with any benefit or coverage questions.      Please bring the following with you to your appointment:    (1) Any X-Rays, CTs or MRIs which have been performed.  Contact the facility where they were done to arrange for  prior to your scheduled appointment.    (2) List of current medications  (3) This referral request   (4) Any documents/labs given to you for this referral                  Your next 10 appointments already scheduled     Jul 26, 2018  9:00 AM CDT   Anticoagulation Visit with HC ANTI COAGULATION   [facility] )    [address]  [facility] [address]   [phone]            Jul 26, 2018  9:15 AM CDT   LAB with NA LAB   [facility] ([facility] )    [address]  [facility] [address]   [phone]            Jul 31, 2018  8:15 AM CDT   (Arrive by 8:00 AM)   SHORT with Dominguez Maradiaga MD   [facility] ([facility] )    [address]  [facility] [address]   [phone]            Oct 16, 2018  1:30 PM CDT   (Arrive by 1:15 PM)   New Visit with DANIKA Santoyo MD   [facility] ([facility] )    [address]  "[place] [id number] [phone]              Who to contact     If you have questions or need follow up information about today's clinic visit or your schedule please contact [facility] directly at [phone].  Normal or non-critical lab and imaging results will be communicated to you by MyChart, letter or phone within 4 business days after the clinic has received the results. If you do not hear from us within 7 days, please contact the clinic through MyChart or phone. If you have a critical or abnormal lab result, we will notify you by phone as soon as possible.  Submit refill requests through [employer] or call your pharmacy and they will forward the refill request to us. Please allow 3 business days for your refill to be completed.          Additional Information About Your Visit        Care EveryWhere ID     This is your Care EveryWhere ID. This could be used by other organizations to access your [facility] medical records  [id number]        Your Vitals Were     Pulse Temperature Height Pulse Oximetry BMI (Body Mass Index)       76 97.3  F (36.3  C) 5' 11\" (1.803 m) 96% 24.97 kg/m2        Blood Pressure from Last 3 Encounters:   07/25/18 92/60   07/18/18 (!) 88/60   07/10/18 (!) 86/54    Weight from Last 3 Encounters:   07/25/18 179 lb (81.2 kg)   07/18/18 172 lb (78 kg)   07/10/18 174 lb (78.9 kg)              We Performed the Following     NEUROLOGY ADULT REFERRAL          Today's Medication Changes          These changes are accurate as of 7/25/18 12:49 PM.  If you have any questions, ask your nurse or doctor.               Start taking these medicines.        Dose/Directions    gabapentin 100 MG capsule   Commonly known as:  NEURONTIN   Used for:  Facial pain, Rash and nonspecific skin eruption   Started by:  Dominguez Maradiaga MD        Dose:  100 mg   Take 1 capsule (100 mg) by mouth 3 times daily   Quantity:  90 capsule   Refills:  1            Where to get your medicines    "   These medications were sent to [facility] [place], [place] - [address]   [address] [address]     Phone:  [phone]     gabapentin 100 MG capsule         Some of these will need a paper prescription and others can be bought over the counter.  Ask your nurse if you have questions.     Bring a paper prescription for each of these medications     HYDROcodone-acetaminophen 5-325 MG per tablet               Information about OPIOIDS     PRESCRIPTION OPIOIDS: WHAT YOU NEED TO KNOW   We gave you an opioid (narcotic) pain medicine. It is important to manage your pain, but opioids are not always the best choice. You should first try all the other options your care team gave you. Take this medicine for as short a time (and as few doses) as possible.     These medicines have risks:    DO NOT drive when on new or higher doses of pain medicine. These medicines can affect your alertness and reaction times, and you could be arrested for driving under the influence (DUI). If you need to use opioids long-term, talk to your care team about driving.    DO NOT operate heave machinery    DO NOT do any other dangerous activities while taking these medicines.     DO NOT drink any alcohol while taking these medicines.      If the opioid prescribed includes acetaminophen, DO NOT take with any other medicines that contain acetaminophen. Read all labels carefully. Look for the word  acetaminophen  or  Tylenol.  Ask your pharmacist if you have questions or are unsure.    You can get addicted to pain medicines, especially if you have a history of addiction (chemical, alcohol or substance dependence). Talk to your care team about ways to reduce this risk.    Store your pills in a secure place, locked if possible. We will not replace any lost or stolen medicine. If you don t finish your medicine, please throw away (dispose) as directed by your pharmacist. The [place] [employer]ution [employer] has more information about  safe disposal: https://www.pca.[facility].us/living-green/managing-unwanted-medications.     All opioids tend to cause constipation. Drink plenty of water and eat foods that have a lot of fiber, such as fruits, vegetables, prune juice, apple juice and high-fiber cereal. Take a laxative (Miralax, milk of magnesia, Colace, Senna) if you don t move your bowels at least every other day.          Primary Care Provider Office Phone # Fax #    Dominguez Maradiaga [phone]       [address] [place] [address]        Goals        General    Improve chronic symptoms (pt-stated)     Notes - Note created  3/27/2018  2:18 PM by Pipkin, Lauren N, RN    Continue to monitor COPD symptoms and report signs of exacerbation to CC        Equal Access to Services     DARA SIMPSON : Susan Gregorio, waaxda luqadaha, qaybabran kaalmamaryana frank, karina shell . So [facility] [phone].    ATENCIÓN: Si habla español, tiene a dhillon disposición servicios gratuitos de asistencia lingüística. Suzi al [phone].    We comply with applicable federal civil rights laws and [place] laws. We do not discriminate on the basis of race, color, national origin, age, disability, sex, sexual orientation, or gender identity.            Thank you!     Thank you for choosing [facility]  for your care. Our goal is always to provide you with excellent care. Hearing back from our patients is one way we can continue to improve our services. Please take a few minutes to complete the written survey that you may receive in the mail after your visit with us. Thank you!             Your Updated Medication List - Protect others around you: Learn how to safely use, store and throw away your medicines at www.disposemymeds.org.          This list is accurate as of 7/25/18 12:49 PM.  Always use your most recent med list.                   Brand Name Dispense Instructions for use Diagnosis     ASPIRIN LOW DOSE 81 MG tablet   Generic drug:  aspirin      Take 1 tablet by mouth daily.        azithromycin 250 MG tablet    ZITHROMAX     Take 1 tablet by mouth daily        budesonide 0.25 MG/2ML neb solution    PULMICORT     Take 0.25 mg by nebulization 2 times daily        CENTRUM SILVER per tablet      Take 1 tablet by mouth daily.        cilostazol 100 MG tablet    PLETAL    180 tablet    TAKE ONE TABLET BY MOUTH TWICE DAILY    PVD (peripheral vascular disease) (H)       * clindamycin 300 MG capsule    CLEOCIN    30 capsule    Take 1 capsule (300 mg) by mouth 3 times daily    Left eye pain       * clindamycin 300 MG capsule    CLEOCIN    30 capsule    Take 1 capsule (300 mg) by mouth 4 times daily    Cellulitis of face       * COMBIVENT RESPIMAT  MCG/ACT inhaler   Generic drug:  Ipratropium-Albuterol     12 g    INHALE ONE PUFF BY MOUTH 4 TIMES DAILY. MAX  SIX  DOSES  PER  DAY    Chronic obstructive pulmonary disease, unspecified COPD type (H)       * ipratropium - albuterol 0.5 mg/2.5 mg/3 mL 0.5-2.5 (3) MG/3ML neb solution    DUONEB    360 mL    USE ONE AMPULE IN NEBULIZER EVERY 6 HOURS AS NEEDED FOR SHORTNESS OF BREATH/DYSPNEA  OR  WHEEZING    COPD (chronic obstructive pulmonary disease) (H)       DALIRESP 500 MCG Tabs tablet   Generic drug:  roflumilast      Take 500 mcg by mouth daily        gabapentin 100 MG capsule    NEURONTIN    90 capsule    Take 1 capsule (100 mg) by mouth 3 times daily    Facial pain, Rash and nonspecific skin eruption       HYDROcodone-acetaminophen 5-325 MG per tablet    NORCO    30 tablet    Take 1 tablet by mouth every 6 hours as needed for severe pain    Rash and nonspecific skin eruption       IMODIUM A-D 2 MG tablet   Generic drug:  loperamide      Take 2 mg by mouth 4 times daily as needed. Take 2 tablet by oral route after 1st loose stool and 1 tablet after each subsequent bowel movement; do not exceed 16 mg in 24 hrs. As needed.        moxifloxacin 0.5 %  ophthalmic solution    VIGAMOX     Place 1 drop Into the left eye 4 times daily        order for DME     1 each    Equipment being ordered: shower chair    End stage COPD (H)       PERFOROMIST 20 MCG/2ML neb solution   Generic drug:  formoterol     360 mL    USE ONE VIAL IN NEBULIZER EVERY 12 HOURS    Other emphysema (H)       ROBITUSSIN COUGH+CHEST EDVIN DM 5-100 MG/5ML Liqd   Generic drug:  Dextromethorphan-Guaifenesin      Take 20 mLs by mouth every 4 hours as needed        simvastatin 10 MG tablet    ZOCOR    90 tablet    TAKE ONE TABLET BY MOUTH AT BEDTIME    PVD (peripheral vascular disease) (H)       sodium chloride 0.65 % nasal spray    OCEAN    1 Bottle    Spray 1 spray into both nostrils every hour as needed for congestion    COPD exacerbation (H)       tamsulosin 0.4 MG capsule    FLOMAX    30 capsule    Take 1 capsule (0.4 mg) by mouth daily    BPH (benign prostatic hyperplasia)       traMADol 50 MG tablet    ULTRAM     Take 1 tablet by mouth 6 times daily        warfarin 2.5 MG tablet    COUMADIN    115 tablet    5 mg x five days/week and 2.5 mg x two days/week.  Or as directed by the protime clinic.    Long term current use of anticoagulant therapy       * Notice:  This list has 4 medication(s) that are the same as other medications prescribed for you. Read the directions carefully, and ask your doctor or other care provider to review them with you.

## 2018-07-25 NOTE — NURSING NOTE
"Chief Complaint   Patient presents with     Facial Pain       Initial BP 92/60  Pulse 76  Temp 97.3  F (36.3  C)  Ht 5' 11\" (1.803 m)  Wt 179 lb (81.2 kg)  SpO2 96%  BMI 24.97 kg/m2 Estimated body mass index is 24.97 kg/(m^2) as calculated from the following:    Height as of this encounter: 5' 11\" (1.803 m).    Weight as of this encounter: 179 lb (81.2 kg).  Medication Reconciliation: complete    Lila Serrano LPN  "

## 2018-07-25 NOTE — PROGRESS NOTES
SUBJECTIVE:                                                    Rm Duarte is a 77 year old male who presents to clinic today for the following health issues:        Facial pain      Duration: 1 month-improving    Description (location/character/radiation): left side of face    Intensity:  moderate    Accompanying signs and symptoms: left facial pain, watery eye     History (similar episodes/previous evaluation): None    Precipitating or alleviating factors: None    Therapies tried and outcome: eye drops, clindamycin, zpack       PROBLEMS TO ADD ON...    Problem list and histories reviewed & adjusted, as indicated.  Additional history: doing better for sure.  Gets sharp jolts of pain.  Vision stable.  Eye feels better.  Saw ophthalmology earlier today.  On a different drop now.  He reports they told him it's not shingles in the eyeball.      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  1991 were 4.31/6.20!!!!!!!??     ECHO: TDS, EF~50%, abnl septum, o/w NORMAL  5/20/2011     PAD: CTA> occlusion of L mid SFA with reconstitution  12/2/2010     S/P colonoscopy: tubular adenoma & tics  5/4/2010    Repeat in 2014; Dr Graham     Tobacco Abuse Ongoing         Social History   Substance Use Topics     Smoking status: Former Smoker     Packs/day: 1.00     Years: 51.00     Types: Cigarettes     Start date: 1/1/1960     Quit date: 1/1/2011      Smokeless tobacco: Never Used      Comment: Tried to quit; Quit      Alcohol use 0.0 oz/week     0 Standard drinks or equivalent per week      Comment: Beer; rarely     Family History   Problem Relation Age of Onset     Other - See Comments Mother      AAA, cause of death     Other - See Comments Other 69     AAA, family hx     Chronic Obstructive Pulmonary Disease Brother       MI age 60's     Family History Negative Sister          Current Outpatient Prescriptions   Medication Sig Dispense Refill     aspirin (ASPIRIN LOW DOSE) 81 MG tablet Take 1 tablet by mouth daily.       azithromycin (ZITHROMAX) 250 MG tablet Take 1 tablet by mouth daily       budesonide (PULMICORT) 0.25 MG/2ML neb solution Take 0.25 mg by nebulization 2 times daily       cilostazol (PLETAL) 100 MG tablet TAKE ONE TABLET BY MOUTH TWICE DAILY 180 tablet 0     clindamycin (CLEOCIN) 300 MG capsule Take 1 capsule (300 mg) by mouth 3 times daily 30 capsule 0     clindamycin (CLEOCIN) 300 MG capsule Take 1 capsule (300 mg) by mouth 4 times daily 30 capsule 0     COMBIVENT RESPIMAT  MCG/ACT inhaler INHALE ONE PUFF BY MOUTH 4 TIMES DAILY. MAX  SIX  DOSES  PER  DAY 12 g 5     Dextromethorphan-Guaifenesin (ROBITUSSIN COUGH+CHEST EDVIN DM) 5-100 MG/5ML LIQD Take 20 mLs by mouth every 4 hours as needed       gabapentin (NEURONTIN) 100 MG capsule Take 1 capsule (100 mg) by mouth 3 times daily 90 capsule 1     HYDROcodone-acetaminophen (NORCO) 5-325 MG per tablet Take 1 tablet by mouth every 6 hours as needed for severe pain 30 tablet 0     ipratropium - albuterol 0.5 mg/2.5 mg/3 mL (DUONEB) 0.5-2.5 (3) MG/3ML neb solution USE ONE AMPULE IN NEBULIZER EVERY 6 HOURS AS NEEDED FOR SHORTNESS OF BREATH/DYSPNEA  OR  WHEEZING 360 mL 10     loperamide (IMODIUM A-D) 2 MG tablet Take 2 mg by mouth 4 times daily as needed. Take 2 tablet by oral route after 1st loose stool and 1 tablet after each subsequent bowel movement; do not exceed 16 mg in 24  "hrs. As needed.       moxifloxacin (VIGAMOX) 0.5 % ophthalmic solution Place 1 drop Into the left eye 4 times daily  1     Multiple Vitamins-Minerals (CENTRUM SILVER) per tablet Take 1 tablet by mouth daily.       order for DME Equipment being ordered: shower chair 1 each 0     PERFOROMIST 20 MCG/2ML neb solution USE ONE VIAL IN NEBULIZER EVERY 12 HOURS 360 mL 1     roflumilast (DALIRESP) 500 MCG TABS tablet Take 500 mcg by mouth daily       simvastatin (ZOCOR) 10 MG tablet TAKE ONE TABLET BY MOUTH AT BEDTIME 90 tablet 0     sodium chloride (OCEAN) 0.65 % nasal spray Spray 1 spray into both nostrils every hour as needed for congestion 1 Bottle 0     tamsulosin (FLOMAX) 0.4 MG capsule Take 1 capsule (0.4 mg) by mouth daily 30 capsule 6     traMADol (ULTRAM) 50 MG tablet Take 1 tablet by mouth 6 times daily       warfarin (COUMADIN) 2.5 MG tablet 5 mg x five days/week and 2.5 mg x two days/week.  Or as directed by the [facility] clinic.  115 tablet 3     No Known Allergies    ROS:  Constitutional, HEENT, cardiovascular, pulmonary, gi and gu systems are negative, except as otherwise noted.    OBJECTIVE:                                                    BP 92/60  Pulse 76  Temp 97.3  F (36.3  C)  Ht 5' 11\" (1.803 m)  Wt 179 lb (81.2 kg)  SpO2 96%  BMI 24.97 kg/m2  Body mass index is 24.97 kg/(m^2).  GENERAL APPEARANCE: Alert, no acute distress  CV: regular rate and rhythm, no murmur, rub or gallop  RESP: lungs clear to auscultation bilaterally with decreased breath sounds throughout.   ABDOMEN: normal bowel sounds, soft, nontender, no hepatosplenomegaly or other masses  SKIN: no suspicious lesions or rashes to visualized skin.  Erythema without any excoriations to the left side of head and face.  Eye is not injected.  PERRL.    NEURO: Alert, oriented x 3, speech and mentation normal           ASSESSMENT/PLAN:                                                    1. Rash and nonspecific skin eruption  I am thinking more " shingles pain now given sx and evolution of sx and I told him this.  Normal eye exam today.   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  Going with gabapentin and neuro referral.  Refill lortab if needed.  F/u with any change.  Talked about eye sx, which he doesn't have.  No FB sensation, normal eye exam.  No change in vision.  He understands.  Sister here today as well.   - HYDROcodone-acetaminophen (NORCO) 5-325 MG per tablet; Take 1 tablet by mouth every 6 hours as needed for severe pain  Dispense: 30 tablet; Refill: 0  - gabapentin (NEURONTIN) 100 MG capsule; Take 1 capsule (100 mg) by mouth 3 times daily  Dispense: 90 capsule; Refill: 1  - NEUROLOGY ADULT REFERRAL    2. Facial pain  As above.    - gabapentin (NEURONTIN) 100 MG capsule; Take 1 capsule (100 mg) by mouth 3 times daily  Dispense: 90 capsule; Refill: 1  - NEUROLOGY ADULT REFERRAL          Dominguez Maradiaga MD  [facility]

## 2018-07-26 ENCOUNTER — ANTICOAGULATION THERAPY VISIT (OUTPATIENT)
Dept: ANTICOAGULATION | Facility: OTHER | Age: 78
End: 2018-07-26
Attending: FAMILY MEDICINE
Payer: COMMERCIAL

## 2018-07-26 ENCOUNTER — DOCUMENTATION ONLY (OUTPATIENT)
Dept: OTHER | Facility: CLINIC | Age: 78
End: 2018-07-26

## 2018-07-26 DIAGNOSIS — I73.9 PVD (PERIPHERAL VASCULAR DISEASE) (H): ICD-10-CM

## 2018-07-26 DIAGNOSIS — Z79.01 LONG-TERM (CURRENT) USE OF ANTICOAGULANTS: ICD-10-CM

## 2018-07-26 DIAGNOSIS — I48.20 CHRONIC ATRIAL FIBRILLATION (H): ICD-10-CM

## 2018-07-26 LAB — INR BLD: 1.3 (ref 0.86–1.14)

## 2018-07-26 PROCEDURE — 85610 PROTHROMBIN TIME: CPT | Mod: QW,ZL | Performed by: FAMILY MEDICINE

## 2018-07-26 PROCEDURE — 36416 COLLJ CAPILLARY BLOOD SPEC: CPT | Mod: ZL | Performed by: FAMILY MEDICINE

## 2018-07-26 NOTE — PROGRESS NOTES
ANTICOAGULATION FOLLOW-UP CLINIC VISIT    Patient Name:  Rm Duarte  Date:  7/26/2018  Contact Type:  Telephone    SUBJECTIVE:     Patient Findings     Positives Change in medications    Comments INR done by lab. Call from patient to report to me what he is taking and changes of meds. He is not taking Gabapentin 100mg, 1 tab TID,  Hydrocod/acetam  1 tab q6h as needed, clindamycin 300mg 1 tab QID, 1.5 days left of antibiotic. He has no bleeding/bruising and no changes in diet/activity. He verbalized understanding of warfarin dosing and INR recheck date and has no questions.            OBJECTIVE    INR Point of Care   Date Value Ref Range Status   07/26/2018 1.3 (H) 0.86 - 1.14 Final     Comment:     This test is intended for monitoring Coumadin therapy.  Results are not   accurate in patients with prolonged INR due to factor deficiency.         ASSESSMENT / PLAN  INR assessment SUB dose cut for antibiotic   Recheck INR In: 1 WEEK    INR Location Clinic      Anticoagulation Summary as of 7/26/2018     INR goal 2.0-3.0   Today's INR 1.3!   Warfarin maintenance plan 5 mg (2.5 mg x 2) on Mon, Wed, Fri; 2.5 mg (2.5 mg x 1) all other days   Full warfarin instructions 7/26: 5 mg; Otherwise 5 mg on Mon, Wed, Fri; 2.5 mg all other days   Weekly warfarin total 25 mg   Plan last modified Zena Haas RN (7/26/2018)   Next INR check 8/2/2018   Priority INR   Target end date Indefinite    Indications   Chronic atrial fibrillation (H) [I48.2]  PVD (peripheral vascular disease) (H) [I73.9]  Long-term (current) use of anticoagulants [Z79.01] [Z79.01]         Anticoagulation Episode Summary     INR check location     Preferred lab     Send INR reminders to  ANTICOAG POOL    Comments       Anticoagulation Care Providers     Provider Role Specialty Phone number    Dominguez Maradiaga MD [facility] [phone]            See the Encounter Report to view Anticoagulation Flowsheet and Dosing Calendar (Go to  Encounters tab in chart review, and find the Anticoagulation Therapy Visit)        Zena Haas RN

## 2018-07-26 NOTE — MR AVS SNAPSHOT
Rm Duarte   7/26/2018 9:00 AM   Anticoagulation Therapy Visit    Description:  77 year old male   Provider:   ANTI COAGULATION   Department:  [facility] Anti Coagulation           INR as of 7/26/2018     Today's INR 1.3!      Anticoagulation Summary as of 7/26/2018     INR goal 2.0-3.0   Today's INR 1.3!   Full warfarin instructions 7/26: 5 mg; Otherwise 5 mg on Mon, Wed, Fri; 2.5 mg all other days   Next INR check 8/2/2018    Indications   Chronic atrial fibrillation (H) [I48.2]  PVD (peripheral vascular disease) (H) [I73.9]  Long-term (current) use of anticoagulants [Z79.01] [Z79.01]         Your next Anticoagulation Clinic appointment(s)     Aug 02, 2018  9:15 AM CDT   Anticoagulation Visit with  ANTI COAGULATION   [facility] [place] ([facility] [place] [facility]    [address]  [place] [address]   [phone]              July 2018 Details    Sun Mon Tue Wed Thu Fri Sat     1               2               3               4               5               6               7                 8               9               10               11               12               13               14                 15               16               17               18               19               20               21                 22               23               24               25               26      5 mg   See details      27      5 mg         28      2.5 mg           29      2.5 mg         30      5 mg         31      2.5 mg              Date Details   07/26 This INR check               How to take your warfarin dose     To take:  2.5 mg Take 1 of the 2.5 mg tablets.    To take:  5 mg Take 2 of the 2.5 mg tablets.    To take:  5 mg Take 1 of the 5 mg tablets.           August 2018 Details    Sun Mon Tue Wed Thu Fri Sat        1      5 mg         2            3               4                 5               6               7               8               9               10               11                  12               13               14               15               16               17               18                 19               20               21               22               23               24               25                 26               27               28               29               30               31                 Date Details   No additional details    Date of next INR:  8/2/2018         How to take your warfarin dose     To take:  2.5 mg Take 1 of the 2.5 mg tablets.    To take:  5 mg Take 2 of the 2.5 mg tablets.

## 2018-07-27 NOTE — PROGRESS NOTES
SUBJECTIVE:   Rm Duarte is a 77 year old male who presents to clinic today for the following health issues:      Medication FU      Duration: 07/01/18 approx.    Description (location/character/radiation): Shingles    Intensity:  8/10    Accompanying signs and symptoms: facial pain    History (similar episodes/previous evaluation): None    Precipitating or alleviating factors: None    Therapies tried and outcome: Vigamox opth gtts, Neurontin       PROBLEMS TO ADD ON...    Problem list and histories reviewed & adjusted, as indicated.  Additional history: pain is way better the last 2 days.  Slightly still painful.  Electric like.  Eye doctor said eye looks great yesterday.       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  1991 were 4.31/6.20!!!!!!!??     ECHO: TDS, EF~50%, abnl septum, o/w NORMAL  5/20/2011     PAD: CTA> occlusion of L mid SFA with reconstitution  12/2/2010     S/P colonoscopy: tubular adenoma & tics  5/4/2010    Repeat in 2014; Dr Graham     Tobacco Abuse Ongoing         Social History   Substance Use Topics     Smoking status: Former Smoker     Packs/day: 1.00     Years: 51.00     Types: Cigarettes     Start date: 1/1/1960     Quit date: 1/1/2011     Smokeless tobacco: Never Used      Comment: Tried to quit; Quit 2011     Alcohol use 0.0 oz/week     0 Standard drinks or equivalent per week      Comment: Beer;  rarely     Family History   Problem Relation Age of Onset     Other - See Comments Mother      AAA, cause of death     Other - See Comments Other 69     AAA, family hx     Chronic Obstructive Pulmonary Disease Brother       MI age 60's     Family History Negative Sister          Current Outpatient Prescriptions   Medication Sig Dispense Refill     aspirin (ASPIRIN LOW DOSE) 81 MG tablet Take 1 tablet by mouth daily.       budesonide (PULMICORT) 0.25 MG/2ML neb solution Take 0.25 mg by nebulization 2 times daily       cilostazol (PLETAL) 100 MG tablet TAKE ONE TABLET BY MOUTH TWICE DAILY 180 tablet 0     COMBIVENT RESPIMAT  MCG/ACT inhaler INHALE ONE PUFF BY MOUTH 4 TIMES DAILY. MAX  SIX  DOSES  PER  DAY 12 g 5     Dextromethorphan-Guaifenesin (ROBITUSSIN COUGH+CHEST EDVIN DM) 5-100 MG/5ML LIQD Take 20 mLs by mouth every 4 hours as needed       gabapentin (NEURONTIN) 100 MG capsule Take 1 capsule (100 mg) by mouth 3 times daily 90 capsule 1     HYDROcodone-acetaminophen (NORCO) 5-325 MG per tablet Take 1 tablet by mouth every 6 hours as needed for severe pain 30 tablet 0     ipratropium - albuterol 0.5 mg/2.5 mg/3 mL (DUONEB) 0.5-2.5 (3) MG/3ML neb solution USE ONE AMPULE IN NEBULIZER EVERY 6 HOURS AS NEEDED FOR SHORTNESS OF BREATH/DYSPNEA  OR  WHEEZING 360 mL 10     loperamide (IMODIUM A-D) 2 MG tablet Take 2 mg by mouth 4 times daily as needed. Take 2 tablet by oral route after 1st loose stool and 1 tablet after each subsequent bowel movement; do not exceed 16 mg in 24 hrs. As needed.       moxifloxacin (VIGAMOX) 0.5 % ophthalmic solution Place 1 drop Into the left eye 4 times daily  1     Multiple Vitamins-Minerals (CENTRUM SILVER) per tablet Take 1 tablet by mouth daily.       order for DME Equipment being ordered: shower chair 1 each 0     PERFOROMIST 20 MCG/2ML neb solution USE ONE VIAL IN NEBULIZER EVERY 12 HOURS 360 mL 1     roflumilast (DALIRESP) 500 MCG TABS tablet Take 500 mcg by mouth daily        simvastatin (ZOCOR) 10 MG tablet TAKE ONE TABLET BY MOUTH AT BEDTIME 90 tablet 0     sodium chloride (OCEAN) 0.65 % nasal spray Spray 1 spray into both nostrils every hour as needed for congestion 1 Bottle 0     tamsulosin (FLOMAX) 0.4 MG capsule Take 1 capsule (0.4 mg) by mouth daily 30 capsule 6     traMADol (ULTRAM) 50 MG tablet Take 1 tablet by mouth 6 times daily       warfarin (COUMADIN) 2.5 MG tablet 5 mg x five days/week and 2.5 mg x two days/week.  Or as directed by the [facility].  115 tablet 3       Reviewed and updated as needed this visit by clinical staff       Reviewed and updated as needed this visit by Provider         ROS:  Constitutional, HEENT, cardiovascular, pulmonary, gi and gu systems are negative, except as otherwise noted.    OBJECTIVE:                                                    BP 92/44  Pulse 63  Temp 98.2  F (36.8  C) (Tympanic)  Resp 20  Wt 178 lb (80.7 kg)  SpO2 93%  BMI 24.83 kg/m2  Body mass index is 24.83 kg/(m^2).  GENERAL APPEARANCE: Alert, no acute distress  CV: regular rate and rhythm, no murmur, rub or gallop  RESP: lungs clear to auscultation bilaterally  ABDOMEN: normal bowel sounds, soft, nontender, no hepatosplenomegaly or other masses  SKIN: ongoing mild erythematous rash left face and head.  Some eye drainage ongoing.   NEURO: Alert, oriented x 3, speech and mentation normal           ASSESSMENT/PLAN:                                                    1. Hyperlipidemia LDL goal <100  Reviewed.  Update.    - Lipid Profile (Chol, Trig, HDL, LDL calc); Future    2. Personal history of tobacco use  Discussed CT scan.  He declines.    - Prof Fee: Shared Decision Making Visit for Lung Cancer Screening      (R21) Rash and nonspecific skin eruption  Comment: suspect zoster  Plan: reviewed.  Better with the neurontin.  Stay on for at least a month.  F/u with ongoing concerns.          Dominguez Maradiaga MD  [facility]    Lung Cancer Screening  Shared Decision Making Visit     I discussed screening with the patient. Rm Duarte is eligible for lung cancer screening on the basis of his smoking history:  History   Smoking Status     Former Smoker     Packs/day: 1.00     Years: 51.00     Types: Cigarettes     Start date: 1/1/1960     Quit date: 1/1/2011   Smokeless Tobacco     Never Used     Comment: Tried to quit; Quit 2011       I have discussed with patient the risks and benefits of screening for lung cancer with low-dose CT.         The benefit of early detection of lung cancer is contingent upon adherence to annual screening or more frequent follow up if indicated.     Furthermore, reaping the benefits of screening requires Rm Duarte to be willing and physically able to undergo diagnostic procedures, if indicated. Although no specific guide is available for determining severity of comorbidities, it is reasonable to withhold screening in patients who have greater mortality risk from other diseases.     We did discuss that the only way to prevent lung cancer is to not smoke. Smoking cessation assistance was not offered.    I did not offer risk estimation using a calculator such as this one:    ShouldIScreen    Rm Duarte declines lung cancer screening on the basis of he just doesn't want it.

## 2018-07-31 ENCOUNTER — OFFICE VISIT (OUTPATIENT)
Dept: FAMILY MEDICINE | Facility: OTHER | Age: 78
End: 2018-07-31
Attending: FAMILY MEDICINE
Payer: COMMERCIAL

## 2018-07-31 VITALS
DIASTOLIC BLOOD PRESSURE: 44 MMHG | HEART RATE: 63 BPM | WEIGHT: 178 LBS | SYSTOLIC BLOOD PRESSURE: 92 MMHG | OXYGEN SATURATION: 93 % | RESPIRATION RATE: 20 BRPM | BODY MASS INDEX: 24.83 KG/M2 | TEMPERATURE: 98.2 F

## 2018-07-31 DIAGNOSIS — R21 RASH AND NONSPECIFIC SKIN ERUPTION: ICD-10-CM

## 2018-07-31 DIAGNOSIS — E78.5 HYPERLIPIDEMIA LDL GOAL <100: Primary | ICD-10-CM

## 2018-07-31 DIAGNOSIS — Z87.891 PERSONAL HISTORY OF TOBACCO USE: ICD-10-CM

## 2018-07-31 PROCEDURE — 99213 OFFICE O/P EST LOW 20 MIN: CPT | Performed by: FAMILY MEDICINE

## 2018-07-31 PROCEDURE — G0296 VISIT TO DETERM LDCT ELIG: HCPCS | Performed by: FAMILY MEDICINE

## 2018-07-31 PROCEDURE — G0463 HOSPITAL OUTPT CLINIC VISIT: HCPCS

## 2018-07-31 ASSESSMENT — PAIN SCALES - GENERAL: PAINLEVEL: EXTREME PAIN (8)

## 2018-07-31 NOTE — MR AVS SNAPSHOT
After Visit Summary   7/31/2018    Rm Duarte    MRN: [id number]           Patient Information     Date Of Birth          1940        Visit Information        Provider Department      7/31/2018 8:15 AM Dominguez Maradiaga MD [facility]        Today's Diagnoses     Hyperlipidemia LDL goal <100    -  1    Personal history of tobacco use        Rash and nonspecific skin eruption          Care Instructions    F/u with ongoing concerns.             Follow-ups after your visit        Your next 10 appointments already scheduled     Aug 02, 2018  9:15 AM CDT   Anticoagulation Visit with HC ANTI COAGULATION   [facility] ([facility] )    [address]  [facility] [address]   [phone]            Aug 02, 2018  9:15 AM CDT   LAB with NA LAB   [facility] ([facility] )    [address]  [facility] [address]   [phone]            Oct 16, 2018  1:30 PM CDT   (Arrive by 1:15 PM)   New Visit with DANIKA Santoyo MD   [facility] ([facility] )    [address]  [facility] [id number] [phone]              Future tests that were ordered for you today     Open Future Orders        Priority Expected Expires Ordered    Lipid Profile (Chol, Trig, HDL, LDL calc) Routine  7/31/2019 7/31/2018            Who to contact     If you have questions or need follow up information about today's clinic visit or your schedule please contact [facility] directly at [phone].  Normal or non-critical lab and imaging results will be communicated to you by MyChart, letter or phone within 4 business days after the clinic has received the results. If you do not hear from us within 7 days, please contact the clinic through Anywhere to [facility] or phone. If you have a critical or abnormal lab result, we will notify you by phone as soon as possible.  Submit refill requests through ADOPt or call  your pharmacy and they will forward the refill request to us. Please allow 3 business days for your refill to be completed.          Additional Information About Your Visit        Care EveryWhere ID     This is your Care EveryWhere ID. This could be used by other organizations to access your [facility] medical records  [id number]        Your Vitals Were     Pulse Temperature Respirations Pulse Oximetry BMI (Body Mass Index)       63 98.2  F (36.8  C) (Tympanic) 20 93% 24.83 kg/m2        Blood Pressure from Last 3 Encounters:   07/31/18 92/44   07/25/18 92/60   07/18/18 (!) 88/60    Weight from Last 3 Encounters:   07/31/18 178 lb (80.7 kg)   07/25/18 179 lb (81.2 kg)   07/18/18 172 lb (78 kg)              We Performed the Following     Prof Fee: Shared Decision Making Visit for Lung Cancer Screening        Primary Care Provider Office Phone # Fax #    Dominguez Maradiaga [phone]       [address] [place] [address]        Goals        General    Improve chronic symptoms (pt-stated)     Notes - Note created  3/27/2018  2:18 PM by Pipkin, Lauren N, RN    Continue to monitor COPD symptoms and report signs of exacerbation to CC        Equal Access to Services     DARA SIMPSON : Hadii bette fragoso hadasho Soomaali, waaxda luqadaha, qaybta kaalmada adeegyada, karina prado. So [facility] [phone].    ATENCIÓN: Si habla español, tiene a dhillon disposición servicios gratuitos de asistencia lingüística. Llame al [phone].    We comply with applicable federal civil rights laws and [place] laws. We do not discriminate on the basis of race, color, national origin, age, disability, sex, sexual orientation, or gender identity.            Thank you!     Thank you for choosing [facility]  for your care. Our goal is always to provide you with excellent care. Hearing back from our patients is one way we can continue to improve our services. Please take a few minutes to  complete the written survey that you may receive in the mail after your visit with us. Thank you!             Your Updated Medication List - Protect others around you: Learn how to safely use, store and throw away your medicines at www.disposemymeds.org.          This list is accurate as of 7/31/18  8:29 AM.  Always use your most recent med list.                   Brand Name Dispense Instructions for use Diagnosis    ASPIRIN LOW DOSE 81 MG tablet   Generic drug:  aspirin      Take 1 tablet by mouth daily.        budesonide 0.25 MG/2ML neb solution    PULMICORT     Take 0.25 mg by nebulization 2 times daily        CENTRUM SILVER per tablet      Take 1 tablet by mouth daily.        cilostazol 100 MG tablet    PLETAL    180 tablet    TAKE ONE TABLET BY MOUTH TWICE DAILY    PVD (peripheral vascular disease) (H)       * COMBIVENT RESPIMAT  MCG/ACT inhaler   Generic drug:  Ipratropium-Albuterol     12 g    INHALE ONE PUFF BY MOUTH 4 TIMES DAILY. MAX  SIX  DOSES  PER  DAY    Chronic obstructive pulmonary disease, unspecified COPD type (H)       * ipratropium - albuterol 0.5 mg/2.5 mg/3 mL 0.5-2.5 (3) MG/3ML neb solution    DUONEB    360 mL    USE ONE AMPULE IN NEBULIZER EVERY 6 HOURS AS NEEDED FOR SHORTNESS OF BREATH/DYSPNEA  OR  WHEEZING    COPD (chronic obstructive pulmonary disease) (H)       DALIRESP 500 MCG Tabs tablet   Generic drug:  roflumilast      Take 500 mcg by mouth daily        gabapentin 100 MG capsule    NEURONTIN    90 capsule    Take 1 capsule (100 mg) by mouth 3 times daily    Facial pain, Rash and nonspecific skin eruption       HYDROcodone-acetaminophen 5-325 MG per tablet    NORCO    30 tablet    Take 1 tablet by mouth every 6 hours as needed for severe pain    Rash and nonspecific skin eruption       IMODIUM A-D 2 MG tablet   Generic drug:  loperamide      Take 2 mg by mouth 4 times daily as needed. Take 2 tablet by oral route after 1st loose stool and 1 tablet after each subsequent bowel  movement; do not exceed 16 mg in 24 hrs. As needed.        moxifloxacin 0.5 % ophthalmic solution    VIGAMOX     Place 1 drop Into the left eye 4 times daily        order for DME     1 each    Equipment being ordered: shower chair    End stage COPD (H)       PERFOROMIST 20 MCG/2ML neb solution   Generic drug:  formoterol     360 mL    USE ONE VIAL IN NEBULIZER EVERY 12 HOURS    Other emphysema (H)       ROBITUSSIN COUGH+CHEST EDVIN DM 5-100 MG/5ML Liqd   Generic drug:  Dextromethorphan-Guaifenesin      Take 20 mLs by mouth every 4 hours as needed        simvastatin 10 MG tablet    ZOCOR    90 tablet    TAKE ONE TABLET BY MOUTH AT BEDTIME    PVD (peripheral vascular disease) (H)       sodium chloride 0.65 % nasal spray    OCEAN    1 Bottle    Spray 1 spray into both nostrils every hour as needed for congestion    COPD exacerbation (H)       tamsulosin 0.4 MG capsule    FLOMAX    30 capsule    Take 1 capsule (0.4 mg) by mouth daily    BPH (benign prostatic hyperplasia)       traMADol 50 MG tablet    ULTRAM     Take 1 tablet by mouth 6 times daily        warfarin 2.5 MG tablet    COUMADIN    115 tablet    5 mg x five days/week and 2.5 mg x two days/week.  Or as directed by the [facility]time clinic.    Long term current use of anticoagulant therapy       * Notice:  This list has 2 medication(s) that are the same as other medications prescribed for you. Read the directions carefully, and ask your doctor or other care provider to review them with you.

## 2018-07-31 NOTE — NURSING NOTE
"Chief Complaint   Patient presents with     Recheck Medication     Forms     Pt declined PHQ9 and GAD7       Initial BP 92/44  Pulse 63  Temp 98.2  F (36.8  C) (Tympanic)  Resp 20  Wt 178 lb (80.7 kg)  SpO2 93%  BMI 24.83 kg/m2 Estimated body mass index is 24.83 kg/(m^2) as calculated from the following:    Height as of 7/25/18: 5' 11\" (1.803 m).    Weight as of this encounter: 178 lb (80.7 kg).  Medication Reconciliation: complete    Joann Walls LPN    "

## 2018-08-02 ENCOUNTER — ANTICOAGULATION THERAPY VISIT (OUTPATIENT)
Dept: ANTICOAGULATION | Facility: OTHER | Age: 78
End: 2018-08-02
Attending: FAMILY MEDICINE
Payer: COMMERCIAL

## 2018-08-02 DIAGNOSIS — I48.20 CHRONIC ATRIAL FIBRILLATION (H): ICD-10-CM

## 2018-08-02 DIAGNOSIS — I73.9 PVD (PERIPHERAL VASCULAR DISEASE) (H): ICD-10-CM

## 2018-08-02 DIAGNOSIS — Z79.01 LONG-TERM (CURRENT) USE OF ANTICOAGULANTS: ICD-10-CM

## 2018-08-02 DIAGNOSIS — E78.5 HYPERLIPIDEMIA LDL GOAL <100: ICD-10-CM

## 2018-08-02 LAB
CHOLEST SERPL-MCNC: 119 MG/DL
HDLC SERPL-MCNC: 50 MG/DL
INR BLD: 1.7 (ref 0.86–1.14)
LDLC SERPL CALC-MCNC: 40 MG/DL
NONHDLC SERPL-MCNC: 69 MG/DL
TRIGL SERPL-MCNC: 145 MG/DL

## 2018-08-02 PROCEDURE — 85610 PROTHROMBIN TIME: CPT | Mod: QW,ZL | Performed by: FAMILY MEDICINE

## 2018-08-02 PROCEDURE — 36416 COLLJ CAPILLARY BLOOD SPEC: CPT | Mod: ZL | Performed by: FAMILY MEDICINE

## 2018-08-02 PROCEDURE — 80061 LIPID PANEL: CPT | Mod: ZL | Performed by: FAMILY MEDICINE

## 2018-08-02 NOTE — PROGRESS NOTES
ANTICOAGULATION FOLLOW-UP CLINIC VISIT    Patient Name:  Rm Duarte  Date:  8/2/2018  Contact Type:  Telephone    SUBJECTIVE:     Patient Findings     Positives No Problem Findings    Comments INR done by lab. Call placed to patient and spoke to him re: INR result, warfarin dosing and INR recheck date. He verbalized understanding and has no questions. He has no bleeding/bruising, and no new changes in diet/meds/activity.            OBJECTIVE    INR Point of Care   Date Value Ref Range Status   08/02/2018 1.7 (H) 0.86 - 1.14 Final     Comment:     This test is intended for monitoring Coumadin therapy.  Results are not   accurate in patients with prolonged INR due to factor deficiency.         ASSESSMENT / PLAN  INR assessment SUB    Recheck INR In: 10 DAYS    INR Location Clinic      Anticoagulation Summary as of 8/2/2018     INR goal 2.0-3.0   Today's INR 1.7!   Warfarin maintenance plan 5 mg (2.5 mg x 2) on Mon, Wed, Fri; 2.5 mg (2.5 mg x 1) all other days   Full warfarin instructions 8/2: 5 mg; Otherwise 5 mg on Mon, Wed, Fri; 2.5 mg all other days   Weekly warfarin total 25 mg   Plan last modified Zena Haas RN (7/26/2018)   Next INR check 8/14/2018   Priority INR   Target end date Indefinite    Indications   Chronic atrial fibrillation (H) [I48.2]  PVD (peripheral vascular disease) (H) [I73.9]  Long-term (current) use of anticoagulants [Z79.01] [Z79.01]         Anticoagulation Episode Summary     INR check location     Preferred lab     Send INR reminders to  ANTICOAG POOL    Comments       Anticoagulation Care Providers     Provider Role Specialty Phone number    Dominguez Maradiaga MD [facility] [phone]            See the Encounter Report to view Anticoagulation Flowsheet and Dosing Calendar (Go to Encounters tab in chart review, and find the Anticoagulation Therapy Visit)        Zena Haas, RN

## 2018-08-02 NOTE — MR AVS SNAPSHOT
Rm Duarte   8/2/2018 9:15 AM   Anticoagulation Therapy Visit    Description:  77 year old male   Provider:   ANTI COAGULATION   Department:  Hc Anti Coagulation           INR as of 8/2/2018     Today's INR 1.7!      Anticoagulation Summary as of 8/2/2018     INR goal 2.0-3.0   Today's INR 1.7!   Full warfarin instructions 8/2: 5 mg; Otherwise 5 mg on Mon, Wed, Fri; 2.5 mg all other days   Next INR check 8/14/2018    Indications   Chronic atrial fibrillation (H) [I48.2]  PVD (peripheral vascular disease) (H) [I73.9]  Long-term (current) use of anticoagulants [Z79.01] [Z79.01]         Your next Anticoagulation Clinic appointment(s)     Aug 14, 2018  9:00 AM CDT   Anticoagulation Visit with  ANTI COAGULATION   [facility] [place] ([facility] [place] )    [address]  [place] [address]   [phone]              August 2018 Details    Sun Mon Tue Wed Thu Fri Sat        1               2      5 mg   See details      3      5 mg         4      2.5 mg           5      2.5 mg         6      5 mg         7      2.5 mg         8      5 mg         9      2.5 mg         10      5 mg         11      2.5 mg           12      2.5 mg         13      5 mg         14            15               16               17               18                 19               20               21               22               23               24               25                 26               27               28               29               30               31                 Date Details   08/02 This INR check       Date of next INR:  8/14/2018         How to take your warfarin dose     To take:  2.5 mg Take 1 of the 2.5 mg tablets.    To take:  5 mg Take 2 of the 2.5 mg tablets.

## 2018-08-03 ENCOUNTER — TELEPHONE (OUTPATIENT)
Dept: FAMILY MEDICINE | Facility: OTHER | Age: 78
End: 2018-08-03

## 2018-08-03 ENCOUNTER — PATIENT OUTREACH (OUTPATIENT)
Dept: CARE COORDINATION | Facility: OTHER | Age: 78
End: 2018-08-03

## 2018-08-03 DIAGNOSIS — R21 RASH AND NONSPECIFIC SKIN ERUPTION: ICD-10-CM

## 2018-08-03 RX ORDER — HYDROCODONE BITARTRATE AND ACETAMINOPHEN 5; 325 MG/1; MG/1
1 TABLET ORAL EVERY 6 HOURS PRN
Qty: 30 TABLET | Refills: 0 | Status: SHIPPED | OUTPATIENT
Start: 2018-08-03 | End: 2018-08-14

## 2018-08-03 ASSESSMENT — ACTIVITIES OF DAILY LIVING (ADL): DEPENDENT_IADLS:: INDEPENDENT

## 2018-08-03 NOTE — TELEPHONE ENCOUNTER
Reason for call:  Medication    1. Medication Name? hydrocodone  2. Is this request for a refill? {YES /   3. What Pharmacy do you use?   4. Have you contacted your pharmacy? {YES /   5. If yes, when?  (Please note that the turn-around-time for prescriptions is 72 business hours; I am sending your request at this time. SEND TO  [facility] Refill Pool  )  Description: pt called and said he needs to  a written rx to bring to pharmacy. He is out of refills.  Was an appointment offered for this a call?no  Preferred method for responding to this message telephone  If we cannot reach you directly, may we leave a detailed response at the number you provided? {YES /   Can this message wait until your PCP/Provider returns if not available today?

## 2018-08-03 NOTE — TELEPHONE ENCOUNTER
Controlled Substance Refill Request for Norco    Problem List Complete:  No     PROVIDER TO CONSIDER COMPLETION OF PROBLEM LIST AND OVERVIEW/CONTROLLED SUBSTANCE AGREEMENT    Last Written Prescription Date:  7.25.18  Last Fill Quantity: 30,   # refills: 0    Last Office Visit with [facility] primary care provider: 7.31.18    Future Office visit:     Controlled substance agreement on file: No.     Processing:  Patient will  in clinic   checked in past 3 months?  No, route to RN

## 2018-08-03 NOTE — TELEPHONE ENCOUNTER
This is for acute pain for shingles.  No further scripts without visit.  Thanks.   Dominguez Maradiaga

## 2018-08-03 NOTE — PROGRESS NOTES
Clinic Care Coordination Contact    Clinic Care Coordination Contact  OUTREACH    Referral Information:  Referral Source: Care Team    Primary Diagnosis: COPD    Chief Complaint   Patient presents with     Clinic Care Coordination - Follow-up     Universal Utilization:   Clinic Utilization  Difficulty keeping appointments:: No  Utilization    Last refreshed: 8/3/2018  2:35 PM:  No Show Count (past year) 2       Last refreshed: 8/3/2018  2:35 PM:  ED visits 1       Last refreshed: 8/3/2018  2:35 PM:  Hospital admissions 1          Current as of: 8/3/2018  2:35 PM         Clinical Concerns:  Current Medical Concerns: COPD, shingles    Current Behavioral Concerns: denies     Education Provided to patient: Educated patient on importance of calling Care Coordinator with questions or concerns, follow COPD Action Plan      Health Maintenance Reviewed:    Clinical Pathway: COPD- see Care Management COPD flowsheet for assessment    Medication Management:  Patient reports he is managing his own medications and denies questions or concerns about this.  He verbalized understanding of the importance of medication adherence.  Denies need for refills at this time.  He did request a refill of Norco today and reports he is still taking it as needed for pain related to his shingles.  He understands this will not be refilled.  He reports the shingles are improving and understands he is to call his Care Coordinator if symptoms return or become worse.     Functional Status:  Dependent ADLs:: Independent  Dependent IADLs:: Independent  Bed or wheelchair confined:: No  Mobility Status: Independent    Living Situation:  Current living arrangement:: I live alone    Diet/Exercise/Sleep:  Inadequate nutrition (GOAL):: No  Food Insecurity: No  Tube Feeding: No  Exercise:: Unable to exercise  Significant changes in sleep pattern (GOAL): No    Transportation:  Transportation concerns (GOAL):: No  Transportation means:: Regular car      Psychosocial:  Church or spiritual beliefs that impact treatment:: No  Mental health DX:: No  Mental health management concern (GOAL):: No  Informal Support system:: Spouse     Financial/Insurance:   Financial/Insurance concerns (GOAL):: No       Resources and Interventions:  Current Resources: PCP; Care Coordination; Anti-coagulation nurse     Supplies used at home:: Diabetic Supplies  Equipment Currently Used at Home: raised toilet, shower chair, oxygen    Advance Care Plan/Directive  Advanced Care Plans/Directives on file:: In process  Advanced Care Plan/Directive Status: In Process    Goals:   Goals        General    Improve chronic symptoms (pt-stated)     Notes - Note created  3/27/2018  2:18 PM by Pipkin, Lauren N, RN    Continue to monitor COPD symptoms and report signs of exacerbation to CC              Patient/Caregiver understanding: Rm verbalized understanding that he can contact Care Coordinator anytime he has questions or concerns.  He understands that we are here to assist him with any issues that may arise related to his chronic medical conditions as well as provide him with psychosocial support and resources as needed.    Outreach Frequency: monthly  Future Appointments              In 1 week HC ANTI COAGULATION [facility]    In 1 week NA LAB [facility]    In 2 months DANIKA Santoyo MD [facility]          Plan: Care Coordinator will continue regular outreach to patient.  Will continue to monitor his functional ability and situation at home as concerns have been raised by his sister.  He does continue to deny issues or concerns.    Lauren Pipkin, BS-RN   CHF and General Care Coordinator  [phone] Option # 1

## 2018-08-09 ENCOUNTER — TRANSFERRED RECORDS (OUTPATIENT)
Dept: HEALTH INFORMATION MANAGEMENT | Facility: CLINIC | Age: 78
End: 2018-08-09

## 2018-08-14 ENCOUNTER — ANTICOAGULATION THERAPY VISIT (OUTPATIENT)
Dept: ANTICOAGULATION | Facility: OTHER | Age: 78
End: 2018-08-14
Attending: FAMILY MEDICINE
Payer: COMMERCIAL

## 2018-08-14 DIAGNOSIS — Z79.01 LONG-TERM (CURRENT) USE OF ANTICOAGULANTS: ICD-10-CM

## 2018-08-14 DIAGNOSIS — I48.20 CHRONIC ATRIAL FIBRILLATION (H): ICD-10-CM

## 2018-08-14 DIAGNOSIS — R21 RASH AND NONSPECIFIC SKIN ERUPTION: ICD-10-CM

## 2018-08-14 DIAGNOSIS — I73.9 PVD (PERIPHERAL VASCULAR DISEASE) (H): ICD-10-CM

## 2018-08-14 LAB — INR BLD: 1.4 (ref 0.86–1.14)

## 2018-08-14 PROCEDURE — 85610 PROTHROMBIN TIME: CPT | Mod: QW,ZL | Performed by: FAMILY MEDICINE

## 2018-08-14 PROCEDURE — 36416 COLLJ CAPILLARY BLOOD SPEC: CPT | Mod: ZL | Performed by: FAMILY MEDICINE

## 2018-08-14 RX ORDER — HYDROCODONE BITARTRATE AND ACETAMINOPHEN 5; 325 MG/1; MG/1
1 TABLET ORAL EVERY 6 HOURS PRN
Qty: 30 TABLET | Refills: 0 | Status: SHIPPED | OUTPATIENT
Start: 2018-08-14 | End: 2018-08-22

## 2018-08-14 NOTE — PROGRESS NOTES
ANTICOAGULATION FOLLOW-UP CLINIC VISIT    Patient Name:  Rm Duarte  Date:  8/14/2018  Contact Type:  Telephone    SUBJECTIVE:     Patient Findings     Positives No Problem Findings    Comments INR done by lab. Call placed to patient and spoke to him re: INR result, warfarin dosing and INR recheck date. He verbalized understanding and has no questions. He states he is still having residual pain from his episode with shingles. No bleeding/bruising and no changes in diet/meds/activity.            OBJECTIVE    INR Point of Care   Date Value Ref Range Status   08/14/2018 1.4 (H) 0.86 - 1.14 Final     Comment:     This test is intended for monitoring Coumadin therapy.  Results are not   accurate in patients with prolonged INR due to factor deficiency.         ASSESSMENT / PLAN  INR assessment SUB    Recheck INR In: 2 WEEKS    INR Location Clinic      Anticoagulation Summary as of 8/14/2018     INR goal 2.0-3.0   Today's INR 1.4!   Warfarin maintenance plan 5 mg (2.5 mg x 2) on Mon, Wed, Fri; 2.5 mg (2.5 mg x 1) all other days   Full warfarin instructions 8/14: 7.5 mg; Otherwise 5 mg on Mon, Wed, Fri; 2.5 mg all other days   Weekly warfarin total 25 mg   Plan last modified Zena Haas RN (7/26/2018)   Next INR check 8/28/2018   Priority INR   Target end date Indefinite    Indications   Chronic atrial fibrillation (H) [I48.2]  PVD (peripheral vascular disease) (H) [I73.9]  Long-term (current) use of anticoagulants [Z79.01] [Z79.01]         Anticoagulation Episode Summary     INR check location     Preferred lab     Send INR reminders to  DA[facility]ID POOL    Comments       Anticoagulation Care Providers     Provider Role Specialty Phone number    Dominguez Maradiaga MD [facility] [phone]            See the Encounter Report to view Anticoagulation Flowsheet and Dosing Calendar (Go to Encounters tab in chart review, and find the Anticoagulation Therapy Visit)        Zena Haas, RADHA

## 2018-08-14 NOTE — TELEPHONE ENCOUNTER
neilco      Last Written Prescription Date:  8/3/18  Last Fill Quantity: 30,   # refills: 0  Last Office Visit: 7/31/18  Future Office visit:       Routing refill request to provider for review/approval because:  Drug not on the [facility], [facility] or [facility] refill protocol or controlled substance

## 2018-08-14 NOTE — MR AVS SNAPSHOT
Rm Duarte   8/14/2018 9:00 AM   Anticoagulation Therapy Visit    Description:  77 year old male   Provider:   ANTI COAGULATION   Department:  Hc Anti Coagulation           INR as of 8/14/2018     Today's INR 1.4!      Anticoagulation Summary as of 8/14/2018     INR goal 2.0-3.0   Today's INR 1.4!   Full warfarin instructions 8/14: 7.5 mg; Otherwise 5 mg on Mon, Wed, Fri; 2.5 mg all other days   Next INR check 8/28/2018    Indications   Chronic atrial fibrillation (H) [I48.2]  PVD (peripheral vascular disease) (H) [I73.9]  Long-term (current) use of anticoagulants [Z79.01] [Z79.01]         Your next Anticoagulation Clinic appointment(s)     Aug 28, 2018  9:00 AM CDT   Anticoagulation Visit with  ANTI COAGULATION   [facility] ([facility] [place] )    [address]  [place] [address]   [phone]              August 2018 Details    Sun Mon Tue Wed Thu Fri Sat        1               2               3               4                 5               6               7               8               9               10               11                 12               13               14      7.5 mg   See details      15      5 mg         16      2.5 mg         17      5 mg         18      2.5 mg           19      2.5 mg         20      5 mg         21      2.5 mg         22      5 mg         23      2.5 mg         24      5 mg         25      2.5 mg           26      2.5 mg         27      5 mg         28            29               30               31                 Date Details   08/14 This INR check       Date of next INR:  8/28/2018         How to take your warfarin dose     To take:  2.5 mg Take 1 of the 2.5 mg tablets.    To take:  5 mg Take 2 of the 2.5 mg tablets.    To take:  7.5 mg Take 3 of the 2.5 mg tablets.

## 2018-08-21 ENCOUNTER — TELEPHONE (OUTPATIENT)
Dept: FAMILY MEDICINE | Facility: OTHER | Age: 78
End: 2018-08-21

## 2018-08-21 NOTE — TELEPHONE ENCOUNTER
To: Gabi Koo nurse  I made an appointment for patient with Gabi tomorrow 8/22/18 (Dr Maradiaga was booked) for an oxygen re certification.  Is Gabi able to provide service for the visit type? If not, please call patient back @ [phone].  Thank you

## 2018-08-22 ENCOUNTER — OFFICE VISIT (OUTPATIENT)
Dept: FAMILY MEDICINE | Facility: OTHER | Age: 78
End: 2018-08-22
Attending: PHYSICIAN ASSISTANT
Payer: COMMERCIAL

## 2018-08-22 VITALS
WEIGHT: 178 LBS | BODY MASS INDEX: 24.92 KG/M2 | HEIGHT: 71 IN | DIASTOLIC BLOOD PRESSURE: 62 MMHG | SYSTOLIC BLOOD PRESSURE: 100 MMHG | HEART RATE: 113 BPM | OXYGEN SATURATION: 84 %

## 2018-08-22 DIAGNOSIS — R21 RASH AND NONSPECIFIC SKIN ERUPTION: ICD-10-CM

## 2018-08-22 DIAGNOSIS — J44.1 COPD EXACERBATION (H): Primary | ICD-10-CM

## 2018-08-22 PROCEDURE — G0463 HOSPITAL OUTPT CLINIC VISIT: HCPCS

## 2018-08-22 PROCEDURE — 99214 OFFICE O/P EST MOD 30 MIN: CPT | Performed by: PHYSICIAN ASSISTANT

## 2018-08-22 RX ORDER — HYDROCODONE BITARTRATE AND ACETAMINOPHEN 5; 325 MG/1; MG/1
1 TABLET ORAL EVERY 6 HOURS PRN
Qty: 30 TABLET | Refills: 0 | Status: SHIPPED | OUTPATIENT
Start: 2018-08-22 | End: 2018-08-31

## 2018-08-22 RX ORDER — TIOTROPIUM BROMIDE INHALATION SPRAY 3.12 UG/1
SPRAY, METERED RESPIRATORY (INHALATION)
COMMUNITY
Start: 2018-08-13 | End: 2020-01-29

## 2018-08-22 ASSESSMENT — PAIN SCALES - GENERAL: PAINLEVEL: MODERATE PAIN (5)

## 2018-08-22 NOTE — MR AVS SNAPSHOT
After Visit Summary   8/22/2018    Rm Duarte    MRN: [id number]           Patient Information     Date Of Birth          1940        Visit Information        Provider Department      8/22/2018 3:45 PM Gabi Koo PA [facility]        Today's Diagnoses     COPD exacerbation (H)    -  1    Rash and nonspecific skin eruption           Follow-ups after your visit        Your next 10 appointments already scheduled     Aug 28, 2018  8:45 AM CDT   LAB with NA LAB   [facility] ([facility] )    [address]  [facility] [address]   [phone]            Aug 28, 2018  9:00 AM CDT   Anticoagulation Visit with HC ANTI COAGULATION   [facility] ([facility] )    [address] [place] [id number] [phone]            Oct 16, 2018  1:30 PM CDT   (Arrive by 1:15 PM)   New Visit with H Vernon Santoyo MD   [facility] ([facility] [place] )    [address]  [place] [id number] [phone]              Who to contact     If you have questions or need follow up information about today's clinic visit or your schedule please contact [facility] directly at [phone].  Normal or non-critical lab and imaging results will be communicated to you by MyChart, letter or phone within 4 business days after the clinic has received the results. If you do not hear from us within 7 days, please contact the clinic through MyChart or phone. If you have a critical or abnormal lab result, we will notify you by phone as soon as possible.  Submit refill requests through [facility] or call your pharmacy and they will forward the refill request to us. Please allow 3 business days for your refill to be completed.          Additional Information About Your Visit        Care EveryWhere ID     This is your Care EveryWhere ID. This could be used by other organizations to access your  "[place] medical records  [id number]        Your Vitals Were     Pulse Height Pulse Oximetry BMI (Body Mass Index)          113 5' 11\" (1.803 m) 84% 24.83 kg/m2         Blood Pressure from Last 3 Encounters:   08/22/18 100/62   07/31/18 92/44   07/25/18 92/60    Weight from Last 3 Encounters:   08/22/18 178 lb (80.7 kg)   07/31/18 178 lb (80.7 kg)   07/25/18 179 lb (81.2 kg)              Today, you had the following     No orders found for display         Today's Medication Changes          These changes are accurate as of 8/22/18  5:30 PM.  If you have any questions, ask your nurse or doctor.               Start taking these medicines.        Dose/Directions    order for DME   Used for:  COPD exacerbation (H)   Started by:  Gabi Koo PA        Equipment being ordered: Oxygen 2 L per nasal canula continuously, re-certify for 1 year   FAX TO [facility]   Quantity:  1 Units   Refills:  11            Where to get your medicines      Some of these will need a paper prescription and others can be bought over the counter.  Ask your nurse if you have questions.     Bring a paper prescription for each of these medications     HYDROcodone-acetaminophen 5-325 MG per tablet    order for DME               Information about OPIOIDS     PRESCRIPTION OPIOIDS: WHAT YOU NEED TO KNOW   We gave you an opioid (narcotic) pain medicine. It is important to manage your pain, but opioids are not always the best choice. You should first try all the other options your care team gave you. Take this medicine for as short a time (and as few doses) as possible.    Some activities can increase your pain, such as bandage changes or therapy sessions. It may help to take your pain medicine 30 to 60 minutes before these activities. Reduce your stress by getting enough sleep, working on hobbies you enjoy and practicing relaxation or meditation. Talk to your care team about ways to manage your pain beyond prescription opioids.    These " medicines have risks:    DO NOT drive when on new or higher doses of pain medicine. These medicines can affect your alertness and reaction times, and you could be arrested for driving under the influence (DUI). If you need to use opioids long-term, talk to your care team about driving.    DO NOT operate heavy machinery    DO NOT do any other dangerous activities while taking these medicines.    DO NOT drink any alcohol while taking these medicines.     If the opioid prescribed includes acetaminophen, DO NOT take with any other medicines that contain acetaminophen. Read all labels carefully. Look for the word  acetaminophen  or  Tylenol.  Ask your pharmacist if you have questions or are unsure.    You can get addicted to pain medicines, especially if you have a history of addiction (chemical, alcohol or substance dependence). Talk to your care team about ways to reduce this risk.    All opioids tend to cause constipation. Drink plenty of water and eat foods that have a lot of fiber, such as fruits, vegetables, prune juice, apple juice and high-fiber cereal. Take a laxative (Miralax, milk of magnesia, Colace, Senna) if you don t move your bowels at least every other day. Other side effects include upset stomach, sleepiness, dizziness, throwing up, tolerance (needing more of the medicine to have the same effect), physical dependence and slowed breathing.    Store your pills in a secure place, locked if possible. We will not replace any lost or stolen medicine. If you don t finish your medicine, please throw away (dispose) as directed by your pharmacist. The [place] [employer]ution [employer] has more information about safe disposal: https://www.pca.[email or web address].[place].us/living-green/managing-unwanted-medications         Primary Care Provider Office Phone # Fax #    Dominguez Maradiaga [phone]       [address] [place] [address]        Goals        General    Improve chronic symptoms (pt-stated)      Notes - Note created  3/27/2018  2:18 PM by Pipkin, Lauren N, RN    Continue to monitor COPD symptoms and report signs of exacerbation to         Equal Access to Services     DARA SIMPSON : Hadii aad ku hadedwigejoss Gregorio, sofiya de la fuente, dominic wilbertwes frank, karina natalyain hayaamalgorzata trimbledanie mccormack suleman rpado. So [facility] [phone].    ATENCIÓN: Si habla español, tiene a dhillon disposición servicios gratuitos de asistencia lingüística. Llame al [phone].    We comply with applicable federal civil rights laws and [place] laws. We do not discriminate on the basis of race, color, national origin, age, disability, sex, sexual orientation, or gender identity.            Thank you!     Thank you for choosing [facility]  for your care. Our goal is always to provide you with excellent care. Hearing back from our patients is one way we can continue to improve our services. Please take a few minutes to complete the written survey that you may receive in the mail after your visit with us. Thank you!             Your Updated Medication List - Protect others around you: Learn how to safely use, store and throw away your medicines at www.disposemymeds.org.          This list is accurate as of 8/22/18  5:30 PM.  Always use your most recent med list.                   Brand Name Dispense Instructions for use Diagnosis    ASPIRIN LOW DOSE 81 MG tablet   Generic drug:  aspirin      Take 1 tablet by mouth daily.        budesonide 0.25 MG/2ML neb solution    PULMICORT     Take 0.25 mg by nebulization 2 times daily        CENTRUM SILVER per tablet      Take 1 tablet by mouth daily.        cilostazol 100 MG tablet    PLETAL    180 tablet    TAKE ONE TABLET BY MOUTH TWICE DAILY    PVD (peripheral vascular disease) (H)       * COMBIVENT RESPIMAT  MCG/ACT inhaler   Generic drug:  Ipratropium-Albuterol     12 g    INHALE ONE PUFF BY MOUTH 4 TIMES DAILY. MAX  SIX  DOSES  PER  DAY    Chronic obstructive pulmonary disease,  unspecified COPD type (H)       * ipratropium - albuterol 0.5 mg/2.5 mg/3 mL 0.5-2.5 (3) MG/3ML neb solution    DUONEB    360 mL    USE ONE AMPULE IN NEBULIZER EVERY 6 HOURS AS NEEDED FOR SHORTNESS OF BREATH/DYSPNEA  OR  WHEEZING    COPD (chronic obstructive pulmonary disease) (H)       DALIRESP 500 MCG Tabs tablet   Generic drug:  roflumilast      Take 500 mcg by mouth daily        gabapentin 100 MG capsule    NEURONTIN    90 capsule    Take 1 capsule (100 mg) by mouth 3 times daily    Facial pain, Rash and nonspecific skin eruption       HYDROcodone-acetaminophen 5-325 MG per tablet    NORCO    30 tablet    Take 1 tablet by mouth every 6 hours as needed for severe pain    Rash and nonspecific skin eruption       IMODIUM A-D 2 MG tablet   Generic drug:  loperamide      Take 2 mg by mouth 4 times daily as needed. Take 2 tablet by oral route after 1st loose stool and 1 tablet after each subsequent bowel movement; do not exceed 16 mg in 24 hrs. As needed.        moxifloxacin 0.5 % ophthalmic solution    VIGAMOX     Place 1 drop Into the left eye 4 times daily        order for DME     1 each    Equipment being ordered: shower chair    End stage COPD (H)       order for DME     1 Units    Equipment being ordered: Oxygen 2 L per nasal canula continuously, re-certify for 1 year   FAX TO [facility]    COPD exacerbation (H)       PERFOROMIST 20 MCG/2ML neb solution   Generic drug:  formoterol     360 mL    USE ONE VIAL IN NEBULIZER EVERY 12 HOURS    Other emphysema (H)       ROBITUSSIN COUGH+CHEST EDVIN DM 5-100 MG/5ML Liqd   Generic drug:  Dextromethorphan-Guaifenesin      Take 20 mLs by mouth every 4 hours as needed        simvastatin 10 MG tablet    ZOCOR    90 tablet    TAKE ONE TABLET BY MOUTH AT BEDTIME    PVD (peripheral vascular disease) (H)       sodium chloride 0.65 % nasal spray    OCEAN    1 Bottle    Spray 1 spray into both nostrils every hour as needed for congestion    COPD exacerbation (H)       SPIRIVA  RESPIMAT 2.5 MCG/ACT inhalation aerosol   Generic drug:  tiotropium           tamsulosin 0.4 MG capsule    FLOMAX    30 capsule    Take 1 capsule (0.4 mg) by mouth daily    BPH (benign prostatic hyperplasia)       traMADol 50 MG tablet    ULTRAM     Take 1 tablet by mouth 6 times daily        warfarin 2.5 MG tablet    COUMADIN    115 tablet    5 mg x five days/week and 2.5 mg x two days/week.  Or as directed by the [facility] clinic.    Long term current use of anticoagulant therapy       * Notice:  This list has 2 medication(s) that are the same as other medications prescribed for you. Read the directions carefully, and ask your doctor or other care provider to review them with you.

## 2018-08-22 NOTE — PROGRESS NOTES
SUBJECTIVE:                                                    Rm Duarte is a 77 year old male who presents to clinic today for the following health issues: History of COPD. He uses O2 at 2L/min. at night and uses during the day as needed. He has SOB with any activity. No increase in cough      Re-cert O2      Duration: annually    Description (location/character/radiation): re certification for oxygen    Intensity:  moderate    Accompanying signs and symptoms: SOB    History (similar episodes/previous evaluation): COPD    Precipitating or alleviating factors: exertion    Therapies tried and outcome: oxygen            Problem list and histories reviewed & adjusted, as indicated.  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  1991 were 4.31/6.20!!!!!!!??     ECHO: TDS, EF~50%, abnl septum, o/w NORMAL  5/20/2011     PAD: CTA> occlusion of L mid SFA with reconstitution  12/2/2010     S/P colonoscopy: tubular adenoma & tics  5/4/2010    Repeat in 2014; Dr Graham     Tobacco Abuse Ongoing         Social History   Substance Use Topics     Smoking status: Former Smoker     Packs/day: 1.00     Years: 51.00     Types: Cigarettes     Start date: 1/1/1960     Quit date: 1/1/2011     Smokeless tobacco: Never Used      Comment: Tried to quit; Quit 2011     Alcohol use 0.0 oz/week     0 Standard drinks or  equivalent per week      Comment: Beer; rarely     Family History   Problem Relation Age of Onset     Other - See Comments Mother      AAA, cause of death     Other - See Comments Other 69     AAA, family hx     Chronic Obstructive Pulmonary Disease Brother       MI age 60's     Family History Negative Sister          Current Outpatient Prescriptions   Medication Sig Dispense Refill     aspirin (ASPIRIN LOW DOSE) 81 MG tablet Take 1 tablet by mouth daily.       budesonide (PULMICORT) 0.25 MG/2ML neb solution Take 0.25 mg by nebulization 2 times daily       cilostazol (PLETAL) 100 MG tablet TAKE ONE TABLET BY MOUTH TWICE DAILY 180 tablet 0     COMBIVENT RESPIMAT  MCG/ACT inhaler INHALE ONE PUFF BY MOUTH 4 TIMES DAILY. MAX  SIX  DOSES  PER  DAY 12 g 5     Dextromethorphan-Guaifenesin (ROBITUSSIN COUGH+CHEST EDVIN DM) 5-100 MG/5ML LIQD Take 20 mLs by mouth every 4 hours as needed       gabapentin (NEURONTIN) 100 MG capsule Take 1 capsule (100 mg) by mouth 3 times daily 90 capsule 1     HYDROcodone-acetaminophen (NORCO) 5-325 MG per tablet Take 1 tablet by mouth every 6 hours as needed for severe pain 30 tablet 0     ipratropium - albuterol 0.5 mg/2.5 mg/3 mL (DUONEB) 0.5-2.5 (3) MG/3ML neb solution USE ONE AMPULE IN NEBULIZER EVERY 6 HOURS AS NEEDED FOR SHORTNESS OF BREATH/DYSPNEA  OR  WHEEZING 360 mL 10     loperamide (IMODIUM A-D) 2 MG tablet Take 2 mg by mouth 4 times daily as needed. Take 2 tablet by oral route after 1st loose stool and 1 tablet after each subsequent bowel movement; do not exceed 16 mg in 24 hrs. As needed.       moxifloxacin (VIGAMOX) 0.5 % ophthalmic solution Place 1 drop Into the left eye 4 times daily  1     Multiple Vitamins-Minerals (CENTRUM SILVER) per tablet Take 1 tablet by mouth daily.       order for DME Equipment being ordered: shower chair 1 each 0     PERFOROMIST 20 MCG/2ML neb solution USE ONE VIAL IN NEBULIZER EVERY 12 HOURS 360 mL 1     roflumilast (DALIRESP) 500 MCG  "TABS tablet Take 500 mcg by mouth daily       simvastatin (ZOCOR) 10 MG tablet TAKE ONE TABLET BY MOUTH AT BEDTIME 90 tablet 0     sodium chloride (OCEAN) 0.65 % nasal spray Spray 1 spray into both nostrils every hour as needed for congestion 1 Bottle 0     SPIRIVA RESPIMAT 2.5 MCG/ACT inhalation aerosol        tamsulosin (FLOMAX) 0.4 MG capsule Take 1 capsule (0.4 mg) by mouth daily 30 capsule 6     traMADol (ULTRAM) 50 MG tablet Take 1 tablet by mouth 6 times daily       warfarin (COUMADIN) 2.5 MG tablet 5 mg x five days/week and 2.5 mg x two days/week.  Or as directed by the [facility] clinic.  115 tablet 3     No Known Allergies    ROS:  Constitutional, HEENT, cardiovascular, pulmonary, gi and gu systems are negative, except as otherwise noted.    OBJECTIVE:                                                    /62  Pulse 113  Ht 5' 11\" (1.803 m)  Wt 178 lb (80.7 kg)  SpO2 (!) 84%  BMI 24.83 kg/m2  Body mass index is 24.83 kg/(m^2).          Physical Exam:  Constitutional: healthy, alert and no acute distress  Skin: No suspicious rash or skin lesion  ENT: Posterior pharynx moist and pink without edema or exudate.  EAC's clear. TM's intact bilateral.  CV: RRR. No murmur  Pulm: Distant breath sounds with mild diffuse crackles. O2 sat 84% RA  Psych: mentation and affect appear normal                   ASSESSMENT/PLAN:                                                    (J44.1) COPD exacerbation (H)  (primary encounter diagnosis)  Comment: sent DME for O2  Plan: order for DME            (R21) Rash and nonspecific skin eruption  Comment: RF  Plan: HYDROcodone-acetaminophen (NORCO) 5-325 MG per         tablet                  FAX TO [facility]  Dispense: 1 Units; Refill: 11      Follow up as needed        GLORIA Fernández  [facility]      "

## 2018-08-22 NOTE — NURSING NOTE
"Chief Complaint   Patient presents with     RE-CERT       Initial /62  Pulse 113  Ht 5' 11\" (1.803 m)  Wt 178 lb (80.7 kg)  SpO2 (!) 84%  BMI 24.83 kg/m2 Estimated body mass index is 24.83 kg/(m^2) as calculated from the following:    Height as of this encounter: 5' 11\" (1.803 m).    Weight as of this encounter: 178 lb (80.7 kg).  Medication Reconciliation: complete    Lila Serrano LPN  "

## 2018-08-29 ENCOUNTER — ANTICOAGULATION THERAPY VISIT (OUTPATIENT)
Dept: ANTICOAGULATION | Facility: OTHER | Age: 78
End: 2018-08-29
Attending: FAMILY MEDICINE
Payer: COMMERCIAL

## 2018-08-29 DIAGNOSIS — Z79.01 LONG-TERM (CURRENT) USE OF ANTICOAGULANTS: ICD-10-CM

## 2018-08-29 DIAGNOSIS — I73.9 PVD (PERIPHERAL VASCULAR DISEASE) (H): ICD-10-CM

## 2018-08-29 DIAGNOSIS — I48.20 CHRONIC ATRIAL FIBRILLATION (H): ICD-10-CM

## 2018-08-29 LAB — INR BLD: 1.3 (ref 0.86–1.14)

## 2018-08-29 PROCEDURE — 85610 PROTHROMBIN TIME: CPT | Mod: QW,ZL | Performed by: FAMILY MEDICINE

## 2018-08-29 PROCEDURE — 36416 COLLJ CAPILLARY BLOOD SPEC: CPT | Mod: ZL | Performed by: FAMILY MEDICINE

## 2018-08-29 NOTE — MR AVS SNAPSHOT
Rm Duarte   8/29/2018 1:00 PM   Anticoagulation Therapy Visit    Description:  77 year old male   Provider:   ANTI COAGULATION   Department:  Hc Anti Coagulation           INR as of 8/29/2018     Today's INR 1.3!      Anticoagulation Summary as of 8/29/2018     INR goal 2.0-3.0   Today's INR 1.3!   Full warfarin instructions 8/29: 10 mg; 8/30: 5 mg; Otherwise 5 mg on Mon, Wed, Fri; 2.5 mg all other days   Next INR check 9/7/2018    Indications   Chronic atrial fibrillation (H) [I48.2]  PVD (peripheral vascular disease) (H) [I73.9]  Long-term (current) use of anticoagulants [Z79.01] [Z79.01]         Your next Anticoagulation Clinic appointment(s)     Sep 07, 2018 10:00 AM CDT   Anticoagulation Visit with  ANTI COAGULATION   [facility] [place] ([facility] [place] )    [address]  [place] [address]   [phone]              August 2018 Details    Sun Mon Tue Wed Thu Fri Sat        1               2               3               4                 5               6               7               8               9               10               11                 12               13               14               15               16               17               18                 19               20               21               22               23               24               25                 26               27               28               29      10 mg   See details      30      5 mg         31      5 mg           Date Details   08/29 This INR check               How to take your warfarin dose     To take:  5 mg Take 2 of the 2.5 mg tablets.    To take:  10 mg Take 4 of the 2.5 mg tablets.           September 2018 Details    Sun Mon Tue Wed Thu Fri Sat           1      2.5 mg           2      2.5 mg         3      5 mg         4      2.5 mg         5      5 mg         6      2.5 mg         7            8                 9               10               11               12                13               14               15                 16               17               18               19               20               21               22                 23               24               25               26               27               28               29                 30                      Date Details   No additional details    Date of next INR:  9/7/2018         How to take your warfarin dose     To take:  2.5 mg Take 1 of the 2.5 mg tablets.    To take:  5 mg Take 2 of the 2.5 mg tablets.

## 2018-08-29 NOTE — PROGRESS NOTES
ANTICOAGULATION FOLLOW-UP CLINIC VISIT    Patient Name:  Rm Duarte  Date:  8/29/2018  Contact Type:  Telephone    SUBJECTIVE:     Patient Findings     Positives No Problem Findings    Comments INR done by lab. Call placed to patient and spoke to him re: INR result, warfarin dosing and INR recheck date. He verbalized understanding of instruction and has no questions. He has no bleeding/bruising and no changes in diet/meds/activity or questions           OBJECTIVE    INR Point of Care   Date Value Ref Range Status   08/29/2018 1.3 (H) 0.86 - 1.14 Final     Comment:     This test is intended for monitoring Coumadin therapy.  Results are not   accurate in patients with prolonged INR due to factor deficiency.         ASSESSMENT / PLAN  INR assessment SUB    Recheck INR In: 9 DAYS    INR Location Clinic      Anticoagulation Summary as of 8/29/2018     INR goal 2.0-3.0   Today's INR 1.3!   Warfarin maintenance plan 5 mg (2.5 mg x 2) on Mon, Wed, Fri; 2.5 mg (2.5 mg x 1) all other days   Full warfarin instructions 8/29: 10 mg; 8/30: 5 mg; Otherwise 5 mg on Mon, Wed, Fri; 2.5 mg all other days   Weekly warfarin total 25 mg   Plan last modified Zena Haas RN (7/26/2018)   Next INR check 9/7/2018   Priority INR   Target end date Indefinite    Indications   Chronic atrial fibrillation (H) [I48.2]  PVD (peripheral vascular disease) (H) [I73.9]  Long-term (current) use of anticoagulants [Z79.01] [Z79.01]         Anticoagulation Episode Summary     INR check location     Preferred lab     Send INR reminders to  ANTICOAG POOL    Comments       Anticoagulation Care Providers     Provider Role Specialty Phone number    Dominguez Maradiaga MD [facility] [phone]            See the Encounter Report to view Anticoagulation Flowsheet and Dosing Calendar (Go to Encounters tab in chart review, and find the Anticoagulation Therapy Visit)        Zena Haas, RN

## 2018-08-31 DIAGNOSIS — R21 RASH AND NONSPECIFIC SKIN ERUPTION: ICD-10-CM

## 2018-08-31 RX ORDER — HYDROCODONE BITARTRATE AND ACETAMINOPHEN 5; 325 MG/1; MG/1
1 TABLET ORAL EVERY 6 HOURS PRN
Qty: 10 TABLET | Refills: 0 | Status: SHIPPED | OUTPATIENT
Start: 2018-08-31 | End: 2018-09-04

## 2018-08-31 NOTE — TELEPHONE ENCOUNTER
Pt states he called his pharmacy 3 times for refill of norco, no refill in epic.  Last refill was 8/22/18 #30.  Can you refill for the weekend?

## 2018-09-04 DIAGNOSIS — R21 RASH AND NONSPECIFIC SKIN ERUPTION: ICD-10-CM

## 2018-09-04 RX ORDER — HYDROCODONE BITARTRATE AND ACETAMINOPHEN 5; 325 MG/1; MG/1
1 TABLET ORAL EVERY 6 HOURS PRN
Qty: 30 TABLET | Refills: 0 | Status: SHIPPED | OUTPATIENT
Start: 2018-09-04 | End: 2018-09-12

## 2018-09-07 ENCOUNTER — ANTICOAGULATION THERAPY VISIT (OUTPATIENT)
Dept: ANTICOAGULATION | Facility: OTHER | Age: 78
End: 2018-09-07
Attending: FAMILY MEDICINE
Payer: COMMERCIAL

## 2018-09-07 DIAGNOSIS — Z79.01 LONG-TERM (CURRENT) USE OF ANTICOAGULANTS: ICD-10-CM

## 2018-09-07 DIAGNOSIS — I73.9 PVD (PERIPHERAL VASCULAR DISEASE) (H): ICD-10-CM

## 2018-09-07 DIAGNOSIS — I48.20 CHRONIC ATRIAL FIBRILLATION (H): ICD-10-CM

## 2018-09-07 LAB — INR BLD: 3.5 (ref 0.86–1.14)

## 2018-09-07 PROCEDURE — 85610 PROTHROMBIN TIME: CPT | Mod: QW,ZL | Performed by: FAMILY MEDICINE

## 2018-09-07 PROCEDURE — 36416 COLLJ CAPILLARY BLOOD SPEC: CPT | Mod: ZL | Performed by: FAMILY MEDICINE

## 2018-09-07 NOTE — MR AVS SNAPSHOT
Rm Duarte   9/7/2018 1:30 PM   Anticoagulation Therapy Visit    Description:  77 year old male   Provider:   ANTI COAGULATION   Department:  Hc Anti Coagulation           INR as of 9/7/2018     Today's INR 3.5!      Anticoagulation Summary as of 9/7/2018     INR goal 2.0-3.0   Today's INR 3.5!   Full warfarin instructions 9/7: 2.5 mg; Otherwise 5 mg on Mon, Wed, Fri; 2.5 mg all other days   Next INR check 9/19/2018    Indications   Chronic atrial fibrillation (H) [I48.2]  PVD (peripheral vascular disease) (H) [I73.9]  Long-term (current) use of anticoagulants [Z79.01] [Z79.01]         Your next Anticoagulation Clinic appointment(s)     Sep 19, 2018  9:00 AM CDT   Anticoagulation Visit with  ANTI COAGULATION   [facility] ([facility] [place] )    [address]  [place] [address]   [phone]              September 2018 Details    Sun Mon Tue Wed Thu Fri Sat           1                 2               3               4               5               6               7      2.5 mg   See details      8      2.5 mg           9      2.5 mg         10      5 mg         11      2.5 mg         12      5 mg         13      2.5 mg         14      5 mg         15      2.5 mg           16      2.5 mg         17      5 mg         18      2.5 mg         19            20               21               22                 23               24               25               26               27               28               29                 30                      Date Details   09/07 This INR check       Date of next INR:  9/19/2018         How to take your warfarin dose     To take:  2.5 mg Take 1 of the 2.5 mg tablets.    To take:  5 mg Take 2 of the 2.5 mg tablets.

## 2018-09-07 NOTE — PROGRESS NOTES
ANTICOAGULATION FOLLOW-UP CLINIC VISIT    Patient Name:  Rm Duarte  Date:  9/7/2018  Contact Type:  Telephone    SUBJECTIVE:     Patient Findings     Positives No Problem Findings    Comments INR done by lab. Call placed to patient and spoke to him re: INR result, warfarin dosing and INR recheck date. He verbalized understanding and has no questions. He has no bleeding/bruising and no new changes in diet/meds/activity.           OBJECTIVE    INR Point of Care   Date Value Ref Range Status   09/07/2018 3.5 (H) 0.86 - 1.14 Final     Comment:     This test is intended for monitoring Coumadin therapy.  Results are not   accurate in patients with prolonged INR due to factor deficiency.         ASSESSMENT / PLAN  INR assessment SUPRA    Recheck INR In: 10 DAYS    INR Location Clinic      Anticoagulation Summary as of 9/7/2018     INR goal 2.0-3.0   Today's INR 3.5!   Warfarin maintenance plan 5 mg (2.5 mg x 2) on Mon, Wed, Fri; 2.5 mg (2.5 mg x 1) all other days   Full warfarin instructions 9/7: 2.5 mg; Otherwise 5 mg on Mon, Wed, Fri; 2.5 mg all other days   Weekly warfarin total 25 mg   Plan last modified Zena Haas RN (7/26/2018)   Next INR check 9/19/2018   Priority INR   Target end date Indefinite    Indications   Chronic atrial fibrillation (H) [I48.2]  PVD (peripheral vascular disease) (H) [I73.9]  Long-term (current) use of anticoagulants [Z79.01] [Z79.01]         Anticoagulation Episode Summary     INR check location     Preferred lab     Send INR reminders to  ANTICOAG POOL    Comments       Anticoagulation Care Providers     Provider Role Specialty Phone number    Dominguez Maradiaga MD [facility] [phone]            See the Encounter Report to view Anticoagulation Flowsheet and Dosing Calendar (Go to Encounters tab in chart review, and find the Anticoagulation Therapy Visit)        Zena Haas, RN

## 2018-09-10 ENCOUNTER — MEDICAL CORRESPONDENCE (OUTPATIENT)
Dept: HEALTH INFORMATION MANAGEMENT | Facility: CLINIC | Age: 78
End: 2018-09-10

## 2018-09-12 DIAGNOSIS — R21 RASH AND NONSPECIFIC SKIN ERUPTION: ICD-10-CM

## 2018-09-12 RX ORDER — HYDROCODONE BITARTRATE AND ACETAMINOPHEN 5; 325 MG/1; MG/1
1 TABLET ORAL EVERY 6 HOURS PRN
Qty: 30 TABLET | Refills: 0 | Status: SHIPPED | OUTPATIENT
Start: 2018-09-12 | End: 2018-09-19

## 2018-09-12 NOTE — TELEPHONE ENCOUNTER
neilco      Last Written Prescription Date:  9/4/18  Last Fill Quantity: 30,   # refills: 0  Last Office Visit: 8/22/18  Future Office visit:       Routing refill request to provider for review/approval because:  Drug not on the [facility], [facility] or [facility] refill protocol or controlled substance

## 2018-09-12 NOTE — TELEPHONE ENCOUNTER
I wouldn't expect to keep needing this.  I am willing to fill this time but have to see him for further refills.  Thanks.  Dominguez Maradiaga

## 2018-09-13 NOTE — TELEPHONE ENCOUNTER
Called informed written RX is ready to  at  [facility]  Advised that an appointment is needed before anymore refills.  Coral Hidalgo

## 2018-09-19 DIAGNOSIS — R21 RASH AND NONSPECIFIC SKIN ERUPTION: ICD-10-CM

## 2018-09-19 RX ORDER — HYDROCODONE BITARTRATE AND ACETAMINOPHEN 5; 325 MG/1; MG/1
1 TABLET ORAL EVERY 6 HOURS PRN
Qty: 30 TABLET | Refills: 0 | Status: SHIPPED | OUTPATIENT
Start: 2018-09-19 | End: 2018-09-24

## 2018-09-19 NOTE — TELEPHONE ENCOUNTER
Pt stops in he needs a refill of his Norco for shingles. The last was 09/12/18 #30. Pt requested this through his pharmacy today, but will run out in the am. Pt is here for the 2nd time today looking for his Rx.

## 2018-09-24 ENCOUNTER — ANTICOAGULATION THERAPY VISIT (OUTPATIENT)
Dept: ANTICOAGULATION | Facility: OTHER | Age: 78
End: 2018-09-24
Attending: FAMILY MEDICINE
Payer: COMMERCIAL

## 2018-09-24 ENCOUNTER — TELEPHONE (OUTPATIENT)
Dept: FAMILY MEDICINE | Facility: OTHER | Age: 78
End: 2018-09-24

## 2018-09-24 DIAGNOSIS — Z79.01 LONG-TERM (CURRENT) USE OF ANTICOAGULANTS: ICD-10-CM

## 2018-09-24 DIAGNOSIS — I73.9 PVD (PERIPHERAL VASCULAR DISEASE) (H): ICD-10-CM

## 2018-09-24 DIAGNOSIS — I48.20 CHRONIC ATRIAL FIBRILLATION (H): ICD-10-CM

## 2018-09-24 LAB
INR BLD: 6.7 (ref 0.86–1.14)
INR PPP: 5.22 (ref 0.8–1.2)

## 2018-09-24 PROCEDURE — 85610 PROTHROMBIN TIME: CPT | Mod: QW,ZL | Performed by: FAMILY MEDICINE

## 2018-09-24 PROCEDURE — 36416 COLLJ CAPILLARY BLOOD SPEC: CPT | Mod: ZL | Performed by: FAMILY MEDICINE

## 2018-09-24 PROCEDURE — 85610 PROTHROMBIN TIME: CPT | Mod: ZL,91 | Performed by: FAMILY MEDICINE

## 2018-09-24 NOTE — MR AVS SNAPSHOT
Rm GARCIA Gerald   9/24/2018 1:30 PM   Anticoagulation Therapy Visit    Description:  77 year old male   Provider:   ANTI COAGULATION   Department:  [facility] Anti Coagulation           INR as of 9/24/2018     Today's INR 5.22!      Anticoagulation Summary as of 9/24/2018     INR goal 2.0-3.0   Today's INR 5.22!   Full warfarin instructions 9/24: Hold; 9/25: 1.25 mg; Otherwise 5 mg on Mon, Wed, Fri; 2.5 mg all other days   Next INR check 9/28/2018    Indications   Chronic atrial fibrillation (H) [I48.2]  PVD (peripheral vascular disease) (H) [I73.9]  Long-term (current) use of anticoagulants [Z79.01] [Z79.01]         Your next Anticoagulation Clinic appointment(s)     Sep 28, 2018 10:45 AM CDT   Anticoagulation [facility] with [facility] COAG[facility]   [facility]    [address] [facility] [address] [address]   [phone]              September 2018 Details    Sun Mon Tue Wed Thu Fri Sat           1                 2               3               4               5               6               7               8                 9               10               11               12               13               14               15                 16               17               18               19               20               21               22                 23               24      Hold   See details      25      1.25 mg         26      5 mg         27      2.5 mg         28            29                 30                      Date Details   09/24 This INR check       Date of next INR:  9/28/2018         How to take your warfarin dose     To take:  1.25 mg Take 0.5 of a 2.5 mg tablet.    To take:  2.5 mg Take 1 of the 2.5 mg tablets.    To take:  5 mg Take 2 of the 2.5 mg tablets.    Hold Do not take your warfarin dose. See the Details table to the right for additional instructions.

## 2018-09-24 NOTE — Clinical Note
Rm Duarte had INR (done by hospital lab) of 5.22.  He said he is not eating very much, minimal intake of vit K. I am holding warfarin today and decreasing dose tomorrow. INR will be rechecked in 4 days.  Zena Haas RN Anticoagulation clinic.

## 2018-09-24 NOTE — TELEPHONE ENCOUNTER
DATE:  9/24/2018   TIME OF RECEIPT FROM LAB:  2451  LAB TEST:  INR  LAB VALUE:  5.22  RESULTS GIVEN WITH READ-BACK TO (PROVIDER):  CATINA TEE  TIME LAB VALUE REPORTED TO PROVIDER:   [id number]

## 2018-09-24 NOTE — PROGRESS NOTES
SUBJECTIVE:                                                    Rm Duarte is a 77 year old male who presents to clinic today for the following health issues:    Rash      Duration: 8 weeks    Description  Location: head  Itching: mild    Intensity:  moderate    Accompanying signs and symptoms: None    History (similar episodes/previous evaluation): None    Precipitating or alleviating factors:  New exposures:  None  Recent travel: no      Therapies tried and outcome: Medication for pain, effective      PROBLEMS TO ADD ON...    Problem list and histories reviewed & adjusted, as indicated.  Additional history: he gets severe jolts of pain and numbness.  Getting slowly better.  Lengthy review of risk/benefit, etc.  Discussed more options as well.      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  1991 were 4.31/6.20!!!!!!!??     ECHO: TDS, EF~50%, abnl septum, o/w NORMAL  5/20/2011     PAD: CTA> occlusion of L mid SFA with reconstitution  12/2/2010     S/P colonoscopy: tubular adenoma & tics  5/4/2010    Repeat in 2014; Dr Graham     Tobacco Abuse Ongoing         Social History   Substance Use Topics     Smoking status: Former Smoker     Packs/day: 1.00     Years: 51.00     Types: Cigarettes     Start date: 1/1/1960     Quit date: 1/1/2011     Smokeless tobacco: Never Used      Comment: Tried to quit; Quit       Alcohol use 0.0 oz/week     0 Standard drinks or equivalent per week      Comment: Beer; rarely     Family History   Problem Relation Age of Onset     Other - See Comments Mother      AAA, cause of death     Other - See Comments Other 69     AAA, family hx     Chronic Obstructive Pulmonary Disease Brother       MI age 60's     Family History Negative Sister          Current Outpatient Prescriptions   Medication Sig Dispense Refill     aspirin (ASPIRIN LOW DOSE) 81 MG tablet Take 1 tablet by mouth daily.       budesonide (PULMICORT) 0.25 MG/2ML neb solution Take 0.25 mg by nebulization 2 times daily       cilostazol (PLETAL) 100 MG tablet TAKE ONE TABLET BY MOUTH TWICE DAILY 180 tablet 0     COMBIVENT RESPIMAT  MCG/ACT inhaler INHALE ONE PUFF BY MOUTH 4 TIMES DAILY. MAX  SIX  DOSES  PER  DAY 12 g 5     Dextromethorphan-Guaifenesin (ROBITUSSIN COUGH+CHEST EDVIN DM) 5-100 MG/5ML LIQD Take 20 mLs by mouth every 4 hours as needed       gabapentin (NEURONTIN) 300 MG capsule Take 1 capsule (300 mg) by mouth 3 times daily 90 capsule 3     HYDROcodone-acetaminophen (NORCO) 5-325 MG per tablet Take 1 tablet by mouth every 6 hours as needed for severe pain 30 tablet 0     ipratropium - albuterol 0.5 mg/2.5 mg/3 mL (DUONEB) 0.5-2.5 (3) MG/3ML neb solution USE ONE AMPULE IN NEBULIZER EVERY 6 HOURS AS NEEDED FOR SHORTNESS OF BREATH/DYSPNEA  OR  WHEEZING 360 mL 10     loperamide (IMODIUM A-D) 2 MG tablet Take 2 mg by mouth 4 times daily as needed. Take 2 tablet by oral route after 1st loose stool and 1 tablet after each subsequent bowel movement; do not exceed 16 mg in 24 hrs. As needed.       moxifloxacin (VIGAMOX) 0.5 % ophthalmic solution Place 1 drop Into the left eye 4 times daily  1     Multiple Vitamins-Minerals (CENTRUM SILVER) per tablet Take 1 tablet by mouth daily.       order for DME Equipment being ordered: Oxygen 2 L per nasal canula continuously, re-certify for 1 year      FAX TO [facility]  "Units 11     order for DME Equipment being ordered: shower chair 1 each 0     PERFOROMIST 20 MCG/2ML neb solution USE ONE VIAL IN NEBULIZER EVERY 12 HOURS 360 mL 1     roflumilast (DALIRESP) 500 MCG TABS tablet Take 500 mcg by mouth daily       simvastatin (ZOCOR) 10 MG tablet TAKE ONE TABLET BY MOUTH AT BEDTIME 90 tablet 0     sodium chloride (OCEAN) 0.65 % nasal spray Spray 1 spray into both nostrils every hour as needed for congestion 1 Bottle 0     SPIRIVA RESPIMAT 2.5 MCG/ACT inhalation aerosol        tamsulosin (FLOMAX) 0.4 MG capsule Take 1 capsule (0.4 mg) by mouth daily 30 capsule 6     warfarin (COUMADIN) 2.5 MG tablet 5 mg x five days/week and 2.5 mg x two days/week.  Or as directed by the [facility] clinic.  115 tablet 3     [DISCONTINUED] gabapentin (NEURONTIN) 100 MG capsule Take 1 capsule (100 mg) by mouth 3 times daily 90 capsule 1     No Known Allergies    ROS:  Constitutional, HEENT, cardiovascular, pulmonary, gi and gu systems are negative, except as otherwise noted.    OBJECTIVE:                                                    /78  Pulse 115  Temp 98  F (36.7  C)  Ht 5' 11\" (1.803 m)  Wt 175 lb (79.4 kg)  SpO2 95%  BMI 24.41 kg/m2  Body mass index is 24.41 kg/(m^2).  GENERAL APPEARANCE: Alert, no acute distress  SKIN: no suspicious lesions or rashes to visualized skin.  Mild erythema to the left face and head remains.  NEURO: Alert, oriented x 3, speech and mentation normal           ASSESSMENT/PLAN:                                                    1. Rash and nonspecific skin eruption  Discussed.  See below.   - HYDROcodone-acetaminophen (NORCO) 5-325 MG per tablet; Take 1 tablet by mouth every 6 hours as needed for severe pain  Dispense: 30 tablet; Refill: 0  - gabapentin (NEURONTIN) 300 MG capsule; Take 1 capsule (300 mg) by mouth 3 times daily  Dispense: 90 capsule; Refill: 3    2. Facial pain  As above.   - gabapentin (NEURONTIN) 300 MG capsule; Take 1 capsule (300 mg) by mouth " 3 times daily  Dispense: 90 capsule; Refill: 3    3. Postherpetic neuralgia  Reviewed at length.  Ok for opiates for the next couple of months.  He is going to go down slowly as he is able.  Increased the gabapentin today.  1 month f/u.       25 minutes spent with patient over 50%in counseling mostly about opiates and associated risks.      Dominguez Maradiaga MD  [facility]

## 2018-09-24 NOTE — PROGRESS NOTES
"  ANTICOAGULATION FOLLOW-UP CLINIC VISIT    Patient Name:  Rm Duarte  Date:  9/24/2018  Contact Type:  Telephone    SUBJECTIVE:     Patient Findings     Positives Change in diet/appetite, Inflammation, Activity level change    Comments INR done by lab. Call placed to patient and spoke to him re: INR result, warfarin dosing and INR recheck date. He states he is not eating very much at all and not much vit K at all. He does have shingles and states it has been quite painful. He states it hurts \"inside his head\". We discussed for him to try to increase vit K intake a little more than he is doing when he is feeling good. He states no bleeding/bruising and no new medication changes. He verbalized understanding of instruction and has no questions.            OBJECTIVE    INR   Date Value Ref Range Status   09/24/2018 5.22 (HH) 0.80 - 1.20 Final     Comment:     Critical Value called to and read back by  YOVANI CANTU AT 1407 ON 9/24/18 BY [facility]         ASSESSMENT / PLAN  INR assessment SUPRA not eating much, minimal vit K intake   Recheck INR In: 4 DAYS    INR Location Clinic      Anticoagulation Summary as of 9/24/2018     INR goal 2.0-3.0   Today's INR 5.22!   Warfarin maintenance plan 5 mg (2.5 mg x 2) on Mon, Wed, Fri; 2.5 mg (2.5 mg x 1) all other days   Full warfarin instructions 9/24: Hold; 9/25: 1.25 mg; Otherwise 5 mg on Mon, Wed, Fri; 2.5 mg all other days   Weekly warfarin total 25 mg   Plan last modified Zena Haas RN (7/26/2018)   Next INR check 9/28/2018   Priority INR   Target end date Indefinite    Indications   Chronic atrial fibrillation (H) [I48.2]  PVD (peripheral vascular disease) (H) [I73.9]  Long-term (current) use of anticoagulants [Z79.01] [Z79.01]         Anticoagulation Episode Summary     INR check location     Preferred lab     Send INR reminders to  ANTICOAG POOL    Comments       Anticoagulation Care Providers     Provider Role Specialty Phone number    Dominguez Maradiaga MD Responsible " Family Practice [phone]            See the Encounter Report to view Anticoagulation Flowsheet and Dosing Calendar (Go to Encounters tab in chart review, and find the Anticoagulation Therapy Visit)        Zena Haas RN

## 2018-09-25 ENCOUNTER — PATIENT OUTREACH (OUTPATIENT)
Dept: CARE COORDINATION | Facility: OTHER | Age: 78
End: 2018-09-25

## 2018-09-25 ASSESSMENT — ACTIVITIES OF DAILY LIVING (ADL): DEPENDENT_IADLS:: INDEPENDENT

## 2018-09-26 ENCOUNTER — OFFICE VISIT (OUTPATIENT)
Dept: FAMILY MEDICINE | Facility: OTHER | Age: 78
End: 2018-09-26
Attending: FAMILY MEDICINE
Payer: COMMERCIAL

## 2018-09-26 VITALS
WEIGHT: 175 LBS | HEART RATE: 115 BPM | BODY MASS INDEX: 24.5 KG/M2 | SYSTOLIC BLOOD PRESSURE: 118 MMHG | DIASTOLIC BLOOD PRESSURE: 78 MMHG | OXYGEN SATURATION: 95 % | HEIGHT: 71 IN | TEMPERATURE: 98 F

## 2018-09-26 DIAGNOSIS — R21 RASH AND NONSPECIFIC SKIN ERUPTION: ICD-10-CM

## 2018-09-26 DIAGNOSIS — R51.9 FACIAL PAIN: ICD-10-CM

## 2018-09-26 DIAGNOSIS — B02.29 POSTHERPETIC NEURALGIA: Primary | ICD-10-CM

## 2018-09-26 PROCEDURE — G0463 HOSPITAL OUTPT CLINIC VISIT: HCPCS

## 2018-09-26 PROCEDURE — 99214 OFFICE O/P EST MOD 30 MIN: CPT | Performed by: FAMILY MEDICINE

## 2018-09-26 RX ORDER — GABAPENTIN 300 MG/1
300 CAPSULE ORAL 3 TIMES DAILY
Qty: 90 CAPSULE | Refills: 3 | Status: SHIPPED | OUTPATIENT
Start: 2018-09-26 | End: 2018-10-29

## 2018-09-26 RX ORDER — HYDROCODONE BITARTRATE AND ACETAMINOPHEN 5; 325 MG/1; MG/1
1 TABLET ORAL EVERY 6 HOURS PRN
Qty: 30 TABLET | Refills: 0 | Status: SHIPPED | OUTPATIENT
Start: 2018-09-26 | End: 2018-10-09

## 2018-09-26 ASSESSMENT — PAIN SCALES - GENERAL: PAINLEVEL: MILD PAIN (2)

## 2018-09-26 ASSESSMENT — ANXIETY QUESTIONNAIRES
GAD7 TOTAL SCORE: 0
2. NOT BEING ABLE TO STOP OR CONTROL WORRYING: NOT AT ALL
6. BECOMING EASILY ANNOYED OR IRRITABLE: NOT AT ALL
1. FEELING NERVOUS, ANXIOUS, OR ON EDGE: NOT AT ALL
7. FEELING AFRAID AS IF SOMETHING AWFUL MIGHT HAPPEN: NOT AT ALL
5. BEING SO RESTLESS THAT IT IS HARD TO SIT STILL: NOT AT ALL
3. WORRYING TOO MUCH ABOUT DIFFERENT THINGS: NOT AT ALL

## 2018-09-26 ASSESSMENT — PATIENT HEALTH QUESTIONNAIRE - PHQ9: 5. POOR APPETITE OR OVEREATING: NOT AT ALL

## 2018-09-26 NOTE — MR AVS SNAPSHOT
After Visit Summary   9/26/2018    Rm Duarte    MRN: [id number]           Patient Information     Date Of Birth          1940        Visit Information        Provider Department      9/26/2018 9:00 AM Dominguez Maradiaga MD [facility]        Today's Diagnoses     Postherpetic neuralgia    -  1    Rash and nonspecific skin eruption        Facial pain          Care Instructions    F/u with ongoing concerns.             Follow-ups after your visit        Your next 10 appointments already scheduled     Sep 28, 2018 10:45 AM CDT   Anticoagulation Visit with HC ANTI COAGULATION   [facility]    [address]  [facility] [address]   [phone]            Sep 28, 2018 10:45 AM CDT   LAB with NA LAB   [facility]    [address]  [facility] [address]   [phone]            Oct 16, 2018  1:30 PM CDT   (Arrive by 1:15 PM)   New Visit with DANIKA Santoyo MD   [facility] )    [address]  [facility] [id number] [phone]              Who to contact     If you have questions or need follow up information about today's clinic visit or your schedule please contact [facility] directly at [phone].  Normal or non-critical lab and imaging results will be communicated to you by MyChart, letter or phone within 4 business days after the clinic has received the results. If you do not hear from us within 7 days, please contact the clinic through MyChart or phone. If you have a critical or abnormal lab result, we will notify you by phone as soon as possible.  Submit refill requests through [employer] or call your pharmacy and they will forward the refill request to us. Please allow 3 business days for your refill to be completed.          Additional Information About Your Visit    "     Care EveryWhere ID     This is your Care EveryWhere ID. This could be used by other organizations to access your [place] medical records  [id number]        Your Vitals Were     Pulse Temperature Height Pulse Oximetry BMI (Body Mass Index)       115 98  F (36.7  C) 5' 11\" (1.803 m) 95% 24.41 kg/m2        Blood Pressure from Last 3 Encounters:   09/26/18 118/78   08/22/18 100/62   07/31/18 92/44    Weight from Last 3 Encounters:   09/26/18 175 lb (79.4 kg)   08/22/18 178 lb (80.7 kg)   07/31/18 178 lb (80.7 kg)              Today, you had the following     No orders found for display         Today's Medication Changes          These changes are accurate as of 9/26/18 12:41 PM.  If you have any questions, ask your nurse or doctor.               These medicines have changed or have updated prescriptions.        Dose/Directions    gabapentin 300 MG capsule   Commonly known as:  NEURONTIN   This may have changed:    - medication strength  - how much to take   Used for:  Facial pain, Rash and nonspecific skin eruption   Changed by:  Dominguez Maradiaga MD        Dose:  300 mg   Take 1 capsule (300 mg) by mouth 3 times daily   Quantity:  90 capsule   Refills:  3            Where to get your medicines      These medications were sent to [id number] - [place], [place] - [id number], [place] [address]     Phone:  [phone]     gabapentin 300 MG capsule         Some of these will need a paper prescription and others can be bought over the counter.  Ask your nurse if you have questions.     Bring a paper prescription for each of these medications     HYDROcodone-acetaminophen 5-325 MG per tablet               Information about OPIOIDS     PRESCRIPTION OPIOIDS: WHAT YOU NEED TO KNOW   We gave you an opioid (narcotic) pain medicine. It is important to manage your pain, but opioids are not always the best choice. You should first try all the other options your care team gave you. Take this medicine " for as short a time (and as few doses) as possible.    Some activities can increase your pain, such as bandage changes or therapy sessions. It may help to take your pain medicine 30 to 60 minutes before these activities. Reduce your stress by getting enough sleep, working on hobbies you enjoy and practicing relaxation or meditation. Talk to your care team about ways to manage your pain beyond prescription opioids.    These medicines have risks:    DO NOT drive when on new or higher doses of pain medicine. These medicines can affect your alertness and reaction times, and you could be arrested for driving under the influence (DUI). If you need to use opioids long-term, talk to your care team about driving.    DO NOT operate heavy machinery    DO NOT do any other dangerous activities while taking these medicines.    DO NOT drink any alcohol while taking these medicines.     If the opioid prescribed includes acetaminophen, DO NOT take with any other medicines that contain acetaminophen. Read all labels carefully. Look for the word  acetaminophen  or  Tylenol.  Ask your pharmacist if you have questions or are unsure.    You can get addicted to pain medicines, especially if you have a history of addiction (chemical, alcohol or substance dependence). Talk to your care team about ways to reduce this risk.    All opioids tend to cause constipation. Drink plenty of water and eat foods that have a lot of fiber, such as fruits, vegetables, prune juice, apple juice and high-fiber cereal. Take a laxative (Miralax, milk of magnesia, Colace, Senna) if you don t move your bowels at least every other day. Other side effects include upset stomach, sleepiness, dizziness, throwing up, tolerance (needing more of the medicine to have the same effect), physical dependence and slowed breathing.    Store your pills in a secure place, locked if possible. We will not replace any lost or stolen medicine. If you don t finish your medicine,  please throw away (dispose) as directed by your pharmacist. The [place] Pollution [employer] has more information about safe disposal: https://www.pca.state.[place]/living-green/managing-unwanted-medications         Primary Care Provider Office Phone # Fax #    Dominguez Maradiaga [phone]       [address] [place] [address]        Goals        General    Improve chronic symptoms (pt-stated)     Notes - Note created  3/27/2018  2:18 PM by Pipkin, Lauren N, RADHA    Continue to monitor COPD symptoms and report signs of exacerbation to         Equal Access to Services     [facility]: Hadii bette ku hadasho Soomaali, waaxda luqadaha, qaybta kaalmada cordellyamaryana, karina shell . So [facility] [phone].    ATENCIÓN: Si habla español, tiene a dhillon disposición servicios gratuitos de asistencia lingüística. [facility] [phone].    We comply with applicable federal civil rights laws and [place] laws. We do not discriminate on the basis of race, color, national origin, age, disability, sex, sexual orientation, or gender identity.            Thank you!     Thank you for choosing [facility]  for your care. Our goal is always to provide you with excellent care. Hearing back from our patients is one way we can continue to improve our services. Please take a few minutes to complete the written survey that you may receive in the mail after your visit with us. Thank you!             Your Updated Medication List - Protect others around you: Learn how to safely use, store and throw away your medicines at www.disposemymeds.org.          This list is accurate as of 9/26/18 12:41 PM.  Always use your most recent med list.                   Brand Name Dispense Instructions for use Diagnosis    ASPIRIN LOW DOSE 81 MG tablet   Generic drug:  aspirin      Take 1 tablet by mouth daily.        budesonide 0.25 MG/2ML neb solution    PULMICORT     Take 0.25 mg by  nebulization 2 times daily        CENTRUM SILVER per tablet      Take 1 tablet by mouth daily.        cilostazol 100 MG tablet    PLETAL    180 tablet    TAKE ONE TABLET BY MOUTH TWICE DAILY    PVD (peripheral vascular disease) (H)       * COMBIVENT RESPIMAT  MCG/ACT inhaler   Generic drug:  Ipratropium-Albuterol     12 g    INHALE ONE PUFF BY MOUTH 4 TIMES DAILY. MAX  SIX  DOSES  PER  DAY    Chronic obstructive pulmonary disease, unspecified COPD type (H)       * ipratropium - albuterol 0.5 mg/2.5 mg/3 mL 0.5-2.5 (3) MG/3ML neb solution    DUONEB    360 mL    USE ONE AMPULE IN NEBULIZER EVERY 6 HOURS AS NEEDED FOR SHORTNESS OF BREATH/DYSPNEA  OR  WHEEZING    COPD (chronic obstructive pulmonary disease) (H)       DALIRESP 500 MCG Tabs tablet   Generic drug:  roflumilast      Take 500 mcg by mouth daily        gabapentin 300 MG capsule    NEURONTIN    90 capsule    Take 1 capsule (300 mg) by mouth 3 times daily    Facial pain, Rash and nonspecific skin eruption       HYDROcodone-acetaminophen 5-325 MG per tablet    NORCO    30 tablet    Take 1 tablet by mouth every 6 hours as needed for severe pain    Rash and nonspecific skin eruption       IMODIUM A-D 2 MG tablet   Generic drug:  loperamide      Take 2 mg by mouth 4 times daily as needed. Take 2 tablet by oral route after 1st loose stool and 1 tablet after each subsequent bowel movement; do not exceed 16 mg in 24 hrs. As needed.        moxifloxacin 0.5 % ophthalmic solution    VIGAMOX     Place 1 drop Into the left eye 4 times daily        order for DME     1 each    Equipment being ordered: shower chair    End stage COPD (H)       order for DME     1 Units    Equipment being ordered: Oxygen 2 L per nasal canula continuously, re-certify for 1 year   FAX TO [facility]    COPD exacerbation (H)       PERFOROMIST 20 MCG/2ML neb solution   Generic drug:  formoterol     360 mL    USE ONE VIAL IN NEBULIZER EVERY 12 HOURS    Other emphysema (H)       ROBITUSSIN  COUGH+CHEST EDVIN DM 5-100 MG/5ML Liqd   Generic drug:  Dextromethorphan-Guaifenesin      Take 20 mLs by mouth every 4 hours as needed        simvastatin 10 MG tablet    ZOCOR    90 tablet    TAKE ONE TABLET BY MOUTH AT BEDTIME    PVD (peripheral vascular disease) (H)       sodium chloride 0.65 % nasal spray    OCEAN    1 Bottle    Spray 1 spray into both nostrils every hour as needed for congestion    COPD exacerbation (H)       SPIRIVA RESPIMAT 2.5 MCG/ACT inhalation aerosol   Generic drug:  tiotropium           tamsulosin 0.4 MG capsule    FLOMAX    30 capsule    Take 1 capsule (0.4 mg) by mouth daily    BPH (benign prostatic hyperplasia)       warfarin 2.5 MG tablet    COUMADIN    115 tablet    5 mg x five days/week and 2.5 mg x two days/week.  Or as directed by the protime clinic.    Long term current use of anticoagulant therapy       * Notice:  This list has 2 medication(s) that are the same as other medications prescribed for you. Read the directions carefully, and ask your doctor or other care provider to review them with you.

## 2018-09-26 NOTE — NURSING NOTE
"Chief Complaint   Patient presents with     Rash       Initial /78  Pulse 115  Temp 98  F (36.7  C)  Ht 5' 11\" (1.803 m)  Wt 175 lb (79.4 kg)  SpO2 95%  BMI 24.41 kg/m2 Estimated body mass index is 24.41 kg/(m^2) as calculated from the following:    Height as of this encounter: 5' 11\" (1.803 m).    Weight as of this encounter: 175 lb (79.4 kg).  Medication Reconciliation: complete    Libby Junior LPN  "

## 2018-09-27 ASSESSMENT — ANXIETY QUESTIONNAIRES: GAD7 TOTAL SCORE: 0

## 2018-09-27 ASSESSMENT — PATIENT HEALTH QUESTIONNAIRE - PHQ9: SUM OF ALL RESPONSES TO PHQ QUESTIONS 1-9: 4

## 2018-09-28 ENCOUNTER — ANTICOAGULATION THERAPY VISIT (OUTPATIENT)
Dept: ANTICOAGULATION | Facility: OTHER | Age: 78
End: 2018-09-28
Attending: FAMILY MEDICINE
Payer: COMMERCIAL

## 2018-09-28 DIAGNOSIS — I73.9 PVD (PERIPHERAL VASCULAR DISEASE) (H): ICD-10-CM

## 2018-09-28 DIAGNOSIS — Z79.01 LONG-TERM (CURRENT) USE OF ANTICOAGULANTS: ICD-10-CM

## 2018-09-28 DIAGNOSIS — I48.20 CHRONIC ATRIAL FIBRILLATION (H): ICD-10-CM

## 2018-09-28 LAB — INR BLD: 1.6 (ref 0.86–1.14)

## 2018-09-28 PROCEDURE — 85610 PROTHROMBIN TIME: CPT | Mod: QW,ZL | Performed by: FAMILY MEDICINE

## 2018-09-28 PROCEDURE — 36416 COLLJ CAPILLARY BLOOD SPEC: CPT | Mod: ZL | Performed by: FAMILY MEDICINE

## 2018-09-28 NOTE — PROGRESS NOTES
ANTICOAGULATION FOLLOW-UP CLINIC VISIT    Patient Name:  Rm Duarte  Date:  9/28/2018  Contact Type:  Telephone    SUBJECTIVE:     Patient Findings     Positives Change in medications    Comments INR done by lab. Call placed to patient and message left to call warfarin clinic. Call back from patient. We discussed INR result, warfarin dosing and INR recheck date. He verbalized understanding and has no questions. He states his gabapentin was increased to 300mg  TID now.            OBJECTIVE    INR Point of Care   Date Value Ref Range Status   09/28/2018 1.6 (H) 0.86 - 1.14 Final     Comment:     This test is intended for monitoring Coumadin therapy.  Results are not   accurate in patients with prolonged INR due to factor deficiency.         ASSESSMENT / PLAN  INR assessment SUB dose decreased for high INR a few days ago   Recheck INR In: 1 WEEK    INR Location Clinic      Anticoagulation Summary as of 9/28/2018     INR goal 2.0-3.0   Today's INR 1.6!   Warfarin maintenance plan 5 mg (2.5 mg x 2) on Mon, Wed, Fri; 2.5 mg (2.5 mg x 1) all other days   Full warfarin instructions 9/29: 3.75 mg; 9/30: 3.75 mg; 10/1: 3.75 mg; 10/2: 3.75 mg; 10/3: 1.25 mg; 10/4: 3.75 mg; Otherwise 5 mg on Mon, Wed, Fri; 2.5 mg all other days   Weekly warfarin total 25 mg   Plan last modified Zena Haas RN (7/26/2018)   Next INR check 10/5/2018   Priority INR   Target end date Indefinite    Indications   Chronic atrial fibrillation (H) [I48.2]  PVD (peripheral vascular disease) (H) [I73.9]  Long-term (current) use of anticoagulants [Z79.01] [Z79.01]         Anticoagulation Episode Summary     INR check location     Preferred lab     Send INR reminders to  ANTICOAG POOL    Comments       Anticoagulation Care Providers     Provider Role Specialty Phone number    Dominguez Maradiaga MD [facility] [phone]            See the Encounter Report to view Anticoagulation Flowsheet and Dosing Calendar (Go to Encounters  tab in chart review, and find the Anticoagulation Therapy Visit)        Zena Haas RN

## 2018-09-28 NOTE — MR AVS SNAPSHOT
Rm Duarte   9/28/2018 10:45 AM   Anticoagulation Therapy Visit    Description:  77 year old male   Provider:   ANTI COAGULATION   Department:  Hc Anti Coagulation           INR as of 9/28/2018     Today's INR 1.6!      Anticoagulation Summary as of 9/28/2018     INR goal 2.0-3.0   Today's INR 1.6!   Full warfarin instructions 9/29: 3.75 mg; 9/30: 3.75 mg; 10/1: 3.75 mg; 10/2: 3.75 mg; 10/3: 1.25 mg; 10/4: 3.75 mg; Otherwise 5 mg on Mon, Wed, Fri; 2.5 mg all other days   Next INR check 10/5/2018    Indications   Chronic atrial fibrillation (H) [I48.2]  PVD (peripheral vascular disease) (H) [I73.9]  Long-term (current) use of anticoagulants [Z79.01] [Z79.01]         Your next Anticoagulation Clinic appointment(s)     Oct 05, 2018 10:00 AM CDT   Anticoagulation Visit with  ANTI COAGULATION   [facility] ([facility] )    [address] [facility] [address]   [phone]              September 2018 Details    Sun Mon Tue Wed Thu Fri Sat           1                 2               3               4               5               6               7               8                 9               10               11               12               13               14               15                 16               17               18               19               20               21               22                 23               24               25               26               27               28      5 mg   See details      29      3.75 mg           30      3.75 mg                Date Details   09/28 This INR check               How to take your warfarin dose     To take:  3.75 mg Take 1.5 of the 2.5 mg tablets.    To take:  5 mg Take 2 of the 2.5 mg tablets.           October 2018 Details    Sun Mon Tue Wed Thu Fri Sat      1      3.75 mg         2      3.75 mg         3      1.25 mg         4      3.75 mg         5            6                 7                8               9               10               11               12               13                 14               15               16               17               18               19               20                 21               22               23               24               25               26               27                 28               29               30               31                   Date Details   No additional details    Date of next INR:  10/5/2018         How to take your warfarin dose     To take:  1.25 mg Take 0.5 of a 2.5 mg tablet.    To take:  3.75 mg Take 1.5 of the 2.5 mg tablets.    To take:  5 mg Take 2 of the 2.5 mg tablets.

## 2018-10-05 ENCOUNTER — ANTICOAGULATION THERAPY VISIT (OUTPATIENT)
Dept: ANTICOAGULATION | Facility: OTHER | Age: 78
End: 2018-10-05
Attending: FAMILY MEDICINE
Payer: COMMERCIAL

## 2018-10-05 DIAGNOSIS — I73.9 PVD (PERIPHERAL VASCULAR DISEASE) (H): ICD-10-CM

## 2018-10-05 DIAGNOSIS — Z79.01 LONG TERM CURRENT USE OF ANTICOAGULANT THERAPY: ICD-10-CM

## 2018-10-05 DIAGNOSIS — I48.20 CHRONIC ATRIAL FIBRILLATION (H): ICD-10-CM

## 2018-10-05 LAB — INR BLD: 2 (ref 0.86–1.14)

## 2018-10-05 PROCEDURE — 36416 COLLJ CAPILLARY BLOOD SPEC: CPT | Mod: ZL | Performed by: FAMILY MEDICINE

## 2018-10-05 PROCEDURE — 85610 PROTHROMBIN TIME: CPT | Mod: QW,ZL | Performed by: FAMILY MEDICINE

## 2018-10-05 NOTE — MR AVS SNAPSHOT
Rm Duarte   10/5/2018 10:00 AM   Anticoagulation Therapy Visit    Description:  77 year old male   Provider:   ANTI COAGULATION   Department:  Hc Anti Coagulation           INR as of 10/5/2018     Today's INR 2.0      Anticoagulation Summary as of 10/5/2018     INR goal 2.0-3.0   Today's INR 2.0   Full warfarin instructions 1.25 mg on Wed; 3.75 mg all other days   Next INR check 10/17/2018    Indications   Chronic atrial fibrillation (H) [I48.2]  PVD (peripheral vascular disease) (H) [I73.9]  Long-term (current) use of anticoagulants [Z79.01] [Z79.01]         Your next Anticoagulation Clinic appointment(s)     Oct 17, 2018 10:00 AM CDT   Anticoagulation Visit with  ANTI COAGULATION   [facility] [place] ([facility] )    [address]  [place] [address]   [phone]              October 2018 Details    Sun Mon Tue Wed Thu Fri Sat      1               2               3               4               5      3.75 mg   See details      6      3.75 mg           7      3.75 mg         8      3.75 mg         9      3.75 mg         10      1.25 mg         11      3.75 mg         12      3.75 mg         13      3.75 mg           14      3.75 mg         15      3.75 mg         16      3.75 mg         17            18               19               20                 21               22               23               24               25               26               27                 28               29               30               31                   Date Details   10/05 This INR check       Date of next INR:  10/17/2018         How to take your warfarin dose     To take:  1.25 mg Take 0.5 of a 2.5 mg tablet.    To take:  3.75 mg Take 1.5 of the 2.5 mg tablets.

## 2018-10-05 NOTE — PROGRESS NOTES
ANTICOAGULATION FOLLOW-UP CLINIC VISIT    Patient Name:  Rm Duarte  Date:  10/5/2018  Contact Type:  Telephone/ message left on home phone voicemail    SUBJECTIVE:     Patient Findings     Positives No Problem Findings    Comments INR done by lab. Call placed to patient and message left re: INR result, warfarin dosing and INR recheck date. He is to call warfarin clinic if he has any bleeding/bruising, changes in diet/meds/activity or questions.           OBJECTIVE    INR Point of Care   Date Value Ref Range Status   10/05/2018 2.0 (H) 0.86 - 1.14 Final     Comment:     This test is intended for monitoring Coumadin therapy.  Results are not   accurate in patients with prolonged INR due to factor deficiency.         ASSESSMENT / PLAN  INR assessment THER    Recheck INR In: 10 DAYS    INR Location Clinic      Anticoagulation Summary as of 10/5/2018     INR goal 2.0-3.0   Today's INR 2.0   Warfarin maintenance plan 1.25 mg (2.5 mg x 0.5) on Wed; 3.75 mg (2.5 mg x 1.5) all other days   Full warfarin instructions 1.25 mg on Wed; 3.75 mg all other days   Weekly warfarin total 23.75 mg   Plan last modified Zena Haas RN (10/5/2018)   Next INR check 10/17/2018   Priority INR   Target end date Indefinite    Indications   Chronic atrial fibrillation (H) [I48.2]  PVD (peripheral vascular disease) (H) [I73.9]  Long-term (current) use of anticoagulants [Z79.01] [Z79.01]         Anticoagulation Episode Summary     INR check location     Preferred lab     Send INR reminders to  ANTICOAG POOL    Comments       Anticoagulation Care Providers     Provider Role Specialty Phone number    Dominguez Maradiaga MD [facility] [phone]            See the Encounter Report to view Anticoagulation Flowsheet and Dosing Calendar (Go to Encounters tab in chart review, and find the Anticoagulation Therapy Visit)        Zena Haas, RN

## 2018-10-09 DIAGNOSIS — R21 RASH AND NONSPECIFIC SKIN ERUPTION: ICD-10-CM

## 2018-10-09 NOTE — TELEPHONE ENCOUNTER
HYDROcodone-acetaminophen (NORCO) 5-325 MG per tablet    Last Written Prescription Date:  9/26/18  Last Fill Quantity: 30,   # refills: 0  Last Office Visit: 9/26/18  Future Office visit:       Routing refill request to provider for review/approval because:  Drug not on the [facility], [facility] or [facility] refill protocol or controlled substance

## 2018-10-11 RX ORDER — HYDROCODONE BITARTRATE AND ACETAMINOPHEN 5; 325 MG/1; MG/1
1 TABLET ORAL EVERY 6 HOURS PRN
Qty: 30 TABLET | Refills: 0 | Status: SHIPPED | OUTPATIENT
Start: 2018-10-11 | End: 2018-10-22

## 2018-10-11 NOTE — TELEPHONE ENCOUNTER
Pt stops in to check on Rx. Dr Maradiaga is only in the office this am. Refill routed to Dr Maradiaga.

## 2018-10-11 NOTE — TELEPHONE ENCOUNTER
Pt states he is going to wean himself off. He has an appt with Dr Maraidaga next week and to cancel his Rx.

## 2018-10-17 ENCOUNTER — TELEPHONE (OUTPATIENT)
Dept: FAMILY MEDICINE | Facility: OTHER | Age: 78
End: 2018-10-17

## 2018-10-17 NOTE — TELEPHONE ENCOUNTER
I called pt's sister Patt and she is concerned about him being alone and his medications.  She states that he is hallucinating and has mental confusion.  About 1 month ago he called Patt stating people were filming him driving and it will be on TV.  He called another time and accused Patt of stealing his money.  Yesterday he called her and said that he hasn't slept and was up all night playing cards with Patt and a couple other people.  She told him that she was in [place] and was not there all night playing cards and he did not seem to comprehend.  She states he stopped his norco on Sunday and she thinks the hallucinations are from this.  Please advise.

## 2018-10-17 NOTE — TELEPHONE ENCOUNTER
She has to advise him to go to the ER if he is hallucinating.  He may be having a w/d sx from the [place].   Hallucinations wouldn't normally be a part of that, and if it happened a month ago it certainly wasn't that.  I think he should be seen.  If he is not seen, I should see him next week.  I have to be able to tell him that she called and reported the hallucinations.  Thanks. . Me

## 2018-10-17 NOTE — TELEPHONE ENCOUNTER
8:53 AM    Reason for Call: Phone Call    Description: Sister Patt called wondering if Dr. Hiren pham call her about Abdalla's medications and his problems with shingles. Please call Patt    Was an appointment offered for this call?  No    Preferred method for responding to this message: [phone]    If we cannot reach you directly, may we leave a detailed response at the number you provided?   Yes      Zena Roldan

## 2018-10-19 ENCOUNTER — PATIENT OUTREACH (OUTPATIENT)
Dept: CARE COORDINATION | Facility: OTHER | Age: 78
End: 2018-10-19

## 2018-10-19 ASSESSMENT — ACTIVITIES OF DAILY LIVING (ADL): DEPENDENT_IADLS:: INDEPENDENT

## 2018-10-22 ENCOUNTER — OFFICE VISIT (OUTPATIENT)
Dept: FAMILY MEDICINE | Facility: OTHER | Age: 78
End: 2018-10-22
Attending: FAMILY MEDICINE
Payer: COMMERCIAL

## 2018-10-22 VITALS
HEIGHT: 71 IN | RESPIRATION RATE: 18 BRPM | WEIGHT: 178 LBS | HEART RATE: 97 BPM | DIASTOLIC BLOOD PRESSURE: 70 MMHG | OXYGEN SATURATION: 90 % | TEMPERATURE: 95.7 F | BODY MASS INDEX: 24.92 KG/M2 | SYSTOLIC BLOOD PRESSURE: 130 MMHG

## 2018-10-22 DIAGNOSIS — I48.20 CHRONIC ATRIAL FIBRILLATION (H): ICD-10-CM

## 2018-10-22 DIAGNOSIS — J44.1 OBSTRUCTIVE CHRONIC BRONCHITIS WITH EXACERBATION (H): ICD-10-CM

## 2018-10-22 DIAGNOSIS — B02.29 POST HERPETIC NEURALGIA: Primary | ICD-10-CM

## 2018-10-22 PROCEDURE — 99214 OFFICE O/P EST MOD 30 MIN: CPT | Performed by: FAMILY MEDICINE

## 2018-10-22 PROCEDURE — G0463 HOSPITAL OUTPT CLINIC VISIT: HCPCS

## 2018-10-22 PROCEDURE — 94760 N-INVAS EAR/PLS OXIMETRY 1: CPT

## 2018-10-22 RX ORDER — AZITHROMYCIN 250 MG/1
TABLET, FILM COATED ORAL
Qty: 6 TABLET | Refills: 0 | Status: SHIPPED | OUTPATIENT
Start: 2018-10-22 | End: 2018-10-29

## 2018-10-22 RX ORDER — PREDNISONE 20 MG/1
TABLET ORAL
Qty: 20 TABLET | Refills: 0 | Status: SHIPPED | OUTPATIENT
Start: 2018-10-22 | End: 2018-12-17

## 2018-10-22 RX ORDER — PREGABALIN 50 MG/1
50 CAPSULE ORAL 3 TIMES DAILY
Qty: 90 CAPSULE | Refills: 1 | Status: SHIPPED | OUTPATIENT
Start: 2018-10-22 | End: 2018-10-29

## 2018-10-22 ASSESSMENT — PAIN SCALES - GENERAL: PAINLEVEL: EXTREME PAIN (8)

## 2018-10-22 NOTE — MR AVS SNAPSHOT
"              After Visit Summary   10/22/2018    Rm Duarte    MRN: [id number]           Patient Information     Date Of Birth          1940        Visit Information        Provider Department      10/22/2018 11:00 AM Dominguez Maradiaga MD [facility]        Today's Diagnoses     Post herpetic neuralgia    -  1    Obstructive chronic bronchitis with exacerbation (H)        Chronic atrial fibrillation (H)          Care Instructions    F/u with ongoing concerns.             Follow-ups after your visit        Who to contact     If you have questions or need follow up information about today's clinic visit or your schedule please contact [facility] directly at [phone].  Normal or non-critical lab and imaging results will be communicated to you by MyChart, letter or phone within 4 business days after the clinic has received the results. If you do not hear from us within 7 days, please contact the clinic through MyChart or phone. If you have a critical or abnormal lab result, we will notify you by phone as soon as possible.  Submit refill requests through [employer] or call your pharmacy and they will forward the refill request to us. Please allow 3 business days for your refill to be completed.          Additional Information About Your Visit        Care EveryWhere ID     This is your Care EveryWhere ID. This could be used by other organizations to access your [facility] medical records  [id number]        Your Vitals Were     Pulse Temperature Respirations Height Pulse Oximetry BMI (Body Mass Index)    97 95.7  F (35.4  C) (Tympanic) 18 5' 11\" (1.803 m) 90% 24.83 kg/m2       Blood Pressure from Last 3 Encounters:   10/22/18 130/70   09/26/18 118/78   08/22/18 100/62    Weight from Last 3 Encounters:   10/22/18 178 lb (80.7 kg)   09/26/18 175 lb (79.4 kg)   08/22/18 178 lb (80.7 kg)              Today, you had the following     No orders found for display       "   Today's Medication Changes          These changes are accurate as of 10/22/18 12:48 PM.  If you have any questions, ask your nurse or doctor.               Start taking these medicines.        Dose/Directions    azithromycin 250 MG tablet   Commonly known as:  ZITHROMAX   Used for:  Obstructive chronic bronchitis with exacerbation (H)   Started by:  Dominguez Maradiaga MD        Two tablets first day, then one tablet daily for four days.   Quantity:  6 tablet   Refills:  0       predniSONE 20 MG tablet   Commonly known as:  DELTASONE   Used for:  Obstructive chronic bronchitis with exacerbation (H)   Started by:  Dominguez Maradiaga MD        Take 3 tabs (60 mg) by mouth daily x 3 days, 2 tabs (40 mg) daily x 3 days, 1 tab (20 mg) daily x 3 days, then 1/2 tab (10 mg) x 3 days.   Quantity:  20 tablet   Refills:  0       pregabalin 50 MG capsule   Commonly known as:  LYRICA   Used for:  Post herpetic neuralgia   Started by:  Dominguez Maradiaga MD        Dose:  50 mg   Take 1 capsule (50 mg) by mouth 3 times daily   Quantity:  90 capsule   Refills:  1         Stop taking these medicines if you haven't already. Please contact your care team if you have questions.     HYDROcodone-acetaminophen 5-325 MG per tablet   Commonly known as:  NORCO   Stopped by:  Dominguez Maradiaga MD                Where to get your medicines      These medications were sent to [facility] [address] [place] [address] [facility] [address] [address] [facility] [address], [facility] [address]     Phone:  [phone]     azithromycin 250 MG tablet    predniSONE 20 MG tablet         Some of these will need a paper prescription and others can be bought over the counter.  Ask your nurse if you have questions.     Bring a paper prescription for each of these medications     pregabalin 50 MG capsule                Primary Care Provider Office Phone # Fax #    Dominguez Maradiaga [phone]       [address] [place] [address]        Goals        General     Improve chronic symptoms (pt-stated)     Notes - Note created  3/27/2018  2:18 PM by Pipkin, Lauren N, RN    Continue to monitor COPD symptoms and report signs of exacerbation to CC        Equal Access to Services     DARA SIMPSON : Hadii aad ku hadedwigejoss Thuymanuelali, waangelda luqjose, qacherylta kawes frank, karina sharp naidanie mccormack suleman rpado. So [facility] [phone].    ATENCIÓN: Si habla español, tiene a dhillon disposición servicios gratuitos de asistencia lingüística. Llame al [phone].    We comply with applicable federal civil rights laws and [place] laws. We do not discriminate on the basis of race, color, national origin, age, disability, sex, sexual orientation, or gender identity.            Thank you!     Thank you for choosing [facility]  for your care. Our goal is always to provide you with excellent care. Hearing back from our patients is one way we can continue to improve our services. Please take a few minutes to complete the written survey that you may receive in the mail after your visit with us. Thank you!             Your Updated Medication List - Protect others around you: Learn how to safely use, store and throw away your medicines at www.disposemymeds.org.          This list is accurate as of 10/22/18 12:48 PM.  Always use your most recent med list.                   Brand Name Dispense Instructions for use Diagnosis    ASPIRIN LOW DOSE 81 MG tablet   Generic drug:  aspirin      Take 1 tablet by mouth daily.        azithromycin 250 MG tablet    ZITHROMAX    6 tablet    Two tablets first day, then one tablet daily for four days.    Obstructive chronic bronchitis with exacerbation (H)       budesonide 0.25 MG/2ML neb solution    PULMICORT     Take 0.25 mg by nebulization 2 times daily        CENTRUM SILVER per tablet      Take 1 tablet by mouth daily.        cilostazol 100 MG tablet    PLETAL    180 tablet    TAKE ONE TABLET BY MOUTH TWICE DAILY    PVD (peripheral  vascular disease) (H)       * COMBIVENT RESPIMAT  MCG/ACT inhaler   Generic drug:  Ipratropium-Albuterol     12 g    INHALE ONE PUFF BY MOUTH 4 TIMES DAILY. MAX  SIX  DOSES  PER  DAY    Chronic obstructive pulmonary disease, unspecified COPD type (H)       * ipratropium - albuterol 0.5 mg/2.5 mg/3 mL 0.5-2.5 (3) MG/3ML neb solution    DUONEB    360 mL    USE ONE AMPULE IN NEBULIZER EVERY 6 HOURS AS NEEDED FOR SHORTNESS OF BREATH/DYSPNEA  OR  WHEEZING    COPD (chronic obstructive pulmonary disease) (H)       DALIRESP 500 MCG Tabs tablet   Generic drug:  roflumilast      Take 500 mcg by mouth daily        gabapentin 300 MG capsule    NEURONTIN    90 capsule    Take 1 capsule (300 mg) by mouth 3 times daily    Facial pain, Rash and nonspecific skin eruption       IMODIUM A-D 2 MG tablet   Generic drug:  loperamide      Take 2 mg by mouth 4 times daily as needed. Take 2 tablet by oral route after 1st loose stool and 1 tablet after each subsequent bowel movement; do not exceed 16 mg in 24 hrs. As needed.        moxifloxacin 0.5 % ophthalmic solution    VIGAMOX     Place 1 drop Into the left eye 4 times daily        order for DME     1 each    Equipment being ordered: shower chair    End stage COPD (H)       order for DME     1 Units    Equipment being ordered: Oxygen 2 L per nasal canula continuously, re-certify for 1 year   FAX TO [facility]    COPD exacerbation (H)       PERFOROMIST 20 MCG/2ML neb solution   Generic drug:  formoterol     360 mL    USE ONE VIAL IN NEBULIZER EVERY 12 HOURS    Other emphysema (H)       predniSONE 20 MG tablet    DELTASONE    20 tablet    Take 3 tabs (60 mg) by mouth daily x 3 days, 2 tabs (40 mg) daily x 3 days, 1 tab (20 mg) daily x 3 days, then 1/2 tab (10 mg) x 3 days.    Obstructive chronic bronchitis with exacerbation (H)       pregabalin 50 MG capsule    LYRICA    90 capsule    Take 1 capsule (50 mg) by mouth 3 times daily    Post herpetic neuralgia       ROBITUSSIN  COUGH+CHEST EDVIN DM 5-100 MG/5ML Liqd   Generic drug:  Dextromethorphan-Guaifenesin      Take 20 mLs by mouth every 4 hours as needed        simvastatin 10 MG tablet    ZOCOR    90 tablet    TAKE ONE TABLET BY MOUTH AT BEDTIME    PVD (peripheral vascular disease) (H)       sodium chloride 0.65 % nasal spray    OCEAN    1 Bottle    Spray 1 spray into both nostrils every hour as needed for congestion    COPD exacerbation (H)       SPIRIVA RESPIMAT 2.5 MCG/ACT inhalation aerosol   Generic drug:  tiotropium           tamsulosin 0.4 MG capsule    FLOMAX    90 capsule    TAKE ONE CAPSULE BY MOUTH ONCE DAILY    BPH (benign prostatic hyperplasia)       warfarin 2.5 MG tablet    COUMADIN    115 tablet    5 mg x five days/week and 2.5 mg x two days/week.  Or as directed by the protime clinic.    Long term current use of anticoagulant therapy       * Notice:  This list has 2 medication(s) that are the same as other medications prescribed for you. Read the directions carefully, and ask your doctor or other care provider to review them with you.

## 2018-10-22 NOTE — NURSING NOTE
"Chief Complaint   Patient presents with     Recheck Medication     went off the Norco about 9 days ago,it made him crazy       Initial /70  Pulse 97  Temp 95.7  F (35.4  C) (Tympanic)  Resp 18  Ht 5' 11\" (1.803 m)  Wt 178 lb (80.7 kg)  SpO2 90%  BMI 24.83 kg/m2 Estimated body mass index is 24.83 kg/(m^2) as calculated from the following:    Height as of this encounter: 5' 11\" (1.803 m).    Weight as of this encounter: 178 lb (80.7 kg).  Medication Reconciliation: complete    Yeni Zepeda LPN  "

## 2018-10-22 NOTE — PROGRESS NOTES
SUBJECTIVE:                                                    Rm Duarte is a 77 year old male who presents to clinic today for the following health issues:             Chronic Pain Follow-Up       Type / Location of Pain: eye pain, head ache from shingles  Analgesia/pain control:       Recent changes:  improved      Overall control: Tolerable with discomfort  Activity level/function:      Daily activities:  Able to do light housework, cooking    Work:  not applicable  Adverse effects:  No took himself off the pain medication , NORCO  Adherance    Taking medication as directed?  No: patient stopped the NORCO    Participating in other treatments: not applicable  Risk Factors:    Sleep:  Good    Mood/anxiety:  controlled    Recent family or social stressors:  none noted    Other aggravating factors: none  PHQ-9 SCORE 6/25/2018 6/26/2018 9/26/2018   Total Score - - -   Total Score 2 2 4     SEMAJ-7 SCORE 4/23/2018 6/26/2018 9/26/2018   Total Score 0 0 0     Encounter-Level CSA:     There are no encounter-level csa.          Problem list and histories reviewed & adjusted, as indicated.  Additional history: having a lot of pain.  Very fatigued.  He is having a exacerbation of copd and would like normal meds for same.      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  1991 were 4.31/6.20!!!!!!!??     ECHO:  TDS, EF~50%, abnl septum, o/w NORMAL  2011     PAD: CTA> occlusion of L mid SFA with reconstitution  2010     S/P colonoscopy: tubular adenoma & tics  2010    Repeat in ; Dr Graham     Tobacco Abuse Ongoing         Social History   Substance Use Topics     Smoking status: Former Smoker     Packs/day: 1.00     Years: 51.00     Types: Cigarettes     Start date: 1960     Quit date: 2011     Smokeless tobacco: Never Used      Comment: Tried to quit; Quit      Alcohol use 0.0 oz/week     0 Standard drinks or equivalent per week      Comment: Beer; rarely     Family History   Problem Relation Age of Onset     Other - See Comments Mother      AAA, cause of death     Other - See Comments Other 69     AAA, family hx     Chronic Obstructive Pulmonary Disease Brother       MI age 60's     Family History Negative Sister          Current Outpatient Prescriptions   Medication Sig Dispense Refill     aspirin (ASPIRIN LOW DOSE) 81 MG tablet Take 1 tablet by mouth daily.       azithromycin (ZITHROMAX) 250 MG tablet Two tablets first day, then one tablet daily for four days. 6 tablet 0     budesonide (PULMICORT) 0.25 MG/2ML neb solution Take 0.25 mg by nebulization 2 times daily       cilostazol (PLETAL) 100 MG tablet TAKE ONE TABLET BY MOUTH TWICE DAILY 180 tablet 0     COMBIVENT RESPIMAT  MCG/ACT inhaler INHALE ONE PUFF BY MOUTH 4 TIMES DAILY. MAX  SIX  DOSES  PER  DAY 12 g 5     Dextromethorphan-Guaifenesin (ROBITUSSIN COUGH+CHEST EDVIN DM) 5-100 MG/5ML LIQD Take 20 mLs by mouth every 4 hours as needed       gabapentin (NEURONTIN) 300 MG capsule Take 1 capsule (300 mg) by mouth 3 times daily 90 capsule 3     ipratropium - albuterol 0.5 mg/2.5 mg/3 mL (DUONEB) 0.5-2.5 (3) MG/3ML neb solution USE ONE AMPULE IN NEBULIZER EVERY 6 HOURS AS NEEDED FOR SHORTNESS OF BREATH/DYSPNEA  OR  WHEEZING 360 mL 10     loperamide (IMODIUM A-D) 2 MG tablet Take 2 mg by mouth 4 times daily as needed. Take 2  "tablet by oral route after 1st loose stool and 1 tablet after each subsequent bowel movement; do not exceed 16 mg in 24 hrs. As needed.       moxifloxacin (VIGAMOX) 0.5 % ophthalmic solution Place 1 drop Into the left eye 4 times daily  1     Multiple Vitamins-Minerals (CENTRUM SILVER) per tablet Take 1 tablet by mouth daily.       order for DME Equipment being ordered: Oxygen 2 L per nasal canula continuously, re-certify for 1 year      FAX TO [facility] 1 Units 11     order for DME Equipment being ordered: shower chair 1 each 0     PERFOROMIST 20 MCG/2ML neb solution USE ONE VIAL IN NEBULIZER EVERY 12 HOURS 360 mL 1     predniSONE (DELTASONE) 20 MG tablet Take 3 tabs (60 mg) by mouth daily x 3 days, 2 tabs (40 mg) daily x 3 days, 1 tab (20 mg) daily x 3 days, then 1/2 tab (10 mg) x 3 days. 20 tablet 0     pregabalin (LYRICA) 50 MG capsule Take 1 capsule (50 mg) by mouth 3 times daily 90 capsule 1     roflumilast (DALIRESP) 500 MCG TABS tablet Take 500 mcg by mouth daily       simvastatin (ZOCOR) 10 MG tablet TAKE ONE TABLET BY MOUTH AT BEDTIME 90 tablet 0     sodium chloride (OCEAN) 0.65 % nasal spray Spray 1 spray into both nostrils every hour as needed for congestion 1 Bottle 0     SPIRIVA RESPIMAT 2.5 MCG/ACT inhalation aerosol        tamsulosin (FLOMAX) 0.4 MG capsule TAKE ONE CAPSULE BY MOUTH ONCE DAILY 90 capsule 1     warfarin (COUMADIN) 2.5 MG tablet 5 mg x five days/week and 2.5 mg x two days/week.  Or as directed by the protime clinic.  115 tablet 3     No Known Allergies    ROS:  Constitutional, HEENT, cardiovascular, pulmonary, gi and gu systems are negative, except as otherwise noted.    OBJECTIVE:                                                    /70  Pulse 97  Temp 95.7  F (35.4  C) (Tympanic)  Resp 18  Ht 5' 11\" (1.803 m)  Wt 178 lb (80.7 kg)  SpO2 90%  BMI 24.83 kg/m2  Body mass index is 24.83 kg/(m^2).  GENERAL APPEARANCE: Alert, no acute distress  CV: regular rate and rhythm, no " murmur, rub or gallop  RESP: decreased breath sounds with tight exp wheeze bilaterally.   ABDOMEN: normal bowel sounds, soft, nontender, no hepatosplenomegaly or other masses  SKIN: no suspicious lesions or rashes to visualized skin  NEURO: Alert, oriented x 3, speech and mentation normal           ASSESSMENT/PLAN:                                                    1. Post herpetic neuralgia  Discussed at length, and I did a literature search.  He is tired from the neurontin I think.  It doesn't help sufficiently at modest dose.  Transition to lyrica by quick ween and quick titration up on the lyrica.  F/u based on clinical result and how he is doing.    - pregabalin (LYRICA) 50 MG capsule; Take 1 capsule (50 mg) by mouth 3 times daily  Dispense: 90 capsule; Refill: 1    2. Obstructive chronic bronchitis with exacerbation (H)  With a flare.  He has the oxygen.  On all the other meds.  Prednisone and azithro as per usual.  F/u with any worsening.    - predniSONE (DELTASONE) 20 MG tablet; Take 3 tabs (60 mg) by mouth daily x 3 days, 2 tabs (40 mg) daily x 3 days, 1 tab (20 mg) daily x 3 days, then 1/2 tab (10 mg) x 3 days.  Dispense: 20 tablet; Refill: 0  - azithromycin (ZITHROMAX) 250 MG tablet; Two tablets first day, then one tablet daily for four days.  Dispense: 6 tablet; Refill: 0    3. Chronic atrial fibrillation (H)  Will let coumadin clinic know of the meds.        25 minutes spent with patient over 50%in counseling about the nerve pain, meds, titration (I wrote it all down on a paper for him) and the copd as well.      Dominguez Maradiaga MD  [facility]

## 2018-10-23 ENCOUNTER — TELEPHONE (OUTPATIENT)
Dept: CARE COORDINATION | Facility: OTHER | Age: 78
End: 2018-10-23

## 2018-10-23 NOTE — TELEPHONE ENCOUNTER
Contacted client d/t referral for Complimentary Home Visit. He agree for visit tomorrow at 10:00 AM.

## 2018-10-24 ENCOUNTER — ANTICOAGULATION THERAPY VISIT (OUTPATIENT)
Dept: ANTICOAGULATION | Facility: OTHER | Age: 78
End: 2018-10-24
Attending: FAMILY MEDICINE
Payer: COMMERCIAL

## 2018-10-24 ENCOUNTER — DOCUMENTATION ONLY (OUTPATIENT)
Dept: CARE COORDINATION | Facility: OTHER | Age: 78
End: 2018-10-24

## 2018-10-24 DIAGNOSIS — I73.9 PVD (PERIPHERAL VASCULAR DISEASE) (H): ICD-10-CM

## 2018-10-24 DIAGNOSIS — I48.20 CHRONIC ATRIAL FIBRILLATION (H): ICD-10-CM

## 2018-10-24 DIAGNOSIS — Z79.01 LONG TERM CURRENT USE OF ANTICOAGULANT THERAPY: ICD-10-CM

## 2018-10-24 LAB — INR BLD: 3.8 (ref 0.86–1.14)

## 2018-10-24 PROCEDURE — 36416 COLLJ CAPILLARY BLOOD SPEC: CPT | Mod: ZL | Performed by: FAMILY MEDICINE

## 2018-10-24 PROCEDURE — 85610 PROTHROMBIN TIME: CPT | Mod: QW,ZL | Performed by: FAMILY MEDICINE

## 2018-10-24 NOTE — MR AVS SNAPSHOT
Rm Galee   10/24/2018 1:45 PM   Anticoagulation Therapy Visit    Description:  77 year old male   Provider:   ANTI COAGULATION   Department:  Hc Anti Coagulation           INR as of 10/24/2018     Today's INR 3.8!      Anticoagulation Summary as of 10/24/2018     INR goal 2.0-3.0   Today's INR 3.8!   Full warfarin instructions 10/24: Hold; 10/25: 2.5 mg; 10/26: 1.25 mg; 10/27: 2.5 mg; 10/28: 2.5 mg; Otherwise 1.25 mg on Wed; 3.75 mg all other days   Next INR check 10/29/2018    Indications   Chronic atrial fibrillation (H) [I48.2]  PVD (peripheral vascular disease) (H) [I73.9]  Long-term (current) use of anticoagulants [Z79.01] [Z79.01]         Your next Anticoagulation Clinic appointment(s)     Oct 29, 2018 10:30 AM CDT   Anticoagulation Visit with  ANTI COAGULATION   [facility] ([facility]    [address] [facility] [address]   [phone]              October 2018 Details    Sun Mon Tue Wed Thu Fri Sat      1               2               3               4               5               6                 7               8               9               10               11               12               13                 14               15               16               17               18               19               20                 21               22               23               24      Hold   See details      25      2.5 mg         26      1.25 mg         27      2.5 mg           28      2.5 mg         29            30               31                   Date Details   10/24 This INR check       Date of next INR:  10/29/2018         How to take your warfarin dose     To take:  1.25 mg Take 0.5 of a 2.5 mg tablet.    To take:  2.5 mg Take 1 of the 2.5 mg tablets.    To take:  3.75 mg Take 1.5 of the 2.5 mg tablets.    Hold Do not take your warfarin dose. See the Details table to the right for additional instructions.

## 2018-10-24 NOTE — PROGRESS NOTES
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  Reviewed and educated on Preventing Falls at Home booklet.   /60 Rt sitting, P 66 irregular, O2 sat 94% 2 LPM- lungs sounds- rhonchi scattered thoughout  Reviewed stairs and living areas with the following noted concerns clutter and throw-rugs- most home areas are very cluttered. Bathroom and front-room of house not as cluttered. Educated on keeping walk-ways clear and being very careful around O2 tubing, which he has large lengths of tubing scattered throughout home. He was not wearing O2 when I arrived. When doing walk-through of home, he was visibly dyspneic. He states he does not use his oxygen much but states he probably should. He did put O2 on during this visit. 2 LPM.   Reviewed bedroom with the following noted concerns clutter-- client does not sleep in bedroom. He sleeps on couch. He states that he leaves the TV on all night and this serves as his \"night-light\" to find his way to the bathroom in the night.   Reviewed bathroom with the following noted concerns grab bars, raised toilet seat, non-slip bath mat and non-slip rug --discussed grab bars in shower would increase safety in bathroom.   Reviewed kitchen with the following noted concerns clutter --not much clear  counter space in kitchen. " "Client states that he does not eat much and does not have much of an appetite at all. He states that he has lost a significant amount of weight in past three months. He reports he mainly eats ice cream and Negin's candy bars. He did have a negin's bar by his chair in living room.   Reviewed sitting, standing, walking with the following noted concerns dizzy/lightheadedness, cane/walker needs and inappropriate footwear educated on fall prevention. Changing positions slowly, using cane.       Medication reconciliation complete.-- Client has three spots in his home in which he keeps his medications. He does know well what medications he takes and the different locations are for different medication administration times. He has his new meds: Z-pack and prednisone and Lyrica by his chair in his living room. He does understand how to take these medications and reason he is taking them. Discussed if the thinks he would benefit by setting pills up in weekly container. He reports that he is comfortable taking them from bottles. Reports that coumadin clinic is aware that he started new meds and that he is going to call this afternoon to see if he should have INR checked soon. Denies s/s of bleeding.     Reviewed new and exisiting equipment of Has a cane in his home. He was not using it this date. Talked about using the cane in his home for stability. He did seem a little \"off-balance\" a couple times during visit. Stressed the importance of fall prevention several times during visit.  Client also has a shower stool that he still has in box in his living room. I offered to help him set it up, but he declined.   Client states he is unable to do outside work due to his mobility and breathing, but does say he has someone that does snow removal for him.     Needed Referrals include: Discussed at length, Life Alert system and thoughts that client would benefit greatly from having this in his home. Talked about the new doner " program in which he could qualify to have a Life Alert in home. He liked the thought of this as it would be added security. Will get in touch with Smita, Care Coordinator to make sure he qualifies. He reports he does not have any friends and family close in the area that check on him. Client reports that he is not homebound and that he drives himself to and from medical appointments. Will f/u with phone call in 1-2 days.

## 2018-10-24 NOTE — PROGRESS NOTES
ANTICOAGULATION FOLLOW-UP CLINIC VISIT    Patient Name:  Rm Duarte  Date:  10/24/2018  Contact Type   Telephone  message left on home phone voicemail    SUBJECTIVE:     Patient Findings     Positives Change in medications, Antibiotic use or infection    Comments Call from patient asking about when he was suppose to have INR done. I told him he was 1 week overdue. He states that he is taking prednisone. I requested he go to lab and have INR done today. INR done by lab. Per chart review, patient to take prednisone 60mg x 3 days (22,23,24), 40mg x3 days (25,26,27), 20mg x 3 days (28,29,30) and 10mg x 3 days (31, 11/1, 2). He is also taking a zpak for 5 days (22nd- 26).  Attempted to call patient and there was no answer. Did not leave message as he never listens to them and calls me requesting dosing. After many attempts at calling both his home phone and his cell phone I did leave a message on his home phone voicemail re: warfarin dosing and INR recheck date. He is to call warfarin clinic if he has any bleeding/bruising , changes in diet/meds/activity or questions.            OBJECTIVE    INR Point of Care   Date Value Ref Range Status   10/24/2018 3.8 (H) 0.86 - 1.14 Final     Comment:     This test is intended for monitoring Coumadin therapy.  Results are not   accurate in patients with prolonged INR due to factor deficiency.         ASSESSMENT / PLAN  No question data found.  Anticoagulation Summary as of 10/24/2018     INR goal 2.0-3.0   Today's INR 3.8!   Warfarin maintenance plan 1.25 mg (2.5 mg x 0.5) on Wed; 3.75 mg (2.5 mg x 1.5) all other days   Full warfarin instructions 10/24: Hold; 10/25: 2.5 mg; 10/26: 1.25 mg; 10/27: 2.5 mg; 10/28: 2.5 mg; Otherwise 1.25 mg on Wed; 3.75 mg all other days   Weekly warfarin total 23.75 mg   Plan last modified Zena Haas RN (10/5/2018)   Next INR check 10/29/2018   Priority INR   Target end date Indefinite    Indications   Chronic atrial fibrillation (H)  [I48.2]  PVD (peripheral vascular disease) (H) [I73.9]  Long-term (current) use of anticoagulants [Z79.01] [Z79.01]         Anticoagulation Episode Summary     INR check location     Preferred lab     Send INR reminders to  ANTICOAG POOL    Comments       Anticoagulation Care Providers     Provider Role Specialty Phone number    Dominguez Maradiaga MD [facility] [phone]            See the Encounter Report to view Anticoagulation Flowsheet and Dosing Calendar (Go to Encounters tab in chart review, and find the Anticoagulation Therapy Visit)        Zena Haas RN

## 2018-10-25 ENCOUNTER — ALLIED HEALTH/NURSE VISIT (OUTPATIENT)
Dept: FAMILY MEDICINE | Facility: OTHER | Age: 78
End: 2018-10-25
Attending: FAMILY MEDICINE
Payer: COMMERCIAL

## 2018-10-25 DIAGNOSIS — I73.9 PVD (PERIPHERAL VASCULAR DISEASE) (H): Primary | ICD-10-CM

## 2018-10-25 DIAGNOSIS — I73.9 PVD (PERIPHERAL VASCULAR DISEASE) (H): ICD-10-CM

## 2018-10-25 RX ORDER — CILOSTAZOL 100 MG/1
TABLET ORAL
Qty: 180 TABLET | Refills: 0 | Status: SHIPPED | OUTPATIENT
Start: 2018-10-25 | End: 2019-01-31

## 2018-10-25 NOTE — MR AVS SNAPSHOT
After Visit Summary   10/25/2018    Rm Duarte    MRN: [id number]           Patient Information     Date Of Birth          1940        Visit Information        Provider Department      10/25/2018 2:00 PM NA FP NURSE [facility]        Today's Diagnoses     PVD (peripheral vascular disease) (H)    -  1       Follow-ups after your visit        Your next 10 appointments already scheduled     Oct 25, 2018  2:00 PM CDT   (Arrive by 1:45 PM)   Nurse Only with NA FP NURSE   [facility] ([facility] )    [address]  [facility] [address]   [phone]            Oct 29, 2018 10:30 AM CDT   Anticoagulation Visit with HC ANTI COAGULATION   [facility]    [address]  [facility] [address]   [phone]            Oct 29, 2018 10:30 AM CDT   LAB with NA LAB   [facility] ([facility] )    [address]  [facility] [address]   [phone]              Who to contact     If you have questions or need follow up information about today's clinic visit or your schedule please contact [facility] directly at [phone].  Normal or non-critical lab and imaging results will be communicated to you by MyChart, letter or phone within 4 business days after the clinic has received the results. If you do not hear from us within 7 days, please contact the clinic through MyChart or phone. If you have a critical or abnormal lab result, we will notify you by phone as soon as possible.  Submit refill requests through [employer] or call your pharmacy and they will forward the refill request to us. Please allow 3 business days for your refill to be completed.          Additional Information About Your Visit        Care EveryWhere ID     This is your Care EveryWhere ID. This could be used by other organizations to access your  [facility] medical records  [id number]         Blood Pressure from Last 3 Encounters:   10/22/18 130/70   09/26/18 118/78   08/22/18 100/62    Weight from Last 3 Encounters:   10/22/18 178 lb (80.7 kg)   09/26/18 175 lb (79.4 kg)   08/22/18 178 lb (80.7 kg)              Today, you had the following     No orders found for display         Today's Medication Changes          These changes are accurate as of 10/25/18  1:04 PM.  If you have any questions, ask your nurse or doctor.               These medicines have changed or have updated prescriptions.        Dose/Directions    cilostazol 100 MG tablet   Commonly known as:  PLETAL   This may have changed:  See the new instructions.   Used for:  PVD (peripheral vascular disease) (H)   Changed by:  Gabi Koo PA        TAKE 1 TABLET BY MOUTH TWICE DAILY   Quantity:  180 tablet   Refills:  0       simvastatin 10 MG tablet   Commonly known as:  ZOCOR   This may have changed:  See the new instructions.   Used for:  PVD (peripheral vascular disease) (H)   Changed by:  Caridad Arora APRN CNP        TAKE 1 TABLET BY MOUTH AT BEDTIME   Quantity:  90 tablet   Refills:  1            Where to get your medicines      These medications were sent to [facility] [address] [facility] [place] - [address] [address]  [address] [place] [place] [address]     Phone:  [phone]     cilostazol 100 MG tablet    simvastatin 10 MG tablet                Primary Care Provider Office Phone # Fax #    Dominguez Maradiaga [phone]       [address] [place] [id number]        Goals        General    Improve chronic symptoms (pt-stated)     Notes - Note created  3/27/2018  2:18 PM by Pipkin, Lauren N, RN    Continue to monitor COPD symptoms and report signs of exacerbation to CC        Equal Access to Services     DARA SIMPSON AH: Susan mirzao Sopenny, waaxda luqadaha, qaybta kaalmada adeegyada, karina prado. So [facility]  [phone].    ATENCIÓN: Si linda cope, tiene a dhillon disposición servicios gratuitos de asistencia lingüística. Suzi nino [phone].    We comply with applicable federal civil rights laws and [place] laws. We do not discriminate on the basis of race, color, national origin, age, disability, sex, sexual orientation, or gender identity.            Thank you!     Thank you for choosing [facility]  for your care. Our goal is always to provide you with excellent care. Hearing back from our patients is one way we can continue to improve our services. Please take a few minutes to complete the written survey that you may receive in the mail after your visit with us. Thank you!             Your Updated Medication List - Protect others around you: Learn how to safely use, store and throw away your medicines at www.disposemymeds.org.          This list is accurate as of 10/25/18  1:04 PM.  Always use your most recent med list.                   Brand Name Dispense Instructions for use Diagnosis    ASPIRIN LOW DOSE 81 MG tablet   Generic drug:  aspirin      Take 1 tablet by mouth daily.        * ZITHROMAX Z-MARIN PO      Take 250 mg by mouth daily        * azithromycin 250 MG tablet    ZITHROMAX    6 tablet    Two tablets first day, then one tablet daily for four days.    Obstructive chronic bronchitis with exacerbation (H)       budesonide 0.25 MG/2ML neb solution    PULMICORT     Take 0.25 mg by nebulization 2 times daily        CENTRUM SILVER per tablet      Take 1 tablet by mouth daily.        cilostazol 100 MG tablet    PLETAL    180 tablet    TAKE 1 TABLET BY MOUTH TWICE DAILY    PVD (peripheral vascular disease) (H)       * COMBIVENT RESPIMAT  MCG/ACT inhaler   Generic drug:  Ipratropium-Albuterol     12 g    INHALE ONE PUFF BY MOUTH 4 TIMES DAILY. MAX  SIX  DOSES  PER  DAY    Chronic obstructive pulmonary disease, unspecified COPD type (H)       * ipratropium - albuterol 0.5 mg/2.5  mg/3 mL 0.5-2.5 (3) MG/3ML neb solution    DUONEB    360 mL    USE ONE AMPULE IN NEBULIZER EVERY 6 HOURS AS NEEDED FOR SHORTNESS OF BREATH/DYSPNEA  OR  WHEEZING    COPD (chronic obstructive pulmonary disease) (H)       DALIRESP 500 MCG Tabs tablet   Generic drug:  roflumilast      Take 500 mcg by mouth daily        gabapentin 300 MG capsule    NEURONTIN    90 capsule    Take 1 capsule (300 mg) by mouth 3 times daily    Facial pain, Rash and nonspecific skin eruption       IMODIUM A-D 2 MG tablet   Generic drug:  loperamide      Take 2 mg by mouth 4 times daily as needed. Take 2 tablet by oral route after 1st loose stool and 1 tablet after each subsequent bowel movement; do not exceed 16 mg in 24 hrs. As needed.        moxifloxacin 0.5 % ophthalmic solution    VIGAMOX     Place 1 drop Into the left eye 4 times daily        order for DME     1 each    Equipment being ordered: shower chair    End stage COPD (H)       order for DME     1 Units    Equipment being ordered: Oxygen 2 L per nasal canula continuously, re-certify for 1 year   FAX TO [facility]    COPD exacerbation (H)       PERFOROMIST 20 MCG/2ML neb solution   Generic drug:  formoterol     360 mL    USE ONE VIAL IN NEBULIZER EVERY 12 HOURS    Other emphysema (H)       predniSONE 20 MG tablet    DELTASONE    20 tablet    Take 3 tabs (60 mg) by mouth daily x 3 days, 2 tabs (40 mg) daily x 3 days, 1 tab (20 mg) daily x 3 days, then 1/2 tab (10 mg) x 3 days.    Obstructive chronic bronchitis with exacerbation (H)       pregabalin 50 MG capsule    LYRICA    90 capsule    Take 1 capsule (50 mg) by mouth 3 times daily    Post herpetic neuralgia       ROBITUSSIN COUGH+CHEST EDVIN DM 5-100 MG/5ML Liqd   Generic drug:  Dextromethorphan-Guaifenesin      Take 20 mLs by mouth every 4 hours as needed        simvastatin 10 MG tablet    ZOCOR    90 tablet    TAKE 1 TABLET BY MOUTH AT BEDTIME    PVD (peripheral vascular disease) (H)       sodium chloride 0.65 % nasal spray     OCEAN    1 Bottle    Spray 1 spray into both nostrils every hour as needed for congestion    COPD exacerbation (H)       SPIRIVA RESPIMAT 2.5 MCG/ACT inhalation aerosol   Generic drug:  tiotropium           tamsulosin 0.4 MG capsule    FLOMAX    90 capsule    TAKE ONE CAPSULE BY MOUTH ONCE DAILY    BPH (benign prostatic hyperplasia)       warfarin 2.5 MG tablet    COUMADIN    115 tablet    5 mg x five days/week and 2.5 mg x two days/week.  Or as directed by the protime clinic.    Long term current use of anticoagulant therapy       * Notice:  This list has 4 medication(s) that are the same as other medications prescribed for you. Read the directions carefully, and ask your doctor or other care provider to review them with you.

## 2018-10-29 ENCOUNTER — TELEPHONE (OUTPATIENT)
Dept: CARE COORDINATION | Facility: OTHER | Age: 78
End: 2018-10-29

## 2018-10-29 ENCOUNTER — TELEPHONE (OUTPATIENT)
Dept: FAMILY MEDICINE | Facility: OTHER | Age: 78
End: 2018-10-29

## 2018-10-29 ENCOUNTER — ANTICOAGULATION THERAPY VISIT (OUTPATIENT)
Dept: ANTICOAGULATION | Facility: OTHER | Age: 78
End: 2018-10-29
Attending: FAMILY MEDICINE
Payer: COMMERCIAL

## 2018-10-29 DIAGNOSIS — B02.29 POST HERPETIC NEURALGIA: ICD-10-CM

## 2018-10-29 DIAGNOSIS — Z79.01 LONG TERM CURRENT USE OF ANTICOAGULANT THERAPY: ICD-10-CM

## 2018-10-29 DIAGNOSIS — I73.9 PVD (PERIPHERAL VASCULAR DISEASE) (H): ICD-10-CM

## 2018-10-29 DIAGNOSIS — I48.20 CHRONIC ATRIAL FIBRILLATION (H): ICD-10-CM

## 2018-10-29 LAB — INR BLD: 1.7 (ref 0.86–1.14)

## 2018-10-29 PROCEDURE — 36416 COLLJ CAPILLARY BLOOD SPEC: CPT | Mod: ZL | Performed by: FAMILY MEDICINE

## 2018-10-29 PROCEDURE — 85610 PROTHROMBIN TIME: CPT | Mod: QW,ZL | Performed by: FAMILY MEDICINE

## 2018-10-29 RX ORDER — PREGABALIN 100 MG/1
100 CAPSULE ORAL 3 TIMES DAILY
Qty: 90 CAPSULE | Refills: 1 | Status: SHIPPED | OUTPATIENT
Start: 2018-10-29 | End: 2019-01-14

## 2018-10-29 NOTE — TELEPHONE ENCOUNTER
Follow-up phone call with client this AM regarding complimentary home visit and referral for the donor [employer] Alert program. Client reports he is going to talk with his neighbor about being his first contact for device. I let him know [facility] [employer] will contact him this week to come set this up and to educate him on its use. Client reports he is doing well and has an appt at clinic today for INR. Encouraged him to call [facility] office if he has any questions.

## 2018-10-29 NOTE — TELEPHONE ENCOUNTER
Recommend increase lyrica to 100 tid.  There are limited things available as no nsaids due to coumadin use.  This is the best I can come up with.  Advise OTC tylenol as well.  Thanks.   Dominguez Maradiaga

## 2018-10-29 NOTE — PROGRESS NOTES
ANTICOAGULATION FOLLOW-UP CLINIC VISIT    Patient Name:  Rm Duarte  Date:  10/29/2018  Contact Type:  Telephone    SUBJECTIVE:     Patient Findings     Positives No Problem Findings    Comments INR done by lab. Patient has one more day of 20mg dose of prednisone and decreases dose to 10mg on 10/30/18. Call placed to patient. He states that  Lyrica changed to 2 tablets three times daily. We discussed INR result, warfarin dosing and INR recheck date. He verbalized understanding of instructions and has no questions. He has no bleeding/bruising and no other changes in diet/meds/activity           OBJECTIVE    INR Point of Care   Date Value Ref Range Status   10/29/2018 1.7 (H) 0.86 - 1.14 Final     Comment:     This test is intended for monitoring Coumadin therapy.  Results are not   accurate in patients with prolonged INR due to factor deficiency.         ASSESSMENT / PLAN  INR assessment SUB dose cut for prednisone, azithromycin   Recheck INR In: 1 WEEK    INR Location Clinic      Anticoagulation Summary as of 10/29/2018     INR goal 2.0-3.0   Today's INR 1.7!   Warfarin maintenance plan 1.25 mg (2.5 mg x 0.5) on Wed; 3.75 mg (2.5 mg x 1.5) all other days   Full warfarin instructions 10/30: 2.5 mg; Otherwise 1.25 mg on Wed; 3.75 mg all other days   Weekly warfarin total 23.75 mg   Plan last modified Zena Haas RN (10/5/2018)   Next INR check 11/5/2018   Priority INR   Target end date Indefinite    Indications   Chronic atrial fibrillation (H) [I48.2]  PVD (peripheral vascular disease) (H) [I73.9]  Long-term (current) use of anticoagulants [Z79.01] [Z79.01]         Anticoagulation Episode Summary     INR check location     Preferred lab     Send INR reminders to  ANTICOAG POOL    Comments       Anticoagulation Care Providers     Provider Role Specialty Phone number    Dominguez Maradiaga MD [facility] [phone]            See the Encounter Report to view Anticoagulation Flowsheet and  Dosing Calendar (Go to Encounters tab in chart review, and find the Anticoagulation Therapy Visit)        Zena Haas RN

## 2018-10-29 NOTE — MR AVS SNAPSHOT
Rm Duarte   10/29/2018 10:30 AM   Anticoagulation Therapy Visit    Description:  77 year old male   Provider:   ANTI COAGULATION   Department:  Hc Anti Coagulation           INR as of 10/29/2018     Today's INR 1.7!      Anticoagulation Summary as of 10/29/2018     INR goal 2.0-3.0   Today's INR 1.7!   Full warfarin instructions 10/30: 2.5 mg; Otherwise 1.25 mg on Wed; 3.75 mg all other days   Next INR check 11/5/2018    Indications   Chronic atrial fibrillation (H) [I48.2]  PVD (peripheral vascular disease) (H) [I73.9]  Long-term (current) use of anticoagulants [Z79.01] [Z79.01]         Your next Anticoagulation Clinic appointment(s)     Nov 05, 2018 10:00 AM CST   Anticoagulation Visit with  ANTI COAGULATION   [facility] [place] ([facility] )    [address]  [place] [address]   [phone]              October 2018 Details    Sun Mon Tue Wed Thu Fri Sat      1               2               3               4               5               6                 7               8               9               10               11               12               13                 14               15               16               17               18               19               20                 21               22               23               24               25               26               27                 28               29      3.75 mg   See details      30      2.5 mg         31      1.25 mg             Date Details   10/29 This INR check               How to take your warfarin dose     To take:  1.25 mg Take 0.5 of a 2.5 mg tablet.    To take:  2.5 mg Take 1 of the 2.5 mg tablets.    To take:  3.75 mg Take 1.5 of the 2.5 mg tablets.           November 2018 Details    Sun Mon Tue Wed Thu Fri Sat         1      3.75 mg         2      3.75 mg         3      3.75 mg           4      3.75 mg         5            6               7               8                9               10                 11               12               13               14               15               16               17                 18               19               20               21               22               23               24                 25               26               27               28               29               30                 Date Details   No additional details    Date of next INR:  11/5/2018         How to take your warfarin dose     To take:  3.75 mg Take 1.5 of the 2.5 mg tablets.

## 2018-11-05 ENCOUNTER — ANTICOAGULATION THERAPY VISIT (OUTPATIENT)
Dept: ANTICOAGULATION | Facility: OTHER | Age: 78
End: 2018-11-05
Attending: FAMILY MEDICINE
Payer: COMMERCIAL

## 2018-11-05 DIAGNOSIS — I73.9 PVD (PERIPHERAL VASCULAR DISEASE) (H): ICD-10-CM

## 2018-11-05 DIAGNOSIS — Z79.01 LONG TERM CURRENT USE OF ANTICOAGULANT THERAPY: ICD-10-CM

## 2018-11-05 DIAGNOSIS — I48.20 CHRONIC ATRIAL FIBRILLATION (H): ICD-10-CM

## 2018-11-05 DIAGNOSIS — Z79.01 LONG TERM CURRENT USE OF ANTICOAGULANT THERAPY: Primary | ICD-10-CM

## 2018-11-05 LAB
INR BLD: 3.8 (ref 0.86–1.14)
INR PPP: 3.06 (ref 0.8–1.2)

## 2018-11-05 PROCEDURE — 36415 COLL VENOUS BLD VENIPUNCTURE: CPT | Mod: ZL | Performed by: FAMILY MEDICINE

## 2018-11-05 PROCEDURE — 36416 COLLJ CAPILLARY BLOOD SPEC: CPT | Mod: ZL | Performed by: FAMILY MEDICINE

## 2018-11-05 PROCEDURE — 85610 PROTHROMBIN TIME: CPT | Mod: ZL,91 | Performed by: FAMILY MEDICINE

## 2018-11-05 PROCEDURE — 85610 PROTHROMBIN TIME: CPT | Mod: QW,ZL | Performed by: FAMILY MEDICINE

## 2018-11-05 NOTE — PROGRESS NOTES
ANTICOAGULATION FOLLOW-UP CLINIC VISIT    Patient Name:  Rm Duarte  Date:  11/5/2018  Contact Type:  Telephone    SUBJECTIVE:     Patient Findings     Positives No Problem Findings    Comments INR done by lab. Call placed to patient and spoke to him re: INR result, warfarin dosing and INR recheck date. He verbalized understanding and has no questions. States he is done with prednisone and antibiotics. He is now taking his zithromax 1 pill daily as previous. He has no bleeding/bruising, and no new changes in diet/activity.            OBJECTIVE    INR Point of Care   Date Value Ref Range Status   11/05/2018 3.8 (H) 0.86 - 1.14 Final     Comment:     This test is intended for monitoring Coumadin therapy.  Results are not   accurate in patients with prolonged INR due to factor deficiency.         ASSESSMENT / PLAN  INR assessment SUPRA antibiotics and prednisone   Recheck INR In: 8 DAYS    INR Location Clinic      Anticoagulation Summary as of 11/5/2018     INR goal 2.0-3.0   Today's INR 3.8!   Warfarin maintenance plan 1.25 mg (2.5 mg x 0.5) on Wed; 3.75 mg (2.5 mg x 1.5) all other days   Full warfarin instructions 11/5: Hold; 11/9: 1.25 mg; 11/12: 1.25 mg; Otherwise 1.25 mg on Wed; 3.75 mg all other days   Weekly warfarin total 23.75 mg   Plan last modified Zena Haas RN (10/5/2018)   Next INR check 11/13/2018   Priority INR   Target end date Indefinite    Indications   Chronic atrial fibrillation (H) [I48.2]  PVD (peripheral vascular disease) (H) [I73.9]  Long-term (current) use of anticoagulants [Z79.01] [Z79.01]         Anticoagulation Episode Summary     INR check location     Preferred lab     Send INR reminders to  ANTICOAG POOL    Comments       Anticoagulation Care Providers     Provider Role Specialty Phone number    Dominguez Maradiaga MD [facility] [phone]            See the Encounter Report to view Anticoagulation Flowsheet and Dosing Calendar (Go to Encounters tab in  chart review, and find the Anticoagulation Therapy Visit)        Zena Haas RN

## 2018-11-05 NOTE — PROGRESS NOTES
ANTICOAGULATION FOLLOW-UP CLINIC VISIT    Patient Name:  Rm Duarte  Date:  11/5/2018  Contact Type:  Telephone/ message left on home phone voicemail    SUBJECTIVE:     Patient Findings     Positives No Problem Findings    Comments INR done by lab. Call placed to patient and spoke to him re: INR result, warfarin dosing and INR recheck date. He verbalized understanding and has no questions. States he is done with prednisone and antibiotics. He is now taking his zithromax 1 pill daily as previous. He has no bleeding/bruising, and no new changes in diet/activity.  Lab INR result received and lab resulted INR at 3.06.  Call placed to patient again and message left on voicemail re: INR result from lab, warfarin dosing changes. He is to call warfarin clinic if he has any questions.            OBJECTIVE    INR   Date Value Ref Range Status   11/05/2018 3.06 (H) 0.80 - 1.20 Final     INR Point of Care   Date Value Ref Range Status   11/05/2018 3.8 (H) 0.86 - 1.14 Final     Comment:     This test is intended for monitoring Coumadin therapy.  Results are not   accurate in patients with prolonged INR due to factor deficiency.         ASSESSMENT / PLAN  INR assessment THER antibiotics and prednisone   Recheck INR In: 8 DAYS    INR Location Clinic      Anticoagulation Summary as of 11/5/2018     INR goal 2.0-3.0   Today's INR 3.06!   Warfarin maintenance plan 1.25 mg (2.5 mg x 0.5) on Wed; 3.75 mg (2.5 mg x 1.5) all other days   Full warfarin instructions 11/5: 1.25 mg; 11/9: 1.25 mg; 11/12: 1.25 mg; Otherwise 1.25 mg on Wed; 3.75 mg all other days   Weekly warfarin total 23.75 mg   Plan last modified Zena Haas RN (10/5/2018)   Next INR check 11/13/2018   Priority INR   Target end date Indefinite    Indications   Chronic atrial fibrillation (H) [I48.2]  PVD (peripheral vascular disease) (H) [I73.9]  Long-term (current) use of anticoagulants [Z79.01] [Z79.01]         Anticoagulation Episode Summary     INR check  location     Preferred lab     Send INR reminders to  ANTICOAG POOL    Comments       Anticoagulation Care Providers     Provider Role Specialty Phone number    Dominguez Maradiaga MD [facility] [phone]            See the Encounter Report to view Anticoagulation Flowsheet and Dosing Calendar (Go to Encounters tab in chart review, and find the Anticoagulation Therapy Visit)        Zena Haas RN

## 2018-11-05 NOTE — MR AVS SNAPSHOT
Rm GARCIA Gerald   11/5/2018 10:00 AM   Anticoagulation Therapy Visit    Description:  77 year old male   Provider:   ANTI COAGULATION   Department:  Hc Anti Coagulation           INR as of 11/5/2018     Today's INR 3.8!      Anticoagulation Summary as of 11/5/2018     INR goal 2.0-3.0   Today's INR 3.8!   Full warfarin instructions 11/5: Hold; 11/9: 1.25 mg; 11/12: 1.25 mg; Otherwise 1.25 mg on Wed; 3.75 mg all other days   Next INR check 11/13/2018    Indications   Chronic atrial fibrillation (H) [I48.2]  PVD (peripheral vascular disease) (H) [I73.9]  Long-term (current) use of anticoagulants [Z79.01] [Z79.01]         Your next Anticoagulation Clinic appointment(s)     Nov 13, 2018 11:00 AM CST   [facility] with HC [facility]ULATION   [facility]    [address]  [place] [address]   [phone]              November 2018 Details    Sun Mon Tue Wed Thu Fri Sat         1               2               3                 4               5      Hold   See details      6      3.75 mg         7      1.25 mg         8      3.75 mg         9      1.25 mg         10      3.75 mg           11      3.75 mg         12      1.25 mg         13            14               15               16               17                 18               19               20               21               22               23               24                 25               26               27               28               29               30                 Date Details   11/05 This INR check       Date of next INR:  11/13/2018         How to take your warfarin dose     To take:  1.25 mg Take 0.5 of a 2.5 mg tablet.    To take:  3.75 mg Take 1.5 of the 2.5 mg tablets.    Hold Do not take your warfarin dose. See the Details table to the right for additional instructions.

## 2018-11-06 DIAGNOSIS — J43.8 OTHER EMPHYSEMA (H): ICD-10-CM

## 2018-11-07 ENCOUNTER — PATIENT OUTREACH (OUTPATIENT)
Dept: CARE COORDINATION | Facility: OTHER | Age: 78
End: 2018-11-07

## 2018-11-07 RX ORDER — FORMOTEROL FUMARATE DIHYDRATE 20 UG/2ML
SOLUTION RESPIRATORY (INHALATION)
Qty: 360 ML | Refills: 1 | Status: SHIPPED | OUTPATIENT
Start: 2018-11-07 | End: 2020-01-01

## 2018-11-07 ASSESSMENT — ACTIVITIES OF DAILY LIVING (ADL): DEPENDENT_IADLS:: INDEPENDENT

## 2018-11-07 NOTE — PROGRESS NOTES
Clinic Care Coordination Contact  [facility]/Voicemail    Referral Source: Care Team  Clinical Data: Care Coordinator Outreach  Outreach attempted x 1.  Left message on voicemail with call back information and requested return call.  Would like to inquire about how things are going with the new [employer] equipment.  Plan: Care Coordinator will await return call.     Lauren Pipkin, BS-RN   CHF and General Care Coordinator  [phone] Option # 1

## 2018-11-13 ENCOUNTER — ANTICOAGULATION THERAPY VISIT (OUTPATIENT)
Dept: ANTICOAGULATION | Facility: OTHER | Age: 78
End: 2018-11-13
Attending: FAMILY MEDICINE
Payer: COMMERCIAL

## 2018-11-13 ENCOUNTER — ALLIED HEALTH/NURSE VISIT (OUTPATIENT)
Dept: FAMILY MEDICINE | Facility: OTHER | Age: 78
End: 2018-11-13
Attending: FAMILY MEDICINE
Payer: COMMERCIAL

## 2018-11-13 DIAGNOSIS — Z79.01 LONG TERM CURRENT USE OF ANTICOAGULANT THERAPY: ICD-10-CM

## 2018-11-13 DIAGNOSIS — I48.20 CHRONIC ATRIAL FIBRILLATION (H): ICD-10-CM

## 2018-11-13 DIAGNOSIS — Z23 NEED FOR PROPHYLACTIC VACCINATION AND INOCULATION AGAINST INFLUENZA: Primary | ICD-10-CM

## 2018-11-13 DIAGNOSIS — I73.9 PVD (PERIPHERAL VASCULAR DISEASE) (H): ICD-10-CM

## 2018-11-13 LAB — INR BLD: 3.5 (ref 0.86–1.14)

## 2018-11-13 PROCEDURE — 36416 COLLJ CAPILLARY BLOOD SPEC: CPT | Mod: ZL | Performed by: FAMILY MEDICINE

## 2018-11-13 PROCEDURE — 90662 IIV NO PRSV INCREASED AG IM: CPT

## 2018-11-13 PROCEDURE — 85610 PROTHROMBIN TIME: CPT | Mod: QW,ZL | Performed by: FAMILY MEDICINE

## 2018-11-13 PROCEDURE — G0008 ADMIN INFLUENZA VIRUS VAC: HCPCS

## 2018-11-13 RX ORDER — GABAPENTIN 300 MG/1
300 CAPSULE ORAL 3 TIMES DAILY
COMMUNITY
Start: 2018-09-26 | End: 2020-01-07

## 2018-11-13 RX ORDER — PREGABALIN 50 MG
CAPSULE ORAL
COMMUNITY
Start: 2018-10-22 | End: 2019-01-07

## 2018-11-13 RX ORDER — WARFARIN SODIUM 2.5 MG/1
TABLET ORAL
Qty: 115 TABLET | Refills: 3 | COMMUNITY
Start: 2018-11-13 | End: 2019-02-22

## 2018-11-13 NOTE — PROGRESS NOTES
ANTICOAGULATION FOLLOW-UP CLINIC VISIT    Patient Name:  Rm Duarte  Date:  11/13/2018  Contact Type:  Telephone/ Voice Message    SUBJECTIVE:     Patient Findings     Positives Unexplained INR or factor level change    Comments POCT INR drawn today by [facility] staff.  INR results/dosing/INR recheck information left on pt's telephone voice messaging system today.  Patient's last 7 days of warfarin dosing reviewed and patient is supratherapeutic; therefore the weekly dosage of warfarin will be adjusted.  Call ended with pt informed to contact the AC clinic with any question/concerns they may have.           OBJECTIVE    INR Point of Care   Date Value Ref Range Status   11/13/2018 3.5 (H) 0.86 - 1.14 Final     Comment:     This test is intended for monitoring Coumadin therapy.  Results are not   accurate in patients with prolonged INR due to factor deficiency.         ASSESSMENT / PLAN  No question data found.  Anticoagulation Summary as of 11/13/2018     INR goal 2.0-3.0   Today's INR 3.5!   Warfarin maintenance plan 1.25 mg (2.5 mg x 0.5) on Wed; 3.75 mg (2.5 mg x 1.5) all other days   Full warfarin instructions 11/13: 1.25 mg; 11/15: 1.25 mg; Otherwise 1.25 mg on Wed; 3.75 mg all other days   Weekly warfarin total 23.75 mg   Plan last modified Zena Haas RN (10/5/2018)   Next INR check 11/20/2018   Priority INR   Target end date Indefinite    Indications   Chronic atrial fibrillation (H) [I48.2]  PVD (peripheral vascular disease) (H) [I73.9]  Long-term (current) use of anticoagulants [Z79.01] [Z79.01]         Anticoagulation Episode Summary     INR check location     Preferred lab     Send INR reminders to  ANTICOAG POOL    Comments       Anticoagulation Care Providers     Provider Role Specialty Phone number    Dominguez Maradiaga MD [facility] [phone]            See the Encounter Report to view Anticoagulation Flowsheet and Dosing Calendar (Go to Encounters tab in chart review, and  find the Anticoagulation Therapy Visit)        Zena Burden RN

## 2018-11-13 NOTE — MR AVS SNAPSHOT
Rm Duarte   11/13/2018 11:00 AM   Anticoagulation Therapy Visit    Description:  77 year old male   Provider:  [facility] ANTI COAGULATION   Department:  Hc Anti Coagulation           INR as of 11/13/2018     Today's INR 3.5!      Anticoagulation Summary as of 11/13/2018     INR goal 2.0-3.0   Today's INR 3.5!   Full warfarin instructions 11/13: 1.25 mg; 11/15: 1.25 mg; Otherwise 1.25 mg on Wed; 3.75 mg all other days   Next INR check 11/20/2018    Indications   Chronic atrial fibrillation (H) [I48.2]  PVD (peripheral vascular disease) (H) [I73.9]  Long-term (current) use of anticoagulants [Z79.01] [Z79.01]         November 2018 Details    Sun Mon Tue Wed Thu Fri Sat         1               2               3                 4               5               6               7               8               9               10                 11               12               13      1.25 mg   See details      14      1.25 mg         15      1.25 mg         16      3.75 mg         17      3.75 mg           18      3.75 mg         19      3.75 mg         20            21               22               23               24                 25               26               27               28               29               30                 Date Details   11/13 This INR check       Date of next INR:  11/20/2018         How to take your warfarin dose     To take:  1.25 mg Take 0.5 of a 2.5 mg tablet.    To take:  3.75 mg Take 1.5 of the 2.5 mg tablets.

## 2018-11-13 NOTE — MR AVS SNAPSHOT
After Visit Summary   11/13/2018    Rm Duarte    MRN: [id number]           Patient Information     Date Of Birth          1940        Visit Information        Provider Department      11/13/2018 10:15 AM NA FP NURSE [facility]        Today's Diagnoses     Need for prophylactic vaccination and inoculation against influenza    -  1       Follow-ups after your visit        Your next 10 appointments already scheduled     Nov 13, 2018 10:15 AM CST   (Arrive by 10:00 AM)   Nurse Only with NA FP NURSE   [facility] ([facility] )    [address]  [facility] [address]   [phone]            Nov 13, 2018 11:00 AM CST   Anticoagulation Visit with HC ANTI COAGULATION   [facility] ([facility] )    [address] [place] [address]   [phone]            Nov 13, 2018 11:00 AM CST   LAB with NA LAB   [facility] ([facility] )    [address]  [facility] [address]   [phone]              Who to contact     If you have questions or need follow up information about today's clinic visit or your schedule please contact [facility] directly at [phone].  Normal or non-critical lab and imaging results will be communicated to you by MyChart, letter or phone within 4 business days after the clinic has received the results. If you do not hear from us within 7 days, please contact the clinic through MyChart or phone. If you have a critical or abnormal lab result, we will notify you by phone as soon as possible.  Submit refill requests through [employer] or call your pharmacy and they will forward the refill request to us. Please allow 3 business days for your refill to be completed.          Additional Information About Your Visit        Care EveryWhere ID     This is your Care EveryWhere ID. This could be used by other  organizations to access your [place] medical records  [id number]         Blood Pressure from Last 3 Encounters:   10/22/18 130/70   09/26/18 118/78   08/22/18 100/62    Weight from Last 3 Encounters:   10/22/18 178 lb (80.7 kg)   09/26/18 175 lb (79.4 kg)   08/22/18 178 lb (80.7 kg)              We Performed the Following     HC FLU VACCINE, INCREASED ANTIGEN, PRESV FREE     Vaccine Administration, Initial [17006]        Primary Care Provider Office Phone # Fax #    Dominguez Maradiaga [phone]       [address] [place] [address] E  [facility] [address]        Goals        General    Improve chronic symptoms (pt-stated)     Notes - Note created  3/27/2018  2:18 PM by Pipkin, Lauren N, RN    Continue to monitor COPD symptoms and report signs of exacerbation to CC        Equal Access to Services     [facility] : Hadii bette fragoso hadasho Sopenny, waaxda luqadaha, qaybta kaalmada adedanieyada, karina shell . So [facility] [phone].    ATENCIÓN: Si habla español, tiene a dhillon disposición servicios gratuitos de asistencia lingüística. Llame al [phone].    We comply with applicable federal civil rights laws and [place] laws. We do not discriminate on the basis of race, color, national origin, age, disability, sex, sexual orientation, or gender identity.            Thank you!     Thank you for choosing [facility]  for your care. Our goal is always to provide you with excellent care. Hearing back from our patients is one way we can continue to improve our services. Please take a few minutes to complete the written survey that you may receive in the mail after your visit with us. Thank you!             Your Updated Medication List - Protect others around you: Learn how to safely use, store and throw away your medicines at www.disposemymeds.org.          This list is accurate as of 11/13/18 10:10 AM.  Always use your most recent med list.                   Brand Name  Dispense Instructions for use Diagnosis    ASPIRIN LOW DOSE 81 MG tablet   Generic drug:  aspirin      Take 1 tablet by mouth daily.        budesonide 0.25 MG/2ML neb solution    PULMICORT     Take 0.25 mg by nebulization 2 times daily        CENTRUM SILVER per tablet      Take 1 tablet by mouth daily.        cilostazol 100 MG tablet    PLETAL    180 tablet    TAKE 1 TABLET BY MOUTH TWICE DAILY    PVD (peripheral vascular disease) (H)       * COMBIVENT RESPIMAT  MCG/ACT inhaler   Generic drug:  Ipratropium-Albuterol     12 g    INHALE ONE PUFF BY MOUTH 4 TIMES DAILY. MAX  SIX  DOSES  PER  DAY    Chronic obstructive pulmonary disease, unspecified COPD type (H)       * ipratropium - albuterol 0.5 mg/2.5 mg/3 mL 0.5-2.5 (3) MG/3ML neb solution    DUONEB    360 mL    USE ONE AMPULE IN NEBULIZER EVERY 6 HOURS AS NEEDED FOR SHORTNESS OF BREATH/DYSPNEA  OR  WHEEZING    COPD (chronic obstructive pulmonary disease) (H)       DALIRESP 500 MCG Tabs tablet   Generic drug:  roflumilast      Take 500 mcg by mouth daily        IMODIUM A-D 2 MG tablet   Generic drug:  loperamide      Take 2 mg by mouth 4 times daily as needed. Take 2 tablet by oral route after 1st loose stool and 1 tablet after each subsequent bowel movement; do not exceed 16 mg in 24 hrs. As needed.        order for DME     1 each    Equipment being ordered: shower chair    End stage COPD (H)       order for DME     1 Units    Equipment being ordered: Oxygen 2 L per nasal canula continuously, re-certify for 1 year   FAX TO [facility]    COPD exacerbation (H)       PERFOROMIST 20 MCG/2ML neb solution   Generic drug:  formoterol     360 mL    USE 1 VIAL IN NEBULIZER EVERY 12 HOURS    Other emphysema (H)       predniSONE 20 MG tablet    DELTASONE    20 tablet    Take 3 tabs (60 mg) by mouth daily x 3 days, 2 tabs (40 mg) daily x 3 days, 1 tab (20 mg) daily x 3 days, then 1/2 tab (10 mg) x 3 days.    Obstructive chronic bronchitis with exacerbation (H)        pregabalin 100 MG capsule    LYRICA    90 capsule    Take 1 capsule (100 mg) by mouth 3 times daily    Post herpetic neuralgia       ROBITUSSIN COUGH+CHEST EDVIN DM 5-100 MG/5ML Liqd   Generic drug:  Dextromethorphan-Guaifenesin      Take 20 mLs by mouth every 4 hours as needed        simvastatin 10 MG tablet    ZOCOR    90 tablet    TAKE 1 TABLET BY MOUTH AT BEDTIME    PVD (peripheral vascular disease) (H)       SPIRIVA RESPIMAT 2.5 MCG/ACT inhalation aerosol   Generic drug:  tiotropium           tamsulosin 0.4 MG capsule    FLOMAX    90 capsule    TAKE ONE CAPSULE BY MOUTH ONCE DAILY    BPH (benign prostatic hyperplasia)       warfarin 2.5 MG tablet    COUMADIN    115 tablet    5 mg x five days/week and 2.5 mg x two days/week.  Or as directed by the protime clinic.    Long term current use of anticoagulant therapy       ZITHROMAX Z-MARIN PO      Take 250 mg by mouth daily        * Notice:  This list has 2 medication(s) that are the same as other medications prescribed for you. Read the directions carefully, and ask your doctor or other care provider to review them with you.

## 2018-11-13 NOTE — PROGRESS NOTES

## 2018-11-19 ENCOUNTER — PATIENT OUTREACH (OUTPATIENT)
Dept: CARE COORDINATION | Facility: OTHER | Age: 78
End: 2018-11-19

## 2018-11-19 ASSESSMENT — ACTIVITIES OF DAILY LIVING (ADL): DEPENDENT_IADLS:: INDEPENDENT

## 2018-11-19 NOTE — PROGRESS NOTES
Clinic Care Coordination Contact  Care Team Conversations    Care Coordinator phoned Abdalla today to check in to see how things are going with the [employer] Alert.  He states they are fine, however, he has been having trouble with the battery and has lost the back cover and screw for the pendant.  He agreed to receive a call from [employer] to try to troubleshoot these issues.  Phone call placed to Yoselin with [employer] and she will have a tech call him.  She will look into ordering replacement parts as needed and how we would request these in the future for this donated equipment.    Lauren Pipkin, BS-RN   CHF and General Care Coordinator  [phone] Option # 1

## 2018-11-20 ENCOUNTER — ANTICOAGULATION THERAPY VISIT (OUTPATIENT)
Dept: ANTICOAGULATION | Facility: OTHER | Age: 78
End: 2018-11-20
Attending: FAMILY MEDICINE
Payer: COMMERCIAL

## 2018-11-20 DIAGNOSIS — I73.9 PVD (PERIPHERAL VASCULAR DISEASE) (H): ICD-10-CM

## 2018-11-20 DIAGNOSIS — Z79.01 LONG TERM CURRENT USE OF ANTICOAGULANT THERAPY: ICD-10-CM

## 2018-11-20 DIAGNOSIS — I48.20 CHRONIC ATRIAL FIBRILLATION (H): ICD-10-CM

## 2018-11-20 LAB
INR BLD: 7.1 (ref 0.86–1.14)
INR PPP: 6.33 (ref 0.8–1.2)

## 2018-11-20 PROCEDURE — 36416 COLLJ CAPILLARY BLOOD SPEC: CPT | Mod: ZL | Performed by: FAMILY MEDICINE

## 2018-11-20 PROCEDURE — 85610 PROTHROMBIN TIME: CPT | Mod: ZL,91 | Performed by: FAMILY MEDICINE

## 2018-11-20 PROCEDURE — 85610 PROTHROMBIN TIME: CPT | Mod: QW,ZL | Performed by: FAMILY MEDICINE

## 2018-11-20 NOTE — PROGRESS NOTES
ANTICOAGULATION FOLLOW-UP CLINIC VISIT    Patient Name:  Rm Duarte  Date:  11/20/2018  Contact Type:  Telephone    SUBJECTIVE:     Patient Findings     Positives Unexplained INR or factor level change    Comments INR done by lab. Call from patient wondering if we got reflex INR back.  We discussed INR result, warfarin dosing and INR recheck date. He verbalized understanding and has no questions. He has no bleeding/bruising. Will go to ER if any uncontrolled bleeding, will increase vit K intake.  He has had no ETOH intake, no prednisone, no antibiotics, no changes in diet/activity.            OBJECTIVE    INR   Date Value Ref Range Status   11/20/2018 6.33 (HH) 0.80 - 1.20 Final     Comment:     Critical Value called to and read back by  DR TEE @ 4179 ON 11/20/2018 BY [facility]MB         ASSESSMENT / PLAN  INR assessment SUPRA    Recheck INR In: 6 DAYS    INR Location Clinic      Anticoagulation Summary as of 11/20/2018     INR goal 2.0-3.0   Today's INR 6.33!   Warfarin maintenance plan 1.25 mg (2.5 mg x 0.5) on Wed; 3.75 mg (2.5 mg x 1.5) all other days   Full warfarin instructions 11/20: Hold; 11/21: Hold; 11/22: 2.5 mg; 11/24: 2.5 mg; Otherwise 1.25 mg on Wed; 3.75 mg all other days   Weekly warfarin total 23.75 mg   Plan last modified Zena Haas RN (10/5/2018)   Next INR check 11/26/2018   Priority INR   Target end date Indefinite    Indications   Chronic atrial fibrillation (H) [I48.2]  PVD (peripheral vascular disease) (H) [I73.9]  Long-term (current) use of anticoagulants [Z79.01] [Z79.01]         Anticoagulation Episode Summary     INR check location     Preferred lab     Send INR reminders to [facility] POOL    Comments       Anticoagulation Care Providers     Provider Role Specialty Phone number    Dominguez Tee MD [facility] [phone]            See the Encounter Report to view Anticoagulation Flowsheet and Dosing Calendar (Go to Encounters tab in chart review, and find  the Anticoagulation Therapy Visit)        Zena Haas RN

## 2018-11-20 NOTE — MR AVS SNAPSHOT
Rm GARCIA Gerald   11/20/2018 9:30 AM   Anticoagulation Therapy Visit    Description:  77 year old male   Provider:   ANTI COAGULATION   Department:  Hc Anti Coagulation           INR as of 11/20/2018     Today's INR 6.33!      Anticoagulation Summary as of 11/20/2018     INR goal 2.0-3.0   Today's INR 6.33!   Full warfarin instructions 11/20: Hold; 11/21: Hold; 11/22: 2.5 mg; 11/24: 2.5 mg; Otherwise 1.25 mg on Wed; 3.75 mg all other days   Next INR check 11/26/2018    Indications   Chronic atrial fibrillation (H) [I48.2]  PVD (peripheral vascular disease) (H) [I73.9]  Long-term (current) use of anticoagulants [Z79.01] [Z79.01]         Your next Anticoagulation Clinic appointment(s)     Nov 26, 2018 10:00 AM CST   Anticoagulation Visit with HC ANTI COAGULATION   [facility] ([facility] [place] [facility] )    [address]  [place] [address]   [phone]              November 2018 Details    Sun Mon Tue Wed Thu Fri Sat         1               2               3                 4               5               6               7               8               9               10                 11               12               13               14               15               16               17                 18               19               20      Hold   See details      21      Hold         22      2.5 mg         23      3.75 mg         24      2.5 mg           25      3.75 mg         26            27               28               29               30                 Date Details   11/20 This INR check       Date of next INR:  11/26/2018         How to take your warfarin dose     To take:  2.5 mg Take 1 of the 2.5 mg tablets.    To take:  3.75 mg Take 1.5 of the 2.5 mg tablets.    Hold Do not take your warfarin dose. See the Details table to the right for additional instructions.

## 2018-11-26 ENCOUNTER — ANTICOAGULATION THERAPY VISIT (OUTPATIENT)
Dept: ANTICOAGULATION | Facility: OTHER | Age: 78
End: 2018-11-26
Attending: FAMILY MEDICINE
Payer: COMMERCIAL

## 2018-11-26 DIAGNOSIS — I48.20 CHRONIC ATRIAL FIBRILLATION (H): ICD-10-CM

## 2018-11-26 DIAGNOSIS — I73.9 PVD (PERIPHERAL VASCULAR DISEASE) (H): ICD-10-CM

## 2018-11-26 DIAGNOSIS — Z79.01 LONG TERM CURRENT USE OF ANTICOAGULANT THERAPY: ICD-10-CM

## 2018-11-26 LAB — INR BLD: 2.6 (ref 0.86–1.14)

## 2018-11-26 PROCEDURE — 36416 COLLJ CAPILLARY BLOOD SPEC: CPT | Mod: ZL | Performed by: FAMILY MEDICINE

## 2018-11-26 PROCEDURE — 85610 PROTHROMBIN TIME: CPT | Mod: QW,ZL | Performed by: FAMILY MEDICINE

## 2018-11-26 NOTE — PROGRESS NOTES
ANTICOAGULATION FOLLOW-UP CLINIC VISIT    Patient Name:  Rm Duarte  Date:  11/26/2018  Contact Type:  Telephone    SUBJECTIVE:     Patient Findings     Positives No Problem Findings    Comments INR done by lab. Call placed to patient and spoke to him re: INR result, warfarin dosing and INR recheck date. He verbalized understanding and has no questions. No bleeding/bruising and no new changes in diet/meds/activity.            OBJECTIVE    INR Point of Care   Date Value Ref Range Status   11/26/2018 2.6 (H) 0.86 - 1.14 Final     Comment:     This test is intended for monitoring Coumadin therapy.  Results are not   accurate in patients with prolonged INR due to factor deficiency.         ASSESSMENT / PLAN  INR assessment THER    Recheck INR In: 1 WEEK    INR Location Clinic      Anticoagulation Summary as of 11/26/2018     INR goal 2.0-3.0   Today's INR 2.6   Warfarin maintenance plan 1.25 mg (2.5 mg x 0.5) on Mon, Wed, Fri; 3.75 mg (2.5 mg x 1.5) all other days   Full warfarin instructions 11/26: 1.25 mg; 11/28: 1.25 mg; 11/30: 1.25 mg; Otherwise 1.25 mg on Mon, Wed, Fri; 3.75 mg all other days   Weekly warfarin total 18.75 mg   Plan last modified Zena Haas RN (11/26/2018)   Next INR check 12/3/2018   Priority INR   Target end date Indefinite    Indications   Chronic atrial fibrillation (H) [I48.2]  PVD (peripheral vascular disease) (H) [I73.9]  Long-term (current) use of anticoagulants [Z79.01] [Z79.01]         Anticoagulation Episode Summary     INR check location     Preferred lab     Send INR reminders to [facility] POOL    Comments       Anticoagulation Care Providers     Provider Role Specialty Phone number    Dominguez Maradiaga MD [facility] [phone]            See the Encounter Report to view Anticoagulation Flowsheet and Dosing Calendar (Go to Encounters tab in chart review, and find the Anticoagulation Therapy Visit)        Zena Haas, RN

## 2018-11-26 NOTE — MR AVS SNAPSHOT
Rm Duarte   11/26/2018 10:00 AM   Anticoagulation Therapy Visit    Description:  77 year old male   Provider:   ANTI COAGULATION   Department:  Hc Anti Coagulation           INR as of 11/26/2018     Today's INR 2.6      Anticoagulation Summary as of 11/26/2018     INR goal 2.0-3.0   Today's INR 2.6   Full warfarin instructions 11/26: 1.25 mg; 11/28: 1.25 mg; 11/30: 1.25 mg; Otherwise 1.25 mg on Mon, Wed, Fri; 3.75 mg all other days   Next INR check 12/3/2018    Indications   Chronic atrial fibrillation (H) [I48.2]  PVD (peripheral vascular disease) (H) [I73.9]  Long-term (current) use of anticoagulants [Z79.01] [Z79.01]         Your next Anticoagulation Clinic appointment(s)     Dec 03, 2018 10:00 AM CST   Anticoagulation Visit with  ANTI COAGULATION   [facility] [place] ([facility] )    [address]  [place] [address]   [phone]              November 2018 Details    Sun Mon Tue Wed Thu Fri Sat         1               2               3                 4               5               6               7               8               9               10                 11               12               13               14               15               16               17                 18               19               20               21               22               23               24                 25               26      1.25 mg   See details      27      3.75 mg         28      1.25 mg         29      3.75 mg         30      1.25 mg           Date Details   11/26 This INR check               How to take your warfarin dose     To take:  1.25 mg Take 0.5 of a 2.5 mg tablet.    To take:  3.75 mg Take 1.5 of the 2.5 mg tablets.           December 2018 Details    Sun Mon Tue Wed Thu Fri Sat           1      3.75 mg           2      3.75 mg         3            4               5               6               7               8                 9                10               11               12               13               14               15                 16               17               18               19               20               21               22                 23               24               25               26               27               28               29                 30               31                     Date Details   No additional details    Date of next INR:  12/3/2018         How to take your warfarin dose     To take:  1.25 mg Take 0.5 of a 2.5 mg tablet.    To take:  3.75 mg Take 1.5 of the 2.5 mg tablets.

## 2018-12-03 ENCOUNTER — ANTICOAGULATION THERAPY VISIT (OUTPATIENT)
Dept: ANTICOAGULATION | Facility: OTHER | Age: 78
End: 2018-12-03
Attending: FAMILY MEDICINE
Payer: COMMERCIAL

## 2018-12-03 DIAGNOSIS — I73.9 PVD (PERIPHERAL VASCULAR DISEASE) (H): ICD-10-CM

## 2018-12-03 DIAGNOSIS — I48.20 CHRONIC ATRIAL FIBRILLATION (H): ICD-10-CM

## 2018-12-03 DIAGNOSIS — Z79.01 LONG TERM CURRENT USE OF ANTICOAGULANT THERAPY: ICD-10-CM

## 2018-12-03 LAB — INR BLD: 1.6 (ref 0.86–1.14)

## 2018-12-03 PROCEDURE — 36416 COLLJ CAPILLARY BLOOD SPEC: CPT | Mod: ZL | Performed by: FAMILY MEDICINE

## 2018-12-03 PROCEDURE — 85610 PROTHROMBIN TIME: CPT | Mod: QW,ZL | Performed by: FAMILY MEDICINE

## 2018-12-03 NOTE — PROGRESS NOTES
ANTICOAGULATION FOLLOW-UP CLINIC VISIT    Patient Name:  Rm Duarte  Date:  12/3/2018  Contact Type:  Telephone    SUBJECTIVE:     Patient Findings     Positives Unexplained INR or factor level change           OBJECTIVE    INR Point of Care   Date Value Ref Range Status   12/03/2018 1.6 (H) 0.86 - 1.14 Final     Comment:     This test is intended for monitoring Coumadin therapy.  Results are not   accurate in patients with prolonged INR due to factor deficiency.         ASSESSMENT / PLAN  INR assessment SUB    Recheck INR In: 10 DAYS    INR Location Clinic      Anticoagulation Summary as of 12/3/2018     INR goal 2.0-3.0   Today's INR 1.6!   Warfarin maintenance plan 1.25 mg (2.5 mg x 0.5) on Mon, Wed, Fri; 3.75 mg (2.5 mg x 1.5) all other days   Full warfarin instructions 12/3: 2.5 mg; Otherwise 1.25 mg on Mon, Wed, Fri; 3.75 mg all other days   Weekly warfarin total 18.75 mg   Plan last modified Zena Haas RN (11/26/2018)   Next INR check 12/12/2018   Priority INR   Target end date Indefinite    Indications   Chronic atrial fibrillation (H) [I48.2]  PVD (peripheral vascular disease) (H) [I73.9]  Long-term (current) use of anticoagulants [Z79.01] [Z79.01]         Anticoagulation Episode Summary     INR check location     Preferred lab     Send INR reminders to  [facility] POOL    Comments       Anticoagulation Care Providers     Provider Role Specialty Phone number    Dominguez Maradiaga MD [facility] [phone]            See the Encounter Report to view Anticoagulation Flowsheet and Dosing Calendar (Go to Encounters tab in chart review, and find the Anticoagulation Therapy Visit)    INR result, Warfarin dosing, and INR recheck date discussed via telephone this date with pt.    LAZ DAVID RN

## 2018-12-03 NOTE — MR AVS SNAPSHOT
Rm Duarte   12/3/2018 10:00 AM   Anticoagulation Therapy Visit    Description:  77 year old male   Provider:   ANTI COAGULATION   Department:  Hc Anti Coagulation           INR as of 12/3/2018     Today's INR 1.6!      Anticoagulation Summary as of 12/3/2018     INR goal 2.0-3.0   Today's INR 1.6!   Full warfarin instructions 12/3: 2.5 mg; Otherwise 1.25 mg on Mon, Wed, Fri; 3.75 mg all other days   Next INR check 12/12/2018    Indications   Chronic atrial fibrillation (H) [I48.2]  PVD (peripheral vascular disease) (H) [I73.9]  Long-term (current) use of anticoagulants [Z79.01] [Z79.01]         Your next Anticoagulation Clinic appointment(s)     Dec 12, 2018 10:15 AM CST   Anticoagulation Visit with  ANTI COAGULATION   [facility] ([facility] )    [address] [facility] [place] [address]   [phone]              December 2018 Details    Sun Mon Tue Wed Thu Fri Sat           1                 2               3      2.5 mg   See details      4      3.75 mg         5      1.25 mg         6      3.75 mg         7      1.25 mg         8      3.75 mg           9      3.75 mg         10      1.25 mg         11      3.75 mg         12            13               14               15                 16               17               18               19               20               21               22                 23               24               25               26               27               28               29                 30               31                     Date Details   12/03 This INR check       Date of next INR:  12/12/2018         How to take your warfarin dose     To take:  1.25 mg Take 0.5 of a 2.5 mg tablet.    To take:  2.5 mg Take 1 of the 2.5 mg tablets.    To take:  3.75 mg Take 1.5 of the 2.5 mg tablets.

## 2018-12-12 ENCOUNTER — ANTICOAGULATION THERAPY VISIT (OUTPATIENT)
Dept: ANTICOAGULATION | Facility: OTHER | Age: 78
End: 2018-12-12
Attending: FAMILY MEDICINE
Payer: COMMERCIAL

## 2018-12-12 DIAGNOSIS — I48.20 CHRONIC ATRIAL FIBRILLATION (H): ICD-10-CM

## 2018-12-12 DIAGNOSIS — I73.9 PVD (PERIPHERAL VASCULAR DISEASE) (H): ICD-10-CM

## 2018-12-12 DIAGNOSIS — Z79.01 LONG TERM CURRENT USE OF ANTICOAGULANT THERAPY: ICD-10-CM

## 2018-12-12 LAB — INR BLD: 1.9 (ref 0.86–1.14)

## 2018-12-12 PROCEDURE — 85610 PROTHROMBIN TIME: CPT | Mod: QW,ZL | Performed by: FAMILY MEDICINE

## 2018-12-12 PROCEDURE — 36416 COLLJ CAPILLARY BLOOD SPEC: CPT | Mod: ZL | Performed by: FAMILY MEDICINE

## 2018-12-12 NOTE — PROGRESS NOTES
ANTICOAGULATION FOLLOW-UP CLINIC VISIT    Patient Name:  Rm Duarte  Date:  2018  Contact Type:  Telephone    SUBJECTIVE:     Patient Findings     Positives:   No Problem Findings    Comments:   INR done by lab. Call placed to patient, he did not answer the phone but did not leave message as he previously stated he never listens to his voicemails. Will call again later. Call placed to patient again and spoke to him re: INR result, warfarin dosing and INR recheck date. He verbalized understanding and has no questions. He had no bleeding/bruising and no new changes in diet/meds/activity.            OBJECTIVE    INR Point of Care   Date Value Ref Range Status   2018 1.9 (H) 0.86 - 1.14 Final     Comment:     This test is intended for monitoring Coumadin therapy.  Results are not   accurate in patients with prolonged INR due to factor deficiency.         ASSESSMENT / PLAN  INR assessment THER    Recheck INR In: 1 WEEK    INR Location Clinic      Anticoagulation Summary  As of 2018    INR goal:   2.0-3.0   TTR:   33.1 % (2.3 y)   INR used for dosin.9! (2018)   Warfarin maintenance plan:   3.75 mg (2.5 mg x 1.5) every Mon; 2.5 mg (2.5 mg x 1) all other days   Full warfarin instructions:   3.75 mg every Mon; 2.5 mg all other days   Weekly warfarin total:   18.75 mg   Plan last modified:   Zena Haas RN (2018)   Next INR check:   2018   Priority:   INR   Target end date:   Indefinite    Indications    Chronic atrial fibrillation (H) [I48.2]  PVD (peripheral vascular disease) (H) [I73.9]  Long-term (current) use of anticoagulants [Z79.01] [Z79.01]             Anticoagulation Episode Summary     INR check location:       Preferred lab:       Send INR reminders to:   HC ANTICOAG POOL    Comments:         Anticoagulation Care Providers     Provider Role Specialty Phone number    Dominguez Maradiaga MD [facility] [phone]            See the Encounter Report to  view Anticoagulation Flowsheet and Dosing Calendar (Go to Encounters tab in chart review, and find the Anticoagulation Therapy Visit)        Zena Haas RN

## 2018-12-13 NOTE — TELEPHONE ENCOUNTER
----- Message from Ramiro Dupree MD sent at 12/12/2018  6:31 PM CST -----  Please tell him that the stress test is normal, indicating that his chest pain was not from blockage in the heart arteries. Follow-up in 6 months.   Called informed written RX is ready to  at  [facility]

## 2018-12-14 RX ORDER — AZITHROMYCIN 250 MG/1
250 TABLET, FILM COATED ORAL DAILY
Qty: 30 TABLET | Refills: 1 | OUTPATIENT
Start: 2018-12-14

## 2018-12-14 NOTE — TELEPHONE ENCOUNTER
Patient is scheduled for Monday 12/17/18 at 3:45pm with Dr Maradiaga.    Joanie Keating  Medical Assistant

## 2018-12-14 NOTE — TELEPHONE ENCOUNTER
Pt states he called the pharmacy numerous times to have them fax a rx for azithromycin.  He is requesting a refill.

## 2018-12-17 ENCOUNTER — OFFICE VISIT (OUTPATIENT)
Dept: FAMILY MEDICINE | Facility: OTHER | Age: 78
End: 2018-12-17
Attending: FAMILY MEDICINE
Payer: COMMERCIAL

## 2018-12-17 VITALS
BODY MASS INDEX: 25.06 KG/M2 | WEIGHT: 179 LBS | TEMPERATURE: 97.5 F | SYSTOLIC BLOOD PRESSURE: 110 MMHG | OXYGEN SATURATION: 88 % | HEART RATE: 93 BPM | DIASTOLIC BLOOD PRESSURE: 60 MMHG | HEIGHT: 71 IN

## 2018-12-17 DIAGNOSIS — J44.1 OBSTRUCTIVE CHRONIC BRONCHITIS WITH EXACERBATION (H): Primary | ICD-10-CM

## 2018-12-17 DIAGNOSIS — B02.29 POST HERPETIC NEURALGIA: ICD-10-CM

## 2018-12-17 PROCEDURE — 99213 OFFICE O/P EST LOW 20 MIN: CPT | Performed by: FAMILY MEDICINE

## 2018-12-17 PROCEDURE — G0463 HOSPITAL OUTPT CLINIC VISIT: HCPCS

## 2018-12-17 RX ORDER — TRAZODONE HYDROCHLORIDE 50 MG/1
50 TABLET, FILM COATED ORAL
Qty: 30 TABLET | Refills: 1 | Status: SHIPPED | OUTPATIENT
Start: 2018-12-17 | End: 2019-04-24

## 2018-12-17 RX ORDER — TRAMADOL HYDROCHLORIDE 50 MG/1
50 TABLET ORAL EVERY 6 HOURS PRN
Qty: 20 TABLET | Refills: 0 | Status: SHIPPED | OUTPATIENT
Start: 2018-12-17 | End: 2019-04-10 | Stop reason: ALTCHOICE

## 2018-12-17 RX ORDER — AZITHROMYCIN 250 MG/1
TABLET, FILM COATED ORAL
Qty: 90 TABLET | Refills: 3 | Status: SHIPPED | OUTPATIENT
Start: 2018-12-17 | End: 2020-01-06

## 2018-12-17 ASSESSMENT — ANXIETY QUESTIONNAIRES
1. FEELING NERVOUS, ANXIOUS, OR ON EDGE: NOT AT ALL
5. BEING SO RESTLESS THAT IT IS HARD TO SIT STILL: NOT AT ALL
6. BECOMING EASILY ANNOYED OR IRRITABLE: NOT AT ALL
3. WORRYING TOO MUCH ABOUT DIFFERENT THINGS: NOT AT ALL
7. FEELING AFRAID AS IF SOMETHING AWFUL MIGHT HAPPEN: NOT AT ALL
GAD7 TOTAL SCORE: 1
2. NOT BEING ABLE TO STOP OR CONTROL WORRYING: NOT AT ALL

## 2018-12-17 ASSESSMENT — PATIENT HEALTH QUESTIONNAIRE - PHQ9
5. POOR APPETITE OR OVEREATING: SEVERAL DAYS
SUM OF ALL RESPONSES TO PHQ QUESTIONS 1-9: 1

## 2018-12-17 ASSESSMENT — PAIN SCALES - GENERAL: PAINLEVEL: SEVERE PAIN (6)

## 2018-12-17 ASSESSMENT — MIFFLIN-ST. JEOR: SCORE: 1569.07

## 2018-12-17 NOTE — NURSING NOTE
"Chief Complaint   Patient presents with     Recheck Medication       Initial /60 (BP Location: Right arm, Patient Position: Chair, Cuff Size: Adult Regular)   Pulse 93   Temp 97.5  F (36.4  C) (Tympanic)   Ht 1.803 m (5' 11\")   Wt 81.2 kg (179 lb)   SpO2 (!) 88%   BMI 24.97 kg/m   Estimated body mass index is 24.97 kg/m  as calculated from the following:    Height as of this encounter: 1.803 m (5' 11\").    Weight as of this encounter: 81.2 kg (179 lb).  Medication Reconciliation: complete    KASSANDRA LARKIN LPN    "

## 2018-12-17 NOTE — PROGRESS NOTES
SUBJECTIVE:                                                    Rm Duarte is a 77 year old male who presents to clinic today for the following health issues:      Medication request      Duration: many years per patient    Description (location/character/radiation): azithromycin    Intensity:  mild    Accompanying signs and symptoms: SOB    History (similar episodes/previous evaluation): COPD    Precipitating or alleviating factors: Antibiotic    Therapies tried and outcome: Helps keep his COPD from flaring up.       PROBLEMS TO ADD ON...    Problem list and histories reviewed & adjusted, as indicated.  Additional history: on azithro for prophylaxis.  Having worse shingles pain at times.  Has the ongoing post herpetic neuralgia.  We reviewed options.     Patient Active Problem List   Diagnosis     Chronic atrial fibrillation (H)     Arteriosclerotic cardiovascular disease (ASCVD)     ACP (advance care planning)     Obstructive chronic bronchitis with exacerbation (H)     PVD (peripheral vascular disease) (H)     Long-term (current) use of anticoagulants [Z79.01]     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)     COPD exacerbation (H)     Acute exacerbation of chronic obstructive pulmonary disease (H)     Postherpetic neuralgia     Past Surgical History:   Procedure Laterality Date     COPD:FEV1-0.74/FVC-3.35 22%, DLCO-28%  3/1/2010    Severe COPD; 2008 PFT's done : 0.96/3.81 25% Fev1, DLCO 45%.  1991 were 4.31/6.20!!!!!!!??     ECHO: TDS, EF~50%, abnl septum, o/w NORMAL  2011     PAD: CTA> occlusion of L mid SFA with reconstitution  2010     S/P colonoscopy: tubular adenoma & tics  2010    Repeat in ; Dr Graham     Tobacco Abuse Ongoing         Social History     Tobacco Use     Smoking status: Former Smoker     Packs/day: 1.00     Years: 51.00     Pack years: 51.00     Types: Cigarettes     Start date: 1960     Last attempt to quit: 2011     Years since quittin.9      Smokeless tobacco: Never Used     Tobacco comment: Tried to quit; Quit    Substance Use Topics     Alcohol use: Yes     Alcohol/week: 0.0 oz     Comment: Beer; rarely     Family History   Problem Relation Age of Onset     Other - See Comments Mother         AAA, cause of death     Other - See Comments Other 69        AAA, family hx     Chronic Obstructive Pulmonary Disease Brother          MI age 60's     Family History Negative Sister          Current Outpatient Medications   Medication Sig Dispense Refill     aspirin (ASPIRIN LOW DOSE) 81 MG tablet Take 1 tablet by mouth daily.       azithromycin (ZITHROMAX Z-MARIN) 250 MG tablet Take 1 pill daily indefinitely 90 tablet 3     budesonide (PULMICORT) 0.25 MG/2ML neb solution Take 0.25 mg by nebulization 2 times daily       cilostazol (PLETAL) 100 MG tablet TAKE 1 TABLET BY MOUTH TWICE DAILY 180 tablet 0     COMBIVENT RESPIMAT  MCG/ACT inhaler INHALE ONE PUFF BY MOUTH 4 TIMES DAILY. MAX  SIX  DOSES  PER  DAY 12 g 5     Dextromethorphan-Guaifenesin (ROBITUSSIN COUGH+CHEST EDVIN DM) 5-100 MG/5ML LIQD Take 20 mLs by mouth every 4 hours as needed       gabapentin (NEURONTIN) 300 MG capsule        ipratropium - albuterol 0.5 mg/2.5 mg/3 mL (DUONEB) 0.5-2.5 (3) MG/3ML neb solution USE ONE AMPULE IN NEBULIZER EVERY 6 HOURS AS NEEDED FOR SHORTNESS OF BREATH/DYSPNEA  OR  WHEEZING 360 mL 10     loperamide (IMODIUM A-D) 2 MG tablet Take 2 mg by mouth 4 times daily as needed. Take 2 tablet by oral route after 1st loose stool and 1 tablet after each subsequent bowel movement; do not exceed 16 mg in 24 hrs. As needed.       LYRICA 50 MG capsule        Multiple Vitamins-Minerals (CENTRUM SILVER) per tablet Take 1 tablet by mouth daily.       order for DME Equipment being ordered: Oxygen 2 L per nasal canula continuously, re-certify for 1 year      FAX TO [facility] 1 Units 11     order for DME Equipment being ordered: shower chair 1 each 0     PERFOROMIST 20 MCG/2ML  "neb solution USE 1 VIAL IN NEBULIZER EVERY 12 HOURS 360 mL 1     pregabalin (LYRICA) 100 MG capsule Take 1 capsule (100 mg) by mouth 3 times daily 90 capsule 1     roflumilast (DALIRESP) 500 MCG TABS tablet Take 500 mcg by mouth daily       simvastatin (ZOCOR) 10 MG tablet TAKE 1 TABLET BY MOUTH AT BEDTIME 90 tablet 1     SPIRIVA RESPIMAT 2.5 MCG/ACT inhalation aerosol        tamsulosin (FLOMAX) 0.4 MG capsule TAKE ONE CAPSULE BY MOUTH ONCE DAILY 90 capsule 1     traMADol (ULTRAM) 50 MG tablet Take 1 tablet (50 mg) by mouth every 6 hours as needed for severe pain 20 tablet 0     traZODone (DESYREL) 50 MG tablet Take 1 tablet (50 mg) by mouth nightly as needed for sleep 30 tablet 1     warfarin (COUMADIN) 2.5 MG tablet Take 1.25mg Wed; 3.75mg all other days or as directed by [facility] 115 tablet 3     No Known Allergies    ROS:  Constitutional, HEENT, cardiovascular, pulmonary, gi and gu systems are negative, except as otherwise noted.    OBJECTIVE:                                                    /60 (BP Location: Right arm, Patient Position: Chair, Cuff Size: Adult Regular)   Pulse 93   Temp 97.5  F (36.4  C) (Tympanic)   Ht 1.803 m (5' 11\")   Wt 81.2 kg (179 lb)   SpO2 (!) 88%   BMI 24.97 kg/m    Body mass index is 24.97 kg/m .  GENERAL APPEARANCE: Alert, no acute distress  CV: regular rate and rhythm, no murmur, rub or gallop  RESP: lungs clear to auscultation bilaterally with decreased breath sounds.    ABDOMEN: normal bowel sounds, soft, nontender, no hepatosplenomegaly or other masses  SKIN: no suspicious lesions or rashes to visualized skin  NEURO: Alert, oriented x 3, speech and mentation normal           ASSESSMENT/PLAN:                                                    1. Obstructive chronic bronchitis with exacerbation (H)  Reviewed.  Refilled azithro for the year.  F/u routine.    - azithromycin (ZITHROMAX Z-MARIN) 250 MG tablet; Take 1 pill daily indefinitely  Dispense: 90 tablet; " Refill: 3    2. Post herpetic neuralgia  Discussed options.  Trial of trazodone at night.  Ultram if needed.  F/u with ongoing concerns.  Warned about SE, etc.    - traZODone (DESYREL) 50 MG tablet; Take 1 tablet (50 mg) by mouth nightly as needed for sleep  Dispense: 30 tablet; Refill: 1  - traMADol (ULTRAM) 50 MG tablet; Take 1 tablet (50 mg) by mouth every 6 hours as needed for severe pain  Dispense: 20 tablet; Refill: 0    I sent Zena the note so she knows I started the trazodone and the ultram.          Dominguez Maradiaga MD  [facility]

## 2018-12-18 ASSESSMENT — ANXIETY QUESTIONNAIRES: GAD7 TOTAL SCORE: 1

## 2018-12-19 ENCOUNTER — ANTICOAGULATION THERAPY VISIT (OUTPATIENT)
Dept: ANTICOAGULATION | Facility: OTHER | Age: 78
End: 2018-12-19
Attending: FAMILY MEDICINE
Payer: COMMERCIAL

## 2018-12-19 DIAGNOSIS — Z79.01 LONG TERM CURRENT USE OF ANTICOAGULANT THERAPY: ICD-10-CM

## 2018-12-19 DIAGNOSIS — I48.20 CHRONIC ATRIAL FIBRILLATION (H): ICD-10-CM

## 2018-12-19 DIAGNOSIS — I73.9 PVD (PERIPHERAL VASCULAR DISEASE) (H): ICD-10-CM

## 2018-12-19 LAB — INR BLD: 2.1 (ref 0.86–1.14)

## 2018-12-19 PROCEDURE — 85610 PROTHROMBIN TIME: CPT | Mod: QW,ZL | Performed by: FAMILY MEDICINE

## 2018-12-19 PROCEDURE — 36416 COLLJ CAPILLARY BLOOD SPEC: CPT | Mod: ZL | Performed by: FAMILY MEDICINE

## 2018-12-19 NOTE — PROGRESS NOTES
ANTICOAGULATION FOLLOW-UP CLINIC VISIT    Patient Name:  Rm Duarte  Date:  2018  Contact Type:  Telephone/ spoke with patient    SUBJECTIVE:     Patient Findings     Positives:   Change in medications    Comments:   INR done by lab. Call placed to patient and spoke to him re: INR result, warfarin dosing (per consult with Zena Haas RN) and INR recheck date. He verbalized understanding and has no questions. No bleeding/bruising and no new changes in diet/activity. Patient has started Trazodone and Tramadol on 18 and he believes he will ask for refill of this.  Patient took 1 pill Tramadol on 18.  Will recheck lab in 1 week.             OBJECTIVE    INR Point of Care   Date Value Ref Range Status   2018 2.1 (H) 0.86 - 1.14 Final     Comment:     This test is intended for monitoring Coumadin therapy.  Results are not   accurate in patients with prolonged INR due to factor deficiency.         ASSESSMENT / PLAN  INR assessment THER    Recheck INR In: 1 WEEK    INR Location Clinic      Anticoagulation Summary  As of 2018    INR goal:   2.0-3.0   TTR:   33.2 % (2.3 y)   INR used for dosin.1 (2018)   Warfarin maintenance plan:   3.75 mg (2.5 mg x 1.5) every Mon; 2.5 mg (2.5 mg x 1) all other days   Full warfarin instructions:   3.75 mg every Mon; 2.5 mg all other days   Weekly warfarin total:   18.75 mg   No change documented:   Vianey Hinkle RN   Plan last modified:   Zena Haas RN (2018)   Next INR check:   2018   Priority:   INR   Target end date:   Indefinite    Indications    Chronic atrial fibrillation (H) [I48.2]  PVD (peripheral vascular disease) (H) [I73.9]  Long-term (current) use of anticoagulants [Z79.01] [Z79.01]             Anticoagulation Episode Summary     INR check location:       Preferred lab:       Send INR reminders to:   HC ANTICOAG POOL    Comments:         Anticoagulation Care Providers     Provider Role Specialty Phone number     Dominguez Maradiaga MD [facility] [phone]            See the Encounter Report to view Anticoagulation Flowsheet and Dosing Calendar (Go to Encounters tab in chart review, and find the Anticoagulation Therapy Visit)      Vianey Hinkle RN

## 2018-12-21 ENCOUNTER — PATIENT OUTREACH (OUTPATIENT)
Dept: CARE COORDINATION | Facility: OTHER | Age: 78
End: 2018-12-21

## 2018-12-21 ASSESSMENT — ACTIVITIES OF DAILY LIVING (ADL): DEPENDENT_IADLS:: INDEPENDENT

## 2018-12-21 NOTE — PROGRESS NOTES
Clinic Care Coordination Contact  Care Team Conversations    Care Coordinator phoned Rm today to follow-up after his visit 12/17 with Dr Maradiaga.  He states he is overall doing ok, however, continues to suffer from post-herpetic neuralgias.  He states he doesn't really feel the tramadol works that well for his pain and the trazodone is just ok- he states if he can fall asleep he usually sleeps ok.      Rm denies issues with his COPD.  States things are going well overall and he is adhering to his medication regimen.      He has one of the [facility] [employer] alert pendants which he is satisfied with.  He states he is missing one of the batteries and thought [facility] was ordering him another one.  CC called [facility] and confirmed they ordered him one.  As soon as they receive it they will let him know.  CC let Rm know.      Encouraged him to reach out to CC anytime with questions/concerns/needs.  Verbalized understanding.    Lauren Pipkin, BS-RN   CHF and General Care Coordinator  [phone] Option # 1

## 2018-12-24 DIAGNOSIS — M19.90 ARTHRITIS: Primary | ICD-10-CM

## 2018-12-26 ENCOUNTER — ANTICOAGULATION THERAPY VISIT (OUTPATIENT)
Dept: ANTICOAGULATION | Facility: OTHER | Age: 78
End: 2018-12-26
Attending: FAMILY MEDICINE
Payer: COMMERCIAL

## 2018-12-26 DIAGNOSIS — I48.20 CHRONIC ATRIAL FIBRILLATION (H): ICD-10-CM

## 2018-12-26 DIAGNOSIS — Z79.01 LONG TERM CURRENT USE OF ANTICOAGULANT THERAPY: ICD-10-CM

## 2018-12-26 DIAGNOSIS — I73.9 PVD (PERIPHERAL VASCULAR DISEASE) (H): ICD-10-CM

## 2018-12-26 LAB — INR BLD: 2 (ref 0.86–1.14)

## 2018-12-26 PROCEDURE — 36416 COLLJ CAPILLARY BLOOD SPEC: CPT | Mod: ZL | Performed by: FAMILY MEDICINE

## 2018-12-26 PROCEDURE — 85610 PROTHROMBIN TIME: CPT | Mod: QW,ZL | Performed by: FAMILY MEDICINE

## 2018-12-26 NOTE — PROGRESS NOTES
ANTICOAGULATION FOLLOW-UP CLINIC VISIT    Patient Name:  Rm Duarte  Date:  2018  Contact Type:  Telephone/ spoke to patient    SUBJECTIVE:     Patient Findings     Positives:   No Problem Findings    Comments:   INR done by lab. Call placed to patient and spoke to him re: INR result, warfarin dosing (per consult with Zena Burden RN) and INR recheck date. He verbalized understanding and has no questions. No bleeding/bruising and no new changes in diet/meds/activity.            OBJECTIVE    INR Point of Care   Date Value Ref Range Status   2018 2.0 (H) 0.86 - 1.14 Final     Comment:     This test is intended for monitoring Coumadin therapy.  Results are not   accurate in patients with prolonged INR due to factor deficiency.         ASSESSMENT / PLAN  INR assessment THER    Recheck INR In: 3 WEEKS    INR Location Clinic      Anticoagulation Summary  As of 2018    INR goal:   2.0-3.0   TTR:   33.7 % (2.4 y)   INR used for dosin.0 (2018)   Warfarin maintenance plan:   3.75 mg (2.5 mg x 1.5) every Mon; 2.5 mg (2.5 mg x 1) all other days   Full warfarin instructions:   3.75 mg every Mon; 2.5 mg all other days   Weekly warfarin total:   18.75 mg   No change documented:   Vianey Hinkle RN   Plan last modified:   Zena Haas RN (2018)   Next INR check:   2019   Priority:   INR   Target end date:   Indefinite    Indications    Chronic atrial fibrillation (H) [I48.2]  PVD (peripheral vascular disease) (H) [I73.9]  Long-term (current) use of anticoagulants [Z79.01] [Z79.01]             Anticoagulation Episode Summary     INR check location:       Preferred lab:       Send INR reminders to:    ANTICOAG POOL    Comments:         Anticoagulation Care Providers     Provider Role Specialty Phone number    Dominguez Maradiaga MD [facility] [phone]            See the Encounter Report to view Anticoagulation Flowsheet and Dosing Calendar (Go to Encounters tab  in chart review, and find the Anticoagulation Therapy Visit)      Vianey Hinkle RN

## 2018-12-26 NOTE — TELEPHONE ENCOUNTER
traMADol (ULTRAM) 50 MG tablet      Last Written Prescription Date:  12/17/18  Last Fill Quantity: 20,   # refills: 0  Last Office Visit: 12/17/18  Future Office visit:       Routing refill request to provider for review/approval because:  Drug not active on patient's medication list

## 2018-12-27 RX ORDER — TRAMADOL HYDROCHLORIDE 50 MG/1
TABLET ORAL
Qty: 20 TABLET | Refills: 0 | Status: SHIPPED | OUTPATIENT
Start: 2018-12-27 | End: 2019-01-07

## 2019-01-07 ENCOUNTER — TELEPHONE (OUTPATIENT)
Dept: FAMILY MEDICINE | Facility: OTHER | Age: 79
End: 2019-01-07

## 2019-01-07 DIAGNOSIS — B02.29 POST HERPETIC NEURALGIA: ICD-10-CM

## 2019-01-07 DIAGNOSIS — M19.90 ARTHRITIS: ICD-10-CM

## 2019-01-07 RX ORDER — TRAMADOL HYDROCHLORIDE 50 MG/1
50 TABLET ORAL 3 TIMES DAILY PRN
Qty: 60 TABLET | Refills: 0 | Status: SHIPPED | OUTPATIENT
Start: 2019-01-07 | End: 2019-01-09

## 2019-01-07 RX ORDER — PREGABALIN 100 MG/1
100 CAPSULE ORAL 3 TIMES DAILY
Qty: 90 CAPSULE | Refills: 1 | Status: CANCELLED | OUTPATIENT
Start: 2019-01-07

## 2019-01-07 NOTE — TELEPHONE ENCOUNTER
Situation- Pt called, reports increased pain due to shingles.     Background- Pt has history of postherpetic neuralgia.     Assessment- Pt reports increased pain to his head due to shingles. Pt rates his pain at 10/10 when at its worst. Pt reports that he has been taking PRN tramadol but states that it wears off after approx 4 hours.     Recommendation- Pt wondering if he can take tramadol more frequently or if he can have a stronger pain med. Pt uses [facility]. Please advise. Thank you!

## 2019-01-07 NOTE — TELEPHONE ENCOUNTER
Ok for tramadol tid prn.  Verify lyrica dosing please.  Can likely go up on that as well.  Thanks. Dominguez Maradiaga

## 2019-01-09 RX ORDER — TRAMADOL HYDROCHLORIDE 50 MG/1
50 TABLET ORAL EVERY 6 HOURS PRN
Qty: 60 TABLET | Refills: 0 | Status: SHIPPED | OUTPATIENT
Start: 2019-01-09 | End: 2019-01-22

## 2019-01-09 NOTE — TELEPHONE ENCOUNTER
Received call from patient.  He is requesting Ultram to be changed back to every 6 hours.  He is in a lot of pain and declined an appointment he just wants his Ultram changed back to what it previously was.    Please advise.     Phone: [phone]

## 2019-01-09 NOTE — TELEPHONE ENCOUNTER
Fine with me.  I thought it was bid and moved it up to tid.  q 6 hours is fine with me.  Ensure no side effects.  Thanks.  Dominguez Maradiaga

## 2019-01-14 DIAGNOSIS — B02.29 POST HERPETIC NEURALGIA: ICD-10-CM

## 2019-01-15 RX ORDER — PREGABALIN 100 MG
CAPSULE ORAL
Qty: 90 CAPSULE | Refills: 1 | Status: SHIPPED | OUTPATIENT
Start: 2019-01-15 | End: 2019-03-20

## 2019-01-15 NOTE — TELEPHONE ENCOUNTER
Please advise.    pregabalin (LYRICA) 100 MG capsule      Last Written Prescription Date:  10/29/18  Last Fill Quantity: 90,   # refills: 1  Last Office Visit: 12/17/18  Future Office visit:       Routing refill request to provider for review/approval because:  Drug not on the [facility], [facility] or [facility] refill protocol or controlled substance

## 2019-01-16 ENCOUNTER — ANTICOAGULATION THERAPY VISIT (OUTPATIENT)
Dept: ANTICOAGULATION | Facility: OTHER | Age: 79
End: 2019-01-16
Attending: FAMILY MEDICINE
Payer: COMMERCIAL

## 2019-01-16 DIAGNOSIS — Z79.01 LONG TERM CURRENT USE OF ANTICOAGULANT THERAPY: ICD-10-CM

## 2019-01-16 DIAGNOSIS — I73.9 PVD (PERIPHERAL VASCULAR DISEASE) (H): ICD-10-CM

## 2019-01-16 DIAGNOSIS — I48.20 CHRONIC ATRIAL FIBRILLATION (H): ICD-10-CM

## 2019-01-16 LAB — INR BLD: 2.4 (ref 0.86–1.14)

## 2019-01-16 PROCEDURE — 85610 PROTHROMBIN TIME: CPT | Mod: QW,ZL | Performed by: FAMILY MEDICINE

## 2019-01-16 PROCEDURE — 36416 COLLJ CAPILLARY BLOOD SPEC: CPT | Mod: ZL | Performed by: FAMILY MEDICINE

## 2019-01-16 NOTE — PROGRESS NOTES
ANTICOAGULATION FOLLOW-UP CLINIC VISIT    Patient Name:  Rm Duarte  Date:  2019  Contact Type:  Telephone/ called 3 times and no answer and patient doesn't use answering machine then spoke to patient    SUBJECTIVE:     Patient Findings     Positives:   No Problem Findings    Comments:   Called patient 3 times as previous note stated he doesn't check his answering machine coumadin will call back again.  INR done by lab. Call placed to patient and spoke to him re: INR result, warfarin dosing and INR recheck date. He verbalized understanding and has no questions. No bleeding/bruising/upcoming procedures/questions and no new changes in diet/meds/activity           OBJECTIVE    INR Point of Care   Date Value Ref Range Status   2019 2.4 (H) 0.86 - 1.14 Final     Comment:     This test is intended for monitoring Coumadin therapy.  Results are not   accurate in patients with prolonged INR due to factor deficiency.         ASSESSMENT / PLAN  INR assessment THER    Recheck INR In: 4 WEEKS    INR Location Clinic      Anticoagulation Summary  As of 2019    INR goal:   2.0-3.0   TTR:   35.3 % (2.4 y)   INR used for dosin.4 (2019)   Warfarin maintenance plan:   3.75 mg (2.5 mg x 1.5) every Mon; 2.5 mg (2.5 mg x 1) all other days   Full warfarin instructions:   3.75 mg every Mon; 2.5 mg all other days   Weekly warfarin total:   18.75 mg   No change documented:   Vianey Hinkle, RN   Plan last modified:   Zena Haas RN (2018)   Next INR check:   2019   Priority:   INR   Target end date:   Indefinite    Indications    Chronic atrial fibrillation (H) [I48.2]  PVD (peripheral vascular disease) (H) [I73.9]  Long-term (current) use of anticoagulants [Z79.01] [Z79.01]             Anticoagulation Episode Summary     INR check location:       Preferred lab:       Send INR reminders to:   HC ANTICOAG POOL    Comments:         Anticoagulation Care Providers     Provider Role Specialty Phone  number    Dominguez Maradiaga MD [facility] [phone]            See the Encounter Report to view Anticoagulation Flowsheet and Dosing Calendar (Go to Encounters tab in chart review, and find the Anticoagulation Therapy Visit)      Vianey Hinkle RN

## 2019-01-22 DIAGNOSIS — M19.90 ARTHRITIS: ICD-10-CM

## 2019-01-23 RX ORDER — TRAMADOL HYDROCHLORIDE 50 MG/1
TABLET ORAL
Qty: 60 TABLET | Refills: 0 | Status: SHIPPED | OUTPATIENT
Start: 2019-01-23 | End: 2019-02-05

## 2019-01-31 DIAGNOSIS — I73.9 PVD (PERIPHERAL VASCULAR DISEASE) (H): ICD-10-CM

## 2019-01-31 RX ORDER — CILOSTAZOL 100 MG/1
TABLET ORAL
Qty: 180 TABLET | Refills: 0 | Status: SHIPPED | OUTPATIENT
Start: 2019-01-31 | End: 2019-05-08

## 2019-01-31 NOTE — TELEPHONE ENCOUNTER
airam       Last Written Prescription Date:  10/25/2018  Last Fill Quantity: 180,   # refills: 0  Last Office Visit: 12/17/2018  Future Office visit:

## 2019-02-05 DIAGNOSIS — M19.90 ARTHRITIS: ICD-10-CM

## 2019-02-06 ENCOUNTER — PATIENT OUTREACH (OUTPATIENT)
Dept: CARE COORDINATION | Facility: OTHER | Age: 79
End: 2019-02-06

## 2019-02-06 RX ORDER — TRAMADOL HYDROCHLORIDE 50 MG/1
TABLET ORAL
Qty: 60 TABLET | Refills: 0 | Status: SHIPPED | OUTPATIENT
Start: 2019-02-06 | End: 2019-02-24

## 2019-02-06 ASSESSMENT — ACTIVITIES OF DAILY LIVING (ADL): DEPENDENT_IADLS:: INDEPENDENT

## 2019-02-06 NOTE — TELEPHONE ENCOUNTER
Tramadol  Last Written Prescription Date: 1/23/19  Last Fill Quantity: 60 # of Refills: 0  Last Office Visit: 12/17/18
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
## 2019-02-13 ENCOUNTER — ANTICOAGULATION THERAPY VISIT (OUTPATIENT)
Dept: ANTICOAGULATION | Facility: OTHER | Age: 79
End: 2019-02-13
Attending: FAMILY MEDICINE
Payer: COMMERCIAL

## 2019-02-13 DIAGNOSIS — I73.9 PVD (PERIPHERAL VASCULAR DISEASE) (H): ICD-10-CM

## 2019-02-13 DIAGNOSIS — I48.20 CHRONIC ATRIAL FIBRILLATION (H): ICD-10-CM

## 2019-02-13 DIAGNOSIS — Z79.01 LONG TERM CURRENT USE OF ANTICOAGULANT THERAPY: ICD-10-CM

## 2019-02-13 LAB — INR BLD: 1.7 (ref 0.86–1.14)

## 2019-02-13 PROCEDURE — 85610 PROTHROMBIN TIME: CPT | Mod: QW,ZL | Performed by: FAMILY MEDICINE

## 2019-02-13 PROCEDURE — 36416 COLLJ CAPILLARY BLOOD SPEC: CPT | Mod: ZL | Performed by: FAMILY MEDICINE

## 2019-02-13 NOTE — PROGRESS NOTES
ANTICOAGULATION FOLLOW-UP CLINIC VISIT    Patient Name:  Rm Duarte  Date:  2019  Contact Type:  Telephone    SUBJECTIVE:     Patient Findings     Positives:   No Problem Findings    Comments:   INR done by lab. Call placed to patient and spoke to him re: INR result, warfarin dosing and INR recheck date. He verbalized understanding and has no questions. He has no bleeding/bruising and no new changes in diet/meds/activity           OBJECTIVE    INR Point of Care   Date Value Ref Range Status   2019 1.7 (H) 0.86 - 1.14 Final     Comment:     This test is intended for monitoring Coumadin therapy.  Results are not   accurate in patients with prolonged INR due to factor deficiency.         ASSESSMENT / PLAN  INR assessment SUB    Recheck INR In: 3 WEEKS    INR Location Clinic      Anticoagulation Summary  As of 2019    INR goal:   2.0-3.0   TTR:   36.0 % (2.5 y)   INR used for dosin.7! (2019)   Warfarin maintenance plan:   3.75 mg (2.5 mg x 1.5) every Mon; 2.5 mg (2.5 mg x 1) all other days   Full warfarin instructions:   : 3.75 mg; Otherwise 3.75 mg every Mon; 2.5 mg all other days   Weekly warfarin total:   18.75 mg   Plan last modified:   Zena Haas RN (2018)   Next INR check:   3/6/2019   Priority:   INR   Target end date:   Indefinite    Indications    Chronic atrial fibrillation (H) [I48.2]  PVD (peripheral vascular disease) (H) [I73.9]  Long-term (current) use of anticoagulants [Z79.01] [Z79.01]             Anticoagulation Episode Summary     INR check location:       Preferred lab:       Send INR reminders to:   HC ANTICOAG POOL    Comments:         Anticoagulation Care Providers     Provider Role Specialty Phone number    Dominguez Maradiaga MD [facility] [phone]            See the Encounter Report to view Anticoagulation Flowsheet and Dosing Calendar (Go to Encounters tab in chart review, and find the Anticoagulation Therapy Visit)        Zena FARAH  Waldo RN
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
## 2019-02-21 DIAGNOSIS — Z79.01 LONG TERM CURRENT USE OF ANTICOAGULANT THERAPY: ICD-10-CM

## 2019-02-21 NOTE — TELEPHONE ENCOUNTER
warfarin (COUMADIN) 2.5 MG tablet   Last Written Prescription Date:  11/13/18  Last Fill Quantity: 115,   # refills: 3  Last Office Visit: 12/17/18  Future Office visit:

## 2019-02-22 RX ORDER — WARFARIN SODIUM 2.5 MG/1
TABLET ORAL
Qty: 125 TABLET | Refills: 3 | Status: SHIPPED | OUTPATIENT
Start: 2019-02-22 | End: 2020-01-01

## 2019-02-24 DIAGNOSIS — M19.90 ARTHRITIS: ICD-10-CM

## 2019-02-25 NOTE — TELEPHONE ENCOUNTER
Tramadol      Last Written Prescription Date:  2/6/19  Last Fill Quantity: 60,   # refills: 0  Last Office Visit: 12/17/18  Future Office visit:       Routing refill request to provider for review/approval because:  Drug not on the [facility], P or [facility] refill protocol or controlled substance

## 2019-02-26 RX ORDER — TRAMADOL HYDROCHLORIDE 50 MG/1
TABLET ORAL
Qty: 60 TABLET | Refills: 0 | Status: SHIPPED | OUTPATIENT
Start: 2019-02-26 | End: 2019-03-12

## 2019-03-06 ENCOUNTER — ANTICOAGULATION THERAPY VISIT (OUTPATIENT)
Dept: ANTICOAGULATION | Facility: OTHER | Age: 79
End: 2019-03-06
Attending: FAMILY MEDICINE
Payer: COMMERCIAL

## 2019-03-06 DIAGNOSIS — I48.20 CHRONIC ATRIAL FIBRILLATION (H): ICD-10-CM

## 2019-03-06 DIAGNOSIS — I73.9 PVD (PERIPHERAL VASCULAR DISEASE) (H): ICD-10-CM

## 2019-03-06 DIAGNOSIS — Z79.01 LONG TERM CURRENT USE OF ANTICOAGULANT THERAPY: ICD-10-CM

## 2019-03-06 LAB — INR BLD: 3.5 (ref 0.86–1.14)

## 2019-03-06 PROCEDURE — 36416 COLLJ CAPILLARY BLOOD SPEC: CPT | Mod: ZL | Performed by: FAMILY MEDICINE

## 2019-03-06 PROCEDURE — 85610 PROTHROMBIN TIME: CPT | Mod: QW,ZL | Performed by: FAMILY MEDICINE

## 2019-03-06 NOTE — PROGRESS NOTES
ANTICOAGULATION FOLLOW-UP CLINIC VISIT    Patient Name:  Rm Duarte  Date:  3/6/2019  Contact Type:  Telephone    SUBJECTIVE:     Patient Findings     Positives:   No Problem Findings    Comments:   INR done by lab. Call placed to patient and spoke to him re: INR result, warfarin dosing and INR recheck date. He verbalized understanding and has no questions. He states no bleeding/bruising and no changes in diet/meds/activity.            OBJECTIVE    INR Point of Care   Date Value Ref Range Status   03/06/2019 3.5 (H) 0.86 - 1.14 Final     Comment:     This test is intended for monitoring Coumadin therapy.  Results are not   accurate in patients with prolonged INR due to factor deficiency.         ASSESSMENT / PLAN  INR assessment SUPRA    Recheck INR In: 3 WEEKS    INR Location Clinic      Anticoagulation Summary  As of 3/6/2019    INR goal:   2.0-3.0   TTR:   36.4 % (2.5 y)   INR used for dosing:   3.5! (3/6/2019)   Warfarin maintenance plan:   3.75 mg (2.5 mg x 1.5) every Mon; 2.5 mg (2.5 mg x 1) all other days   Full warfarin instructions:   3/6: 1.25 mg; Otherwise 3.75 mg every Mon; 2.5 mg all other days   Weekly warfarin total:   18.75 mg   Plan last modified:   Zena Haas RN (12/12/2018)   Next INR check:   3/27/2019   Priority:   INR   Target end date:   Indefinite    Indications    Chronic atrial fibrillation (H) [I48.2]  PVD (peripheral vascular disease) (H) [I73.9]  Long-term (current) use of anticoagulants [Z79.01] [Z79.01]             Anticoagulation Episode Summary     INR check location:       Preferred lab:       Send INR reminders to:   [facility] ANTICOAG POOL    Comments:         Anticoagulation Care Providers     Provider Role Specialty Phone number    Dominguez Maradiaga MD [facility] [phone]            See the Encounter Report to view Anticoagulation Flowsheet and Dosing Calendar (Go to Encounters tab in chart review, and find the Anticoagulation Therapy Visit)        Zena FARAH  Waldo RN

## 2019-03-12 DIAGNOSIS — M19.90 ARTHRITIS: ICD-10-CM

## 2019-03-13 NOTE — TELEPHONE ENCOUNTER
Ultram       Last Written Prescription Date:  2/26/2019  Last Fill Quantity: 60,   # refills: 0  Last Office Visit: 12/17/2018  Future Office visit:       Routing refill request to provider for review/approval because:  Drug not on the [facility], [facility] or [facility] refill protocol or controlled substance

## 2019-03-14 RX ORDER — TRAMADOL HYDROCHLORIDE 50 MG/1
TABLET ORAL
Qty: 60 TABLET | Refills: 0 | Status: SHIPPED | OUTPATIENT
Start: 2019-03-14 | End: 2019-04-08

## 2019-03-20 DIAGNOSIS — B02.29 POST HERPETIC NEURALGIA: ICD-10-CM

## 2019-03-20 RX ORDER — PREGABALIN 100 MG
CAPSULE ORAL
Qty: 90 CAPSULE | Refills: 1 | Status: SHIPPED | OUTPATIENT
Start: 2019-03-20 | End: 2019-04-10

## 2019-03-20 NOTE — TELEPHONE ENCOUNTER
LYRICA 100 MG capsule      Last Written Prescription Date:  1-15-19  Last Fill Quantity: 90,   # refills: 0  Last Office Visit: 12-17-18  Future Office visit:       Routing refill request to provider for review/approval because:  Drug not on the [facility], P or [facility] refill protocol or controlled substance

## 2019-03-27 ENCOUNTER — ANTICOAGULATION THERAPY VISIT (OUTPATIENT)
Dept: ANTICOAGULATION | Facility: OTHER | Age: 79
End: 2019-03-27
Attending: FAMILY MEDICINE
Payer: COMMERCIAL

## 2019-03-27 DIAGNOSIS — I48.20 CHRONIC ATRIAL FIBRILLATION (H): ICD-10-CM

## 2019-03-27 DIAGNOSIS — Z79.01 LONG TERM CURRENT USE OF ANTICOAGULANT THERAPY: ICD-10-CM

## 2019-03-27 DIAGNOSIS — I73.9 PVD (PERIPHERAL VASCULAR DISEASE) (H): ICD-10-CM

## 2019-03-27 LAB — INR BLD: 2.2 (ref 0.86–1.14)

## 2019-03-27 PROCEDURE — 36416 COLLJ CAPILLARY BLOOD SPEC: CPT | Mod: ZL | Performed by: FAMILY MEDICINE

## 2019-03-27 PROCEDURE — 85610 PROTHROMBIN TIME: CPT | Mod: QW,ZL | Performed by: FAMILY MEDICINE

## 2019-03-27 NOTE — PROGRESS NOTES
ANTICOAGULATION FOLLOW-UP CLINIC VISIT    Patient Name:  Rm Duarte  Date:  3/27/2019  Contact Type:  Telephone/ message left on home phone voicemail    SUBJECTIVE:     Patient Findings     Comments:   INR done by lab. Call placed to patient and message left re: INR result, warfarin dosing and INR recheck date. Patient to call warfarin clinic if he has any bleeding/bruising, changes in diet/meds/activity or questions.            OBJECTIVE    INR Point of Care   Date Value Ref Range Status   2019 2.2 (H) 0.86 - 1.14 Final     Comment:     This test is intended for monitoring Coumadin therapy.  Results are not   accurate in patients with prolonged INR due to factor deficiency.         ASSESSMENT / PLAN  INR assessment THER    Recheck INR In: 4 WEEKS    INR Location Clinic      Anticoagulation Summary  As of 3/27/2019    INR goal:   2.0-3.0   TTR:   37.0 % (2.6 y)   INR used for dosin.2 (3/27/2019)   Warfarin maintenance plan:   3.75 mg (2.5 mg x 1.5) every Mon; 2.5 mg (2.5 mg x 1) all other days   Full warfarin instructions:   3.75 mg every Mon; 2.5 mg all other days   Weekly warfarin total:   18.75 mg   No change documented:   Zena Haas, RN   Plan last modified:   Zena Haas RN (2018)   Next INR check:   2019   Priority:   INR   Target end date:   Indefinite    Indications    Chronic atrial fibrillation (H) [I48.2]  PVD (peripheral vascular disease) (H) [I73.9]  Long-term (current) use of anticoagulants [Z79.01] [Z79.01]             Anticoagulation Episode Summary     INR check location:       Preferred lab:       Send INR reminders to:   [facility] ANTICOAG POOL    Comments:         Anticoagulation Care Providers     Provider Role Specialty Phone number    Dominguez Maradiaga MD [facility] [phone]            See the Encounter Report to view Anticoagulation Flowsheet and Dosing Calendar (Go to Encounters tab in chart review, and find the Anticoagulation Therapy  Visit)        Zena Haas RN

## 2019-04-04 ENCOUNTER — PATIENT OUTREACH (OUTPATIENT)
Dept: CARE COORDINATION | Facility: OTHER | Age: 79
End: 2019-04-04

## 2019-04-04 ASSESSMENT — ACTIVITIES OF DAILY LIVING (ADL): DEPENDENT_IADLS:: INDEPENDENT

## 2019-04-05 NOTE — PROGRESS NOTES
SUBJECTIVE:                                                    Rm Duarte is a 78 year old male who presents to clinic today for the following health issues:      Shingles; Rash      Duration: Ongoing concern    Description (location/character/radiation): frontal lobe; fore head left    Intensity:  moderate    Accompanying signs and symptoms: Rash, no pain at this time, no itch    History (similar episodes/previous evaluation): See MR    Precipitating or alleviating factors: None    Therapies tried and outcome: Prescribed medications only; helpful       PROBLEMS TO ADD ON...    Problem list and histories reviewed & adjusted, as indicated.  Additional history: here for f/u.  Pain not controlled.  Frustrated with it.  He missed his neuro appt set up several months ago.      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  1991 were 4.31/6.20!!!!!!!??     ECHO: TDS, EF~50%, abnl septum, o/w NORMAL  5/20/2011     PAD: CTA> occlusion of L mid SFA with reconstitution  12/2/2010     S/P colonoscopy: tubular adenoma & tics  5/4/2010    Repeat in 2014; Dr Graham     Tobacco Abuse Ongoing         Social History     Tobacco Use     Smoking status: Former Smoker     Packs/day: 1.00     Years: 51.00     Pack years: 51.00     Types: Cigarettes     Start date: 1/1/1960     Last attempt to quit: 1/1/2011     Years since  quittin.2     Smokeless tobacco: Never Used     Tobacco comment: Tried to quit; Quit    Substance Use Topics     Alcohol use: Yes     Alcohol/week: 0.0 oz     Comment: Beer; rarely     Family History   Problem Relation Age of Onset     Other - See Comments Mother         AAA, cause of death     Other - See Comments Other 69        AAA, family hx     Chronic Obstructive Pulmonary Disease Brother          MI age 60's     Family History Negative Sister          Current Outpatient Medications   Medication Sig Dispense Refill     aspirin (ASPIRIN LOW DOSE) 81 MG tablet Take 1 tablet by mouth daily.       azithromycin (ZITHROMAX Z-MARIN) 250 MG tablet Take 1 pill daily indefinitely 90 tablet 3     budesonide (PULMICORT) 0.25 MG/2ML neb solution Take 0.25 mg by nebulization 2 times daily       cilostazol (PLETAL) 100 MG tablet TAKE 1 TABLET BY MOUTH TWICE DAILY 180 tablet 0     COMBIVENT RESPIMAT  MCG/ACT inhaler INHALE ONE PUFF BY MOUTH 4 TIMES DAILY. MAX  SIX  DOSES  PER  DAY 12 g 5     Dextromethorphan-Guaifenesin (ROBITUSSIN COUGH+CHEST EDVIN DM) 5-100 MG/5ML LIQD Take 20 mLs by mouth every 4 hours as needed       gabapentin (NEURONTIN) 300 MG capsule        ipratropium - albuterol 0.5 mg/2.5 mg/3 mL (DUONEB) 0.5-2.5 (3) MG/3ML neb solution USE ONE AMPULE IN NEBULIZER EVERY 6 HOURS AS NEEDED FOR SHORTNESS OF BREATH/DYSPNEA  OR  WHEEZING 360 mL 10     loperamide (IMODIUM A-D) 2 MG tablet Take 2 mg by mouth 4 times daily as needed. Take 2 tablet by oral route after 1st loose stool and 1 tablet after each subsequent bowel movement; do not exceed 16 mg in 24 hrs. As needed.       Multiple Vitamins-Minerals (CENTRUM SILVER) per tablet Take 1 tablet by mouth daily.       order for DME Equipment being ordered: Oxygen 2 L per nasal canula continuously, re-certify for 1 year      FAX TO [facility] 1 Units 11     order for DME Equipment being ordered: shower chair 1 each 0     PERFOROMIST 20 MCG/2ML neb  solution USE 1 VIAL IN NEBULIZER EVERY 12 HOURS 360 mL 1     pregabalin (LYRICA) 225 MG capsule Take 1 capsule (225 mg) by mouth 2 times daily 60 capsule 1     roflumilast (DALIRESP) 500 MCG TABS tablet Take 500 mcg by mouth daily       simvastatin (ZOCOR) 10 MG tablet TAKE 1 TABLET BY MOUTH AT BEDTIME 90 tablet 1     tamsulosin (FLOMAX) 0.4 MG capsule TAKE ONE CAPSULE BY MOUTH ONCE DAILY 90 capsule 1     traMADol (ULTRAM) 50 MG tablet Take 1-2 tablets ( mg) by mouth every 6 hours as needed for severe pain 120 tablet 3     traZODone (DESYREL) 50 MG tablet Take 1 tablet (50 mg) by mouth nightly as needed for sleep 30 tablet 1     warfarin (COUMADIN) 2.5 MG tablet Take 3.75 mg(1 1/2 tabs) every Mon; 2.5 mg (1 tab) all other days or as directed by Coumadin Clinic 125 tablet 3     SPIRIVA RESPIMAT 2.5 MCG/ACT inhalation aerosol        No Known Allergies    ROS:  Constitutional, HEENT, cardiovascular, pulmonary, gi and gu systems are negative, except as otherwise noted.    OBJECTIVE:                                                    BP 98/56 (BP Location: Right arm, Patient Position: Sitting, Cuff Size: Adult Regular)   Pulse 133   Temp 96.6  F (35.9  C) (Tympanic)   Wt 83.3 kg (183 lb 9.6 oz)   SpO2 90%   BMI 25.61 kg/m    Body mass index is 25.61 kg/m .  GENERAL APPEARANCE: Alert, no acute distress  CV: regular rate and rhythm, no murmur, rub or gallop  RESP: lungs clear to auscultation bilaterally with decreased breath sounds.   ABDOMEN: normal bowel sounds, soft, nontender, no hepatosplenomegaly or other masses  SKIN: no suspicious lesions or rashes to visualized skin.  Some keratotic changes to the left face and eyelid relative to the right.    NEURO: Alert, oriented x 3, speech and mentation normal           ASSESSMENT/PLAN:                                                    1. Post herpetic neuralgia  Ongoing.  Increase lyrica to 225 bid.  Set up neurology appt.  Ok for 2 tramadol q 6 if needed but try  and minimize as much as possible.    - pregabalin (LYRICA) 225 MG capsule; Take 1 capsule (225 mg) by mouth 2 times daily  Dispense: 60 capsule; Refill: 1  - NEUROLOGY ADULT REFERRAL        Dominguez Maradiaga MD  [facility]

## 2019-04-08 DIAGNOSIS — M19.90 ARTHRITIS: ICD-10-CM

## 2019-04-09 RX ORDER — TRAMADOL HYDROCHLORIDE 50 MG/1
TABLET ORAL
Qty: 60 TABLET | Refills: 0 | Status: SHIPPED | OUTPATIENT
Start: 2019-04-09 | End: 2019-04-10

## 2019-04-09 NOTE — TELEPHONE ENCOUNTER
traMADol (ULTRAM) 50 MG tablet      Last Written Prescription Date:  3/14/19  Last Fill Quantity: 60,   # refills: 0  Last Office Visit: 12/17/18  Future Office visit:    Next 5 appointments (look out 90 days)    Apr 10, 2019  2:15 PM CDT  (Arrive by 2:00 PM)  SHORT with Dominguez Maradiaga MD  [facility] ([facility] [address] [facility] [address]  [phone]           Routing refill request to provider for review/approval because:  Drug not on the [facility], [facility] or  Health refill protocol or controlled substance

## 2019-04-10 ENCOUNTER — MEDICAL CORRESPONDENCE (OUTPATIENT)
Dept: HEALTH INFORMATION MANAGEMENT | Facility: CLINIC | Age: 79
End: 2019-04-10

## 2019-04-10 ENCOUNTER — OFFICE VISIT (OUTPATIENT)
Dept: FAMILY MEDICINE | Facility: OTHER | Age: 79
End: 2019-04-10
Attending: FAMILY MEDICINE
Payer: COMMERCIAL

## 2019-04-10 VITALS
TEMPERATURE: 96.6 F | SYSTOLIC BLOOD PRESSURE: 98 MMHG | DIASTOLIC BLOOD PRESSURE: 56 MMHG | BODY MASS INDEX: 25.61 KG/M2 | WEIGHT: 183.6 LBS | OXYGEN SATURATION: 90 % | HEART RATE: 133 BPM

## 2019-04-10 DIAGNOSIS — B02.29 POST HERPETIC NEURALGIA: ICD-10-CM

## 2019-04-10 DIAGNOSIS — M19.90 ARTHRITIS: ICD-10-CM

## 2019-04-10 PROCEDURE — 99213 OFFICE O/P EST LOW 20 MIN: CPT | Performed by: FAMILY MEDICINE

## 2019-04-10 PROCEDURE — G0463 HOSPITAL OUTPT CLINIC VISIT: HCPCS

## 2019-04-10 RX ORDER — TRAMADOL HYDROCHLORIDE 50 MG/1
50-100 TABLET ORAL EVERY 6 HOURS PRN
Qty: 120 TABLET | Refills: 3 | Status: SHIPPED | OUTPATIENT
Start: 2019-04-10 | End: 2019-05-28

## 2019-04-10 RX ORDER — PREGABALIN 225 MG/1
225 CAPSULE ORAL 2 TIMES DAILY
Qty: 60 CAPSULE | Refills: 1 | Status: SHIPPED | OUTPATIENT
Start: 2019-04-10 | End: 2019-06-09

## 2019-04-10 ASSESSMENT — PAIN SCALES - GENERAL: PAINLEVEL: SEVERE PAIN (6)

## 2019-04-10 NOTE — NURSING NOTE
"Chief Complaint   Patient presents with     Shingles       Initial BP 98/56 (BP Location: Right arm, Patient Position: Sitting, Cuff Size: Adult Regular)   Pulse 133   Temp 96.6  F (35.9  C) (Tympanic)   Wt 83.3 kg (183 lb 9.6 oz)   SpO2 90%   BMI 25.61 kg/m   Estimated body mass index is 25.61 kg/m  as calculated from the following:    Height as of 12/17/18: 1.803 m (5' 11\").    Weight as of this encounter: 83.3 kg (183 lb 9.6 oz).  Medication Reconciliation: complete    Tara Ellison LPN  "

## 2019-04-24 ENCOUNTER — ANTICOAGULATION THERAPY VISIT (OUTPATIENT)
Dept: ANTICOAGULATION | Facility: OTHER | Age: 79
End: 2019-04-24
Attending: FAMILY MEDICINE
Payer: COMMERCIAL

## 2019-04-24 DIAGNOSIS — I48.20 CHRONIC ATRIAL FIBRILLATION (H): ICD-10-CM

## 2019-04-24 DIAGNOSIS — Z79.01 LONG TERM CURRENT USE OF ANTICOAGULANT THERAPY: ICD-10-CM

## 2019-04-24 DIAGNOSIS — I73.9 PVD (PERIPHERAL VASCULAR DISEASE) (H): ICD-10-CM

## 2019-04-24 DIAGNOSIS — B02.29 POST HERPETIC NEURALGIA: ICD-10-CM

## 2019-04-24 LAB — INR BLD: 2 (ref 0.86–1.14)

## 2019-04-24 PROCEDURE — 36416 COLLJ CAPILLARY BLOOD SPEC: CPT | Mod: ZL | Performed by: FAMILY MEDICINE

## 2019-04-24 PROCEDURE — 85610 PROTHROMBIN TIME: CPT | Mod: QW,ZL | Performed by: FAMILY MEDICINE

## 2019-04-24 RX ORDER — TRAZODONE HYDROCHLORIDE 50 MG/1
50 TABLET, FILM COATED ORAL
Qty: 30 TABLET | Refills: 1 | Status: SHIPPED | OUTPATIENT
Start: 2019-04-24 | End: 2020-01-29

## 2019-04-24 NOTE — TELEPHONE ENCOUNTER
Tramadol      Last Written Prescription Date:  4/10/19  Last Fill Quantity: 120,   # refills: 3  Last Office Visit: 4/10/19  Future Office visit:

## 2019-04-24 NOTE — PROGRESS NOTES
ANTICOAGULATION FOLLOW-UP CLINIC VISIT    Patient Name:  Rm Duarte  Date:  2019  Contact Type:  Telephone    SUBJECTIVE:     Patient Findings     Comments:   INR done by lab. Call placed to patient and spoke to him re: INR result, warfarin dosing and INR recheck date. He verbalized understanding and has no questions. He has no bleeding/bruising and no new changes in diet/meds/activity.            OBJECTIVE    INR Point of Care   Date Value Ref Range Status   2019 2.0 (H) 0.86 - 1.14 Final     Comment:     This test is intended for monitoring Coumadin therapy.  Results are not   accurate in patients with prolonged INR due to factor deficiency.         ASSESSMENT / PLAN  INR assessment THER    Recheck INR In: 4 WEEKS    INR Location Clinic      Anticoagulation Summary  As of 2019    INR goal:   2.0-3.0   TTR:   38.8 % (2.7 y)   INR used for dosin.0 (2019)   Warfarin maintenance plan:   3.75 mg (2.5 mg x 1.5) every Mon; 2.5 mg (2.5 mg x 1) all other days   Full warfarin instructions:   : 3.75 mg; Otherwise 3.75 mg every Mon; 2.5 mg all other days   Weekly warfarin total:   18.75 mg   Plan last modified:   Zena Haas RN (2018)   Next INR check:   2019   Priority:   INR   Target end date:   Indefinite    Indications    Chronic atrial fibrillation (H) [I48.2]  PVD (peripheral vascular disease) (H) [I73.9]  Long-term (current) use of anticoagulants [Z79.01] [Z79.01]             Anticoagulation Episode Summary     INR check location:       Preferred lab:       Send INR reminders to:    ANTICOAG POOL    Comments:         Anticoagulation Care Providers     Provider Role Specialty Phone number    Dominguez Maradiaga MD [facility] [phone]            See the Encounter Report to view Anticoagulation Flowsheet and Dosing Calendar (Go to Encounters tab in chart review, and find the Anticoagulation Therapy Visit)        Zena Haas, RN

## 2019-05-01 DIAGNOSIS — M19.90 ARTHRITIS: ICD-10-CM

## 2019-05-01 RX ORDER — TRAMADOL HYDROCHLORIDE 50 MG/1
50-100 TABLET ORAL EVERY 6 HOURS PRN
Qty: 120 TABLET | Refills: 3 | OUTPATIENT
Start: 2019-05-01

## 2019-05-01 NOTE — TELEPHONE ENCOUNTER
Tramadol      Last Written Prescription Date:  4/10/19  Last Fill Quantity: 120,   # refills: 3  Last Office Visit: 4/10/19  Future Office visit:       Routing refill request to provider for review/approval because:

## 2019-05-08 DIAGNOSIS — I73.9 PVD (PERIPHERAL VASCULAR DISEASE) (H): ICD-10-CM

## 2019-05-08 RX ORDER — CILOSTAZOL 100 MG/1
TABLET ORAL
Qty: 180 TABLET | Refills: 0 | Status: SHIPPED | OUTPATIENT
Start: 2019-05-08 | End: 2019-08-12

## 2019-05-08 RX ORDER — SIMVASTATIN 10 MG
TABLET ORAL
Qty: 90 TABLET | Refills: 0 | Status: SHIPPED | OUTPATIENT
Start: 2019-05-08 | End: 2019-08-12

## 2019-05-08 NOTE — TELEPHONE ENCOUNTER
cilostazol  Last Written Prescription Date: 1/31/19  Last Fill Quantity: 180 # of Refills: 0  Last Office Visit: 4/10/18

## 2019-05-08 NOTE — TELEPHONE ENCOUNTER
protocol failed due to Normal serum creatinine on file in past 12 months  Lab Test 04/22/18  0958   CR 1.13     Please advise. Thank you.

## 2019-05-09 ENCOUNTER — ANTICOAGULATION THERAPY VISIT (OUTPATIENT)
Dept: ANTICOAGULATION | Facility: OTHER | Age: 79
End: 2019-05-09
Attending: FAMILY MEDICINE
Payer: COMMERCIAL

## 2019-05-09 DIAGNOSIS — I48.20 CHRONIC ATRIAL FIBRILLATION (H): ICD-10-CM

## 2019-05-09 DIAGNOSIS — I73.9 PVD (PERIPHERAL VASCULAR DISEASE) (H): ICD-10-CM

## 2019-05-09 DIAGNOSIS — Z79.01 LONG TERM CURRENT USE OF ANTICOAGULANT THERAPY: ICD-10-CM

## 2019-05-09 LAB — INR BLD: 1.8 (ref 0.86–1.14)

## 2019-05-09 PROCEDURE — 85610 PROTHROMBIN TIME: CPT | Mod: QW,ZL | Performed by: FAMILY MEDICINE

## 2019-05-09 PROCEDURE — 36416 COLLJ CAPILLARY BLOOD SPEC: CPT | Mod: ZL | Performed by: FAMILY MEDICINE

## 2019-05-09 NOTE — PROGRESS NOTES
ANTICOAGULATION FOLLOW-UP CLINIC VISIT    Patient Name:  Rm Duarte  Date:  2019  Contact Type:  Telephone    SUBJECTIVE:  Patient Findings     Comments:   INR done by lab. Call placed to patient and spoke to him re: INR result, warfarin dosing and INR recheck date. He verbalized understanding and has no questions. He has  No new changes in diet/meds/activity,  No bleeding/bruising        Clinical Outcomes     Negatives:   Major bleeding event, Thromboembolic event, Anticoagulation-related hospital admission, Anticoagulation-related ED visit, Anticoagulation-related fatality    Comments:   INR done by lab. Call placed to patient and spoke to him re: INR result, warfarin dosing and INR recheck date. He verbalized understanding and has no questions. He has  No new changes in diet/meds/activity,  No bleeding/bruising           OBJECTIVE    INR Point of Care   Date Value Ref Range Status   2019 1.8 (H) 0.86 - 1.14 Final     Comment:     This test is intended for monitoring Coumadin therapy.  Results are not   accurate in patients with prolonged INR due to factor deficiency.         ASSESSMENT / PLAN  INR assessment SUB    Recheck INR In: 2 WEEKS    INR Location Clinic      Anticoagulation Summary  As of 2019    INR goal:   2.0-3.0   TTR:   38.2 % (2.7 y)   INR used for dosin.8! (2019)   Warfarin maintenance plan:   3.75 mg (2.5 mg x 1.5) every Mon, Fri; 2.5 mg (2.5 mg x 1) all other days   Full warfarin instructions:   : 3.75 mg; Otherwise 3.75 mg every Mon, Fri; 2.5 mg all other days   Weekly warfarin total:   20 mg   Plan last modified:   Zena Haas RN (2019)   Next INR check:   2019   Priority:   INR   Target end date:   Indefinite    Indications    Chronic atrial fibrillation (H) [I48.2]  PVD (peripheral vascular disease) (H) [I73.9]  Long-term (current) use of anticoagulants [Z79.01] [Z79.01]             Anticoagulation Episode Summary     INR check location:        Preferred lab:       Send INR reminders to:   HC ANTICOAG POOL    Comments:         Anticoagulation Care Providers     Provider Role Specialty Phone number    Dominguez Maradiaga MD [facility] [phone]            See the Encounter Report to view Anticoagulation Flowsheet and Dosing Calendar (Go to Encounters tab in chart review, and find the Anticoagulation Therapy Visit)        Zena Haas RN

## 2019-05-23 ENCOUNTER — ANTICOAGULATION THERAPY VISIT (OUTPATIENT)
Dept: ANTICOAGULATION | Facility: OTHER | Age: 79
End: 2019-05-23
Attending: FAMILY MEDICINE
Payer: COMMERCIAL

## 2019-05-23 DIAGNOSIS — Z79.01 LONG TERM CURRENT USE OF ANTICOAGULANT THERAPY: ICD-10-CM

## 2019-05-23 DIAGNOSIS — I48.20 CHRONIC ATRIAL FIBRILLATION (H): ICD-10-CM

## 2019-05-23 DIAGNOSIS — I73.9 PVD (PERIPHERAL VASCULAR DISEASE) (H): ICD-10-CM

## 2019-05-23 PROCEDURE — 36416 COLLJ CAPILLARY BLOOD SPEC: CPT | Mod: ZL | Performed by: FAMILY MEDICINE

## 2019-05-23 PROCEDURE — 85610 PROTHROMBIN TIME: CPT | Mod: QW,ZL | Performed by: FAMILY MEDICINE

## 2019-05-23 NOTE — PROGRESS NOTES
ANTICOAGULATION FOLLOW-UP CLINIC VISIT    Patient Name:  Rm Duarte  Date:  2019  Contact Type:  Telephone    SUBJECTIVE:  Patient Findings     Comments:   INR done by lab. Call placed to patient and spoke to him re: INR result, warfarin dosing and INR recheck date. He verbalized understanding and has no questions. He denies any bleeding/bruising and has no changes in diet/meds/activity        Clinical Outcomes     Negatives:   Major bleeding event, Thromboembolic event, Anticoagulation-related hospital admission, Anticoagulation-related ED visit, Anticoagulation-related fatality    Comments:   INR done by lab. Call placed to patient and spoke to him re: INR result, warfarin dosing and INR recheck date. He verbalized understanding and has no questions. He denies any bleeding/bruising and has no changes in diet/meds/activity           OBJECTIVE    INR Point of Care   Date Value Ref Range Status   2019 1.9 (H) 0.86 - 1.14 Final     Comment:     This test is intended for monitoring Coumadin therapy.  Results are not   accurate in patients with prolonged INR due to factor deficiency.         ASSESSMENT / PLAN  INR assessment SUB    Recheck INR In: 2 WEEKS    INR Location Clinic      Anticoagulation Summary  As of 2019    INR goal:   2.0-3.0   TTR:   37.7 % (2.8 y)   INR used for dosin.9! (2019)   Warfarin maintenance plan:   3.75 mg (2.5 mg x 1.5) every Mon, Wed, Fri; 2.5 mg (2.5 mg x 1) all other days   Full warfarin instructions:   : 5 mg; Otherwise 3.75 mg every Mon, Wed, Fri; 2.5 mg all other days   Weekly warfarin total:   21.25 mg   Plan last modified:   Zena Haas RN (2019)   Next INR check:   2019   Priority:   INR   Target end date:   Indefinite    Indications    Chronic atrial fibrillation (H) [I48.2]  PVD (peripheral vascular disease) (H) [I73.9]  Long-term (current) use of anticoagulants [Z79.01] [Z79.01]             Anticoagulation Episode Summary     INR  check location:       Preferred lab:       Send INR reminders to:   HC ANTICOAG POOL    Comments:         Anticoagulation Care Providers     Provider Role Specialty Phone number    Dominguez Maradiaga MD [facility] [phone]            See the Encounter Report to view Anticoagulation Flowsheet and Dosing Calendar (Go to Encounters tab in chart review, and find the Anticoagulation Therapy Visit)        Zena Haas RN

## 2019-05-24 LAB — INR BLD: 1.6 (ref 0.86–1.14)

## 2019-05-28 ENCOUNTER — OFFICE VISIT (OUTPATIENT)
Dept: FAMILY MEDICINE | Facility: OTHER | Age: 79
End: 2019-05-28
Attending: FAMILY MEDICINE
Payer: COMMERCIAL

## 2019-05-28 ENCOUNTER — TELEPHONE (OUTPATIENT)
Dept: FAMILY MEDICINE | Facility: OTHER | Age: 79
End: 2019-05-28

## 2019-05-28 VITALS
OXYGEN SATURATION: 83 % | WEIGHT: 179 LBS | SYSTOLIC BLOOD PRESSURE: 86 MMHG | BODY MASS INDEX: 24.97 KG/M2 | DIASTOLIC BLOOD PRESSURE: 52 MMHG | HEART RATE: 145 BPM

## 2019-05-28 DIAGNOSIS — M19.90 ARTHRITIS: ICD-10-CM

## 2019-05-28 DIAGNOSIS — R29.6 FALLS FREQUENTLY: Primary | ICD-10-CM

## 2019-05-28 DIAGNOSIS — B02.29 POSTHERPETIC NEURALGIA: ICD-10-CM

## 2019-05-28 DIAGNOSIS — I48.20 CHRONIC ATRIAL FIBRILLATION (H): ICD-10-CM

## 2019-05-28 PROCEDURE — 99214 OFFICE O/P EST MOD 30 MIN: CPT | Performed by: FAMILY MEDICINE

## 2019-05-28 PROCEDURE — G0463 HOSPITAL OUTPT CLINIC VISIT: HCPCS

## 2019-05-28 RX ORDER — TRAMADOL HYDROCHLORIDE 50 MG/1
TABLET ORAL
Qty: 120 TABLET | Refills: 3
Start: 2019-05-28 | End: 2019-09-18

## 2019-05-28 ASSESSMENT — PAIN SCALES - GENERAL: PAINLEVEL: MILD PAIN (3)

## 2019-05-28 NOTE — TELEPHONE ENCOUNTER
Called LM can schedule an appt today with Dr Hiren Cortez 5/28/19 3:30pm arrive 3:15pm   Coral Hidalgo

## 2019-05-28 NOTE — PROGRESS NOTES
SUBJECTIVE:   Rm Duarte is a 78 year old male who presents to clinic today for the following   health issues:      Increased falls      Duration: 6 months    Description (location/character/radiation): 3 times    Intensity:  moderate    Accompanying signs and symptoms: legs buckling    History (similar episodes/previous evaluation): None    Precipitating or alleviating factors: None    Therapies tried and outcome: None       PROBLEMS TO ADD ON...    Additional history: lengthy review today.  Has fallen 3 times in the last 4 months or so.  We reviewed timing of falls, sx, meds, etc at length.  The only thing we can come up with is that it started before the gabapentin was added by neurology, but after the ultram was being used fairly routinely for the post herpetic pain.  This is ongoing, but stable.  He gets a weakness that happens and his legs just buckle.  No injury.  We talked about the coumadin, the copd, and the plan.  See below.     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  1991 were 4.31/6.20!!!!!!!??     ECHO: TDS, EF~50%, abnl septum, o/w NORMAL  5/20/2011     PAD: CTA> occlusion of L mid SFA with reconstitution  12/2/2010     S/P colonoscopy: tubular adenoma &  mello  2010    Repeat in 2014; Dr Graham     Tobacco Abuse Ongoing         Social History     Tobacco Use     Smoking status: Former Smoker     Packs/day: 1.00     Years: 51.00     Pack years: 51.00     Types: Cigarettes     Start date: 1960     Last attempt to quit: 2011     Years since quittin.4     Smokeless tobacco: Never Used   Substance Use Topics     Alcohol use: Yes     Alcohol/week: 0.0 oz     Comment: Beer; rarely     Family History   Problem Relation Age of Onset     Other - See Comments Mother         AAA, cause of death     Other - See Comments Other 69        AAA, family hx     Chronic Obstructive Pulmonary Disease Brother          MI age 60's     Family History Negative Sister          Current Outpatient Medications   Medication Sig Dispense Refill     aspirin (ASPIRIN LOW DOSE) 81 MG tablet Take 1 tablet by mouth daily.       azithromycin (ZITHROMAX Z-MARIN) 250 MG tablet Take 1 pill daily indefinitely 90 tablet 3     budesonide (PULMICORT) 0.25 MG/2ML neb solution Take 0.25 mg by nebulization 2 times daily       cilostazol (PLETAL) 100 MG tablet TAKE 1 TABLET BY MOUTH TWICE DAILY 180 tablet 0     COMBIVENT RESPIMAT  MCG/ACT inhaler INHALE ONE PUFF BY MOUTH 4 TIMES DAILY. MAX  SIX  DOSES  PER  DAY 12 g 5     Dextromethorphan-Guaifenesin (ROBITUSSIN COUGH+CHEST EDVIN DM) 5-100 MG/5ML LIQD Take 20 mLs by mouth every 4 hours as needed       gabapentin (NEURONTIN) 300 MG capsule Take 300 mg by mouth 3 times daily        ipratropium - albuterol 0.5 mg/2.5 mg/3 mL (DUONEB) 0.5-2.5 (3) MG/3ML neb solution USE ONE AMPULE IN NEBULIZER EVERY 6 HOURS AS NEEDED FOR SHORTNESS OF BREATH/DYSPNEA  OR  WHEEZING 360 mL 10     loperamide (IMODIUM A-D) 2 MG tablet Take 2 mg by mouth 4 times daily as needed. Take 2 tablet by oral route after 1st loose stool and 1 tablet after each subsequent bowel movement; do not exceed 16 mg in 24 hrs. As needed.       Multiple Vitamins-Minerals (CENTRUM  SILVER) per tablet Take 1 tablet by mouth daily.       order for DME Equipment being ordered: Oxygen 2 L per nasal canula continuously, re-certify for 1 year      FAX TO [facility] 1 Units 11     order for DME Equipment being ordered: shower chair 1 each 0     PERFOROMIST 20 MCG/2ML neb solution USE 1 VIAL IN NEBULIZER EVERY 12 HOURS 360 mL 1     pregabalin (LYRICA) 225 MG capsule Take 1 capsule (225 mg) by mouth 2 times daily 60 capsule 1     roflumilast (DALIRESP) 500 MCG TABS tablet Take 500 mcg by mouth daily       simvastatin (ZOCOR) 10 MG tablet TAKE 1 TABLET BY MOUTH AT BEDTIME 90 tablet 0     SPIRIVA RESPIMAT 2.5 MCG/ACT inhalation aerosol        tamsulosin (FLOMAX) 0.4 MG capsule TAKE 1 CAPSULE BY MOUTH ONCE DAILY 90 capsule 1     traMADol (ULTRAM) 50 MG tablet Take 1-2 tabs daily as needed.  Do not go over 2/day. 120 tablet 3     traZODone (DESYREL) 50 MG tablet Take 1 tablet (50 mg) by mouth nightly as needed for sleep 30 tablet 1     warfarin (COUMADIN) 2.5 MG tablet Take 3.75 mg(1 1/2 tabs) every Mon; 2.5 mg (1 tab) all other days or as directed by Coumadin Clinic 125 tablet 3     No Known Allergies    ROS:  Constitutional, HEENT, cardiovascular, pulmonary, gi and gu systems are negative, except as otherwise noted.    OBJECTIVE:                                                    BP (!) 86/52   Pulse 145   Wt 81.2 kg (179 lb)   SpO2 (!) 83%   BMI 24.97 kg/m    Body mass index is 24.97 kg/m .  GENERAL APPEARANCE: Alert, no acute distress  CV: regular rate and rhythm, no murmur, rub or gallop  RESP: lungs clear to auscultation bilaterally with decreased breath sounds throughout.    ABDOMEN: normal bowel sounds, soft, nontender, no hepatosplenomegaly or other masses  SKIN: no suspicious lesions or rashes to visualized skin  NEURO: Alert, oriented x 3, speech and mentation normal           ASSESSMENT/PLAN:                                                    1. Falls frequently  Discussed at length.  His  bp is low.  We are working with the meds that might contribute.  For now, ultram is 1/2 of previous with a max of 2/day.  We will then work on the lyrica if needed.  He is not on any bp pills.  I think it's the ultram.      2. Arthritis  As above.    - traMADol (ULTRAM) 50 MG tablet; Take 1-2 tabs daily as needed.  Do not go over 2/day.  Dispense: 120 tablet; Refill: 3    3. Chronic atrial fibrillation (H)  Reviewed the falls with the coumadin.  Not really wanting to get rid of that at all.  So, we will continue.  He is aware of the risk/benefit.      4. Postherpetic neuralgia  With the chronic pain, stable as above.  Adjusting medicines for low bp and falls.        His copd is stable and terrible.  This contributes to the overall picture as well.      Dominguez Maradiaga MD  [facility]

## 2019-05-28 NOTE — NURSING NOTE
"Chief Complaint   Patient presents with     Fall       Initial BP (!) 86/52   Pulse 145   Wt 81.2 kg (179 lb)   SpO2 (!) 83%   BMI 24.97 kg/m   Estimated body mass index is 24.97 kg/m  as calculated from the following:    Height as of 12/17/18: 1.803 m (5' 11\").    Weight as of this encounter: 81.2 kg (179 lb).  Medication Reconciliation: complete    Lila Serrano LPN  "

## 2019-05-28 NOTE — TELEPHONE ENCOUNTER
8:03 AM    Reason for Call: OVERBOOK    Patient is having the following symptoms: falling down  for 1 weeks.    The patient is requesting an appointment for 05/28/19 or 05/29/19 with .    Was an appointment offered for this call? No  If yes : Appointment type              Date    Preferred method for responding to this message: Telephone Call  What is your phone number ?[phone]    If we cannot reach you directly, may we leave a detailed response at the number you provided? Yes    Can this message wait until your PCP/provider returns, if unavailable today? Not applicable, pcp is in     [facility]

## 2019-05-30 ENCOUNTER — DOCUMENTATION ONLY (OUTPATIENT)
Dept: OTHER | Facility: CLINIC | Age: 79
End: 2019-05-30

## 2019-05-30 PROBLEM — Z71.89 ACP (ADVANCE CARE PLANNING): Chronic | Status: RESOLVED | Noted: 2017-06-28 | Resolved: 2019-05-30

## 2019-06-06 ENCOUNTER — ANTICOAGULATION THERAPY VISIT (OUTPATIENT)
Dept: ANTICOAGULATION | Facility: OTHER | Age: 79
End: 2019-06-06
Attending: FAMILY MEDICINE
Payer: COMMERCIAL

## 2019-06-06 DIAGNOSIS — I48.20 CHRONIC ATRIAL FIBRILLATION (H): ICD-10-CM

## 2019-06-06 DIAGNOSIS — Z79.01 LONG TERM CURRENT USE OF ANTICOAGULANT THERAPY: ICD-10-CM

## 2019-06-06 DIAGNOSIS — I73.9 PVD (PERIPHERAL VASCULAR DISEASE) (H): ICD-10-CM

## 2019-06-06 LAB — INR BLD: 2.5 (ref 0.86–1.14)

## 2019-06-06 PROCEDURE — 85610 PROTHROMBIN TIME: CPT | Mod: QW,ZL | Performed by: FAMILY MEDICINE

## 2019-06-06 PROCEDURE — 36416 COLLJ CAPILLARY BLOOD SPEC: CPT | Mod: ZL | Performed by: FAMILY MEDICINE

## 2019-06-06 NOTE — PROGRESS NOTES
ANTICOAGULATION FOLLOW-UP CLINIC VISIT    Patient Name:  Rm Duarte  Date:  2019  Contact Type:  Telephone/ message left on home phone voicemail    SUBJECTIVE:  Patient Findings     Comments:   INR done by lab. Call placed to patient and message left re: INR result, warfarin dosing and iNR recheck date. He is to call warfarin clinic if he has any bleeding/bruising or changes in diet/meds/activity or questions.         Clinical Outcomes     Negatives:   Major bleeding event, Thromboembolic event, Anticoagulation-related hospital admission, Anticoagulation-related ED visit, Anticoagulation-related fatality    Comments:   INR done by lab. Call placed to patient and message left re: INR result, warfarin dosing and iNR recheck date. He is to call warfarin clinic if he has any bleeding/bruising or changes in diet/meds/activity or questions.            OBJECTIVE    INR Point of Care   Date Value Ref Range Status   2019 2.5 (H) 0.86 - 1.14 Final     Comment:     This test is intended for monitoring Coumadin therapy.  Results are not   accurate in patients with prolonged INR due to factor deficiency.         ASSESSMENT / PLAN  INR assessment THER    Recheck INR In: 3 WEEKS    INR Location Clinic      Anticoagulation Summary  As of 2019    INR goal:   2.0-3.0   TTR:   37.9 % (2.8 y)   INR used for dosin.5 (2019)   Warfarin maintenance plan:   3.75 mg (2.5 mg x 1.5) every Mon, Wed, Fri; 2.5 mg (2.5 mg x 1) all other days   Full warfarin instructions:   3.75 mg every Mon, Wed, Fri; 2.5 mg all other days   Weekly warfarin total:   21.25 mg   No change documented:   Zena Haas RN   Plan last modified:   Zena Haas RN (2019)   Next INR check:   2019   Priority:   INR   Target end date:   Indefinite    Indications    Chronic atrial fibrillation (H) [I48.2]  PVD (peripheral vascular disease) (H) [I73.9]  Long-term (current) use of anticoagulants [Z79.01] [Z79.01]              Anticoagulation Episode Summary     INR check location:       Preferred lab:       Send INR reminders to:   [facility] ANTICOAG POOL    Comments:         Anticoagulation Care Providers     Provider Role Specialty Phone number    Dominguez Maradiaga MD [facility] [phone]            See the Encounter Report to view Anticoagulation Flowsheet and Dosing Calendar (Go to Encounters tab in chart review, and find the Anticoagulation Therapy Visit)        Zena Haas RN

## 2019-06-09 DIAGNOSIS — B02.29 POST HERPETIC NEURALGIA: ICD-10-CM

## 2019-06-10 RX ORDER — PREGABALIN 225 MG/1
CAPSULE ORAL
Qty: 60 CAPSULE | Refills: 1 | Status: SHIPPED | OUTPATIENT
Start: 2019-06-10 | End: 2019-08-12

## 2019-06-10 NOTE — TELEPHONE ENCOUNTER
pregabalin (LYRICA) 225 MG capsule      Last Written Prescription Date:  4-10-19  Last Fill Quantity: 60,   # refills: 1  Last Office Visit: 5-28-19  Future Office visit:       Routing refill request to provider for review/approval because:  Drug not on the [facility], P or [facility] refill protocol or controlled substance

## 2019-06-27 ENCOUNTER — ANTICOAGULATION THERAPY VISIT (OUTPATIENT)
Dept: ANTICOAGULATION | Facility: OTHER | Age: 79
End: 2019-06-27
Attending: FAMILY MEDICINE
Payer: COMMERCIAL

## 2019-06-27 DIAGNOSIS — I73.9 PVD (PERIPHERAL VASCULAR DISEASE) (H): ICD-10-CM

## 2019-06-27 DIAGNOSIS — Z79.01 LONG TERM CURRENT USE OF ANTICOAGULANT THERAPY: ICD-10-CM

## 2019-06-27 DIAGNOSIS — I48.20 CHRONIC ATRIAL FIBRILLATION (H): ICD-10-CM

## 2019-06-27 LAB — INR BLD: 4.8 (ref 0.86–1.14)

## 2019-06-27 PROCEDURE — 85610 PROTHROMBIN TIME: CPT | Mod: QW,ZL | Performed by: FAMILY MEDICINE

## 2019-06-27 PROCEDURE — 36416 COLLJ CAPILLARY BLOOD SPEC: CPT | Mod: ZL | Performed by: FAMILY MEDICINE

## 2019-06-27 PROCEDURE — 99207 ZZC NO CHARGE NURSE ONLY: CPT

## 2019-06-27 NOTE — PROGRESS NOTES
ANTICOAGULATION FOLLOW-UP CLINIC VISIT    Patient Name:  Rm Duarte  Date:  2019  Contact Type:  Telephone    SUBJECTIVE:  Patient Findings     Comments:   He cannot think of anything that would have caused an increase in his INR.        Clinical Outcomes     Negatives:   Major bleeding event, Thromboembolic event, Anticoagulation-related hospital admission, Anticoagulation-related ED visit, Anticoagulation-related fatality    Comments:   He cannot think of anything that would have caused an increase in his INR.           OBJECTIVE    INR Point of Care   Date Value Ref Range Status   2019 4.8 (H) 0.86 - 1.14 Final     Comment:     This test is intended for monitoring Coumadin therapy.  Results are not   accurate in patients with prolonged INR due to factor deficiency.         ASSESSMENT / PLAN  INR assessment SUPRA    Recheck INR In: 4 DAYS    INR Location Clinic      Anticoagulation Summary  As of 2019    INR goal:   2.0-3.0   TTR:   37.6 % (2.9 y)   INR used for dosin.8! (2019)   Warfarin maintenance plan:   3.75 mg (2.5 mg x 1.5) every Mon, Wed, Fri; 2.5 mg (2.5 mg x 1) all other days   Full warfarin instructions:   : Hold; : Hold; Otherwise 3.75 mg every Mon, Wed, Fri; 2.5 mg all other days   Weekly warfarin total:   21.25 mg   Plan last modified:   Zena Haas RN (2019)   Next INR check:   2019   Priority:   INR   Target end date:   Indefinite    Indications    Chronic atrial fibrillation (H) [I48.2]  PVD (peripheral vascular disease) (H) [I73.9]  Long-term (current) use of anticoagulants [Z79.01] [Z79.01]             Anticoagulation Episode Summary     INR check location:       Preferred lab:       Send INR reminders to:   HC ANTICOAG POOL    Comments:         Anticoagulation Care Providers     Provider Role Specialty Phone number    Dominguez Maradiaga MD [facility] [phone]            See the Encounter Report to view Anticoagulation  Flowsheet and Dosing Calendar (Go to Encounters tab in chart review, and find the Anticoagulation Therapy Visit)    INR result, Warfarin dosing, and INR recheck date discussed via telephone with pt this date.  He verbalized understanding.    LAZ DAVID RN

## 2019-06-30 NOTE — TELEPHONE ENCOUNTER
Attempted to make follow up phone call with patient. Unable to reach patient at this time. First attempt. Will attempt again.     Attending Attestation (For Attendings USE Only)...

## 2019-07-01 ENCOUNTER — ANTICOAGULATION THERAPY VISIT (OUTPATIENT)
Dept: ANTICOAGULATION | Facility: OTHER | Age: 79
End: 2019-07-01

## 2019-07-01 DIAGNOSIS — I48.20 CHRONIC ATRIAL FIBRILLATION (H): ICD-10-CM

## 2019-07-01 DIAGNOSIS — Z79.01 LONG TERM CURRENT USE OF ANTICOAGULANT THERAPY: ICD-10-CM

## 2019-07-01 DIAGNOSIS — I73.9 PVD (PERIPHERAL VASCULAR DISEASE) (H): ICD-10-CM

## 2019-07-01 LAB — INR BLD: 2.6 (ref 0.86–1.14)

## 2019-07-01 PROCEDURE — 85610 PROTHROMBIN TIME: CPT | Mod: QW,ZL | Performed by: FAMILY MEDICINE

## 2019-07-01 PROCEDURE — 36416 COLLJ CAPILLARY BLOOD SPEC: CPT | Mod: ZL | Performed by: FAMILY MEDICINE

## 2019-07-01 NOTE — PROGRESS NOTES
ANTICOAGULATION FOLLOW-UP CLINIC VISIT    Patient Name:  Rm Duarte  Date:  2019  Contact Type:  Telephone/ called and spoke to patient    SUBJECTIVE:  Patient Findings     Comments:   INR done by lab. Call placed to patient and message left re: INR result, warfarin dosing and iNR recheck date. He is to call warfarin clinic if he has any bleeding/bruising or changes in diet/meds/activity or questions        Clinical Outcomes     Comments:   INR done by lab. Call placed to patient and message left re: INR result, warfarin dosing and iNR recheck date. He is to call warfarin clinic if he has any bleeding/bruising or changes in diet/meds/activity or questions           OBJECTIVE    INR Point of Care   Date Value Ref Range Status   2019 2.6 (H) 0.86 - 1.14 Final     Comment:     This test is intended for monitoring Coumadin therapy.  Results are not   accurate in patients with prolonged INR due to factor deficiency.         ASSESSMENT / PLAN  INR assessment THER    Recheck INR In: 2 WEEKS    INR Location Clinic      Anticoagulation Summary  As of 2019    INR goal:   2.0-3.0   TTR:   37.5 % (2.9 y)   INR used for dosin.6 (2019)   Warfarin maintenance plan:   3.75 mg (2.5 mg x 1.5) every Mon, Wed, Fri; 2.5 mg (2.5 mg x 1) all other days   Full warfarin instructions:   3.75 mg every Mon, Wed, Fri; 2.5 mg all other days   Weekly warfarin total:   21.25 mg   No change documented:   Vianey Hinkle RN   Plan last modified:   Zena Haas RN (2019)   Next INR check:   7/15/2019   Priority:   INR   Target end date:   Indefinite    Indications    Chronic atrial fibrillation (H) [I48.2]  PVD (peripheral vascular disease) (H) [I73.9]  Long-term (current) use of anticoagulants [Z79.01] [Z79.01]             Anticoagulation Episode Summary     INR check location:       Preferred lab:       Send INR reminders to:   HC ANTICOAG POOL    Comments:         Anticoagulation Care Providers     Provider Role  Specialty Phone number    Dominguez Maradiaga MD [facility] [phone]            See the Encounter Report to view Anticoagulation Flowsheet and Dosing Calendar (Go to Encounters tab in chart review, and find the Anticoagulation Therapy Visit)      Vianey Hinkle RN

## 2019-07-16 ENCOUNTER — ANTICOAGULATION THERAPY VISIT (OUTPATIENT)
Dept: ANTICOAGULATION | Facility: OTHER | Age: 79
End: 2019-07-16

## 2019-07-16 DIAGNOSIS — I73.9 PVD (PERIPHERAL VASCULAR DISEASE) (H): ICD-10-CM

## 2019-07-16 DIAGNOSIS — Z79.01 LONG TERM CURRENT USE OF ANTICOAGULANT THERAPY: ICD-10-CM

## 2019-07-16 DIAGNOSIS — I48.20 CHRONIC ATRIAL FIBRILLATION (H): ICD-10-CM

## 2019-07-16 LAB — INR BLD: 5.2 (ref 0.86–1.14)

## 2019-07-16 PROCEDURE — 85610 PROTHROMBIN TIME: CPT | Mod: QW,ZL | Performed by: FAMILY MEDICINE

## 2019-07-16 PROCEDURE — 36416 COLLJ CAPILLARY BLOOD SPEC: CPT | Mod: ZL | Performed by: FAMILY MEDICINE

## 2019-07-16 NOTE — PROGRESS NOTES
ANTICOAGULATION FOLLOW-UP CLINIC VISIT    Patient Name:  Rm Duarte  Date:  2019  Contact Type:  Telephone/ received critical lab value from  lab, call placed to patient and left message per his request    SUBJECTIVE:  Patient Findings     Comments:   Critical INR result message received from [place] lab at 8:57 am.  Nurse placed call to patient and left a message as he had requested re: INR result, warfarin dosing and next INR recheck date.  Asked patient to call back if he knew of reason why number was elevated such as diet/medication/activity change or extra doses.  Patient also to call with any bleeding or bruising and if he experiences any uncontrolled bleeding to go into ER/UC.  Staff message sent to PCP.        Clinical Outcomes     Comments:   Critical INR result message received from [place] lab at 8:57 am.  Nurse placed call to patient and left a message as he had requested re: INR result, warfarin dosing and next INR recheck date.  Asked patient to call back if he knew of reason why number was elevated such as diet/medication/activity change or extra doses.  Patient also to call with any bleeding or bruising and if he experiences any uncontrolled bleeding to go into ER/UC.  Staff message sent to PCP.           OBJECTIVE    INR Point of Care   Date Value Ref Range Status   2019 5.2 (HH) 0.86 - 1.14 Final     Comment:     Critical Value called to and read back by  ADY COLE IN CC AT 0855 BY [facility]  This test is intended for monitoring Coumadin therapy.  Results are not   accurate in patients with prolonged INR due to factor deficiency.         ASSESSMENT / PLAN  INR assessment SUPRA    Recheck INR In: 3 DAYS    INR Location Clinic      Anticoagulation Summary  As of 2019    INR goal:   2.0-3.0   TTR:   37.2 % (2.9 y)   INR used for dosin.2! (2019)   Warfarin maintenance plan:   3.75 mg (2.5 mg x 1.5) every Mon, Wed, Fri; 2.5 mg (2.5 mg x 1) all other days   Full warfarin  instructions:   7/16: Hold; 7/17: Hold; Otherwise 3.75 mg every Mon, Wed, Fri; 2.5 mg all other days   Weekly warfarin total:   21.25 mg   Plan last modified:   Zena Haas RN (5/23/2019)   Next INR check:   7/19/2019   Priority:   INR   Target end date:   Indefinite    Indications    Chronic atrial fibrillation (H) [I48.2]  PVD (peripheral vascular disease) (H) [I73.9]  Long-term (current) use of anticoagulants [Z79.01] [Z79.01]             Anticoagulation Episode Summary     INR check location:       Preferred lab:       Send INR reminders to:   HC ANTICOAG POOL    Comments:         Anticoagulation Care Providers     Provider Role Specialty Phone number    Dominguez Maradiaga MD [facility] [phone]            See the Encounter Report to view Anticoagulation Flowsheet and Dosing Calendar (Go to Encounters tab in chart review, and find the Anticoagulation Therapy Visit)      Vianey Hinkle RN

## 2019-07-19 ENCOUNTER — ANTICOAGULATION THERAPY VISIT (OUTPATIENT)
Dept: ANTICOAGULATION | Facility: OTHER | Age: 79
End: 2019-07-19
Attending: FAMILY MEDICINE
Payer: COMMERCIAL

## 2019-07-19 DIAGNOSIS — Z79.01 LONG TERM CURRENT USE OF ANTICOAGULANT THERAPY: ICD-10-CM

## 2019-07-19 DIAGNOSIS — I48.20 CHRONIC ATRIAL FIBRILLATION (H): ICD-10-CM

## 2019-07-19 DIAGNOSIS — I73.9 PVD (PERIPHERAL VASCULAR DISEASE) (H): ICD-10-CM

## 2019-07-19 LAB — INR BLD: 2.2 (ref 0.86–1.14)

## 2019-07-19 PROCEDURE — 36416 COLLJ CAPILLARY BLOOD SPEC: CPT | Mod: ZL | Performed by: FAMILY MEDICINE

## 2019-07-19 PROCEDURE — 85610 PROTHROMBIN TIME: CPT | Mod: QW,ZL | Performed by: FAMILY MEDICINE

## 2019-07-19 NOTE — PROGRESS NOTES
ANTICOAGULATION FOLLOW-UP CLINIC VISIT    Patient Name:  Rm Duarte  Date:  2019  Contact Type:  Telephone/ called and spoke to patient    SUBJECTIVE:  Patient Findings     Comments:   INR completed by Nurse.  Call placed to patient and spoke to him and went over INR result, warfarin dosing and next INR recheck date.  Patient verbalized understanding.  States no bleeding/bruising or changes to diet/meds/activity or upcoming procedures.  Patient's numbers look good today but has had increase in his CBD oil and if number comes back high next Friday may need overall decrease in maintenance dose.                Clinical Outcomes     Comments:   INR completed by Nurse.  Call placed to patient and spoke to him and went over INR result, warfarin dosing and next INR recheck date.  Patient verbalized understanding.  States no bleeding/bruising or changes to diet/meds/activity or upcoming procedures.  Patient's numbers look good today but has had increase in his CBD oil and if number comes back high next Friday may need overall decrease in maintenance dose.                   OBJECTIVE    INR Point of Care   Date Value Ref Range Status   2019 2.2 (H) 0.86 - 1.14 Final     Comment:     This test is intended for monitoring Coumadin therapy.  Results are not   accurate in patients with prolonged INR due to factor deficiency.         ASSESSMENT / PLAN  INR assessment THER    Recheck INR In: 1 WEEK    INR Location Clinic      Anticoagulation Summary  As of 2019    INR goal:   2.0-3.0   TTR:   37.2 % (2.9 y)   INR used for dosin.2 (2019)   Warfarin maintenance plan:   3.75 mg (2.5 mg x 1.5) every Mon, Wed, Fri; 2.5 mg (2.5 mg x 1) all other days   Full warfarin instructions:   3.75 mg every Mon, Wed, Fri; 2.5 mg all other days   Weekly warfarin total:   21.25 mg   No change documented:   Vianey Hinkle, RN   Plan last modified:   Zena Haas RN (2019)   Next INR check:   2019   Priority:    INR   Target end date:   Indefinite    Indications    Chronic atrial fibrillation (H) [I48.2]  PVD (peripheral vascular disease) (H) [I73.9]  Long-term (current) use of anticoagulants [Z79.01] [Z79.01]             Anticoagulation Episode Summary     INR check location:       Preferred lab:       Send INR reminders to:   HC ANTICOAG POOL    Comments:         Anticoagulation Care Providers     Provider Role Specialty Phone number    Dominguez Maradiaga MD [facility] [phone]            See the Encounter Report to view Anticoagulation Flowsheet and Dosing Calendar (Go to Encounters tab in chart review, and find the Anticoagulation Therapy Visit)      Vianey Hinkle RN

## 2019-07-26 ENCOUNTER — ANTICOAGULATION THERAPY VISIT (OUTPATIENT)
Dept: ANTICOAGULATION | Facility: OTHER | Age: 79
End: 2019-07-26
Attending: FAMILY MEDICINE
Payer: COMMERCIAL

## 2019-07-26 DIAGNOSIS — I73.9 PVD (PERIPHERAL VASCULAR DISEASE) (H): ICD-10-CM

## 2019-07-26 DIAGNOSIS — Z79.01 LONG TERM CURRENT USE OF ANTICOAGULANT THERAPY: ICD-10-CM

## 2019-07-26 DIAGNOSIS — I48.20 CHRONIC ATRIAL FIBRILLATION (H): ICD-10-CM

## 2019-07-26 LAB — INR BLD: 3.2 (ref 0.86–1.14)

## 2019-07-26 PROCEDURE — 85610 PROTHROMBIN TIME: CPT | Mod: QW,ZL | Performed by: FAMILY MEDICINE

## 2019-07-26 PROCEDURE — 36416 COLLJ CAPILLARY BLOOD SPEC: CPT | Mod: ZL | Performed by: FAMILY MEDICINE

## 2019-07-26 NOTE — PROGRESS NOTES
ANTICOAGULATION FOLLOW-UP CLINIC VISIT    Patient Name:  Rm Duarte  Date:  7/26/2019  Contact Type:  Telephone    SUBJECTIVE:  Patient Findings     Positives:   Change in medications (stopped CBD oil)    Comments:   INR done by lab. Call placed to patient and spoke to him re: INR result, warfarin dosing and INR recheck date. He verbalized understanding and has no questions. He states he stopped taking the CBD oil. He has no other changes in diet/meds/activity and no questions.         Clinical Outcomes     Negatives:   Major bleeding event, Thromboembolic event, Anticoagulation-related hospital admission, Anticoagulation-related ED visit, Anticoagulation-related fatality    Comments:   INR done by lab. Call placed to patient and spoke to him re: INR result, warfarin dosing and INR recheck date. He verbalized understanding and has no questions. He states he stopped taking the CBD oil. He has no other changes in diet/meds/activity and no questions.            OBJECTIVE    INR Point of Care   Date Value Ref Range Status   07/26/2019 3.2 (H) 0.86 - 1.14 Final     Comment:     This test is intended for monitoring Coumadin therapy.  Results are not   accurate in patients with prolonged INR due to factor deficiency.         ASSESSMENT / PLAN  INR assessment SUPRA    Recheck INR In: 2 WEEKS    INR Location Clinic      Anticoagulation Summary  As of 7/26/2019    INR goal:   2.0-3.0   TTR:   37.5 % (2.9 y)   INR used for dosing:   3.2! (7/26/2019)   Warfarin maintenance plan:   3.75 mg (2.5 mg x 1.5) every Mon, Fri; 2.5 mg (2.5 mg x 1) all other days   Full warfarin instructions:   7/26: 2.5 mg; Otherwise 3.75 mg every Mon, Fri; 2.5 mg all other days   Weekly warfarin total:   20 mg   Plan last modified:   Zena Haas RN (7/26/2019)   Next INR check:   8/9/2019   Priority:   INR   Target end date:   Indefinite    Indications    Chronic atrial fibrillation (H) [I48.2]  PVD (peripheral vascular disease) (H)  [I73.9]  Long-term (current) use of anticoagulants [Z79.01] [Z79.01]             Anticoagulation Episode Summary     INR check location:       Preferred lab:       Send INR reminders to:   [facility] ANTICOAG POOL    Comments:         Anticoagulation Care Providers     Provider Role Specialty Phone number    Dominguez Maradiaga MD [facility] [phone]            See the Encounter Report to view Anticoagulation Flowsheet and Dosing Calendar (Go to Encounters tab in chart review, and find the Anticoagulation Therapy Visit)        Zena Haas RN

## 2019-08-09 ENCOUNTER — ANTICOAGULATION THERAPY VISIT (OUTPATIENT)
Dept: ANTICOAGULATION | Facility: OTHER | Age: 79
End: 2019-08-09
Attending: FAMILY MEDICINE
Payer: COMMERCIAL

## 2019-08-09 DIAGNOSIS — I73.9 PVD (PERIPHERAL VASCULAR DISEASE) (H): ICD-10-CM

## 2019-08-09 DIAGNOSIS — Z79.01 LONG TERM CURRENT USE OF ANTICOAGULANT THERAPY: ICD-10-CM

## 2019-08-09 DIAGNOSIS — I48.20 CHRONIC ATRIAL FIBRILLATION (H): ICD-10-CM

## 2019-08-09 LAB — INR BLD: 2.1 (ref 0.86–1.14)

## 2019-08-09 PROCEDURE — 36416 COLLJ CAPILLARY BLOOD SPEC: CPT | Mod: ZL | Performed by: FAMILY MEDICINE

## 2019-08-09 PROCEDURE — 85610 PROTHROMBIN TIME: CPT | Mod: QW,ZL | Performed by: FAMILY MEDICINE

## 2019-08-09 NOTE — PROGRESS NOTES
ANTICOAGULATION FOLLOW-UP CLINIC VISIT    Patient Name:  Rm Duarte  Date:  2019  Contact Type:  Telephone    SUBJECTIVE:  Patient Findings     Comments:   INR done by lab. Call placed to patient and spoke to him re: INR result, warfarin dosing/INR recheck date. He verbalized understanding of instruction and has no questions. He has no bleeding/bruising and no new changes in diet/meds/activity.         Clinical Outcomes     Comments:   INR done by lab. Call placed to patient and spoke to him re: INR result, warfarin dosing/INR recheck date. He verbalized understanding of instruction and has no questions. He has no bleeding/bruising and no new changes in diet/meds/activity.            OBJECTIVE    INR Point of Care   Date Value Ref Range Status   2019 2.1 (H) 0.86 - 1.14 Final     Comment:     This test is intended for monitoring Coumadin therapy.  Results are not   accurate in patients with prolonged INR due to factor deficiency.         ASSESSMENT / PLAN  INR assessment THER    Recheck INR In: 3 WEEKS    INR Location Clinic      Anticoagulation Summary  As of 2019    INR goal:   2.0-3.0   TTR:   38.0 % (3 y)   INR used for dosin.1 (2019)   Warfarin maintenance plan:   3.75 mg (2.5 mg x 1.5) every Mon, Fri; 2.5 mg (2.5 mg x 1) all other days   Full warfarin instructions:   3.75 mg every Mon, Fri; 2.5 mg all other days   Weekly warfarin total:   20 mg   No change documented:   Zena Haas RN   Plan last modified:   Zena Haas RN (2019)   Next INR check:   2019   Priority:   INR   Target end date:   Indefinite    Indications    Chronic atrial fibrillation (H) [I48.2]  PVD (peripheral vascular disease) (H) [I73.9]  Long-term (current) use of anticoagulants [Z79.01] [Z79.01]             Anticoagulation Episode Summary     INR check location:       Preferred lab:       Send INR reminders to:   [facility] ANTICOAG POOL    Comments:         Anticoagulation Care Providers     Provider  Role Specialty Phone number    Dominguez Maradiaga MD [facility] [phone]            See the Encounter Report to view Anticoagulation Flowsheet and Dosing Calendar (Go to Encounters tab in chart review, and find the Anticoagulation Therapy Visit)        Zena Haas RN

## 2019-08-12 DIAGNOSIS — I73.9 PVD (PERIPHERAL VASCULAR DISEASE) (H): ICD-10-CM

## 2019-08-12 DIAGNOSIS — B02.29 POST HERPETIC NEURALGIA: ICD-10-CM

## 2019-08-13 ENCOUNTER — MEDICAL CORRESPONDENCE (OUTPATIENT)
Dept: HEALTH INFORMATION MANAGEMENT | Facility: CLINIC | Age: 79
End: 2019-08-13

## 2019-08-13 RX ORDER — CILOSTAZOL 100 MG/1
TABLET ORAL
Qty: 180 TABLET | Refills: 0 | Status: SHIPPED | OUTPATIENT
Start: 2019-08-13 | End: 2019-12-23

## 2019-08-13 RX ORDER — PREGABALIN 225 MG/1
CAPSULE ORAL
Qty: 60 CAPSULE | Refills: 1 | Status: SHIPPED | OUTPATIENT
Start: 2019-08-13 | End: 2019-10-18

## 2019-08-13 NOTE — TELEPHONE ENCOUNTER
Cilostazol 100mg      Last Written Prescription Date:  5/8/2019  Last Fill Quantity: 180,   # refills: 0  Last Office Visit: 5/28/2019  Future Office visit:       Routing refill request to provider for review/approval because:  Labs due for HGB, PLT and Serum Creatinine 6/25/2018    Lyrica 225mg      Last Written Prescription Date:  6/10/2019  Last Fill Quantity: 60,   # refills: 1  Last Office Visit: 5/28/2019  Future Office visit:       Routing refill request to provider for review/approval because:  Drug not on the [facility], [facility] or [facility] refill protocol or controlled substance

## 2019-08-29 ENCOUNTER — ANTICOAGULATION THERAPY VISIT (OUTPATIENT)
Dept: ANTICOAGULATION | Facility: OTHER | Age: 79
End: 2019-08-29
Attending: FAMILY MEDICINE
Payer: COMMERCIAL

## 2019-08-29 DIAGNOSIS — Z79.01 LONG TERM CURRENT USE OF ANTICOAGULANT THERAPY: ICD-10-CM

## 2019-08-29 DIAGNOSIS — I48.20 CHRONIC ATRIAL FIBRILLATION (H): ICD-10-CM

## 2019-08-29 DIAGNOSIS — I73.9 PVD (PERIPHERAL VASCULAR DISEASE) (H): ICD-10-CM

## 2019-08-29 LAB — INR BLD: 4.3 (ref 0.86–1.14)

## 2019-08-29 PROCEDURE — 36416 COLLJ CAPILLARY BLOOD SPEC: CPT | Mod: ZL,59 | Performed by: FAMILY MEDICINE

## 2019-08-29 PROCEDURE — 85610 PROTHROMBIN TIME: CPT | Mod: QW,ZL | Performed by: FAMILY MEDICINE

## 2019-08-29 NOTE — PROGRESS NOTES
ANTICOAGULATION FOLLOW-UP CLINIC VISIT    Patient Name:  Rm Duarte  Date:  2019  Contact Type:  Telephone    SUBJECTIVE:  Patient Findings     Positives:   Change in medications (different CBD oil)    Comments:   INR done by lab. Call placed to patient and spoke to him re: INR result, warfarin dosing/INR recheck date. He state he is using different CBD oil and was using it two times daily but is now using it only 1 time a day. No other changes in diet/meds/activity, no bleeding/bruising        Clinical Outcomes     Negatives:   Major bleeding event, Thromboembolic event, Anticoagulation-related hospital admission, Anticoagulation-related ED visit, Anticoagulation-related fatality    Comments:   INR done by lab. Call placed to patient and spoke to him re: INR result, warfarin dosing/INR recheck date. He state he is using different CBD oil and was using it two times daily but is now using it only 1 time a day. No other changes in diet/meds/activity, no bleeding/bruising           OBJECTIVE    INR Point of Care   Date Value Ref Range Status   2019 4.3 (H) 0.86 - 1.14 Final     Comment:     This test is intended for monitoring Coumadin therapy.  Results are not   accurate in patients with prolonged INR due to factor deficiency.         ASSESSMENT / PLAN  INR assessment SUPRA CBD oil change and use it more than daily   Recheck INR In: 10 DAYS    INR Location Clinic      Anticoagulation Summary  As of 2019    INR goal:   2.0-3.0   TTR:   38.1 % (3 y)   INR used for dosin.3! (2019)   Warfarin maintenance plan:   3.75 mg (2.5 mg x 1.5) every Mon, Fri; 2.5 mg (2.5 mg x 1) all other days   Full warfarin instructions:   : Hold; : 2.5 mg; Otherwise 3.75 mg every Mon, Fri; 2.5 mg all other days   Weekly warfarin total:   20 mg   Plan last modified:   Zena Haas RN (2019)   Next INR check:   2019   Priority:   INR   Target end date:   Indefinite    Indications    Chronic atrial  fibrillation (H) [I48.2]  PVD (peripheral vascular disease) (H) [I73.9]  Long-term (current) use of anticoagulants [Z79.01] [Z79.01]             Anticoagulation Episode Summary     INR check location:       Preferred lab:       Send INR reminders to:   [facility] ANTICOAG POOL    Comments:         Anticoagulation Care Providers     Provider Role Specialty Phone number    Dominguez Maradiaga MD [facility] [phone]            See the Encounter Report to view Anticoagulation Flowsheet and Dosing Calendar (Go to Encounters tab in chart review, and find the Anticoagulation Therapy Visit)        Zena Haas RN

## 2019-09-03 ENCOUNTER — ANTICOAGULATION THERAPY VISIT (OUTPATIENT)
Dept: ANTICOAGULATION | Facility: OTHER | Age: 79
End: 2019-09-03
Attending: FAMILY MEDICINE
Payer: COMMERCIAL

## 2019-09-03 DIAGNOSIS — I73.9 PVD (PERIPHERAL VASCULAR DISEASE) (H): ICD-10-CM

## 2019-09-03 DIAGNOSIS — I48.20 CHRONIC ATRIAL FIBRILLATION (H): ICD-10-CM

## 2019-09-03 DIAGNOSIS — Z79.01 LONG TERM CURRENT USE OF ANTICOAGULANT THERAPY: ICD-10-CM

## 2019-09-03 LAB — INR BLD: 3.7 (ref 0.86–1.14)

## 2019-09-03 PROCEDURE — 85610 PROTHROMBIN TIME: CPT | Mod: QW,ZL | Performed by: FAMILY MEDICINE

## 2019-09-03 PROCEDURE — 36416 COLLJ CAPILLARY BLOOD SPEC: CPT | Mod: ZL | Performed by: FAMILY MEDICINE

## 2019-09-03 NOTE — PROGRESS NOTES
ANTICOAGULATION FOLLOW-UP CLINIC VISIT    Patient Name:  Rm Duarte  Date:  9/3/2019  Contact Type:  Telephone    SUBJECTIVE:  Patient Findings     Comments:   INR done by lab. Call placed to patient and spoke to him re: INR result, warfarin dosing/INR recheck date. He verbalized understanding and has no questions.         Clinical Outcomes     Negatives:   Major bleeding event, Thromboembolic event, Anticoagulation-related hospital admission, Anticoagulation-related ED visit, Anticoagulation-related fatality    Comments:   INR done by lab. Call placed to patient and spoke to him re: INR result, warfarin dosing/INR recheck date. He verbalized understanding and has no questions.            OBJECTIVE    INR Point of Care   Date Value Ref Range Status   09/03/2019 3.7 (H) 0.86 - 1.14 Final     Comment:     This test is intended for monitoring Coumadin therapy.  Results are not   accurate in patients with prolonged INR due to factor deficiency.         ASSESSMENT / PLAN  INR assessment SUPRA    Recheck INR In: 10 DAYS    INR Location Clinic      Anticoagulation Summary  As of 9/3/2019    INR goal:   2.0-3.0   TTR:   37.9 % (3 y)   INR used for dosing:   3.7! (9/3/2019)   Warfarin maintenance plan:   3.75 mg (2.5 mg x 1.5) every Mon, Fri; 2.5 mg (2.5 mg x 1) all other days   Full warfarin instructions:   9/3: Hold; 9/4: 1.25 mg; Otherwise 3.75 mg every Mon, Fri; 2.5 mg all other days   Weekly warfarin total:   20 mg   Plan last modified:   Zena Haas RN (7/26/2019)   Next INR check:   9/13/2019   Priority:   INR   Target end date:   Indefinite    Indications    Chronic atrial fibrillation (H) [I48.2]  PVD (peripheral vascular disease) (H) [I73.9]  Long-term (current) use of anticoagulants [Z79.01] [Z79.01]             Anticoagulation Episode Summary     INR check location:       Preferred lab:       Send INR reminders to:   [facility] ANTICOAG POOL    Comments:         Anticoagulation Care Providers     Provider Role  Specialty Phone number    Dominguez Maradiaga MD [facility] [phone]            See the Encounter Report to view Anticoagulation Flowsheet and Dosing Calendar (Go to Encounters tab in chart review, and find the Anticoagulation Therapy Visit)        Zena Haas RN

## 2019-09-09 ENCOUNTER — ANTICOAGULATION THERAPY VISIT (OUTPATIENT)
Dept: ANTICOAGULATION | Facility: OTHER | Age: 79
End: 2019-09-09
Attending: FAMILY MEDICINE
Payer: COMMERCIAL

## 2019-09-09 DIAGNOSIS — I48.20 CHRONIC ATRIAL FIBRILLATION (H): ICD-10-CM

## 2019-09-09 DIAGNOSIS — I73.9 PVD (PERIPHERAL VASCULAR DISEASE) (H): ICD-10-CM

## 2019-09-09 DIAGNOSIS — Z79.01 LONG TERM CURRENT USE OF ANTICOAGULANT THERAPY: ICD-10-CM

## 2019-09-09 LAB — INR BLD: 2.2 (ref 0.86–1.14)

## 2019-09-09 PROCEDURE — 85610 PROTHROMBIN TIME: CPT | Mod: QW,ZL | Performed by: FAMILY MEDICINE

## 2019-09-09 PROCEDURE — 36416 COLLJ CAPILLARY BLOOD SPEC: CPT | Mod: ZL | Performed by: FAMILY MEDICINE

## 2019-09-09 NOTE — PROGRESS NOTES
ANTICOAGULATION FOLLOW-UP CLINIC VISIT    Patient Name:  Rm Duarte  Date:  2019  Contact Type:  Telephone    SUBJECTIVE:  Patient Findings     Comments:   INR done by lab. Call placed to patient and spoke to him re: INR result, warfarin dosing, INR recheck date. He verbalized understanding of instruction and has no questions. He has no unusual bleeding/bruising and no new changes in diet/meds/activity.         Clinical Outcomes     Negatives:   Major bleeding event, Thromboembolic event, Anticoagulation-related hospital admission, Anticoagulation-related ED visit, Anticoagulation-related fatality    Comments:   INR done by lab. Call placed to patient and spoke to him re: INR result, warfarin dosing, INR recheck date. He verbalized understanding of instruction and has no questions. He has no unusual bleeding/bruising and no new changes in diet/meds/activity.            OBJECTIVE    INR Point of Care   Date Value Ref Range Status   2019 2.2 (H) 0.86 - 1.14 Final     Comment:     This test is intended for monitoring Coumadin therapy.  Results are not   accurate in patients with prolonged INR due to factor deficiency.         ASSESSMENT / PLAN  INR assessment THER    Recheck INR In: 2 WEEKS    INR Location Clinic      Anticoagulation Summary  As of 2019    INR goal:   2.0-3.0   TTR:   38.0 % (3.1 y)   INR used for dosin.2 (2019)   Warfarin maintenance plan:   3.75 mg (2.5 mg x 1.5) every Mon, Fri; 2.5 mg (2.5 mg x 1) all other days   Full warfarin instructions:   3.75 mg every Mon, Fri; 2.5 mg all other days   Weekly warfarin total:   20 mg   Plan last modified:   Zena Haas RN (2019)   Next INR check:   2019   Priority:   INR   Target end date:   Indefinite    Indications    Chronic atrial fibrillation (H) [I48.2]  PVD (peripheral vascular disease) (H) [I73.9]  Long-term (current) use of anticoagulants [Z79.01] [Z79.01]             Anticoagulation Episode Summary     INR check  location:       Preferred lab:       Send INR reminders to:   [facility] ANTICOAG POOL    Comments:         Anticoagulation Care Providers     Provider Role Specialty Phone number    Dominguez Maradiaga MD [facility] [phone]            See the Encounter Report to view Anticoagulation Flowsheet and Dosing Calendar (Go to Encounters tab in chart review, and find the Anticoagulation Therapy Visit)        Zena Haas RN

## 2019-09-18 DIAGNOSIS — M19.90 ARTHRITIS: ICD-10-CM

## 2019-09-19 RX ORDER — TRAMADOL HYDROCHLORIDE 50 MG/1
TABLET ORAL
Qty: 120 TABLET | Refills: 0 | Status: SHIPPED | OUTPATIENT
Start: 2019-09-19 | End: 2019-10-16

## 2019-09-19 NOTE — TELEPHONE ENCOUNTER
traMADol (ULTRAM) 50 MG tablet      Last Written Prescription Date:  5-28-19  Last Fill Quantity: 120,   # refills: 3  Last Office Visit: 5-28-19  Future Office visit:       Routing refill request to provider for review/approval because:  Drug not on the [facility], [facility] or [facility] refill protocol or controlled substance

## 2019-09-23 ENCOUNTER — ANTICOAGULATION THERAPY VISIT (OUTPATIENT)
Dept: ANTICOAGULATION | Facility: OTHER | Age: 79
End: 2019-09-23
Attending: FAMILY MEDICINE
Payer: COMMERCIAL

## 2019-09-23 DIAGNOSIS — I73.9 PVD (PERIPHERAL VASCULAR DISEASE) (H): ICD-10-CM

## 2019-09-23 DIAGNOSIS — I48.20 CHRONIC ATRIAL FIBRILLATION (H): ICD-10-CM

## 2019-09-23 DIAGNOSIS — Z79.01 LONG TERM CURRENT USE OF ANTICOAGULANT THERAPY: ICD-10-CM

## 2019-09-23 LAB — INR BLD: 3.6 (ref 0.86–1.14)

## 2019-09-23 PROCEDURE — 36416 COLLJ CAPILLARY BLOOD SPEC: CPT | Mod: ZL | Performed by: FAMILY MEDICINE

## 2019-09-23 PROCEDURE — 85610 PROTHROMBIN TIME: CPT | Mod: QW,ZL | Performed by: FAMILY MEDICINE

## 2019-09-23 NOTE — PROGRESS NOTES
ANTICOAGULATION FOLLOW-UP CLINIC VISIT    Patient Name:  Rm Duarte  Date:  9/23/2019  Contact Type:  Telephone    SUBJECTIVE:  Patient Findings     Comments:   INR done by lab. Call placed to patient and spoke to him re: INR result, warfarin dosing/INR recheck date. He verbalized understanding and has no questions. He has no unusual bleeding/bruising and no new changes in diet/meds/activity. We =discussed that he has almost no vit K intake. We discussed sometimes just adding a little bit of vit K to the diet will help INR stay regulated.         Clinical Outcomes     Negatives:   Major bleeding event, Thromboembolic event, Anticoagulation-related hospital admission, Anticoagulation-related ED visit, Anticoagulation-related fatality    Comments:   INR done by lab. Call placed to patient and spoke to him re: INR result, warfarin dosing/INR recheck date. He verbalized understanding and has no questions. He has no unusual bleeding/bruising and no new changes in diet/meds/activity. We =discussed that he has almost no vit K intake. We discussed sometimes just adding a little bit of vit K to the diet will help INR stay regulated.            OBJECTIVE    INR Point of Care   Date Value Ref Range Status   09/23/2019 3.6 (H) 0.86 - 1.14 Final     Comment:     This test is intended for monitoring Coumadin therapy.  Results are not   accurate in patients with prolonged INR due to factor deficiency.         ASSESSMENT / PLAN  INR assessment SUPRA    Recheck INR In: 2 WEEKS    INR Location Clinic      Anticoagulation Summary  As of 9/23/2019    INR goal:   2.0-3.0   TTR:   38.2 % (3.1 y)   INR used for dosing:   3.6! (9/23/2019)   Warfarin maintenance plan:   3.75 mg (2.5 mg x 1.5) every Fri; 2.5 mg (2.5 mg x 1) all other days   Full warfarin instructions:   9/23: Hold; Otherwise 3.75 mg every Fri; 2.5 mg all other days   Weekly warfarin total:   18.75 mg   Plan last modified:   Zena Haas RN (9/23/2019)   Next INR check:    10/7/2019   Priority:   INR   Target end date:   Indefinite    Indications    Chronic atrial fibrillation (H) [I48.2]  PVD (peripheral vascular disease) (H) [I73.9]  Long-term (current) use of anticoagulants [Z79.01] [Z79.01]             Anticoagulation Episode Summary     INR check location:       Preferred lab:       Send INR reminders to:   [facility] ANTICOAG POOL    Comments:         Anticoagulation Care Providers     Provider Role Specialty Phone number    Dominguez Maradiaga MD [facility] [phone]            See the Encounter Report to view Anticoagulation Flowsheet and Dosing Calendar (Go to Encounters tab in chart review, and find the Anticoagulation Therapy Visit)        Zena Haas RN

## 2019-10-07 ENCOUNTER — ALLIED HEALTH/NURSE VISIT (OUTPATIENT)
Dept: FAMILY MEDICINE | Facility: OTHER | Age: 79
End: 2019-10-07
Attending: FAMILY MEDICINE
Payer: COMMERCIAL

## 2019-10-07 ENCOUNTER — ANTICOAGULATION THERAPY VISIT (OUTPATIENT)
Dept: ANTICOAGULATION | Facility: OTHER | Age: 79
End: 2019-10-07
Attending: FAMILY MEDICINE
Payer: COMMERCIAL

## 2019-10-07 DIAGNOSIS — I73.9 PVD (PERIPHERAL VASCULAR DISEASE) (H): ICD-10-CM

## 2019-10-07 DIAGNOSIS — I48.20 CHRONIC ATRIAL FIBRILLATION (H): ICD-10-CM

## 2019-10-07 DIAGNOSIS — Z79.01 LONG TERM CURRENT USE OF ANTICOAGULANT THERAPY: ICD-10-CM

## 2019-10-07 DIAGNOSIS — Z23 NEED FOR PROPHYLACTIC VACCINATION AND INOCULATION AGAINST INFLUENZA: Primary | ICD-10-CM

## 2019-10-07 LAB — INR BLD: 1.8 (ref 0.86–1.14)

## 2019-10-07 PROCEDURE — 36416 COLLJ CAPILLARY BLOOD SPEC: CPT | Mod: ZL | Performed by: FAMILY MEDICINE

## 2019-10-07 PROCEDURE — 90662 IIV NO PRSV INCREASED AG IM: CPT

## 2019-10-07 PROCEDURE — 90471 IMMUNIZATION ADMIN: CPT

## 2019-10-07 PROCEDURE — 85610 PROTHROMBIN TIME: CPT | Mod: QW,ZL | Performed by: FAMILY MEDICINE

## 2019-10-07 NOTE — PROGRESS NOTES
ANTICOAGULATION FOLLOW-UP CLINIC VISIT    Patient Name:  Rm Duarte  Date:  10/7/2019  Contact Type:  Telephone    SUBJECTIVE:  Patient Findings     Comments:   INR done by lab. Call placed to patient and spoke to him re: INR result,warfarin dosing/INR recheck date. He verbalized understanding and has no questions. He denies any bleeding/bruising and has no new changes in diet/meds/activity.         Clinical Outcomes     Negatives:   Major bleeding event, Thromboembolic event, Anticoagulation-related hospital admission, Anticoagulation-related ED visit, Anticoagulation-related fatality    Comments:   INR done by lab. Call placed to patient and spoke to him re: INR result,warfarin dosing/INR recheck date. He verbalized understanding and has no questions. He denies any bleeding/bruising and has no new changes in diet/meds/activity.            OBJECTIVE    INR Point of Care   Date Value Ref Range Status   10/07/2019 1.8 (H) 0.86 - 1.14 Final     Comment:     This test is intended for monitoring Coumadin therapy.  Results are not   accurate in patients with prolonged INR due to factor deficiency.         ASSESSMENT / PLAN  INR assessment SUB    Recheck INR In: 2 WEEKS    INR Location Clinic      Anticoagulation Summary  As of 10/7/2019    INR goal:   2.0-3.0   TTR:   38.4 % (3.1 y)   INR used for dosin.8! (10/7/2019)   Warfarin maintenance plan:   3.75 mg (2.5 mg x 1.5) every Fri; 2.5 mg (2.5 mg x 1) all other days   Full warfarin instructions:   10/7: 3.75 mg; Otherwise 3.75 mg every Fri; 2.5 mg all other days   Weekly warfarin total:   18.75 mg   Plan last modified:   Zena Haas RN (2019)   Next INR check:   10/21/2019   Priority:   INR   Target end date:   Indefinite    Indications    Chronic atrial fibrillation [I48.20]  PVD (peripheral vascular disease) (H) [I73.9]  Long-term (current) use of anticoagulants [Z79.01] [Z79.01]             Anticoagulation Episode Summary     INR check location:        Preferred lab:       Send INR reminders to:   [facility] ANTICOAG POOL    Comments:         Anticoagulation Care Providers     Provider Role Specialty Phone number    Dominguez Maradiaga MD [facility] [phone]            See the Encounter Report to view Anticoagulation Flowsheet and Dosing Calendar (Go to Encounters tab in chart review, and find the Anticoagulation Therapy Visit)        Zena Haas RN

## 2019-10-16 DIAGNOSIS — M19.90 ARTHRITIS: ICD-10-CM

## 2019-10-16 RX ORDER — TRAMADOL HYDROCHLORIDE 50 MG/1
TABLET ORAL
Qty: 120 TABLET | Refills: 0 | Status: SHIPPED | OUTPATIENT
Start: 2019-10-16 | End: 2019-11-29

## 2019-10-16 NOTE — TELEPHONE ENCOUNTER
Tramadol     Last Written Prescription Date:  9/19/2019  Last Fill Quantity: 120,   # refills: 0  Last Office Visit: 5/28/2019  Future Office visit:       Routing refill request to provider for review/approval because:  Drug not on the [facility], [facility] or [facility] refill protocol or controlled substance  *Patient states he is out and is in severe pain

## 2019-10-18 DIAGNOSIS — B02.29 POST HERPETIC NEURALGIA: ICD-10-CM

## 2019-10-18 NOTE — TELEPHONE ENCOUNTER
Lyrica      Last Written Prescription Date:  8/13/19  Last Fill Quantity: 60,   # refills: 1  Last Office Visit: 5/28/19  Future Office visit:       Routing refill request to provider for review/approval because:

## 2019-10-21 ENCOUNTER — ANTICOAGULATION THERAPY VISIT (OUTPATIENT)
Dept: ANTICOAGULATION | Facility: OTHER | Age: 79
End: 2019-10-21
Attending: FAMILY MEDICINE
Payer: COMMERCIAL

## 2019-10-21 DIAGNOSIS — I73.9 PVD (PERIPHERAL VASCULAR DISEASE) (H): ICD-10-CM

## 2019-10-21 DIAGNOSIS — Z79.01 LONG TERM CURRENT USE OF ANTICOAGULANT THERAPY: ICD-10-CM

## 2019-10-21 DIAGNOSIS — I48.20 CHRONIC ATRIAL FIBRILLATION (H): ICD-10-CM

## 2019-10-21 LAB — INR BLD: 1.9 (ref 0.86–1.14)

## 2019-10-21 PROCEDURE — 36416 COLLJ CAPILLARY BLOOD SPEC: CPT | Mod: ZL | Performed by: FAMILY MEDICINE

## 2019-10-21 PROCEDURE — 85610 PROTHROMBIN TIME: CPT | Mod: QW,ZL | Performed by: FAMILY MEDICINE

## 2019-10-21 RX ORDER — PREGABALIN 225 MG/1
225 CAPSULE ORAL 2 TIMES DAILY
Qty: 60 CAPSULE | Refills: 1 | Status: SHIPPED | OUTPATIENT
Start: 2019-10-21 | End: 2020-01-06

## 2019-10-21 NOTE — PROGRESS NOTES
ANTICOAGULATION FOLLOW-UP CLINIC VISIT    Patient Name:  Rm Duarte  Date:  10/21/2019  Contact Type:  Telephone    SUBJECTIVE:  Patient Findings     Comments:   INR done by lab. Call placed to patient and spoke to him re: INR result, warfarin dosing/INR recheck date. He verbalized understanding and has no questions. He has no bleeding/bruising and no new changes in diet/meds/activity.         Clinical Outcomes     Comments:   INR done by lab. Call placed to patient and spoke to him re: INR result, warfarin dosing/INR recheck date. He verbalized understanding and has no questions. He has no bleeding/bruising and no new changes in diet/meds/activity.            OBJECTIVE    INR Point of Care   Date Value Ref Range Status   10/21/2019 1.9 (H) 0.86 - 1.14 Final     Comment:     This test is intended for monitoring Coumadin therapy.  Results are not   accurate in patients with prolonged INR due to factor deficiency.         ASSESSMENT / PLAN  INR assessment SUB    Recheck INR In: 3 WEEKS    INR Location Clinic      Anticoagulation Summary  As of 10/21/2019    INR goal:   2.0-3.0   TTR:   38.0 % (3.2 y)   INR used for dosin.9! (10/21/2019)   Warfarin maintenance plan:   3.75 mg (2.5 mg x 1.5) every Fri; 2.5 mg (2.5 mg x 1) all other days   Full warfarin instructions:   10/21: 5 mg; Otherwise 3.75 mg every Fri; 2.5 mg all other days   Weekly warfarin total:   18.75 mg   Plan last modified:   Zena Haas RN (10/21/2019)   Next INR check:   2019   Priority:   INR   Target end date:   Indefinite    Indications    Chronic atrial fibrillation [I48.20]  PVD (peripheral vascular disease) (H) [I73.9]  Long-term (current) use of anticoagulants [Z79.01] [Z79.01]             Anticoagulation Episode Summary     INR check location:       Preferred lab:       Send INR reminders to:    ANTICOAG POOL    Comments:         Anticoagulation Care Providers     Provider Role Specialty Phone number    Dominguez Maradiaga MD  [facility] [phone]            See the Encounter Report to view Anticoagulation Flowsheet and Dosing Calendar (Go to Encounters tab in chart review, and find the Anticoagulation Therapy Visit)        Zena Haas RN

## 2019-10-23 ENCOUNTER — MEDICAL CORRESPONDENCE (OUTPATIENT)
Dept: HEALTH INFORMATION MANAGEMENT | Facility: CLINIC | Age: 79
End: 2019-10-23

## 2019-11-13 ENCOUNTER — ANTICOAGULATION THERAPY VISIT (OUTPATIENT)
Dept: ANTICOAGULATION | Facility: OTHER | Age: 79
End: 2019-11-13
Attending: FAMILY MEDICINE
Payer: COMMERCIAL

## 2019-11-13 DIAGNOSIS — I48.20 CHRONIC ATRIAL FIBRILLATION (H): ICD-10-CM

## 2019-11-13 DIAGNOSIS — Z79.01 LONG TERM CURRENT USE OF ANTICOAGULANT THERAPY: ICD-10-CM

## 2019-11-13 DIAGNOSIS — I73.9 PVD (PERIPHERAL VASCULAR DISEASE) (H): ICD-10-CM

## 2019-11-13 LAB — INR BLD: 1.6 (ref 0.86–1.14)

## 2019-11-13 PROCEDURE — 36416 COLLJ CAPILLARY BLOOD SPEC: CPT | Mod: ZL | Performed by: FAMILY MEDICINE

## 2019-11-13 PROCEDURE — 85610 PROTHROMBIN TIME: CPT | Mod: QW,ZL | Performed by: FAMILY MEDICINE

## 2019-11-13 NOTE — PROGRESS NOTES
ANTICOAGULATION FOLLOW-UP CLINIC VISIT    Patient Name:  Rm Duarte  Date:  2019  Contact Type:  Telephone    SUBJECTIVE:  Patient Findings     Comments:   Received INR results from lab. Call placed to patient and spoke to patient re: INR results, warfarin dosing/INR recheck date. Patient to return call to warfarin clinic with changes in bruising/bleeding, new changes in diet/meds/activity or questions. Patient verbalized understanding.         Clinical Outcomes     Negatives:   Major bleeding event, Thromboembolic event, Anticoagulation-related hospital admission, Anticoagulation-related ED visit, Anticoagulation-related fatality    Comments:   Received INR results from lab. Call placed to patient and spoke to patient re: INR results, warfarin dosing/INR recheck date. Patient to return call to warfarin clinic with changes in bruising/bleeding, new changes in diet/meds/activity or questions. Patient verbalized understanding.            OBJECTIVE    INR Point of Care   Date Value Ref Range Status   2019 1.6 (H) 0.86 - 1.14 Final     Comment:     This test is intended for monitoring Coumadin therapy.  Results are not   accurate in patients with prolonged INR due to factor deficiency.         ASSESSMENT / PLAN  INR assessment SUB    Recheck INR In: 2 WEEKS    INR Location Clinic      Anticoagulation Summary  As of 2019    INR goal:   2.0-3.0   TTR:   37.2 % (3.2 y)   INR used for dosin.6! (2019)   Warfarin maintenance plan:   3.75 mg (2.5 mg x 1.5) every Mon, Fri; 2.5 mg (2.5 mg x 1) all other days   Full warfarin instructions:   : 5 mg; Otherwise 3.75 mg every Mon, Fri; 2.5 mg all other days   Weekly warfarin total:   20 mg   Plan last modified:   Jeanie Arechiga RN (2019)   Next INR check:   2019   Priority:   Maintenance   Target end date:   Indefinite    Indications    Chronic atrial fibrillation [I48.20]  PVD (peripheral vascular disease) (H) [I73.9]  Long-term  (current) use of anticoagulants [Z79.01] [Z79.01]             Anticoagulation Episode Summary     INR check location:       Preferred lab:       Send INR reminders to:   [facility] ANTICOAG POOL    Comments:         Anticoagulation Care Providers     Provider Role Specialty Phone number    Dominguez Maradiaga MD [facility] [phone]            See the Encounter Report to view Anticoagulation Flowsheet and Dosing Calendar (Go to Encounters tab in chart review, and find the Anticoagulation Therapy Visit)        Jeanie Arechiga RN

## 2019-11-19 DIAGNOSIS — N40.0 BPH (BENIGN PROSTATIC HYPERPLASIA): ICD-10-CM

## 2019-11-21 RX ORDER — TAMSULOSIN HYDROCHLORIDE 0.4 MG/1
CAPSULE ORAL
Qty: 90 CAPSULE | Refills: 0 | Status: SHIPPED | OUTPATIENT
Start: 2019-11-21 | End: 2020-01-01

## 2019-11-25 DIAGNOSIS — I73.9 PVD (PERIPHERAL VASCULAR DISEASE) (H): ICD-10-CM

## 2019-11-27 RX ORDER — SIMVASTATIN 10 MG
TABLET ORAL
Qty: 90 TABLET | Refills: 0 | Status: SHIPPED | OUTPATIENT
Start: 2019-11-27 | End: 2020-01-01

## 2019-11-27 NOTE — TELEPHONE ENCOUNTER
Due for labs per protocol, please advise.    LDL Cholesterol Calculated   Date Value Ref Range Status   08/02/2018 40 <100 mg/dL Final     Comment:     Desirable:       <100 mg/dl     simvastatin (ZOCOR) 10 MG tablet      Last Written Prescription Date:  8/13/19  Last Fill Quantity: 90,   # refills: 0  Last Office Visit: 5/28/19  Future Office visit:       Routing refill request to provider for review/approval because:  Drug not on the [facility] or [facility] refill protocol or controlled substance

## 2019-11-29 DIAGNOSIS — M19.90 ARTHRITIS: ICD-10-CM

## 2019-12-02 RX ORDER — TRAMADOL HYDROCHLORIDE 50 MG/1
TABLET ORAL
Qty: 120 TABLET | Refills: 0 | Status: SHIPPED | OUTPATIENT
Start: 2019-12-02 | End: 2020-01-06

## 2019-12-02 NOTE — TELEPHONE ENCOUNTER
Tramadol      Last Written Prescription Date:  10/16/19  Last Fill Quantity: 120,   # refills: 0  Last Office Visit: 5/28/19  Future Office visit:       Routing refill request to provider for review/approval because:  Drug not on the [facility], P or [facility] refill protocol or controlled substance

## 2019-12-03 ENCOUNTER — ANTICOAGULATION THERAPY VISIT (OUTPATIENT)
Dept: ANTICOAGULATION | Facility: OTHER | Age: 79
End: 2019-12-03
Attending: FAMILY MEDICINE
Payer: COMMERCIAL

## 2019-12-03 DIAGNOSIS — Z79.01 LONG TERM CURRENT USE OF ANTICOAGULANT THERAPY: ICD-10-CM

## 2019-12-03 DIAGNOSIS — I48.20 CHRONIC ATRIAL FIBRILLATION (H): ICD-10-CM

## 2019-12-03 DIAGNOSIS — Z79.01 LONG TERM CURRENT USE OF ANTICOAGULANT THERAPY: Primary | ICD-10-CM

## 2019-12-03 DIAGNOSIS — I73.9 PVD (PERIPHERAL VASCULAR DISEASE) (H): ICD-10-CM

## 2019-12-03 LAB — INR PPP: 1.47 (ref 0.86–1.14)

## 2019-12-03 PROCEDURE — 85610 PROTHROMBIN TIME: CPT | Mod: ZL | Performed by: FAMILY MEDICINE

## 2019-12-03 PROCEDURE — 36415 COLL VENOUS BLD VENIPUNCTURE: CPT | Mod: ZL | Performed by: FAMILY MEDICINE

## 2019-12-03 NOTE — PROGRESS NOTES
ANTICOAGULATION FOLLOW-UP CLINIC VISIT    Patient Name:  Rm Duarte  Date:  12/3/2019  Contact Type:  Telephone    SUBJECTIVE:  Patient Findings     Positives:   Missed doses (Patient missed 3 days in a row Friday/saturday/)    Comments:   Received INR results from lab. Call placed to patient and spoke to patient re: INR results, warfarin dosing/INR recheck date. Patient advised nurse he missed doses on Friday, Saturday and . Patient to return call to warfarin clinic with changes in bruising/bleeding, new changes in diet/meds/activity or questions.Patient verbalized understanding.         Clinical Outcomes     Negatives:   Major bleeding event, Thromboembolic event, Anticoagulation-related hospital admission, Anticoagulation-related ED visit, Anticoagulation-related fatality    Comments:   Received INR results from lab. Call placed to patient and spoke to patient re: INR results, warfarin dosing/INR recheck date. Patient advised nurse he missed doses on Friday, Saturday and . Patient to return call to warfarin clinic with changes in bruising/bleeding, new changes in diet/meds/activity or questions.Patient verbalized understanding.            OBJECTIVE    INR   Date Value Ref Range Status   2019 1.47 (H) 0.86 - 1.14 Final       ASSESSMENT / PLAN  INR assessment SUB missed 3 doses   Recheck INR In: 2 WEEKS    INR Location Clinic      Anticoagulation Summary  As of 12/3/2019    INR goal:   2.0-3.0   TTR:   43.9 % (1 y)   INR used for dosin.47! (12/3/2019)   Warfarin maintenance plan:   3.75 mg (2.5 mg x 1.5) every Mon, Fri; 2.5 mg (2.5 mg x 1) all other days   Full warfarin instructions:   12/3: 5 mg; Otherwise 3.75 mg every Mon, Fri; 2.5 mg all other days   Weekly warfarin total:   20 mg   Plan last modified:   Jeanie Arechiga RN (12/3/2019)   Next INR check:   2019   Priority:   Maintenance   Target end date:   Indefinite    Indications    Chronic atrial fibrillation [I48.20]  PVD  (peripheral vascular disease) (H) [I73.9]  Long-term (current) use of anticoagulants [Z79.01] [Z79.01]             Anticoagulation Episode Summary     INR check location:       Preferred lab:       Send INR reminders to:   HC ANTICOAG POOL    Comments:         Anticoagulation Care Providers     Provider Role Specialty Phone number    Dominguez Maradiaga MD [facility] [phone]            See the Encounter Report to view Anticoagulation Flowsheet and Dosing Calendar (Go to Encounters tab in chart review, and find the Anticoagulation Therapy Visit)        Jeanie Arechiga RN

## 2019-12-17 ENCOUNTER — ANTICOAGULATION THERAPY VISIT (OUTPATIENT)
Dept: ANTICOAGULATION | Facility: OTHER | Age: 79
End: 2019-12-17
Attending: FAMILY MEDICINE
Payer: COMMERCIAL

## 2019-12-17 DIAGNOSIS — I48.20 CHRONIC ATRIAL FIBRILLATION (H): ICD-10-CM

## 2019-12-17 DIAGNOSIS — I73.9 PVD (PERIPHERAL VASCULAR DISEASE) (H): ICD-10-CM

## 2019-12-17 DIAGNOSIS — Z79.01 LONG TERM CURRENT USE OF ANTICOAGULANT THERAPY: ICD-10-CM

## 2019-12-17 LAB — INR BLD: 3.2 (ref 0.86–1.14)

## 2019-12-17 PROCEDURE — 85610 PROTHROMBIN TIME: CPT | Mod: QW,ZL | Performed by: FAMILY MEDICINE

## 2019-12-17 PROCEDURE — 36416 COLLJ CAPILLARY BLOOD SPEC: CPT | Mod: ZL | Performed by: FAMILY MEDICINE

## 2019-12-17 NOTE — PROGRESS NOTES
ANTICOAGULATION FOLLOW-UP CLINIC VISIT    Patient Name:  Rm Duarte  Date:  12/17/2019  Contact Type:  Telephone    SUBJECTIVE:  Patient Findings     Comments:   Received INR results from lab. Call placed to patient and spoke to patient re: INR results, warfarin dosing/INR recheck date. Patient to return call to warfarin clinic with changes in bruising/bleeding, new changes in diet/meds/activity or questions. Patient verbalized understanding.         Clinical Outcomes     Negatives:   Major bleeding event, Thromboembolic event, Anticoagulation-related hospital admission, Anticoagulation-related ED visit, Anticoagulation-related fatality    Comments:   Received INR results from lab. Call placed to patient and spoke to patient re: INR results, warfarin dosing/INR recheck date. Patient to return call to warfarin clinic with changes in bruising/bleeding, new changes in diet/meds/activity or questions. Patient verbalized understanding.            OBJECTIVE    INR Point of Care   Date Value Ref Range Status   12/17/2019 3.2 (H) 0.86 - 1.14 Final     Comment:     This test is intended for monitoring Coumadin therapy.  Results are not   accurate in patients with prolonged INR due to factor deficiency.         ASSESSMENT / PLAN  INR assessment SUPRA    Recheck INR In: 2 WEEKS    INR Location Clinic      Anticoagulation Summary  As of 12/17/2019    INR goal:   2.0-3.0   TTR:   45.8 % (1 y)   INR used for dosing:      Warfarin maintenance plan:   3.75 mg (2.5 mg x 1.5) every Mon, Fri; 2.5 mg (2.5 mg x 1) all other days   Full warfarin instructions:   12/17: 1.25 mg; Otherwise 3.75 mg every Mon, Fri; 2.5 mg all other days   Weekly warfarin total:   20 mg   Plan last modified:   Jeanie Arechiga RN (12/3/2019)   Next INR check:   1/2/2020   Priority:   Maintenance   Target end date:   Indefinite    Indications    Chronic atrial fibrillation [I48.20]  PVD (peripheral vascular disease) (H) [I73.9]  Long-term (current) use of  anticoagulants [Z79.01] [Z79.01]             Anticoagulation Episode Summary     INR check location:       Preferred lab:       Send INR reminders to:   [facility] ANTICOAG POOL    Comments:         Anticoagulation Care Providers     Provider Role Specialty Phone number    Dominguez Maradiaga MD [facility] [phone]            See the Encounter Report to view Anticoagulation Flowsheet and Dosing Calendar (Go to Encounters tab in chart review, and find the Anticoagulation Therapy Visit)        Jeanie Arechiga RN

## 2019-12-18 DIAGNOSIS — I73.9 PVD (PERIPHERAL VASCULAR DISEASE) (H): ICD-10-CM

## 2019-12-19 NOTE — TELEPHONE ENCOUNTER
cilostazol      Last Written Prescription Date:  8/13/19  Last Fill Quantity: 180,   # refills: 0  Last Office Visit: 5/28/19  Future Office visit:

## 2019-12-23 DIAGNOSIS — I73.9 PVD (PERIPHERAL VASCULAR DISEASE) (H): ICD-10-CM

## 2019-12-23 RX ORDER — CILOSTAZOL 100 MG/1
TABLET ORAL
Qty: 180 TABLET | Refills: 3 | Status: SHIPPED | OUTPATIENT
Start: 2019-12-23 | End: 2019-12-26

## 2019-12-26 RX ORDER — CILOSTAZOL 100 MG/1
TABLET ORAL
Qty: 180 TABLET | Refills: 0 | Status: SHIPPED | OUTPATIENT
Start: 2019-12-26 | End: 2020-01-01

## 2019-12-31 ENCOUNTER — PATIENT OUTREACH (OUTPATIENT)
Dept: CARE COORDINATION | Facility: OTHER | Age: 79
End: 2019-12-31

## 2019-12-31 ASSESSMENT — ACTIVITIES OF DAILY LIVING (ADL): DEPENDENT_IADLS:: INDEPENDENT

## 2019-12-31 NOTE — PROGRESS NOTES
Clinic Care Coordination Contact  RUST/Voicemail    Referral Source: Care Team  Clinical Data: Care Coordinator Outreach  Outreach attempted x 1.  Left message on patient's voicemail with call back information and requested return call.    *Pt overdue for 6 month PCP visit- last May 2019    Plan: Care Coordinator will await return call.     Lauren Pipkin, BS-RN   Care Coordination  Primary Care- [facility]  [phone] Option # 1

## 2020-01-01 ENCOUNTER — ANTICOAGULATION THERAPY VISIT (OUTPATIENT)
Dept: ANTICOAGULATION | Facility: OTHER | Age: 80
End: 2020-01-01
Attending: FAMILY MEDICINE
Payer: COMMERCIAL

## 2020-01-01 ENCOUNTER — HOSPITAL ENCOUNTER (INPATIENT)
Dept: CARDIOLOGY | Facility: HOSPITAL | Age: 80
DRG: 193 | End: 2020-03-10
Attending: INTERNAL MEDICINE
Payer: COMMERCIAL

## 2020-01-01 ENCOUNTER — APPOINTMENT (OUTPATIENT)
Dept: CT IMAGING | Facility: HOSPITAL | Age: 80
DRG: 193 | End: 2020-01-01
Attending: EMERGENCY MEDICINE
Payer: COMMERCIAL

## 2020-01-01 ENCOUNTER — APPOINTMENT (OUTPATIENT)
Dept: PHYSICAL THERAPY | Facility: HOSPITAL | Age: 80
DRG: 193 | End: 2020-01-01
Payer: COMMERCIAL

## 2020-01-01 ENCOUNTER — TRANSFERRED RECORDS (OUTPATIENT)
Dept: HEALTH INFORMATION MANAGEMENT | Facility: CLINIC | Age: 80
End: 2020-01-01

## 2020-01-01 ENCOUNTER — TELEPHONE (OUTPATIENT)
Dept: FAMILY MEDICINE | Facility: OTHER | Age: 80
End: 2020-01-01

## 2020-01-01 ENCOUNTER — APPOINTMENT (OUTPATIENT)
Dept: GERIATRICS | Facility: OTHER | Age: 80
End: 2020-01-01
Attending: SPECIALIST
Payer: COMMERCIAL

## 2020-01-01 ENCOUNTER — ANTICOAGULATION THERAPY VISIT (OUTPATIENT)
Dept: FAMILY MEDICINE | Facility: OTHER | Age: 80
End: 2020-01-01

## 2020-01-01 ENCOUNTER — PATIENT OUTREACH (OUTPATIENT)
Dept: CARE COORDINATION | Facility: OTHER | Age: 80
End: 2020-01-01

## 2020-01-01 ENCOUNTER — HOSPITAL ENCOUNTER (INPATIENT)
Facility: HOSPITAL | Age: 80
LOS: 6 days | Discharge: NURSING, REHAB OR LONG-TERM CARE | DRG: 193 | End: 2020-03-10
Attending: EMERGENCY MEDICINE | Admitting: INTERNAL MEDICINE
Payer: COMMERCIAL

## 2020-01-01 ENCOUNTER — ANTICOAGULATION THERAPY VISIT (OUTPATIENT)
Dept: ANTICOAGULATION | Facility: OTHER | Age: 80
End: 2020-01-01

## 2020-01-01 ENCOUNTER — APPOINTMENT (OUTPATIENT)
Dept: LAB | Facility: OTHER | Age: 80
End: 2020-01-01
Attending: FAMILY MEDICINE
Payer: COMMERCIAL

## 2020-01-01 ENCOUNTER — OFFICE VISIT (OUTPATIENT)
Dept: FAMILY MEDICINE | Facility: OTHER | Age: 80
End: 2020-01-01
Attending: FAMILY MEDICINE
Payer: COMMERCIAL

## 2020-01-01 ENCOUNTER — MEDICAL CORRESPONDENCE (OUTPATIENT)
Dept: LAB | Facility: OTHER | Age: 80
End: 2020-01-01

## 2020-01-01 ENCOUNTER — NURSING HOME VISIT (OUTPATIENT)
Dept: GERIATRICS | Facility: OTHER | Age: 80
End: 2020-01-01
Attending: NURSE PRACTITIONER
Payer: COMMERCIAL

## 2020-01-01 ENCOUNTER — NURSE TRIAGE (OUTPATIENT)
Dept: FAMILY MEDICINE | Facility: OTHER | Age: 80
End: 2020-01-01

## 2020-01-01 ENCOUNTER — RESULTS ONLY (OUTPATIENT)
Dept: LAB | Age: 80
End: 2020-01-01

## 2020-01-01 ENCOUNTER — APPOINTMENT (OUTPATIENT)
Dept: OCCUPATIONAL THERAPY | Facility: HOSPITAL | Age: 80
DRG: 193 | End: 2020-01-01
Attending: INTERNAL MEDICINE
Payer: COMMERCIAL

## 2020-01-01 VITALS
WEIGHT: 171.3 LBS | TEMPERATURE: 98.3 F | SYSTOLIC BLOOD PRESSURE: 116 MMHG | DIASTOLIC BLOOD PRESSURE: 59 MMHG | HEIGHT: 71 IN | OXYGEN SATURATION: 95 % | HEART RATE: 74 BPM | BODY MASS INDEX: 23.98 KG/M2 | RESPIRATION RATE: 20 BRPM

## 2020-01-01 VITALS
DIASTOLIC BLOOD PRESSURE: 51 MMHG | BODY MASS INDEX: 24.27 KG/M2 | SYSTOLIC BLOOD PRESSURE: 87 MMHG | HEART RATE: 63 BPM | WEIGHT: 174 LBS | OXYGEN SATURATION: 96 %

## 2020-01-01 DIAGNOSIS — Z79.01 LONG TERM CURRENT USE OF ANTICOAGULANT THERAPY: ICD-10-CM

## 2020-01-01 DIAGNOSIS — J96.21 ACUTE AND CHRONIC RESPIRATORY FAILURE WITH HYPOXIA (H): ICD-10-CM

## 2020-01-01 DIAGNOSIS — I48.20 CHRONIC ATRIAL FIBRILLATION (H): ICD-10-CM

## 2020-01-01 DIAGNOSIS — B02.29 POST HERPETIC NEURALGIA: ICD-10-CM

## 2020-01-01 DIAGNOSIS — I73.9 PVD (PERIPHERAL VASCULAR DISEASE) (H): ICD-10-CM

## 2020-01-01 DIAGNOSIS — G89.29 OTHER CHRONIC PAIN: ICD-10-CM

## 2020-01-01 DIAGNOSIS — J44.1 COPD EXACERBATION (H): ICD-10-CM

## 2020-01-01 DIAGNOSIS — M19.90 ARTHRITIS: ICD-10-CM

## 2020-01-01 DIAGNOSIS — D64.9 ANEMIA, UNSPECIFIED TYPE: ICD-10-CM

## 2020-01-01 DIAGNOSIS — B02.29 POSTHERPETIC NEURALGIA: ICD-10-CM

## 2020-01-01 DIAGNOSIS — Z13.220 LIPID SCREENING: ICD-10-CM

## 2020-01-01 DIAGNOSIS — J44.1 OBSTRUCTIVE CHRONIC BRONCHITIS WITH EXACERBATION (H): Primary | ICD-10-CM

## 2020-01-01 DIAGNOSIS — F11.90 CHRONIC, CONTINUOUS USE OF OPIOIDS: Chronic | ICD-10-CM

## 2020-01-01 DIAGNOSIS — J44.1 OBSTRUCTIVE CHRONIC BRONCHITIS WITH EXACERBATION (H): ICD-10-CM

## 2020-01-01 DIAGNOSIS — I48.20 CHRONIC ATRIAL FIBRILLATION (H): Primary | ICD-10-CM
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
## 2020-01-01 DIAGNOSIS — J44.1 ACUTE EXACERBATION OF CHRONIC OBSTRUCTIVE PULMONARY DISEASE (H): ICD-10-CM

## 2020-01-01 DIAGNOSIS — Z23 NEED FOR PROPHYLACTIC VACCINATION AND INOCULATION AGAINST INFLUENZA: ICD-10-CM

## 2020-01-01 DIAGNOSIS — J10.1 INFLUENZA A: Primary | ICD-10-CM

## 2020-01-01 DIAGNOSIS — I25.10 ARTERIOSCLEROTIC CARDIOVASCULAR DISEASE (ASCVD): Primary | ICD-10-CM

## 2020-01-01 DIAGNOSIS — N40.0 BPH (BENIGN PROSTATIC HYPERPLASIA): ICD-10-CM

## 2020-01-01 DIAGNOSIS — J44.9 CHRONIC OBSTRUCTIVE PULMONARY DISEASE, UNSPECIFIED COPD TYPE (H): ICD-10-CM

## 2020-01-01 DIAGNOSIS — J43.8 OTHER EMPHYSEMA (H): ICD-10-CM

## 2020-01-01 DIAGNOSIS — Z13.220 LIPID SCREENING: Primary | ICD-10-CM

## 2020-01-01 DIAGNOSIS — J44.1 COPD EXACERBATION (H): Primary | ICD-10-CM

## 2020-01-01 DIAGNOSIS — Z53.9 PERSONS ENCOUNTERING HEALTH SERVICES FOR SPECIFIC PROCEDURES, NOT CARRIED OUT: Primary | ICD-10-CM

## 2020-01-01 LAB
ALBUMIN SERPL-MCNC: 1.9 G/DL (ref 3.4–5)
ALBUMIN SERPL-MCNC: 2.4 G/DL (ref 3.4–5)
ALBUMIN SERPL-MCNC: 2.4 G/DL (ref 3.4–5)
ALBUMIN UR-MCNC: 30 MG/DL
ALP SERPL-CCNC: 45 U/L (ref 40–150)
ALP SERPL-CCNC: 55 U/L (ref 40–150)
ALP SERPL-CCNC: 60 U/L (ref 40–150)
ALT SERPL W P-5'-P-CCNC: 14 U/L (ref 0–70)
ALT SERPL W P-5'-P-CCNC: 19 U/L (ref 0–70)
ALT SERPL W P-5'-P-CCNC: 53 U/L (ref 0–70)
AMPHETAMINES UR QL: NOT DETECTED NG/ML
ANION GAP SERPL CALCULATED.3IONS-SCNC: 3 MMOL/L (ref 3–14)
ANION GAP SERPL CALCULATED.3IONS-SCNC: 5 MMOL/L (ref 3–14)
ANION GAP SERPL CALCULATED.3IONS-SCNC: 5 MMOL/L (ref 3–14)
ANION GAP SERPL CALCULATED.3IONS-SCNC: 6 MMOL/L (ref 3–14)
ANION GAP SERPL CALCULATED.3IONS-SCNC: 7 MMOL/L (ref 3–14)
APPEARANCE UR: CLEAR
APTT PPP: 37 SEC (ref 22–37)
AST SERPL W P-5'-P-CCNC: 19 U/L (ref 0–45)
AST SERPL W P-5'-P-CCNC: 22 U/L (ref 0–45)
AST SERPL W P-5'-P-CCNC: 30 U/L (ref 0–45)
BACTERIA #/AREA URNS HPF: ABNORMAL /HPF
BACTERIA SPEC CULT: NORMAL
BARBITURATES UR QL SCN: NOT DETECTED NG/ML
BASE EXCESS BLDA CALC-SCNC: 3.2 MMOL/L
BASOPHILS # BLD AUTO: 0 10E9/L (ref 0–0.2)
BASOPHILS NFR BLD AUTO: 0 %
BASOPHILS NFR BLD AUTO: 0.1 %
BASOPHILS NFR BLD AUTO: 0.3 %
BASOPHILS NFR BLD AUTO: 0.4 %
BENZODIAZ UR QL SCN: NOT DETECTED NG/ML
BILIRUB SERPL-MCNC: 0.2 MG/DL (ref 0.2–1.3)
BILIRUB SERPL-MCNC: 0.2 MG/DL (ref 0.2–1.3)
BILIRUB SERPL-MCNC: 0.3 MG/DL (ref 0.2–1.3)
BILIRUB UR QL STRIP: NEGATIVE
BUN SERPL-MCNC: 12 MG/DL (ref 7–30)
BUN SERPL-MCNC: 15 MG/DL (ref 7–30)
BUN SERPL-MCNC: 18 MG/DL (ref 7–30)
BUN SERPL-MCNC: 20 MG/DL (ref 7–30)
BUN SERPL-MCNC: 25 MG/DL (ref 7–30)
BUN SERPL-MCNC: 28 MG/DL (ref 7–30)
BUN SERPL-MCNC: 30 MG/DL (ref 7–30)
BUPRENORPHINE UR QL: NOT DETECTED NG/ML
CALCIUM SERPL-MCNC: 7.8 MG/DL (ref 8.5–10.1)
CALCIUM SERPL-MCNC: 7.9 MG/DL (ref 8.5–10.1)
CALCIUM SERPL-MCNC: 7.9 MG/DL (ref 8.5–10.1)
CALCIUM SERPL-MCNC: 8 MG/DL (ref 8.5–10.1)
CALCIUM SERPL-MCNC: 8.2 MG/DL (ref 8.5–10.1)
CALCIUM SERPL-MCNC: 8.2 MG/DL (ref 8.5–10.1)
CALCIUM SERPL-MCNC: 8.4 MG/DL (ref 8.5–10.1)
CANNABINOIDS UR QL: NOT DETECTED NG/ML
CAPILLARY BLOOD COLLECTION: NORMAL
CHLORIDE SERPL-SCNC: 106 MMOL/L (ref 94–109)
CHLORIDE SERPL-SCNC: 107 MMOL/L (ref 94–109)
CHLORIDE SERPL-SCNC: 108 MMOL/L (ref 94–109)
CHOLEST SERPL-MCNC: 115 MG/DL
CK SERPL-CCNC: 194 U/L (ref 30–300)
CO2 SERPL-SCNC: 26 MMOL/L (ref 20–32)
CO2 SERPL-SCNC: 26 MMOL/L (ref 20–32)
CO2 SERPL-SCNC: 27 MMOL/L (ref 20–32)
CO2 SERPL-SCNC: 28 MMOL/L (ref 20–32)
CO2 SERPL-SCNC: 28 MMOL/L (ref 20–32)
CO2 SERPL-SCNC: 29 MMOL/L (ref 20–32)
CO2 SERPL-SCNC: 29 MMOL/L (ref 20–32)
COCAINE UR QL SCN: NOT DETECTED NG/ML
COLOR UR AUTO: ABNORMAL
CREAT SERPL-MCNC: 0.9 MG/DL (ref 0.66–1.25)
CREAT SERPL-MCNC: 0.92 MG/DL (ref 0.66–1.25)
CREAT SERPL-MCNC: 0.94 MG/DL (ref 0.66–1.25)
CREAT SERPL-MCNC: 0.94 MG/DL (ref 0.66–1.25)
CREAT SERPL-MCNC: 0.95 MG/DL (ref 0.66–1.25)
CREAT SERPL-MCNC: 0.97 MG/DL (ref 0.66–1.25)
CREAT SERPL-MCNC: 1.14 MG/DL (ref 0.66–1.25)
D-METHAMPHET UR QL: NOT DETECTED NG/ML
DIFFERENTIAL METHOD BLD: ABNORMAL
EOSINOPHIL # BLD AUTO: 0 10E9/L (ref 0–0.7)
EOSINOPHIL # BLD AUTO: 0 10E9/L (ref 0–0.7)
EOSINOPHIL # BLD AUTO: 0.1 10E9/L (ref 0–0.7)
EOSINOPHIL NFR BLD AUTO: 0.1 %
EOSINOPHIL NFR BLD AUTO: 0.2 %
EOSINOPHIL NFR BLD AUTO: 1.8 %
EOSINOPHIL NFR BLD AUTO: 2 %
EOSINOPHIL NFR BLD AUTO: 2.2 %
EOSINOPHIL NFR BLD AUTO: 2.5 %
ERYTHROCYTE [DISTWIDTH] IN BLOOD BY AUTOMATED COUNT: 16.5 % (ref 10–15)
ERYTHROCYTE [DISTWIDTH] IN BLOOD BY AUTOMATED COUNT: 16.7 % (ref 10–15)
ERYTHROCYTE [DISTWIDTH] IN BLOOD BY AUTOMATED COUNT: 16.9 % (ref 10–15)
ERYTHROCYTE [DISTWIDTH] IN BLOOD BY AUTOMATED COUNT: 16.9 % (ref 10–15)
ERYTHROCYTE [DISTWIDTH] IN BLOOD BY AUTOMATED COUNT: 17 % (ref 10–15)
ERYTHROCYTE [DISTWIDTH] IN BLOOD BY AUTOMATED COUNT: 17.2 % (ref 10–15)
ERYTHROCYTE [DISTWIDTH] IN BLOOD BY AUTOMATED COUNT: 17.2 % (ref 10–15)
ETHANOL SERPL-MCNC: <0.01 G/DL
FLUAV+FLUBV RNA SPEC QL NAA+PROBE: NEGATIVE
FLUAV+FLUBV RNA SPEC QL NAA+PROBE: POSITIVE
GFR SERPL CREATININE-BSD FRML MDRD: 62 ML/MIN/{1.73_M2}
GFR SERPL CREATININE-BSD FRML MDRD: 75 ML/MIN/{1.73_M2}
GFR SERPL CREATININE-BSD FRML MDRD: 77 ML/MIN/{1.73_M2}
GFR SERPL CREATININE-BSD FRML MDRD: 77 ML/MIN/{1.73_M2}
GFR SERPL CREATININE-BSD FRML MDRD: 78 ML/MIN/{1.73_M2}
GFR SERPL CREATININE-BSD FRML MDRD: 79 ML/MIN/{1.73_M2}
GFR SERPL CREATININE-BSD FRML MDRD: 82 ML/MIN/{1.73_M2}
GLUCOSE BLDC GLUCOMTR-MCNC: 112 MG/DL (ref 70–99)
GLUCOSE SERPL-MCNC: 100 MG/DL (ref 70–99)
GLUCOSE SERPL-MCNC: 119 MG/DL (ref 70–99)
GLUCOSE SERPL-MCNC: 88 MG/DL (ref 70–99)
GLUCOSE SERPL-MCNC: 89 MG/DL (ref 70–99)
GLUCOSE SERPL-MCNC: 91 MG/DL (ref 70–99)
GLUCOSE SERPL-MCNC: 93 MG/DL (ref 70–99)
GLUCOSE SERPL-MCNC: 97 MG/DL (ref 70–99)
GLUCOSE UR STRIP-MCNC: NEGATIVE MG/DL
HCO3 BLD-SCNC: 28 MMOL/L (ref 21–28)
HCT VFR BLD AUTO: 26.7 % (ref 40–53)
HCT VFR BLD AUTO: 27.2 % (ref 40–53)
HCT VFR BLD AUTO: 28.5 % (ref 40–53)
HCT VFR BLD AUTO: 29.4 % (ref 40–53)
HCT VFR BLD AUTO: 30.3 % (ref 40–53)
HCT VFR BLD AUTO: 33.7 % (ref 40–53)
HCT VFR BLD AUTO: 36.8 % (ref 40–53)
HDLC SERPL-MCNC: 53 MG/DL
HGB BLD-MCNC: 10.8 G/DL (ref 13.3–17.7)
HGB BLD-MCNC: 11.5 G/DL (ref 13.3–17.7)
HGB BLD-MCNC: 8.4 G/DL (ref 13.3–17.7)
HGB BLD-MCNC: 8.7 G/DL (ref 13.3–17.7)
HGB BLD-MCNC: 9.1 G/DL (ref 13.3–17.7)
HGB BLD-MCNC: 9.3 G/DL (ref 13.3–17.7)
HGB BLD-MCNC: 9.4 G/DL (ref 13.3–17.7)
HGB UR QL STRIP: NEGATIVE
HYALINE CASTS #/AREA URNS LPF: 1 /LPF
IMM GRANULOCYTES # BLD: 0 10E9/L (ref 0–0.4)
IMM GRANULOCYTES NFR BLD: 0.3 %
IMM GRANULOCYTES NFR BLD: 0.4 %
IMM GRANULOCYTES NFR BLD: 0.6 %
INR BLD: 1.3 (ref 0.86–1.14)
INR BLD: 1.4 (ref 0.86–1.14)
INR BLD: 1.5 (ref 0.86–1.14)
INR BLD: 1.8 (ref 0.86–1.14)
INR BLD: 1.8 (ref 0.86–1.14)
INR BLD: 1.9 (ref 0.86–1.14)
INR BLD: 2 (ref 0.86–1.14)
INR BLD: 2.2 (ref 0.86–1.14)
INR BLD: 2.4 (ref 0.86–1.14)
INR BLD: 3 (ref 0.86–1.14)
INR BLD: 3.1 (ref 0.86–1.14)
INR BLD: 3.3 (ref 0.86–1.14)
INR BLD: 3.6 (ref 0.86–1.14)
INR BLD: 4.3 (ref 0.86–1.14)
INR BLD: 7.6 (ref 0.86–1.14)
INR PPP: 1.65 (ref 0.86–1.14)
INR PPP: 1.73 (ref 0.86–1.14)
INR PPP: 1.79 (ref 0.86–1.14)
INR PPP: 1.8 (ref 0.9–1.1)
INR PPP: 2 (ref 0.9–1.1)
INR PPP: 2.01 (ref 0–1.3)
INR PPP: 2.2 (ref 0.86–1.14)
INR PPP: 2.34 (ref 0.86–1.14)
INR PPP: 2.47 (ref 0.86–1.14)
INR PPP: 2.58 (ref 0.86–1.14)
INR PPP: 2.6 (ref 0.9–1.1)
INR PPP: 5.63 (ref 0.86–1.14)
KETONES UR STRIP-MCNC: NEGATIVE MG/DL
LACTATE BLD-SCNC: 0.5 MMOL/L (ref 0.7–2)
LACTATE BLD-SCNC: 0.6 MMOL/L (ref 0.7–2)
LACTATE BLD-SCNC: 0.6 MMOL/L (ref 0.7–2)
LACTATE BLD-SCNC: 0.8 MMOL/L (ref 0.7–2)
LACTATE BLD-SCNC: 2 MMOL/L (ref 0.7–2)
LACTATE BLD-SCNC: 2.4 MMOL/L (ref 0.7–2)
LDLC SERPL CALC-MCNC: 46 MG/DL
LEUKOCYTE ESTERASE UR QL STRIP: NEGATIVE
LYMPHOCYTES # BLD AUTO: 0.1 10E9/L (ref 0.8–5.3)
LYMPHOCYTES # BLD AUTO: 0.2 10E9/L (ref 0.8–5.3)
LYMPHOCYTES # BLD AUTO: 0.5 10E9/L (ref 0.8–5.3)
LYMPHOCYTES # BLD AUTO: 0.6 10E9/L (ref 0.8–5.3)
LYMPHOCYTES # BLD AUTO: 0.6 10E9/L (ref 0.8–5.3)
LYMPHOCYTES # BLD AUTO: 0.7 10E9/L (ref 0.8–5.3)
LYMPHOCYTES NFR BLD AUTO: 1.3 %
LYMPHOCYTES NFR BLD AUTO: 11 %
LYMPHOCYTES NFR BLD AUTO: 16.2 %
LYMPHOCYTES NFR BLD AUTO: 17 %
LYMPHOCYTES NFR BLD AUTO: 18.2 %
LYMPHOCYTES NFR BLD AUTO: 4.6 %
MAGNESIUM SERPL-MCNC: 2.1 MG/DL (ref 1.6–2.3)
MAGNESIUM SERPL-MCNC: 2.1 MG/DL (ref 1.6–2.3)
MCH RBC QN AUTO: 27.8 PG (ref 26.5–33)
MCH RBC QN AUTO: 28.1 PG (ref 26.5–33)
MCH RBC QN AUTO: 28.1 PG (ref 26.5–33)
MCH RBC QN AUTO: 28.3 PG (ref 26.5–33)
MCH RBC QN AUTO: 28.4 PG (ref 26.5–33)
MCH RBC QN AUTO: 28.9 PG (ref 26.5–33)
MCH RBC QN AUTO: 29 PG (ref 26.5–33)
MCHC RBC AUTO-ENTMCNC: 31 G/DL (ref 31.5–36.5)
MCHC RBC AUTO-ENTMCNC: 31.3 G/DL (ref 31.5–36.5)
MCHC RBC AUTO-ENTMCNC: 31.5 G/DL (ref 31.5–36.5)
MCHC RBC AUTO-ENTMCNC: 31.6 G/DL (ref 31.5–36.5)
MCHC RBC AUTO-ENTMCNC: 31.9 G/DL (ref 31.5–36.5)
MCHC RBC AUTO-ENTMCNC: 32 G/DL (ref 31.5–36.5)
MCHC RBC AUTO-ENTMCNC: 32 G/DL (ref 31.5–36.5)
MCV RBC AUTO: 88 FL (ref 78–100)
MCV RBC AUTO: 88 FL (ref 78–100)
MCV RBC AUTO: 89 FL (ref 78–100)
MCV RBC AUTO: 90 FL (ref 78–100)
MCV RBC AUTO: 93 FL (ref 78–100)
METHADONE UR QL SCN: NOT DETECTED NG/ML
MONOCYTES # BLD AUTO: 0.4 10E9/L (ref 0–1.3)
MONOCYTES # BLD AUTO: 0.5 10E9/L (ref 0–1.3)
MONOCYTES # BLD AUTO: 0.5 10E9/L (ref 0–1.3)
MONOCYTES # BLD AUTO: 0.7 10E9/L (ref 0–1.3)
MONOCYTES NFR BLD AUTO: 11 %
MONOCYTES NFR BLD AUTO: 12 %
MONOCYTES NFR BLD AUTO: 12.6 %
MONOCYTES NFR BLD AUTO: 13.7 %
MONOCYTES NFR BLD AUTO: 5.7 %
MONOCYTES NFR BLD AUTO: 8.7 %
MUCOUS THREADS #/AREA URNS LPF: PRESENT /LPF
NEUTROPHILS # BLD AUTO: 1.9 10E9/L (ref 1.6–8.3)
NEUTROPHILS # BLD AUTO: 2.2 10E9/L (ref 1.6–8.3)
NEUTROPHILS # BLD AUTO: 2.4 10E9/L (ref 1.6–8.3)
NEUTROPHILS # BLD AUTO: 4.3 10E9/L (ref 1.6–8.3)
NEUTROPHILS # BLD AUTO: 4.8 10E9/L (ref 1.6–8.3)
NEUTROPHILS # BLD AUTO: 8.4 10E9/L (ref 1.6–8.3)
NEUTROPHILS NFR BLD AUTO: 66.7 %
NEUTROPHILS NFR BLD AUTO: 66.8 %
NEUTROPHILS NFR BLD AUTO: 68.6 %
NEUTROPHILS NFR BLD AUTO: 75.7 %
NEUTROPHILS NFR BLD AUTO: 86.1 %
NEUTROPHILS NFR BLD AUTO: 92.4 %
NITRATE UR QL: NEGATIVE
NONHDLC SERPL-MCNC: 62 MG/DL
NRBC # BLD AUTO: 0 10*3/UL
NRBC BLD AUTO-RTO: 0 /100
O2/TOTAL GAS SETTING VFR VENT: 28 %
OPIATES UR QL SCN: NOT DETECTED NG/ML
OXYCODONE UR QL SCN: NOT DETECTED NG/ML
OXYHGB MFR BLD: 96 % (ref 92–100)
PAIN DRUG SCR UR W RPTD MEDS: NORMAL
PCO2 BLD: 40 MM HG (ref 35–45)
PCP UR QL SCN: NOT DETECTED NG/ML
PH BLD: 7.45 PH (ref 7.35–7.45)
PH UR STRIP: 5.5 PH (ref 4.7–8)
PLATELET # BLD AUTO: 148 10E9/L (ref 150–450)
PLATELET # BLD AUTO: 161 10E9/L (ref 150–450)
PLATELET # BLD AUTO: 161 10E9/L (ref 150–450)
PLATELET # BLD AUTO: 164 10E9/L (ref 150–450)
PLATELET # BLD AUTO: 206 10E9/L (ref 150–450)
PLATELET # BLD AUTO: 209 10E9/L (ref 150–450)
PLATELET # BLD AUTO: 342 10E9/L (ref 150–450)
PO2 BLD: 83 MM HG (ref 80–105)
POTASSIUM SERPL-SCNC: 3.9 MMOL/L (ref 3.4–5.3)
POTASSIUM SERPL-SCNC: 4.2 MMOL/L (ref 3.4–5.3)
POTASSIUM SERPL-SCNC: 4.4 MMOL/L (ref 3.4–5.3)
POTASSIUM SERPL-SCNC: 4.4 MMOL/L (ref 3.4–5.3)
POTASSIUM SERPL-SCNC: 4.5 MMOL/L (ref 3.4–5.3)
PROCALCITONIN SERPL-MCNC: 0.79 NG/ML
PROCALCITONIN SERPL-MCNC: 1.02 NG/ML
PROPOXYPH UR QL: NOT DETECTED NG/ML
PROT SERPL-MCNC: 5.3 G/DL (ref 6.8–8.8)
PROT SERPL-MCNC: 6.4 G/DL (ref 6.8–8.8)
PROT SERPL-MCNC: 6.8 G/DL (ref 6.8–8.8)
PROTHROMBIN TIME: 23.2 S (ref 11.9–15.2)
RBC # BLD AUTO: 3.01 10E12/L (ref 4.4–5.9)
RBC # BLD AUTO: 3.02 10E12/L (ref 4.4–5.9)
RBC # BLD AUTO: 3.22 10E12/L (ref 4.4–5.9)
RBC # BLD AUTO: 3.28 10E12/L (ref 4.4–5.9)
RBC # BLD AUTO: 3.35 10E12/L (ref 4.4–5.9)
RBC # BLD AUTO: 3.85 10E12/L (ref 4.4–5.9)
RBC # BLD AUTO: 3.97 10E12/L (ref 4.4–5.9)
RBC #/AREA URNS AUTO: 1 /HPF (ref 0–2)
RSV RNA SPEC NAA+PROBE: NEGATIVE
SODIUM SERPL-SCNC: 138 MMOL/L (ref 133–144)
SODIUM SERPL-SCNC: 139 MMOL/L (ref 133–144)
SODIUM SERPL-SCNC: 140 MMOL/L (ref 133–144)
SODIUM SERPL-SCNC: 140 MMOL/L (ref 133–144)
SOURCE: ABNORMAL
SP GR UR STRIP: >1.05 (ref 1–1.03)
SPECIMEN SOURCE: ABNORMAL
SPECIMEN SOURCE: NORMAL
TRICYCLICS UR QL SCN: NOT DETECTED NG/ML
TRIGL SERPL-MCNC: 80 MG/DL
TROPONIN I SERPL-MCNC: 0.04 UG/L (ref 0–0.04)
TROPONIN I SERPL-MCNC: 0.07 UG/L (ref 0–0.04)
UROBILINOGEN UR STRIP-MCNC: NORMAL MG/DL (ref 0–2)
WBC # BLD AUTO: 2.8 10E9/L (ref 4–11)
WBC # BLD AUTO: 2.9 10E9/L (ref 4–11)
WBC # BLD AUTO: 3.3 10E9/L (ref 4–11)
WBC # BLD AUTO: 3.7 10E9/L (ref 4–11)
WBC # BLD AUTO: 5 10E9/L (ref 4–11)
WBC # BLD AUTO: 6.4 10E9/L (ref 4–11)
WBC # BLD AUTO: 9.1 10E9/L (ref 4–11)
WBC #/AREA URNS AUTO: <1 /HPF (ref 0–5)

## 2020-01-01 PROCEDURE — 84145 PROCALCITONIN (PCT): CPT | Performed by: NURSE PRACTITIONER

## 2020-01-01 PROCEDURE — 80053 COMPREHEN METABOLIC PANEL: CPT | Performed by: INTERNAL MEDICINE

## 2020-01-01 PROCEDURE — 99316 NF DSCHRG MGMT 30 MIN+: CPT | Performed by: NURSE PRACTITIONER

## 2020-01-01 PROCEDURE — 87040 BLOOD CULTURE FOR BACTERIA: CPT | Performed by: EMERGENCY MEDICINE

## 2020-01-01 PROCEDURE — 36415 COLL VENOUS BLD VENIPUNCTURE: CPT | Performed by: INTERNAL MEDICINE

## 2020-01-01 PROCEDURE — 25800030 ZZH RX IP 258 OP 636: Performed by: INTERNAL MEDICINE

## 2020-01-01 PROCEDURE — 25000132 ZZH RX MED GY IP 250 OP 250 PS 637: Performed by: INTERNAL MEDICINE

## 2020-01-01 PROCEDURE — 85610 PROTHROMBIN TIME: CPT | Performed by: INTERNAL MEDICINE

## 2020-01-01 PROCEDURE — 68300004 ZZH PARTIAL TRAUMA W/O CC LEVEL III

## 2020-01-01 PROCEDURE — 25000125 ZZHC RX 250: Performed by: INTERNAL MEDICINE

## 2020-01-01 PROCEDURE — 99285 EMERGENCY DEPT VISIT HI MDM: CPT | Mod: Z6 | Performed by: EMERGENCY MEDICINE

## 2020-01-01 PROCEDURE — 94669 MECHANICAL CHEST WALL OSCILL: CPT

## 2020-01-01 PROCEDURE — 83605 ASSAY OF LACTIC ACID: CPT | Performed by: EMERGENCY MEDICINE

## 2020-01-01 PROCEDURE — 12000000 ZZH R&B MED SURG/OB

## 2020-01-01 PROCEDURE — 83605 ASSAY OF LACTIC ACID: CPT | Performed by: INTERNAL MEDICINE

## 2020-01-01 PROCEDURE — 36415 COLL VENOUS BLD VENIPUNCTURE: CPT | Mod: ZL,TEL | Performed by: FAMILY MEDICINE

## 2020-01-01 PROCEDURE — 94640 AIRWAY INHALATION TREATMENT: CPT | Mod: 76

## 2020-01-01 PROCEDURE — 85610 PROTHROMBIN TIME: CPT | Mod: ZL | Performed by: FAMILY MEDICINE

## 2020-01-01 PROCEDURE — 74177 CT ABD & PELVIS W/CONTRAST: CPT | Mod: TC

## 2020-01-01 PROCEDURE — 87631 RESP VIRUS 3-5 TARGETS: CPT | Performed by: EMERGENCY MEDICINE

## 2020-01-01 PROCEDURE — 25000125 ZZHC RX 250

## 2020-01-01 PROCEDURE — 99232 SBSQ HOSP IP/OBS MODERATE 35: CPT | Performed by: INTERNAL MEDICINE

## 2020-01-01 PROCEDURE — 83735 ASSAY OF MAGNESIUM: CPT | Performed by: INTERNAL MEDICINE

## 2020-01-01 PROCEDURE — 36415 COLL VENOUS BLD VENIPUNCTURE: CPT | Performed by: NURSE PRACTITIONER

## 2020-01-01 PROCEDURE — 99285 EMERGENCY DEPT VISIT HI MDM: CPT | Mod: 25

## 2020-01-01 PROCEDURE — 99239 HOSP IP/OBS DSCHRG MGMT >30: CPT | Performed by: INTERNAL MEDICINE

## 2020-01-01 PROCEDURE — 25000132 ZZH RX MED GY IP 250 OP 250 PS 637: Performed by: NURSE PRACTITIONER

## 2020-01-01 PROCEDURE — 94640 AIRWAY INHALATION TREATMENT: CPT

## 2020-01-01 PROCEDURE — 93010 ELECTROCARDIOGRAM REPORT: CPT | Performed by: INTERNAL MEDICINE

## 2020-01-01 PROCEDURE — 36415 COLL VENOUS BLD VENIPUNCTURE: CPT | Mod: ZL | Performed by: FAMILY MEDICINE

## 2020-01-01 PROCEDURE — 97530 THERAPEUTIC ACTIVITIES: CPT | Mod: GP

## 2020-01-01 PROCEDURE — 36416 COLLJ CAPILLARY BLOOD SPEC: CPT | Mod: ZL | Performed by: FAMILY MEDICINE

## 2020-01-01 PROCEDURE — 94668 MNPJ CHEST WALL SBSQ: CPT

## 2020-01-01 PROCEDURE — 85025 COMPLETE CBC W/AUTO DIFF WBC: CPT | Performed by: INTERNAL MEDICINE

## 2020-01-01 PROCEDURE — G0180 MD CERTIFICATION HHA PATIENT: HCPCS | Performed by: FAMILY MEDICINE

## 2020-01-01 PROCEDURE — G0008 ADMIN INFLUENZA VIRUS VAC: HCPCS

## 2020-01-01 PROCEDURE — 99222 1ST HOSP IP/OBS MODERATE 55: CPT | Performed by: INTERNAL MEDICINE

## 2020-01-01 PROCEDURE — 80048 BASIC METABOLIC PNL TOTAL CA: CPT | Performed by: INTERNAL MEDICINE

## 2020-01-01 PROCEDURE — 94799 UNLISTED PULMONARY SVC/PX: CPT

## 2020-01-01 PROCEDURE — 80307 DRUG TEST PRSMV CHEM ANLYZR: CPT | Mod: ZL | Performed by: FAMILY MEDICINE

## 2020-01-01 PROCEDURE — 85610 PROTHROMBIN TIME: CPT | Mod: QW,ZL | Performed by: FAMILY MEDICINE

## 2020-01-01 PROCEDURE — 40000275 ZZH STATISTIC RCP TIME EA 10 MIN

## 2020-01-01 PROCEDURE — 93005 ELECTROCARDIOGRAM TRACING: CPT

## 2020-01-01 PROCEDURE — 25000125 ZZHC RX 250: Performed by: NURSE PRACTITIONER

## 2020-01-01 PROCEDURE — 84484 ASSAY OF TROPONIN QUANT: CPT | Performed by: INTERNAL MEDICINE

## 2020-01-01 PROCEDURE — 36415 COLL VENOUS BLD VENIPUNCTURE: CPT | Performed by: EMERGENCY MEDICINE

## 2020-01-01 PROCEDURE — 00000146 ZZHCL STATISTIC GLUCOSE BY METER IP

## 2020-01-01 PROCEDURE — 80061 LIPID PANEL: CPT | Mod: ZL | Performed by: FAMILY MEDICINE

## 2020-01-01 PROCEDURE — 80053 COMPREHEN METABOLIC PANEL: CPT | Performed by: EMERGENCY MEDICINE

## 2020-01-01 PROCEDURE — 25000132 ZZH RX MED GY IP 250 OP 250 PS 637: Performed by: EMERGENCY MEDICINE

## 2020-01-01 PROCEDURE — 97161 PT EVAL LOW COMPLEX 20 MIN: CPT | Mod: GP

## 2020-01-01 PROCEDURE — 85025 COMPLETE CBC W/AUTO DIFF WBC: CPT | Performed by: EMERGENCY MEDICINE

## 2020-01-01 PROCEDURE — 99233 SBSQ HOSP IP/OBS HIGH 50: CPT | Performed by: INTERNAL MEDICINE

## 2020-01-01 PROCEDURE — 80048 BASIC METABOLIC PNL TOTAL CA: CPT | Performed by: NURSE PRACTITIONER

## 2020-01-01 PROCEDURE — 85027 COMPLETE CBC AUTOMATED: CPT | Performed by: NURSE PRACTITIONER

## 2020-01-01 PROCEDURE — 36600 WITHDRAWAL OF ARTERIAL BLOOD: CPT

## 2020-01-01 PROCEDURE — 25800030 ZZH RX IP 258 OP 636: Performed by: EMERGENCY MEDICINE

## 2020-01-01 PROCEDURE — 99232 SBSQ HOSP IP/OBS MODERATE 35: CPT | Performed by: NURSE PRACTITIONER

## 2020-01-01 PROCEDURE — 85610 PROTHROMBIN TIME: CPT | Performed by: EMERGENCY MEDICINE

## 2020-01-01 PROCEDURE — 80320 DRUG SCREEN QUANTALCOHOLS: CPT | Performed by: EMERGENCY MEDICINE

## 2020-01-01 PROCEDURE — 99441 ZZC PHYSICIAN TELEPHONE EVALUATION 5-10 MIN: CPT | Performed by: FAMILY MEDICINE

## 2020-01-01 PROCEDURE — 93306 TTE W/DOPPLER COMPLETE: CPT | Mod: 26 | Performed by: INTERNAL MEDICINE

## 2020-01-01 PROCEDURE — 80053 COMPREHEN METABOLIC PANEL: CPT | Mod: ZL | Performed by: FAMILY MEDICINE

## 2020-01-01 PROCEDURE — 40000786 ZZHCL STATISTIC ACTIVE MRSA SURVEILLANCE CULTURE: Performed by: INTERNAL MEDICINE

## 2020-01-01 PROCEDURE — 96365 THER/PROPH/DIAG IV INF INIT: CPT

## 2020-01-01 PROCEDURE — 25000128 H RX IP 250 OP 636: Performed by: EMERGENCY MEDICINE

## 2020-01-01 PROCEDURE — 85610 PROTHROMBIN TIME: CPT | Mod: ZL,91 | Performed by: FAMILY MEDICINE

## 2020-01-01 PROCEDURE — 85025 COMPLETE CBC W/AUTO DIFF WBC: CPT | Mod: ZL,TEL | Performed by: FAMILY MEDICINE

## 2020-01-01 PROCEDURE — 25500064 ZZH RX 255 OP 636: Performed by: RADIOLOGY

## 2020-01-01 PROCEDURE — 93306 TTE W/DOPPLER COMPLETE: CPT | Mod: TC

## 2020-01-01 PROCEDURE — 81001 URINALYSIS AUTO W/SCOPE: CPT | Performed by: EMERGENCY MEDICINE

## 2020-01-01 PROCEDURE — 99214 OFFICE O/P EST MOD 30 MIN: CPT | Performed by: FAMILY MEDICINE

## 2020-01-01 PROCEDURE — 97530 THERAPEUTIC ACTIVITIES: CPT | Mod: GP | Performed by: PHYSICAL THERAPIST

## 2020-01-01 PROCEDURE — 25800030 ZZH RX IP 258 OP 636: Performed by: NURSE PRACTITIONER

## 2020-01-01 PROCEDURE — 70450 CT HEAD/BRAIN W/O DYE: CPT | Mod: TC

## 2020-01-01 PROCEDURE — 80306 DRUG TEST PRSMV INSTRMNT: CPT | Performed by: EMERGENCY MEDICINE

## 2020-01-01 PROCEDURE — 85730 THROMBOPLASTIN TIME PARTIAL: CPT | Performed by: EMERGENCY MEDICINE

## 2020-01-01 PROCEDURE — 82550 ASSAY OF CK (CPK): CPT | Performed by: EMERGENCY MEDICINE

## 2020-01-01 PROCEDURE — G0463 HOSPITAL OUTPT CLINIC VISIT: HCPCS | Mod: 25

## 2020-01-01 PROCEDURE — 97165 OT EVAL LOW COMPLEX 30 MIN: CPT | Mod: GO

## 2020-01-01 PROCEDURE — 72125 CT NECK SPINE W/O DYE: CPT | Mod: TC

## 2020-01-01 PROCEDURE — 83605 ASSAY OF LACTIC ACID: CPT | Performed by: NURSE PRACTITIONER

## 2020-01-01 PROCEDURE — 82805 BLOOD GASES W/O2 SATURATION: CPT | Performed by: EMERGENCY MEDICINE

## 2020-01-01 PROCEDURE — 96361 HYDRATE IV INFUSION ADD-ON: CPT

## 2020-01-01 RX ORDER — IBUPROFEN 200 MG
600 TABLET ORAL EVERY 6 HOURS PRN
Status: DISCONTINUED | OUTPATIENT
Start: 2020-01-01 | End: 2020-01-01 | Stop reason: HOSPADM

## 2020-01-01 RX ORDER — WARFARIN SODIUM 2.5 MG/1
2.5 TABLET ORAL ONCE
Status: DISCONTINUED | OUTPATIENT
Start: 2020-01-01 | End: 2020-01-01

## 2020-01-01 RX ORDER — CILOSTAZOL 100 MG/1
100 TABLET ORAL 2 TIMES DAILY
Status: DISCONTINUED | OUTPATIENT
Start: 2020-01-01 | End: 2020-01-01 | Stop reason: HOSPADM

## 2020-01-01 RX ORDER — TRAMADOL HYDROCHLORIDE 50 MG/1
50 TABLET ORAL EVERY 6 HOURS PRN
Status: DISCONTINUED | OUTPATIENT
Start: 2020-01-01 | End: 2020-01-01 | Stop reason: DRUGHIGH

## 2020-01-01 RX ORDER — TRAMADOL HYDROCHLORIDE 50 MG/1
TABLET ORAL
Qty: 120 TABLET | Refills: 0 | Status: SHIPPED | OUTPATIENT
Start: 2020-01-01 | End: 2020-01-01

## 2020-01-01 RX ORDER — CEFTRIAXONE SODIUM 1 G/50ML
1 INJECTION, SOLUTION INTRAVENOUS ONCE
Status: COMPLETED | OUTPATIENT
Start: 2020-01-01 | End: 2020-01-01

## 2020-01-01 RX ORDER — SIMVASTATIN 10 MG
TABLET ORAL
Qty: 90 TABLET | Refills: 0 | Status: SHIPPED | OUTPATIENT
Start: 2020-01-01 | End: 2021-01-01

## 2020-01-01 RX ORDER — AZITHROMYCIN 250 MG/1
TABLET, FILM COATED ORAL
Qty: 90 TABLET | Refills: 0 | Status: SHIPPED | OUTPATIENT
Start: 2020-01-01

## 2020-01-01 RX ORDER — DIVALPROEX SODIUM 250 MG/1
250 TABLET, DELAYED RELEASE ORAL 3 TIMES DAILY
Status: ON HOLD | COMMUNITY
Start: 2020-01-07 | End: 2020-01-01

## 2020-01-01 RX ORDER — SODIUM CHLORIDE, SODIUM LACTATE, POTASSIUM CHLORIDE, CALCIUM CHLORIDE 600; 310; 30; 20 MG/100ML; MG/100ML; MG/100ML; MG/100ML
1000 INJECTION, SOLUTION INTRAVENOUS CONTINUOUS
Status: DISCONTINUED | OUTPATIENT
Start: 2020-01-01 | End: 2020-01-01

## 2020-01-01 RX ORDER — CILOSTAZOL 100 MG/1
TABLET ORAL
Qty: 180 TABLET | Refills: 0 | Status: SHIPPED | OUTPATIENT
Start: 2020-01-01 | End: 2020-01-01

## 2020-01-01 RX ORDER — LEVOFLOXACIN 750 MG/1
750 TABLET, FILM COATED ORAL DAILY
Status: COMPLETED | OUTPATIENT
Start: 2020-01-01 | End: 2020-01-01

## 2020-01-01 RX ORDER — CILOSTAZOL 100 MG/1
TABLET ORAL
Qty: 180 TABLET | Refills: 1 | Status: SHIPPED | OUTPATIENT
Start: 2020-01-01

## 2020-01-01 RX ORDER — WARFARIN SODIUM 2.5 MG/1
TABLET ORAL
Qty: 125 TABLET | Refills: 3 | Status: ON HOLD | OUTPATIENT
Start: 2020-01-01 | End: 2020-01-01

## 2020-01-01 RX ORDER — PREGABALIN 25 MG/1
50 CAPSULE ORAL DAILY
Status: COMPLETED | OUTPATIENT
Start: 2020-01-01 | End: 2020-01-01

## 2020-01-01 RX ORDER — WARFARIN SODIUM 2 MG/1
2 TABLET ORAL
Status: COMPLETED | OUTPATIENT
Start: 2020-01-01 | End: 2020-01-01

## 2020-01-01 RX ORDER — PREDNISONE 20 MG/1
TABLET ORAL
Qty: 20 TABLET | Refills: 0 | Status: SHIPPED | OUTPATIENT
Start: 2020-01-01 | End: 2021-01-01

## 2020-01-01 RX ORDER — PREGABALIN 50 MG/1
50 CAPSULE ORAL DAILY
Qty: 3 CAPSULE | Refills: 0 | Status: SHIPPED | OUTPATIENT
Start: 2020-01-01 | End: 2020-01-01

## 2020-01-01 RX ORDER — BUDESONIDE 0.25 MG/2ML
0.25 INHALANT ORAL 2 TIMES DAILY
Status: DISCONTINUED | OUTPATIENT
Start: 2020-01-01 | End: 2020-01-01 | Stop reason: HOSPADM

## 2020-01-01 RX ORDER — TAMSULOSIN HYDROCHLORIDE 0.4 MG/1
0.4 CAPSULE ORAL DAILY
Status: DISCONTINUED | OUTPATIENT
Start: 2020-01-01 | End: 2020-01-01 | Stop reason: HOSPADM

## 2020-01-01 RX ORDER — AZITHROMYCIN 250 MG/1
250 TABLET, FILM COATED ORAL DAILY
Status: DISCONTINUED | OUTPATIENT
Start: 2020-01-01 | End: 2020-01-01

## 2020-01-01 RX ORDER — PREGABALIN 100 MG/1
100 CAPSULE ORAL DAILY
Status: DISCONTINUED | OUTPATIENT
Start: 2020-01-01 | End: 2020-01-01

## 2020-01-01 RX ORDER — NALOXONE HYDROCHLORIDE 0.4 MG/ML
.1-.4 INJECTION, SOLUTION INTRAMUSCULAR; INTRAVENOUS; SUBCUTANEOUS
Status: DISCONTINUED | OUTPATIENT
Start: 2020-01-01 | End: 2020-01-01 | Stop reason: HOSPADM

## 2020-01-01 RX ORDER — IPRATROPIUM BROMIDE AND ALBUTEROL SULFATE 2.5; .5 MG/3ML; MG/3ML
1 SOLUTION RESPIRATORY (INHALATION) EVERY 4 HOURS PRN
DISCHARGE
Start: 2020-01-01 | End: 2020-01-01

## 2020-01-01 RX ORDER — DIVALPROEX SODIUM 250 MG/1
TABLET, DELAYED RELEASE ORAL
Qty: 90 TABLET | Refills: 0 | Status: SHIPPED | OUTPATIENT
Start: 2020-01-01 | End: 2020-01-01

## 2020-01-01 RX ORDER — BUDESONIDE 0.25 MG/2ML
INHALANT ORAL
Status: COMPLETED
Start: 2020-01-01 | End: 2020-01-01

## 2020-01-01 RX ORDER — GABAPENTIN 300 MG/1
600 CAPSULE ORAL 3 TIMES DAILY
Qty: 180 CAPSULE | Refills: 3 | Status: SHIPPED | OUTPATIENT
Start: 2020-01-01

## 2020-01-01 RX ORDER — WARFARIN SODIUM 2.5 MG/1
TABLET ORAL
Qty: 1 TABLET | Refills: 0 | DISCHARGE
Start: 2020-01-01

## 2020-01-01 RX ORDER — ACETAMINOPHEN 325 MG/1
650 TABLET ORAL EVERY 4 HOURS PRN
DISCHARGE
Start: 2020-01-01 | End: 2020-01-01

## 2020-01-01 RX ORDER — LIDOCAINE 40 MG/G
CREAM TOPICAL
Status: DISCONTINUED | OUTPATIENT
Start: 2020-01-01 | End: 2020-01-01 | Stop reason: HOSPADM

## 2020-01-01 RX ORDER — WARFARIN SODIUM 2.5 MG/1
2.5 TABLET ORAL
Status: DISCONTINUED | OUTPATIENT
Start: 2020-01-01 | End: 2020-01-01

## 2020-01-01 RX ORDER — FORMOTEROL FUMARATE DIHYDRATE 20 UG/2ML
SOLUTION RESPIRATORY (INHALATION)
Qty: 360 ML | Refills: 1 | Status: SHIPPED | OUTPATIENT
Start: 2020-01-01

## 2020-01-01 RX ORDER — TRAZODONE HYDROCHLORIDE 50 MG/1
TABLET, FILM COATED ORAL
Qty: 30 TABLET | Refills: 0 | Status: SHIPPED | OUTPATIENT
Start: 2020-01-01 | End: 2021-01-01

## 2020-01-01 RX ORDER — WARFARIN SODIUM 2 MG/1
4 TABLET ORAL
Status: COMPLETED | OUTPATIENT
Start: 2020-01-01 | End: 2020-01-01

## 2020-01-01 RX ORDER — WARFARIN SODIUM 7.5 MG
3.75 TABLET ORAL
Status: COMPLETED | OUTPATIENT
Start: 2020-01-01 | End: 2020-01-01

## 2020-01-01 RX ORDER — IPRATROPIUM BROMIDE AND ALBUTEROL SULFATE 2.5; .5 MG/3ML; MG/3ML
SOLUTION RESPIRATORY (INHALATION)
Qty: 360 ML | Refills: 0 | OUTPATIENT
Start: 2020-01-01

## 2020-01-01 RX ORDER — MAGNESIUM SULFATE HEPTAHYDRATE 40 MG/ML
2 INJECTION, SOLUTION INTRAVENOUS DAILY PRN
Status: DISCONTINUED | OUTPATIENT
Start: 2020-01-01 | End: 2020-01-01

## 2020-01-01 RX ORDER — TRAMADOL HYDROCHLORIDE 50 MG/1
TABLET ORAL
Qty: 120 TABLET | Refills: 0 | OUTPATIENT
Start: 2020-01-01

## 2020-01-01 RX ORDER — ONDANSETRON 4 MG/1
4 TABLET, ORALLY DISINTEGRATING ORAL EVERY 6 HOURS PRN
Status: DISCONTINUED | OUTPATIENT
Start: 2020-01-01 | End: 2020-01-01 | Stop reason: HOSPADM

## 2020-01-01 RX ORDER — ACETAMINOPHEN 325 MG/1
650 TABLET ORAL EVERY 4 HOURS PRN
Status: DISCONTINUED | OUTPATIENT
Start: 2020-01-01 | End: 2020-01-01 | Stop reason: HOSPADM

## 2020-01-01 RX ORDER — TAMSULOSIN HYDROCHLORIDE 0.4 MG/1
CAPSULE ORAL
Qty: 90 CAPSULE | Refills: 0 | Status: SHIPPED | OUTPATIENT
Start: 2020-01-01 | End: 2020-01-01

## 2020-01-01 RX ORDER — ASPIRIN 81 MG/1
81 TABLET, CHEWABLE ORAL DAILY
Status: DISCONTINUED | OUTPATIENT
Start: 2020-01-01 | End: 2020-01-01 | Stop reason: HOSPADM

## 2020-01-01 RX ORDER — AZITHROMYCIN 250 MG/1
TABLET, FILM COATED ORAL
Qty: 6 TABLET | Refills: 0 | Status: SHIPPED | OUTPATIENT
Start: 2020-01-01 | End: 2020-01-01

## 2020-01-01 RX ORDER — ARFORMOTEROL TARTRATE 15 UG/2ML
15 SOLUTION RESPIRATORY (INHALATION) EVERY 12 HOURS
Status: DISCONTINUED | OUTPATIENT
Start: 2020-01-01 | End: 2020-01-01 | Stop reason: HOSPADM

## 2020-01-01 RX ORDER — GUAIFENESIN/DEXTROMETHORPHAN 100-10MG/5
5 SYRUP ORAL EVERY 4 HOURS PRN
Status: DISCONTINUED | OUTPATIENT
Start: 2020-01-01 | End: 2020-01-01 | Stop reason: HOSPADM

## 2020-01-01 RX ORDER — POTASSIUM CL/LIDO/0.9 % NACL 10MEQ/0.1L
10 INTRAVENOUS SOLUTION, PIGGYBACK (ML) INTRAVENOUS
Status: DISCONTINUED | OUTPATIENT
Start: 2020-01-01 | End: 2020-01-01

## 2020-01-01 RX ORDER — PREGABALIN 25 MG/1
50 CAPSULE ORAL 2 TIMES DAILY
Status: DISCONTINUED | OUTPATIENT
Start: 2020-01-01 | End: 2020-01-01 | Stop reason: HOSPADM

## 2020-01-01 RX ORDER — IOPAMIDOL 612 MG/ML
100 INJECTION, SOLUTION INTRAVASCULAR ONCE
Status: COMPLETED | OUTPATIENT
Start: 2020-01-01 | End: 2020-01-01

## 2020-01-01 RX ORDER — IPRATROPIUM BROMIDE AND ALBUTEROL SULFATE 2.5; .5 MG/3ML; MG/3ML
SOLUTION RESPIRATORY (INHALATION)
Qty: 360 ML | Refills: 0 | Status: SHIPPED | OUTPATIENT
Start: 2020-01-01 | End: 2020-01-01

## 2020-01-01 RX ORDER — AZITHROMYCIN 250 MG/1
250 TABLET, FILM COATED ORAL DAILY
Status: DISCONTINUED | OUTPATIENT
Start: 2020-01-01 | End: 2020-01-01 | Stop reason: HOSPADM

## 2020-01-01 RX ORDER — IPRATROPIUM BROMIDE AND ALBUTEROL SULFATE 2.5; .5 MG/3ML; MG/3ML
SOLUTION RESPIRATORY (INHALATION)
Qty: 360 ML | Refills: 0 | Status: SHIPPED | OUTPATIENT
Start: 2020-01-01

## 2020-01-01 RX ORDER — OSELTAMIVIR PHOSPHATE 75 MG/1
75 CAPSULE ORAL 2 TIMES DAILY
Status: DISCONTINUED | OUTPATIENT
Start: 2020-01-01 | End: 2020-01-01 | Stop reason: HOSPADM

## 2020-01-01 RX ORDER — WARFARIN SODIUM 2.5 MG/1
2.5 TABLET ORAL ONCE
Status: COMPLETED | OUTPATIENT
Start: 2020-01-01 | End: 2020-01-01

## 2020-01-01 RX ORDER — ALBUTEROL SULFATE 0.83 MG/ML
2.5 SOLUTION RESPIRATORY (INHALATION)
Status: DISCONTINUED | OUTPATIENT
Start: 2020-01-01 | End: 2020-01-01 | Stop reason: HOSPADM

## 2020-01-01 RX ORDER — MAGNESIUM SULFATE HEPTAHYDRATE 40 MG/ML
4 INJECTION, SOLUTION INTRAVENOUS EVERY 4 HOURS PRN
Status: DISCONTINUED | OUTPATIENT
Start: 2020-01-01 | End: 2020-01-01

## 2020-01-01 RX ORDER — POTASSIUM CHLORIDE 7.45 MG/ML
10 INJECTION INTRAVENOUS
Status: DISCONTINUED | OUTPATIENT
Start: 2020-01-01 | End: 2020-01-01

## 2020-01-01 RX ORDER — ACETYLCYSTEINE 100 MG/ML
4 SOLUTION ORAL; RESPIRATORY (INHALATION) 3 TIMES DAILY
Status: DISCONTINUED | OUTPATIENT
Start: 2020-01-01 | End: 2020-01-01

## 2020-01-01 RX ORDER — PREGABALIN 25 MG/1
50 CAPSULE ORAL DAILY
Status: DISCONTINUED | OUTPATIENT
Start: 2020-01-01 | End: 2020-01-01 | Stop reason: HOSPADM

## 2020-01-01 RX ORDER — ONDANSETRON 2 MG/ML
4 INJECTION INTRAMUSCULAR; INTRAVENOUS EVERY 6 HOURS PRN
Status: DISCONTINUED | OUTPATIENT
Start: 2020-01-01 | End: 2020-01-01 | Stop reason: HOSPADM

## 2020-01-01 RX ORDER — POTASSIUM CHLORIDE 1500 MG/1
20-40 TABLET, EXTENDED RELEASE ORAL
Status: DISCONTINUED | OUTPATIENT
Start: 2020-01-01 | End: 2020-01-01

## 2020-01-01 RX ORDER — POTASSIUM CHLORIDE 1.5 G/1.58G
20-40 POWDER, FOR SOLUTION ORAL
Status: DISCONTINUED | OUTPATIENT
Start: 2020-01-01 | End: 2020-01-01

## 2020-01-01 RX ORDER — SIMVASTATIN 10 MG
TABLET ORAL
Qty: 90 TABLET | Refills: 0 | Status: SHIPPED | OUTPATIENT
Start: 2020-01-01 | End: 2020-01-01

## 2020-01-01 RX ORDER — ROFLUMILAST 500 UG/1
500 TABLET ORAL AT BEDTIME
Status: DISCONTINUED | OUTPATIENT
Start: 2020-01-01 | End: 2020-01-01 | Stop reason: HOSPADM

## 2020-01-01 RX ORDER — DIVALPROEX SODIUM 250 MG/1
TABLET, DELAYED RELEASE ORAL
Qty: 90 TABLET | Refills: 0 | Status: SHIPPED | OUTPATIENT
Start: 2020-01-01 | End: 2021-01-01

## 2020-01-01 RX ORDER — OSELTAMIVIR PHOSPHATE 75 MG/1
75 CAPSULE ORAL ONCE
Status: COMPLETED | OUTPATIENT
Start: 2020-01-01 | End: 2020-01-01

## 2020-01-01 RX ORDER — TRAMADOL HYDROCHLORIDE 50 MG/1
TABLET ORAL
Qty: 120 TABLET | Refills: 0 | Status: SHIPPED | OUTPATIENT
Start: 2020-01-01 | End: 2021-01-01

## 2020-01-01 RX ORDER — GABAPENTIN 300 MG/1
600 CAPSULE ORAL 3 TIMES DAILY
Status: DISCONTINUED | OUTPATIENT
Start: 2020-01-01 | End: 2020-01-01 | Stop reason: HOSPADM

## 2020-01-01 RX ORDER — WARFARIN SODIUM 2.5 MG/1
TABLET ORAL
Qty: 125 TABLET | Refills: 3 | DISCHARGE
Start: 2020-01-01 | End: 2020-01-01

## 2020-01-01 RX ORDER — TRAMADOL HYDROCHLORIDE 50 MG/1
50-100 TABLET ORAL EVERY 6 HOURS PRN
Status: DISCONTINUED | OUTPATIENT
Start: 2020-01-01 | End: 2020-01-01 | Stop reason: HOSPADM

## 2020-01-01 RX ORDER — LOPERAMIDE HCL 2 MG
2 CAPSULE ORAL 3 TIMES DAILY PRN
Status: DISCONTINUED | OUTPATIENT
Start: 2020-01-01 | End: 2020-01-01 | Stop reason: HOSPADM

## 2020-01-01 RX ORDER — SIMVASTATIN 10 MG
10 TABLET ORAL AT BEDTIME
Status: DISCONTINUED | OUTPATIENT
Start: 2020-01-01 | End: 2020-01-01 | Stop reason: HOSPADM

## 2020-01-01 RX ADMIN — OSELTAMIVIR PHOSPHATE 75 MG: 75 CAPSULE ORAL at 05:47

## 2020-01-01 RX ADMIN — LEVOFLOXACIN 750 MG: 750 TABLET, FILM COATED ORAL at 08:32

## 2020-01-01 RX ADMIN — GUAIFENESIN AND DEXTROMETHORPHAN 5 ML: 100; 10 SYRUP ORAL at 00:06

## 2020-01-01 RX ADMIN — GUAIFENESIN AND DEXTROMETHORPHAN 5 ML: 100; 10 SYRUP ORAL at 03:48

## 2020-01-01 RX ADMIN — SODIUM CHLORIDE, POTASSIUM CHLORIDE, SODIUM LACTATE AND CALCIUM CHLORIDE 1000 ML: 600; 310; 30; 20 INJECTION, SOLUTION INTRAVENOUS at 03:50

## 2020-01-01 RX ADMIN — GABAPENTIN 600 MG: 300 CAPSULE ORAL at 08:37

## 2020-01-01 RX ADMIN — OSELTAMIVIR PHOSPHATE 75 MG: 75 CAPSULE ORAL at 08:42

## 2020-01-01 RX ADMIN — TRAMADOL HYDROCHLORIDE 100 MG: 50 TABLET ORAL at 08:38

## 2020-01-01 RX ADMIN — OSELTAMIVIR PHOSPHATE 75 MG: 75 CAPSULE ORAL at 18:11

## 2020-01-01 RX ADMIN — SIMVASTATIN 10 MG: 10 TABLET, FILM COATED ORAL at 21:20

## 2020-01-01 RX ADMIN — POTASSIUM CHLORIDE 20 MEQ: 1500 TABLET, EXTENDED RELEASE ORAL at 06:25

## 2020-01-01 RX ADMIN — BUDESONIDE 0.25 MG: 0.25 INHALANT RESPIRATORY (INHALATION) at 07:47

## 2020-01-01 RX ADMIN — ROFLUMILAST 500 MCG: 500 TABLET ORAL at 21:07

## 2020-01-01 RX ADMIN — LOPERAMIDE HYDROCHLORIDE 2 MG: 2 CAPSULE ORAL at 08:45

## 2020-01-01 RX ADMIN — ALBUTEROL SULFATE 2.5 MG: 2.5 SOLUTION RESPIRATORY (INHALATION) at 19:54

## 2020-01-01 RX ADMIN — ROFLUMILAST 500 MCG: 500 TABLET ORAL at 20:54

## 2020-01-01 RX ADMIN — ARFORMOTEROL TARTRATE 15 MCG: 15 SOLUTION RESPIRATORY (INHALATION) at 07:03

## 2020-01-01 RX ADMIN — GABAPENTIN 600 MG: 300 CAPSULE ORAL at 08:30

## 2020-01-01 RX ADMIN — ALBUTEROL SULFATE 2.5 MG: 2.5 SOLUTION RESPIRATORY (INHALATION) at 11:47

## 2020-01-01 RX ADMIN — TRAMADOL HYDROCHLORIDE 100 MG: 50 TABLET ORAL at 10:08

## 2020-01-01 RX ADMIN — OSELTAMIVIR PHOSPHATE 75 MG: 75 CAPSULE ORAL at 08:32

## 2020-01-01 RX ADMIN — TAMSULOSIN HYDROCHLORIDE 0.4 MG: 0.4 CAPSULE ORAL at 08:39

## 2020-01-01 RX ADMIN — SIMVASTATIN 10 MG: 10 TABLET, FILM COATED ORAL at 22:13

## 2020-01-01 RX ADMIN — AZITHROMYCIN 250 MG: 250 TABLET, FILM COATED ORAL at 09:16

## 2020-01-01 RX ADMIN — ALBUTEROL SULFATE 2.5 MG: 2.5 SOLUTION RESPIRATORY (INHALATION) at 11:57

## 2020-01-01 RX ADMIN — ASPIRIN 81 MG 81 MG: 81 TABLET ORAL at 08:38

## 2020-01-01 RX ADMIN — OSELTAMIVIR PHOSPHATE 75 MG: 75 CAPSULE ORAL at 20:08

## 2020-01-01 RX ADMIN — GABAPENTIN 600 MG: 300 CAPSULE ORAL at 14:04

## 2020-01-01 RX ADMIN — BUDESONIDE 0.25 MG: 0.25 INHALANT RESPIRATORY (INHALATION) at 08:09

## 2020-01-01 RX ADMIN — LOPERAMIDE HYDROCHLORIDE 2 MG: 2 CAPSULE ORAL at 08:37

## 2020-01-01 RX ADMIN — AZITHROMYCIN 250 MG: 250 TABLET, FILM COATED ORAL at 08:42

## 2020-01-01 RX ADMIN — GUAIFENESIN AND DEXTROMETHORPHAN 5 ML: 100; 10 SYRUP ORAL at 21:09

## 2020-01-01 RX ADMIN — GABAPENTIN 600 MG: 300 CAPSULE ORAL at 20:53

## 2020-01-01 RX ADMIN — ALBUTEROL SULFATE 2.5 MG: 2.5 SOLUTION RESPIRATORY (INHALATION) at 07:03

## 2020-01-01 RX ADMIN — CILOSTAZOL 100 MG: 100 TABLET ORAL at 08:32

## 2020-01-01 RX ADMIN — ARFORMOTEROL TARTRATE 15 MCG: 15 SOLUTION RESPIRATORY (INHALATION) at 08:11

## 2020-01-01 RX ADMIN — ALBUTEROL SULFATE 2.5 MG: 2.5 SOLUTION RESPIRATORY (INHALATION) at 01:48

## 2020-01-01 RX ADMIN — CILOSTAZOL 100 MG: 100 TABLET ORAL at 08:30

## 2020-01-01 RX ADMIN — LEVOFLOXACIN 750 MG: 750 TABLET, FILM COATED ORAL at 08:30

## 2020-01-01 RX ADMIN — GABAPENTIN 600 MG: 300 CAPSULE ORAL at 13:46

## 2020-01-01 RX ADMIN — ALBUTEROL SULFATE 2.5 MG: 2.5 SOLUTION RESPIRATORY (INHALATION) at 15:34

## 2020-01-01 RX ADMIN — LOPERAMIDE HYDROCHLORIDE 2 MG: 2 CAPSULE ORAL at 08:44

## 2020-01-01 RX ADMIN — SODIUM CHLORIDE, POTASSIUM CHLORIDE, SODIUM LACTATE AND CALCIUM CHLORIDE 1000 ML: 600; 310; 30; 20 INJECTION, SOLUTION INTRAVENOUS at 11:43

## 2020-01-01 RX ADMIN — GABAPENTIN 600 MG: 300 CAPSULE ORAL at 08:38

## 2020-01-01 RX ADMIN — TRAMADOL HYDROCHLORIDE 100 MG: 50 TABLET ORAL at 08:43

## 2020-01-01 RX ADMIN — ARFORMOTEROL TARTRATE 15 MCG: 15 SOLUTION RESPIRATORY (INHALATION) at 19:39

## 2020-01-01 RX ADMIN — PREGABALIN 50 MG: 25 CAPSULE ORAL at 21:20

## 2020-01-01 RX ADMIN — GUAIFENESIN AND DEXTROMETHORPHAN 5 ML: 100; 10 SYRUP ORAL at 08:56

## 2020-01-01 RX ADMIN — GUAIFENESIN AND DEXTROMETHORPHAN 5 ML: 100; 10 SYRUP ORAL at 10:42

## 2020-01-01 RX ADMIN — GUAIFENESIN AND DEXTROMETHORPHAN 5 ML: 100; 10 SYRUP ORAL at 17:55

## 2020-01-01 RX ADMIN — ALBUTEROL SULFATE 2.5 MG: 2.5 SOLUTION RESPIRATORY (INHALATION) at 03:28

## 2020-01-01 RX ADMIN — ASPIRIN 81 MG 81 MG: 81 TABLET ORAL at 08:37

## 2020-01-01 RX ADMIN — TRAMADOL HYDROCHLORIDE 50 MG: 50 TABLET ORAL at 17:43

## 2020-01-01 RX ADMIN — BUDESONIDE 0.25 MG: 0.25 INHALANT RESPIRATORY (INHALATION) at 08:04

## 2020-01-01 RX ADMIN — WARFARIN SODIUM 4 MG: 2 TABLET ORAL at 17:57

## 2020-01-01 RX ADMIN — ASPIRIN 81 MG 81 MG: 81 TABLET ORAL at 08:32

## 2020-01-01 RX ADMIN — CILOSTAZOL 100 MG: 100 TABLET ORAL at 20:55

## 2020-01-01 RX ADMIN — BUDESONIDE 0.25 MG: 0.25 INHALANT RESPIRATORY (INHALATION) at 22:33

## 2020-01-01 RX ADMIN — TAMSULOSIN HYDROCHLORIDE 0.4 MG: 0.4 CAPSULE ORAL at 08:37

## 2020-01-01 RX ADMIN — PREGABALIN 100 MG: 100 CAPSULE ORAL at 08:48

## 2020-01-01 RX ADMIN — GABAPENTIN 600 MG: 300 CAPSULE ORAL at 14:13

## 2020-01-01 RX ADMIN — TRAMADOL HYDROCHLORIDE 100 MG: 50 TABLET ORAL at 01:49

## 2020-01-01 RX ADMIN — ROFLUMILAST 500 MCG: 500 TABLET ORAL at 20:08

## 2020-01-01 RX ADMIN — SIMVASTATIN 10 MG: 10 TABLET, FILM COATED ORAL at 20:55

## 2020-01-01 RX ADMIN — GUAIFENESIN AND DEXTROMETHORPHAN 5 ML: 100; 10 SYRUP ORAL at 09:46

## 2020-01-01 RX ADMIN — TRAMADOL HYDROCHLORIDE 100 MG: 50 TABLET ORAL at 12:00

## 2020-01-01 RX ADMIN — WARFARIN SODIUM 2.5 MG: 2.5 TABLET ORAL at 22:12

## 2020-01-01 RX ADMIN — GUAIFENESIN AND DEXTROMETHORPHAN 5 ML: 100; 10 SYRUP ORAL at 08:37

## 2020-01-01 RX ADMIN — GABAPENTIN 600 MG: 300 CAPSULE ORAL at 09:16

## 2020-01-01 RX ADMIN — IOPAMIDOL 100 ML: 612 INJECTION, SOLUTION INTRAVENOUS at 15:21

## 2020-01-01 RX ADMIN — WARFARIN SODIUM 3.75 MG: 7.5 TABLET ORAL at 18:14

## 2020-01-01 RX ADMIN — GABAPENTIN 600 MG: 300 CAPSULE ORAL at 22:12

## 2020-01-01 RX ADMIN — CILOSTAZOL 100 MG: 100 TABLET ORAL at 12:46

## 2020-01-01 RX ADMIN — PREGABALIN 100 MG: 100 CAPSULE ORAL at 12:54

## 2020-01-01 RX ADMIN — GUAIFENESIN AND DEXTROMETHORPHAN 5 ML: 100; 10 SYRUP ORAL at 01:38

## 2020-01-01 RX ADMIN — PREGABALIN 100 MG: 100 CAPSULE ORAL at 08:37

## 2020-01-01 RX ADMIN — TRAMADOL HYDROCHLORIDE 50 MG: 50 TABLET, FILM COATED ORAL at 12:45

## 2020-01-01 RX ADMIN — TRAMADOL HYDROCHLORIDE 100 MG: 50 TABLET ORAL at 04:48

## 2020-01-01 RX ADMIN — PREGABALIN 50 MG: 25 CAPSULE ORAL at 08:42

## 2020-01-01 RX ADMIN — ALBUTEROL SULFATE 2.5 MG: 2.5 SOLUTION RESPIRATORY (INHALATION) at 15:11

## 2020-01-01 RX ADMIN — PREGABALIN 100 MG: 100 CAPSULE ORAL at 08:32

## 2020-01-01 RX ADMIN — GABAPENTIN 600 MG: 300 CAPSULE ORAL at 13:56

## 2020-01-01 RX ADMIN — TAMSULOSIN HYDROCHLORIDE 0.4 MG: 0.4 CAPSULE ORAL at 08:32

## 2020-01-01 RX ADMIN — BUDESONIDE 0.25 MG: 0.25 INHALANT RESPIRATORY (INHALATION) at 19:20

## 2020-01-01 RX ADMIN — CILOSTAZOL 100 MG: 100 TABLET ORAL at 20:23

## 2020-01-01 RX ADMIN — LOPERAMIDE HYDROCHLORIDE 2 MG: 2 CAPSULE ORAL at 13:26

## 2020-01-01 RX ADMIN — CILOSTAZOL 100 MG: 100 TABLET ORAL at 21:07

## 2020-01-01 RX ADMIN — ALBUTEROL SULFATE 2.5 MG: 2.5 SOLUTION RESPIRATORY (INHALATION) at 19:37

## 2020-01-01 RX ADMIN — AZITHROMYCIN 250 MG: 250 TABLET, FILM COATED ORAL at 18:14

## 2020-01-01 RX ADMIN — LOPERAMIDE HYDROCHLORIDE 2 MG: 2 CAPSULE ORAL at 12:45

## 2020-01-01 RX ADMIN — TRAMADOL HYDROCHLORIDE 100 MG: 50 TABLET ORAL at 18:20

## 2020-01-01 RX ADMIN — ROFLUMILAST 500 MCG: 500 TABLET ORAL at 21:20

## 2020-01-01 RX ADMIN — TAMSULOSIN HYDROCHLORIDE 0.4 MG: 0.4 CAPSULE ORAL at 22:12

## 2020-01-01 RX ADMIN — ARFORMOTEROL TARTRATE 15 MCG: 15 SOLUTION RESPIRATORY (INHALATION) at 19:17

## 2020-01-01 RX ADMIN — CILOSTAZOL 100 MG: 100 TABLET ORAL at 20:08

## 2020-01-01 RX ADMIN — LEVOFLOXACIN 750 MG: 750 TABLET, FILM COATED ORAL at 08:38

## 2020-01-01 RX ADMIN — BUDESONIDE 0.25 MG: 0.25 INHALANT RESPIRATORY (INHALATION) at 07:03

## 2020-01-01 RX ADMIN — PREGABALIN 50 MG: 25 CAPSULE ORAL at 20:23

## 2020-01-01 RX ADMIN — TRAMADOL HYDROCHLORIDE 50 MG: 50 TABLET, FILM COATED ORAL at 08:44

## 2020-01-01 RX ADMIN — GABAPENTIN 600 MG: 300 CAPSULE ORAL at 08:32

## 2020-01-01 RX ADMIN — TRAMADOL HYDROCHLORIDE 50 MG: 50 TABLET ORAL at 10:42

## 2020-01-01 RX ADMIN — ALBUTEROL SULFATE 2.5 MG: 2.5 SOLUTION RESPIRATORY (INHALATION) at 00:57

## 2020-01-01 RX ADMIN — BUDESONIDE 0.25 MG: 0.25 INHALANT RESPIRATORY (INHALATION) at 08:12

## 2020-01-01 RX ADMIN — ARFORMOTEROL TARTRATE 15 MCG: 15 SOLUTION RESPIRATORY (INHALATION) at 08:10

## 2020-01-01 RX ADMIN — BUDESONIDE 0.25 MG: 0.25 INHALANT RESPIRATORY (INHALATION) at 19:16

## 2020-01-01 RX ADMIN — PREGABALIN 50 MG: 25 CAPSULE ORAL at 20:54

## 2020-01-01 RX ADMIN — OSELTAMIVIR PHOSPHATE 75 MG: 75 CAPSULE ORAL at 21:19

## 2020-01-01 RX ADMIN — SIMVASTATIN 10 MG: 10 TABLET, FILM COATED ORAL at 20:08

## 2020-01-01 RX ADMIN — WARFARIN SODIUM 4 MG: 2 TABLET ORAL at 17:44

## 2020-01-01 RX ADMIN — ACETYLCYSTEINE 4 ML: 100 INHALANT RESPIRATORY (INHALATION) at 11:47

## 2020-01-01 RX ADMIN — ARFORMOTEROL TARTRATE 15 MCG: 15 SOLUTION RESPIRATORY (INHALATION) at 19:27

## 2020-01-01 RX ADMIN — GABAPENTIN 600 MG: 300 CAPSULE ORAL at 21:05

## 2020-01-01 RX ADMIN — PREGABALIN 50 MG: 25 CAPSULE ORAL at 08:30

## 2020-01-01 RX ADMIN — GUAIFENESIN AND DEXTROMETHORPHAN 5 ML: 100; 10 SYRUP ORAL at 17:00

## 2020-01-01 RX ADMIN — CILOSTAZOL 100 MG: 100 TABLET ORAL at 08:38

## 2020-01-01 RX ADMIN — BUDESONIDE 0.25 MG: 0.25 INHALANT RESPIRATORY (INHALATION) at 19:39

## 2020-01-01 RX ADMIN — SODIUM CHLORIDE, POTASSIUM CHLORIDE, SODIUM LACTATE AND CALCIUM CHLORIDE 1000 ML: 600; 310; 30; 20 INJECTION, SOLUTION INTRAVENOUS at 15:06

## 2020-01-01 RX ADMIN — OSELTAMIVIR PHOSPHATE 75 MG: 75 CAPSULE ORAL at 21:07

## 2020-01-01 RX ADMIN — CILOSTAZOL 100 MG: 100 TABLET ORAL at 21:20

## 2020-01-01 RX ADMIN — GABAPENTIN 600 MG: 300 CAPSULE ORAL at 20:24

## 2020-01-01 RX ADMIN — TRAMADOL HYDROCHLORIDE 100 MG: 50 TABLET ORAL at 21:08

## 2020-01-01 RX ADMIN — CEFTRIAXONE SODIUM 1 G: 1 INJECTION, SOLUTION INTRAVENOUS at 16:52

## 2020-01-01 RX ADMIN — AZITHROMYCIN 250 MG: 250 TABLET, FILM COATED ORAL at 08:39

## 2020-01-01 RX ADMIN — ALBUTEROL SULFATE 2.5 MG: 2.5 SOLUTION RESPIRATORY (INHALATION) at 22:32

## 2020-01-01 RX ADMIN — GABAPENTIN 600 MG: 300 CAPSULE ORAL at 21:19

## 2020-01-01 RX ADMIN — PREGABALIN 50 MG: 25 CAPSULE ORAL at 20:08

## 2020-01-01 RX ADMIN — ALBUTEROL SULFATE 2.5 MG: 2.5 SOLUTION RESPIRATORY (INHALATION) at 16:05

## 2020-01-01 RX ADMIN — IBUPROFEN 600 MG: 200 TABLET, FILM COATED ORAL at 01:38

## 2020-01-01 RX ADMIN — WARFARIN SODIUM 2 MG: 2 TABLET ORAL at 18:15

## 2020-01-01 RX ADMIN — ARFORMOTEROL TARTRATE 15 MCG: 15 SOLUTION RESPIRATORY (INHALATION) at 19:21

## 2020-01-01 RX ADMIN — SODIUM CHLORIDE, POTASSIUM CHLORIDE, SODIUM LACTATE AND CALCIUM CHLORIDE 1000 ML: 600; 310; 30; 20 INJECTION, SOLUTION INTRAVENOUS at 16:08

## 2020-01-01 RX ADMIN — GUAIFENESIN AND DEXTROMETHORPHAN 5 ML: 100; 10 SYRUP ORAL at 17:45

## 2020-01-01 RX ADMIN — ARFORMOTEROL TARTRATE 15 MCG: 15 SOLUTION RESPIRATORY (INHALATION) at 22:33

## 2020-01-01 RX ADMIN — BUDESONIDE 0.25 MG: 0.25 INHALANT RESPIRATORY (INHALATION) at 19:14

## 2020-01-01 RX ADMIN — OSELTAMIVIR PHOSPHATE 75 MG: 75 CAPSULE ORAL at 08:30

## 2020-01-01 RX ADMIN — AZITHROMYCIN 250 MG: 250 TABLET, FILM COATED ORAL at 08:37

## 2020-01-01 RX ADMIN — ALBUTEROL SULFATE 2.5 MG: 2.5 SOLUTION RESPIRATORY (INHALATION) at 19:16

## 2020-01-01 RX ADMIN — ARFORMOTEROL TARTRATE 15 MCG: 15 SOLUTION RESPIRATORY (INHALATION) at 08:28

## 2020-01-01 RX ADMIN — ROFLUMILAST 500 MCG: 500 TABLET ORAL at 22:14

## 2020-01-01 RX ADMIN — PREGABALIN 50 MG: 25 CAPSULE ORAL at 21:06

## 2020-01-01 RX ADMIN — ACETAMINOPHEN 650 MG: 325 TABLET, FILM COATED ORAL at 12:54

## 2020-01-01 RX ADMIN — GUAIFENESIN AND DEXTROMETHORPHAN 5 ML: 100; 10 SYRUP ORAL at 03:03

## 2020-01-01 RX ADMIN — TRAMADOL HYDROCHLORIDE 100 MG: 50 TABLET ORAL at 02:20

## 2020-01-01 RX ADMIN — GUAIFENESIN AND DEXTROMETHORPHAN 5 ML: 100; 10 SYRUP ORAL at 13:46

## 2020-01-01 RX ADMIN — ASPIRIN 81 MG 81 MG: 81 TABLET ORAL at 08:30

## 2020-01-01 RX ADMIN — GUAIFENESIN AND DEXTROMETHORPHAN 5 ML: 100; 10 SYRUP ORAL at 08:45

## 2020-01-01 RX ADMIN — TAMSULOSIN HYDROCHLORIDE 0.4 MG: 0.4 CAPSULE ORAL at 09:16

## 2020-01-01 RX ADMIN — OSELTAMIVIR PHOSPHATE 75 MG: 75 CAPSULE ORAL at 06:07

## 2020-01-01 RX ADMIN — ALBUTEROL SULFATE 2.5 MG: 2.5 SOLUTION RESPIRATORY (INHALATION) at 11:16

## 2020-01-01 RX ADMIN — OSELTAMIVIR PHOSPHATE 75 MG: 75 CAPSULE ORAL at 18:15

## 2020-01-01 RX ADMIN — SIMVASTATIN 10 MG: 10 TABLET, FILM COATED ORAL at 21:07

## 2020-01-01 RX ADMIN — LEVOFLOXACIN 750 MG: 750 TABLET, FILM COATED ORAL at 08:39

## 2020-01-01 RX ADMIN — TRAMADOL HYDROCHLORIDE 50 MG: 50 TABLET ORAL at 18:47

## 2020-01-01 RX ADMIN — ROFLUMILAST 500 MCG: 500 TABLET ORAL at 20:24

## 2020-01-01 RX ADMIN — OSELTAMIVIR PHOSPHATE 75 MG: 75 CAPSULE ORAL at 06:09

## 2020-01-01 RX ADMIN — BUDESONIDE 0.25 MG: 0.25 INHALANT RESPIRATORY (INHALATION) at 19:52

## 2020-01-01 RX ADMIN — ASPIRIN 81 MG 81 MG: 81 TABLET ORAL at 09:16

## 2020-01-01 RX ADMIN — GUAIFENESIN AND DEXTROMETHORPHAN 5 ML: 100; 10 SYRUP ORAL at 08:38

## 2020-01-01 RX ADMIN — TAMSULOSIN HYDROCHLORIDE 0.4 MG: 0.4 CAPSULE ORAL at 08:43

## 2020-01-01 RX ADMIN — SIMVASTATIN 10 MG: 10 TABLET, FILM COATED ORAL at 20:23

## 2020-01-01 RX ADMIN — ARFORMOTEROL TARTRATE 15 MCG: 15 SOLUTION RESPIRATORY (INHALATION) at 08:04

## 2020-01-01 RX ADMIN — LEVOFLOXACIN 750 MG: 750 TABLET, FILM COATED ORAL at 10:54

## 2020-01-01 RX ADMIN — BUDESONIDE 0.25 MG: 0.25 INHALANT RESPIRATORY (INHALATION) at 08:28

## 2020-01-01 RX ADMIN — ASPIRIN 81 MG 81 MG: 81 TABLET ORAL at 08:41

## 2020-01-01 RX ADMIN — ARFORMOTEROL TARTRATE 15 MCG: 15 SOLUTION RESPIRATORY (INHALATION) at 07:48

## 2020-01-01 RX ADMIN — TRAMADOL HYDROCHLORIDE 100 MG: 50 TABLET ORAL at 21:21

## 2020-01-01 RX ADMIN — GABAPENTIN 600 MG: 300 CAPSULE ORAL at 13:26

## 2020-01-01 RX ADMIN — OSELTAMIVIR PHOSPHATE 75 MG: 75 CAPSULE ORAL at 17:44

## 2020-01-01 RX ADMIN — CILOSTAZOL 100 MG: 100 TABLET ORAL at 08:42

## 2020-01-01 RX ADMIN — SODIUM CHLORIDE, POTASSIUM CHLORIDE, SODIUM LACTATE AND CALCIUM CHLORIDE 1000 ML: 600; 310; 30; 20 INJECTION, SOLUTION INTRAVENOUS at 20:00

## 2020-01-01 RX ADMIN — TAMSULOSIN HYDROCHLORIDE 0.4 MG: 0.4 CAPSULE ORAL at 08:30

## 2020-01-01 RX ADMIN — SODIUM CHLORIDE, POTASSIUM CHLORIDE, SODIUM LACTATE AND CALCIUM CHLORIDE 1000 ML: 600; 310; 30; 20 INJECTION, SOLUTION INTRAVENOUS at 20:52

## 2020-01-01 RX ADMIN — ALBUTEROL SULFATE 2.5 MG: 2.5 SOLUTION RESPIRATORY (INHALATION) at 05:42

## 2020-01-01 RX ADMIN — ACETYLCYSTEINE 4 ML: 100 INHALANT RESPIRATORY (INHALATION) at 19:38

## 2020-01-01 RX ADMIN — AZITHROMYCIN 250 MG: 250 TABLET, FILM COATED ORAL at 22:13

## 2020-01-01 RX ADMIN — GABAPENTIN 600 MG: 300 CAPSULE ORAL at 20:08

## 2020-01-01 RX ADMIN — WARFARIN SODIUM 4 MG: 2 TABLET ORAL at 17:54

## 2020-01-01 RX ADMIN — GABAPENTIN 600 MG: 300 CAPSULE ORAL at 08:42

## 2020-01-01 RX ADMIN — ARFORMOTEROL TARTRATE 15 MCG: 15 SOLUTION RESPIRATORY (INHALATION) at 20:02

## 2020-01-01 ASSESSMENT — ACTIVITIES OF DAILY LIVING (ADL)
ADLS_ACUITY_SCORE: 13
DEPENDENT_IADLS:: INDEPENDENT
ADLS_ACUITY_SCORE: 13
ADLS_ACUITY_SCORE: 15
ADLS_ACUITY_SCORE: 13
ADLS_ACUITY_SCORE: 13

## 2020-01-01 ASSESSMENT — PAIN SCALES - GENERAL: PAINLEVEL: NO PAIN (0)

## 2020-01-01 ASSESSMENT — ANXIETY QUESTIONNAIRES
2. NOT BEING ABLE TO STOP OR CONTROL WORRYING: NOT AT ALL
GAD7 TOTAL SCORE: 0
GAD7 TOTAL SCORE: 0
7. FEELING AFRAID AS IF SOMETHING AWFUL MIGHT HAPPEN: NOT AT ALL
6. BECOMING EASILY ANNOYED OR IRRITABLE: NOT AT ALL
3. WORRYING TOO MUCH ABOUT DIFFERENT THINGS: NOT AT ALL
5. BEING SO RESTLESS THAT IT IS HARD TO SIT STILL: NOT AT ALL
1. FEELING NERVOUS, ANXIOUS, OR ON EDGE: NOT AT ALL

## 2020-01-01 ASSESSMENT — ENCOUNTER SYMPTOMS
ABDOMINAL PAIN: 0
SHORTNESS OF BREATH: 0
FEVER: 0

## 2020-01-01 ASSESSMENT — PATIENT HEALTH QUESTIONNAIRE - PHQ9
SUM OF ALL RESPONSES TO PHQ QUESTIONS 1-9: 2
5. POOR APPETITE OR OVEREATING: NOT AT ALL

## 2020-01-01 ASSESSMENT — MIFFLIN-ST. JEOR: SCORE: 1524.13

## 2020-01-03 ENCOUNTER — ANTICOAGULATION THERAPY VISIT (OUTPATIENT)
Dept: ANTICOAGULATION | Facility: OTHER | Age: 80
End: 2020-01-03
Attending: FAMILY MEDICINE
Payer: COMMERCIAL

## 2020-01-03 DIAGNOSIS — I48.20 CHRONIC ATRIAL FIBRILLATION (H): ICD-10-CM

## 2020-01-03 DIAGNOSIS — I73.9 PVD (PERIPHERAL VASCULAR DISEASE) (H): ICD-10-CM

## 2020-01-03 DIAGNOSIS — Z79.01 LONG TERM CURRENT USE OF ANTICOAGULANT THERAPY: ICD-10-CM

## 2020-01-03 LAB
CAPILLARY BLOOD COLLECTION: NORMAL
INR BLD: 3.5 (ref 0.86–1.14)

## 2020-01-03 PROCEDURE — 36416 COLLJ CAPILLARY BLOOD SPEC: CPT | Mod: ZL | Performed by: FAMILY MEDICINE

## 2020-01-03 PROCEDURE — 85610 PROTHROMBIN TIME: CPT | Mod: QW,ZL | Performed by: FAMILY MEDICINE

## 2020-01-03 NOTE — PROGRESS NOTES
Clinic Care Coordination Contact  Care Team Conversations    Voicemail received from pt returning CC phone call.    Attempted to reach pt.  No answer.  Voicemail message left asking that he please try to call back again.      Lauren Pipkin, BS-RN   [facility]  [phone] Option # 1

## 2020-01-03 NOTE — PROGRESS NOTES
ANTICOAGULATION FOLLOW-UP CLINIC VISIT    Patient Name:  Rm Duarte  Date:  1/3/2020  Contact Type:  Telephone/ message left on home voicemail re: warfarin dosing/INR recheck date.     SUBJECTIVE:  Patient Findings     Comments:   Received INR results from lab. Call placed to patient and message left on home voicemail re: INR results, warfarin dosing/INR recheck date. Patient to return call to warfarin clinic with changes in bruising/bleeding, new changes in diet/meds/activity or questions.          Clinical Outcomes     Negatives:   Major bleeding event, Thromboembolic event, Anticoagulation-related hospital admission, Anticoagulation-related ED visit, Anticoagulation-related fatality    Comments:   Received INR results from lab. Call placed to patient and message left on home voicemail re: INR results, warfarin dosing/INR recheck date. Patient to return call to warfarin clinic with changes in bruising/bleeding, new changes in diet/meds/activity or questions.             OBJECTIVE    INR Point of Care   Date Value Ref Range Status   01/03/2020 3.5 (H) 0.86 - 1.14 Final     Comment:     This test is intended for monitoring Coumadin therapy.  Results are not   accurate in patients with prolonged INR due to factor deficiency.         ASSESSMENT / PLAN  INR assessment SUPRA    Recheck INR In: 1 WEEK    INR Location Clinic      Anticoagulation Summary  As of 1/3/2020    INR goal:   2.0-3.0   TTR:   41.2 % (1 y)   INR used for dosing:   3.5! (1/3/2020)   Warfarin maintenance plan:   3.75 mg (2.5 mg x 1.5) every Mon, Fri; 2.5 mg (2.5 mg x 1) all other days   Full warfarin instructions:   1/3: Hold; 1/4: 1.25 mg; Otherwise 3.75 mg every Mon, Fri; 2.5 mg all other days   Weekly warfarin total:   20 mg   Plan last modified:   Jeanie Arechiga RN (12/3/2019)   Next INR check:   1/10/2020   Priority:   High   Target end date:   Indefinite    Indications    Chronic atrial fibrillation [I48.20]  PVD (peripheral vascular disease)  (H) [I73.9]  Long-term (current) use of anticoagulants [Z79.01] [Z79.01]             Anticoagulation Episode Summary     INR check location:       Preferred lab:       Send INR reminders to:   [facility] ANTICOAG POOL    Comments:         Anticoagulation Care Providers     Provider Role Specialty Phone number    Dominguez Maradiaga MD [facility] [phone]            See the Encounter Report to view Anticoagulation Flowsheet and Dosing Calendar (Go to Encounters tab in chart review, and find the Anticoagulation Therapy Visit)        Jeanie Arechiga RN

## 2020-01-06 RX ORDER — AZITHROMYCIN 250 MG/1
TABLET, FILM COATED ORAL
Qty: 6 TABLET | Refills: 0 | Status: SHIPPED | OUTPATIENT
Start: 2020-01-06 | End: 2020-01-07

## 2020-01-06 RX ORDER — TRAMADOL HYDROCHLORIDE 50 MG/1
TABLET ORAL
Qty: 120 TABLET | Refills: 0 | Status: SHIPPED | OUTPATIENT
Start: 2020-01-06 | End: 2020-01-01

## 2020-01-06 RX ORDER — PREGABALIN 225 MG/1
CAPSULE ORAL
Qty: 180 CAPSULE | Refills: 3 | Status: SHIPPED | OUTPATIENT
Start: 2020-01-06 | End: 2020-01-29 | Stop reason: DRUGHIGH

## 2020-01-06 NOTE — PROGRESS NOTES
Clinic Care Coordination Contact  Care Team Conversations    Incoming call from Rm.  He reports he isn't doing well.  Feels his shingles are 'acting up again'.  States it is mostly in his left eye, mouth, and nose.  He is agreeable to seeing Dr Maradiaga tomorrow at 4:15PM.    He denies additional questions or concerns for writer at this time.      Lauren Pipkin, BS-RN   Care Coordination  Primary Care- [facility]  [phone] Option # 1

## 2020-01-06 NOTE — TELEPHONE ENCOUNTER
tramadol      Last Written Prescription Date:  12/2/19  Last Fill Quantity: 120,   # refills: 0  Last Office Visit: 5/28/19  Future Office visit:       Routing refill request to provider for review/approval because:  Drug not on the [facility], P or [facility] refill protocol or controlled substance    Azithromycin 12/17/18 #90 with 3

## 2020-01-06 NOTE — TELEPHONE ENCOUNTER
lyrica      Last Written Prescription Date:  10/21/19  Last Fill Quantity: 60,   # refills: 1  Last Office Visit: 5/28/19  Future Office visit:       Routing refill request to provider for review/approval because:  Drug not on the [facility], P or [facility] refill protocol or controlled substance

## 2020-01-07 ENCOUNTER — OFFICE VISIT (OUTPATIENT)
Dept: FAMILY MEDICINE | Facility: OTHER | Age: 80
End: 2020-01-07
Attending: FAMILY MEDICINE
Payer: COMMERCIAL

## 2020-01-07 VITALS
DIASTOLIC BLOOD PRESSURE: 58 MMHG | TEMPERATURE: 98.3 F | OXYGEN SATURATION: 90 % | SYSTOLIC BLOOD PRESSURE: 96 MMHG | HEART RATE: 91 BPM

## 2020-01-07 DIAGNOSIS — J44.1 OBSTRUCTIVE CHRONIC BRONCHITIS WITH EXACERBATION (H): ICD-10-CM

## 2020-01-07 DIAGNOSIS — B02.29 POSTHERPETIC NEURALGIA: Primary | ICD-10-CM

## 2020-01-07 PROCEDURE — 99214 OFFICE O/P EST MOD 30 MIN: CPT | Performed by: FAMILY MEDICINE

## 2020-01-07 PROCEDURE — G0463 HOSPITAL OUTPT CLINIC VISIT: HCPCS

## 2020-01-07 RX ORDER — IPRATROPIUM BROMIDE AND ALBUTEROL SULFATE 2.5; .5 MG/3ML; MG/3ML
1 SOLUTION RESPIRATORY (INHALATION) EVERY 6 HOURS PRN
COMMUNITY
End: 2020-01-29

## 2020-01-07 RX ORDER — AZITHROMYCIN 250 MG/1
TABLET, FILM COATED ORAL
Qty: 90 TABLET | Refills: 3 | Status: SHIPPED | OUTPATIENT
Start: 2020-01-07 | End: 2020-01-01

## 2020-01-07 RX ORDER — DIVALPROEX SODIUM 250 MG/1
250 TABLET, DELAYED RELEASE ORAL 3 TIMES DAILY
Qty: 90 TABLET | Refills: 3 | Status: SHIPPED | OUTPATIENT
Start: 2020-01-07 | End: 2020-01-29

## 2020-01-07 RX ORDER — GABAPENTIN 300 MG/1
600 CAPSULE ORAL 3 TIMES DAILY
Qty: 180 CAPSULE | Refills: 3
Start: 2020-01-07 | End: 2020-01-01

## 2020-01-07 ASSESSMENT — PAIN SCALES - GENERAL: PAINLEVEL: EXTREME PAIN (9)

## 2020-01-07 NOTE — NURSING NOTE
"Chief Complaint   Patient presents with     Shingles     Eye Pain Left Eye     Nose Problem       Initial BP 96/58 (BP Location: Left arm, Patient Position: Sitting, Cuff Size: Adult Regular)   Pulse 91   Temp 98.3  F (36.8  C) (Tympanic)   SpO2 90%  Estimated body mass index is 24.97 kg/m  as calculated from the following:    Height as of 12/17/18: 1.803 m (5' 11\").    Weight as of 5/28/19: 81.2 kg (179 lb).  Medication Reconciliation: complete  Fatimah Cavazos LPN  "

## 2020-01-07 NOTE — PROGRESS NOTES
Subjective     Rm Duarte is a 79 year old male who presents to clinic today for the following health issues:    HPI   Possible Shingles flair up      Duration: its been going on for 3-to 4 years    Description (location/character/radiation): left side    Intensity:  9/10    Accompanying signs and symptoms: no itching    History (similar episodes/previous evaluation): 3 or 4 years has hx with shingles    Precipitating or alleviating factors: None    Therapies tried and outcome: None     Left eye, left side of mouth and left nose      Duration: 1 3/4 this has been going on    Description (location/character/radiation): left side    Intensity:  Severe 9/10    Accompanying signs and symptoms: none    History (similar episodes/previous evaluation): 1 3/4 years this has been going on for    Precipitating or alleviating factors: None    Therapies tried and outcome: None       Patient Active Problem List   Diagnosis     Chronic atrial fibrillation     Arteriosclerotic cardiovascular disease (ASCVD)     Obstructive chronic bronchitis with exacerbation (H)     PVD (peripheral vascular disease) (H)     Long-term (current) use of anticoagulants [Z79.01]     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  1991 were 4.31/6.20!!!!!!!??     ECHO: TDS, EF~50%, abnl septum, o/w NORMAL  5/20/2011     PAD: CTA> occlusion of L mid SFA with reconstitution  12/2/2010     S/P colonoscopy: tubular adenoma & tics  5/4/2010    Repeat in 2014; Dr Graham     Tobacco Abuse Ongoing         Social History     Tobacco Use     Smoking status: Former Smoker     Packs/day: 1.00     Years: 51.00     Pack years: 51.00      Types: Cigarettes     Start date: 1960     Last attempt to quit: 2011     Years since quittin.0     Smokeless tobacco: Never Used   Substance Use Topics     Alcohol use: Yes     Alcohol/week: 0.0 standard drinks     Comment: Beer; rarely     Family History   Problem Relation Age of Onset     Other - See Comments Mother         AAA, cause of death     Other - See Comments Other 69        AAA, family hx     Chronic Obstructive Pulmonary Disease Brother          MI age 60's     Family History Negative Sister          Current Outpatient Medications   Medication Sig Dispense Refill     aspirin (ASPIRIN LOW DOSE) 81 MG tablet Take 1 tablet by mouth daily.       azithromycin (ZITHROMAX) 250 MG tablet TAKE 1 TABLET BY MOUTH ONCE DAILY 90 tablet 3     budesonide (PULMICORT) 0.25 MG/2ML neb solution Take 0.25 mg by nebulization 2 times daily       cilostazol (PLETAL) 100 MG tablet TAKE 1 TABLET BY MOUTH TWICE DAILY 180 tablet 0     Dextromethorphan-Guaifenesin (ROBITUSSIN COUGH+CHEST EDVIN DM) 5-100 MG/5ML LIQD Take 20 mLs by mouth every 4 hours as needed       divalproex sodium delayed-release (DEPAKOTE) 250 MG DR tablet Take 1 tablet (250 mg) by mouth 3 times daily 90 tablet 3     gabapentin (NEURONTIN) 300 MG capsule Take 2 capsules (600 mg) by mouth 3 times daily 180 capsule 3     ipratropium - albuterol 0.5 mg/2.5 mg/3 mL (DUONEB) 0.5-2.5 (3) MG/3ML neb solution Take 1 vial by nebulization every 6 hours as needed for shortness of breath / dyspnea or wheezing       loperamide (IMODIUM A-D) 2 MG tablet Take 2 mg by mouth 4 times daily as needed. Take 2 tablet by oral route after 1st loose stool and 1 tablet after each subsequent bowel movement; do not exceed 16 mg in 24 hrs. As needed.       Multiple Vitamins-Minerals (CENTRUM SILVER) per tablet Take 1 tablet by mouth daily.       order for DME Equipment being ordered: Oxygen 2 L per nasal canula continuously, re-certify for 1 year      FAX TO  [facility] 1 Units 11     order for DME Equipment being ordered: shower chair 1 each 0     PERFOROMIST 20 MCG/2ML neb solution USE 1 VIAL IN NEBULIZER EVERY 12 HOURS 360 mL 1     pregabalin (LYRICA) 225 MG capsule TAKE 1 CAPSULE BY MOUTH TWICE DAILY 180 capsule 3     roflumilast (DALIRESP) 500 MCG TABS tablet Take 500 mcg by mouth daily       simvastatin (ZOCOR) 10 MG tablet TAKE 1 TABLET BY MOUTH AT BEDTIME 90 tablet 0     SPIRIVA RESPIMAT 2.5 MCG/ACT inhalation aerosol        tamsulosin (FLOMAX) 0.4 MG capsule TAKE 1 CAPSULE BY MOUTH ONCE DAILY 90 capsule 0     traMADol (ULTRAM) 50 MG tablet TAKE 1 TO 2 TABLETS BY MOUTH EVERY 6 HOURS AS NEEDED FOR PAIN 120 tablet 0     traZODone (DESYREL) 50 MG tablet Take 1 tablet (50 mg) by mouth nightly as needed for sleep 30 tablet 1     warfarin (COUMADIN) 2.5 MG tablet Take 3.75 mg(1 1/2 tabs) every Mon; 2.5 mg (1 tab) all other days or as directed by Coumadin Clinic 125 tablet 3     No Known Allergies    Reviewed and updated as needed this visit by Provider         Review of Systems   ROS COMP: Constitutional, HEENT, cardiovascular, pulmonary, gi and gu systems are negative, except as otherwise noted.      Objective    BP 96/58 (BP Location: Left arm, Patient Position: Sitting, Cuff Size: Adult Regular)   Pulse 91   Temp 98.3  F (36.8  C) (Tympanic)   SpO2 90%   There is no height or weight on file to calculate BMI.  Physical Exam   GENERAL: healthy, alert and no distress  NECK: no adenopathy, no asymmetry, masses, or scars and thyroid normal to palpation  RESP: lungs clear to auscultation - no rales, rhonchi or wheezes  CV: regular rate and rhythm, normal S1 S2, no S3 or S4, no murmur, click or rub, no peripheral edema and peripheral pulses strong  ABDOMEN: soft, nontender, no hepatosplenomegaly, no masses and bowel sounds normal  MS: no gross musculoskeletal defects noted, no edema  SKIN: no suspicious lesions or rashes    Diagnostic Test Results:  Labs reviewed in  Epic        Assessment & Plan       ICD-10-CM    1. Postherpetic neuralgia B02.29 divalproex sodium delayed-release (DEPAKOTE) 250 MG DR tablet     gabapentin (NEURONTIN) 300 MG capsule   2. Obstructive chronic bronchitis with exacerbation (H) J44.1 azithromycin (ZITHROMAX) 250 MG tablet          Did literature search with him in the room.  We reviewed options.  UTDOL uses depakote.  I added it to the mix.  Recheck in 3-4 weeks in clinic.  I will do depakote level at that time.  Continue others as is.  I clarified the dosing of the tramadol, gabapentin, neurontin.  He may add the trazodone back as well at night for the pain.  I sent his azithro for the year which he takes prophylactically started by pulmonary.  F/u as scheduled.    No follow-ups on file.    Dominguez Maradiaga MD  [facility]

## 2020-01-09 ENCOUNTER — TELEPHONE (OUTPATIENT)
Dept: FAMILY MEDICINE | Facility: OTHER | Age: 80
End: 2020-01-09

## 2020-01-09 NOTE — TELEPHONE ENCOUNTER
Called patient and verified appointment for 01/29/20 for follow up with Dr. Maradiaga. Patient confirmed appointment for 01/13/20 for coumadin.

## 2020-01-09 NOTE — TELEPHONE ENCOUNTER
8:28 AM    Reason for Call: Phone Call    Description: Pt called and was canceling an apt for today with [facility] due to car trouble. I don't see that pt was to see him today, let alone have an apt today. Pt stated he wanted to check his paper again. Pt seems a little confused on doctors/apt times.     Was an appointment offered for this call? No  If yes : Appointment type              Date    Preferred method for responding to this message: Telephone Call  What is your phone number ?   [phone] Rm     If we cannot reach you directly, may we leave a detailed response at the number you provided? Yes    Can this message wait until your PCP/provider returns, if available today? Not applicable, PCP [facility]    [facility]

## 2020-01-13 ENCOUNTER — ANTICOAGULATION THERAPY VISIT (OUTPATIENT)
Dept: ANTICOAGULATION | Facility: OTHER | Age: 80
End: 2020-01-13
Attending: FAMILY MEDICINE
Payer: COMMERCIAL

## 2020-01-13 DIAGNOSIS — I73.9 PVD (PERIPHERAL VASCULAR DISEASE) (H): ICD-10-CM

## 2020-01-13 DIAGNOSIS — I48.20 CHRONIC ATRIAL FIBRILLATION (H): ICD-10-CM

## 2020-01-13 DIAGNOSIS — Z79.01 LONG TERM CURRENT USE OF ANTICOAGULANT THERAPY: ICD-10-CM

## 2020-01-13 LAB — INR BLD: 3 (ref 0.86–1.14)

## 2020-01-13 PROCEDURE — 36416 COLLJ CAPILLARY BLOOD SPEC: CPT | Mod: ZL | Performed by: FAMILY MEDICINE

## 2020-01-13 PROCEDURE — 85610 PROTHROMBIN TIME: CPT | Mod: QW,ZL | Performed by: FAMILY MEDICINE

## 2020-01-13 NOTE — PROGRESS NOTES
ANTICOAGULATION FOLLOW-UP CLINIC VISIT    Patient Name:  Rm Duarte  Date:  1/13/2020  Contact Type:  Telephone/ message left on home voicemail re: warfarin dosing/INR recheck date    SUBJECTIVE:  Patient Findings     Comments:   Received INR results from lab. Call placed to patient and message left on home voicemail re: INR results, warfarin dosing/INR recheck date. Patient to return call to warfarin clinic with changes in bruising/bleeding, new changes in diet/meds/activity or questions.          Clinical Outcomes     Negatives:   Major bleeding event, Thromboembolic event, Anticoagulation-related hospital admission, Anticoagulation-related ED visit, Anticoagulation-related fatality    Comments:   Received INR results from lab. Call placed to patient and message left on home voicemail re: INR results, warfarin dosing/INR recheck date. Patient to return call to warfarin clinic with changes in bruising/bleeding, new changes in diet/meds/activity or questions.             OBJECTIVE    INR Point of Care   Date Value Ref Range Status   01/13/2020 3.0 (H) 0.86 - 1.14 Final     Comment:     This test is intended for monitoring Coumadin therapy.  Results are not   accurate in patients with prolonged INR due to factor deficiency.         ASSESSMENT / PLAN  INR assessment THER    Recheck INR In: 4 WEEKS    INR Location Clinic      Anticoagulation Summary  As of 1/13/2020    INR goal:   2.0-3.0   TTR:   38.4 % (1 y)   INR used for dosing:   3.0 (1/13/2020)   Warfarin maintenance plan:   3.75 mg (2.5 mg x 1.5) every Mon, Fri; 2.5 mg (2.5 mg x 1) all other days   Full warfarin instructions:   1/13: 2.5 mg; Otherwise 3.75 mg every Mon, Fri; 2.5 mg all other days   Weekly warfarin total:   20 mg   Plan last modified:   Jeanie Arechiga RN (12/3/2019)   Next INR check:   2/10/2020   Priority:   Maintenance   Target end date:   Indefinite    Indications    Chronic atrial fibrillation [I48.20]  PVD (peripheral vascular disease) (H)  [I73.9]  Long-term (current) use of anticoagulants [Z79.01] [Z79.01]             Anticoagulation Episode Summary     INR check location:       Preferred lab:       Send INR reminders to:   HC ANTICOAG POOL    Comments:         Anticoagulation Care Providers     Provider Role Specialty Phone number    Dominguez Maradiaga MD [facility] [phone]            See the Encounter Report to view Anticoagulation Flowsheet and Dosing Calendar (Go to Encounters tab in chart review, and find the Anticoagulation Therapy Visit)        Jeanie Arechiga RN

## 2020-01-20 NOTE — PROGRESS NOTES
Subjective     Rm Duarte is a 79 year old male who presents to clinic today for the following health issues:    HPI   Chronic Pain Follow-Up       Type / Location of Pain: face on left side  Analgesia/pain control:       Recent changes:  same      Overall control: Comfortably manageable  Activity level/function:      Daily activities:  Able to do all daily activities    Work:  not applicable  Adverse effects:  No  Adherance    Taking medication as directed?  Yes    Participating in other treatments: no   Risk Factors:    Sleep:  Feels he sleeps too much    Mood/anxiety:  controlled    Recent family or social stressors:  none noted    Other aggravating factors: going without pain medications  PHQ-9 SCORE 6/26/2018 9/26/2018 12/17/2018   PHQ-9 Total Score - - -   PHQ-9 Total Score 2 4 1     SEMAJ-7 SCORE 6/26/2018 9/26/2018 12/17/2018   Total Score 0 0 1     Encounter-Level CSA:    There are no encounter-level csa.     Patient-Level CSA:    There are no patient-level csa.         PROBLEMS TO ADD ON...reviewed.  We talked vaccine, meds, and plan going forward.  The only thing that has seemed to help is the tramadol.      Patient Active Problem List   Diagnosis     Chronic atrial fibrillation     Arteriosclerotic cardiovascular disease (ASCVD)     Obstructive chronic bronchitis with exacerbation (H)     PVD (peripheral vascular disease) (H)     Long-term (current) use of anticoagulants [Z79.01]     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  1991 were 4.31/6.20!!!!!!!??     ECHO: TDS, EF~50%, abnl septum, o/w NORMAL  5/20/2011     PAD: CTA> occlusion of L mid SFA with  reconstitution  2010     S/P colonoscopy: tubular adenoma & tics  2010    Repeat in 2014; Dr Graham     Tobacco Abuse Ongoing         Social History     Tobacco Use     Smoking status: Former Smoker     Packs/day: 1.00     Years: 51.00     Pack years: 51.00     Types: Cigarettes     Start date: 1960     Last attempt to quit: 2011     Years since quittin.0     Smokeless tobacco: Never Used   Substance Use Topics     Alcohol use: Yes     Alcohol/week: 0.0 standard drinks     Comment: Beer; rarely     Family History   Problem Relation Age of Onset     Other - See Comments Mother         AAA, cause of death     Other - See Comments Other 69        AAA, family hx     Chronic Obstructive Pulmonary Disease Brother          MI age 60's     Family History Negative Sister          Current Outpatient Medications   Medication Sig Dispense Refill     aspirin (ASPIRIN LOW DOSE) 81 MG tablet Take 1 tablet by mouth daily.       azithromycin (ZITHROMAX) 250 MG tablet TAKE 1 TABLET BY MOUTH ONCE DAILY 90 tablet 3     budesonide (PULMICORT) 0.25 MG/2ML neb solution Take 0.25 mg by nebulization 2 times daily       cilostazol (PLETAL) 100 MG tablet TAKE 1 TABLET BY MOUTH TWICE DAILY 180 tablet 0     Dextromethorphan-Guaifenesin (ROBITUSSIN COUGH+CHEST EDVIN DM) 5-100 MG/5ML LIQD Take 20 mLs by mouth every 4 hours as needed       gabapentin (NEURONTIN) 300 MG capsule Take 2 capsules (600 mg) by mouth 3 times daily 180 capsule 3     loperamide (IMODIUM A-D) 2 MG tablet Take 2 mg by mouth 4 times daily as needed. Take 2 tablet by oral route after 1st loose stool and 1 tablet after each subsequent bowel movement; do not exceed 16 mg in 24 hrs. As needed.       Multiple Vitamins-Minerals (CENTRUM SILVER) per tablet Take 1 tablet by mouth daily.       order for DME Equipment being ordered: Oxygen 2 L per nasal canula continuously, re-certify for 1 year      FAX TO [facility] 1 Units 11     order for DME Equipment  being ordered: shower chair 1 each 0     PERFOROMIST 20 MCG/2ML neb solution USE 1 VIAL IN NEBULIZER EVERY 12 HOURS 360 mL 1     roflumilast (DALIRESP) 500 MCG TABS tablet Take 500 mcg by mouth daily       simvastatin (ZOCOR) 10 MG tablet TAKE 1 TABLET BY MOUTH AT BEDTIME 90 tablet 0     tamsulosin (FLOMAX) 0.4 MG capsule TAKE 1 CAPSULE BY MOUTH ONCE DAILY 90 capsule 0     traMADol (ULTRAM) 50 MG tablet TAKE 1 TO 2 TABLETS BY MOUTH EVERY 6 HOURS AS NEEDED FOR PAIN 120 tablet 0     warfarin (COUMADIN) 2.5 MG tablet Take 3.75 mg(1 1/2 tabs) every Mon; 2.5 mg (1 tab) all other days or as directed by Coumadin Clinic 125 tablet 3     pregabalin (LYRICA) 50 MG capsule Take 2 twice daily for 3 days, 2 in am and 1 in pm daily for 3 days, 1 pill twice daily x 3 days, 1 pill once daily x 3 days, then stop. 30 capsule 0     No Known Allergies    PROBLEMS TO ADD ON...  Reviewed and updated as needed this visit by Provider         Review of Systems   ROS COMP: Constitutional, HEENT, cardiovascular, pulmonary, gi and gu systems are negative, except as otherwise noted.      Objective    There were no vitals taken for this visit.  There is no height or weight on file to calculate BMI.  Physical Exam   GENERAL: healthy, alert and no distress  MS: no gross musculoskeletal defects noted, no edema    Diagnostic Test Results:  Labs reviewed in Epic        Assessment & Plan       ICD-10-CM    1. Other chronic pain G89.29 Pain Drug Scr UR W Rptd Meds   2. Post herpetic neuralgia B02.29 DISCONTINUED: pregabalin (LYRICA) 50 MG capsule          25 minutes spent with patient over 50%in counseling.  He has post herpetic neuralgia not responding to a host of meds.  He is overmedicated now, with a bunch of meds that don't work.  Start by weening the depakote, 3 days each dose.   Off that, and weening the lyrica as well.  I sent 50's and wrote out the plan there.  Continue ultram.  UDS today.  I don't have any worries with this gentleman.  I did  advise the shingles shot.  He will get at [facility].      No follow-ups on file.    Dominguez Maradiaga MD  [facility]

## 2020-01-29 ENCOUNTER — OFFICE VISIT (OUTPATIENT)
Dept: FAMILY MEDICINE | Facility: OTHER | Age: 80
End: 2020-01-29
Attending: FAMILY MEDICINE
Payer: COMMERCIAL

## 2020-01-29 VITALS
HEIGHT: 71 IN | SYSTOLIC BLOOD PRESSURE: 124 MMHG | BODY MASS INDEX: 23.94 KG/M2 | OXYGEN SATURATION: 90 % | HEART RATE: 77 BPM | WEIGHT: 171 LBS | DIASTOLIC BLOOD PRESSURE: 64 MMHG

## 2020-01-29 DIAGNOSIS — B02.29 POST HERPETIC NEURALGIA: ICD-10-CM

## 2020-01-29 DIAGNOSIS — G89.29 OTHER CHRONIC PAIN: Primary | ICD-10-CM

## 2020-01-29 PROCEDURE — G0463 HOSPITAL OUTPT CLINIC VISIT: HCPCS

## 2020-01-29 PROCEDURE — 99000 SPECIMEN HANDLING OFFICE-LAB: CPT | Performed by: FAMILY MEDICINE

## 2020-01-29 PROCEDURE — 99214 OFFICE O/P EST MOD 30 MIN: CPT | Performed by: FAMILY MEDICINE

## 2020-01-29 PROCEDURE — 80307 DRUG TEST PRSMV CHEM ANLYZR: CPT | Mod: ZL | Performed by: FAMILY MEDICINE

## 2020-01-29 RX ORDER — PREGABALIN 50 MG/1
CAPSULE ORAL
Qty: 30 CAPSULE | Refills: 0 | Status: SHIPPED | OUTPATIENT
Start: 2020-01-29 | End: 2020-01-01

## 2020-01-29 RX ORDER — PREGABALIN 50 MG/1
CAPSULE ORAL
Qty: 30 CAPSULE | Refills: 0 | Status: SHIPPED | OUTPATIENT
Start: 2020-01-29 | End: 2020-01-29

## 2020-01-29 ASSESSMENT — ANXIETY QUESTIONNAIRES
3. WORRYING TOO MUCH ABOUT DIFFERENT THINGS: NOT AT ALL
5. BEING SO RESTLESS THAT IT IS HARD TO SIT STILL: NOT AT ALL
GAD7 TOTAL SCORE: 0
4. TROUBLE RELAXING: NOT AT ALL
6. BECOMING EASILY ANNOYED OR IRRITABLE: NOT AT ALL
7. FEELING AFRAID AS IF SOMETHING AWFUL MIGHT HAPPEN: NOT AT ALL
1. FEELING NERVOUS, ANXIOUS, OR ON EDGE: NOT AT ALL
2. NOT BEING ABLE TO STOP OR CONTROL WORRYING: NOT AT ALL

## 2020-01-29 ASSESSMENT — MIFFLIN-ST. JEOR: SCORE: 1522.78

## 2020-01-29 ASSESSMENT — PATIENT HEALTH QUESTIONNAIRE - PHQ9: SUM OF ALL RESPONSES TO PHQ QUESTIONS 1-9: 2

## 2020-01-29 ASSESSMENT — PAIN SCALES - GENERAL: PAINLEVEL: MODERATE PAIN (5)

## 2020-01-29 NOTE — NURSING NOTE
"Chief Complaint   Patient presents with     Pain       Initial /64   Pulse 77   Ht 1.803 m (5' 11\")   Wt 77.6 kg (171 lb)   SpO2 90%   BMI 23.85 kg/m   Estimated body mass index is 23.85 kg/m  as calculated from the following:    Height as of this encounter: 1.803 m (5' 11\").    Weight as of this encounter: 77.6 kg (171 lb).  Medication Reconciliation: complete  Patt Polo MA    "

## 2020-01-29 NOTE — LETTER
RANGE Moreland  01/20/20    Patient: Rm Duarte  YOB: 1940  Medical Record Number: [id number]                                                                  Opioid / Opioid Plus Controlled Substance Agreement    I understand that my care provider has prescribed an opioid (narcotic) controlled substance to help manage my condition(s). I am taking this medicine to help me function or work. I know this is strong medicine, and that it can cause serious side effects. Opioid medicine can be sedating, addicting and may cause a dependency on the drug. They can affect my ability to drive or think, and cause depression. They need to be taken exactly as prescribed. Combining opioids with certain medicines or chemicals (such as cocaine, sedatives and tranquilizers, sleeping pills, meth) can be dangerous or even fatal. Also, if I stop opioids suddenly, I may have severe withdrawal symptoms. Last, I understand that opioids do not work for all types of pain nor for all patients. If not helpful, I may be asked to stop them.        The risks, benefits, and side effects of these medicine(s) were explained to me. I agree that:    1. I will take part in other treatments as advised by my care team. This may be psychiatry or counseling, physical therapy, behavioral therapy, group treatment or a referral to a pain clinic. I will reduce or stop my medicine when my care team tells me to do so.  2. I will take my medicines as prescribed. I will not change the dose or schedule unless my care team tells me to. There will be no refills if I  run out early.   I may be contactedwithout warning and asked to complete a urine drug test or pill count at any time.   3. I will keep all my appointments, and understand this is part of the monitoring of opioids. My care team may require an office visit for EVERY opioid/controlled substance refill. If I miss appointments or don t follow instructions, my care team may stop my  medicine.  4. I will not ask other providers to prescribe controlled substances, and I will not accept controlled substances from other people. If I need another prescribed controlled substance for a new reason, I will tell my care team within 1 business day.  5. I will use one pharmacy to fill all of my controlled substance prescriptions, and it is up to me to make sure that I do not run out of my medicines on weekends or holidays. If my care team is willing to refill my opioid prescription without a visit, I must request refills only during office hours, refills may take up to 3 days to process, and it may take up to 5 to 7 days for my medicine to be mailed and ready at my pharmacy. Prescriptions will not be mailed anywhere except my pharmacy.        [id number]  Rev 12/18         Registration to scan to EHR                             Page 1 of 2               Controlled Substance Agreement Opioid        RANGE Tamiment  01/20/20  Patient: Rm Duarte  YOB: 1940  Medical Record Number: [id number]                                                                  6. I am responsible for my prescriptions. If the medicine/prescription is lost or stolen, it will not be replaced. I also agree not to share controlled substance medicines with anyone.  7. I agree to not use ANY illegal or recreational drugs. This includes marijuana, cocaine, bath salts or other drugs. I agree not to use alcohol unless my care team says I may.          I agree to give urine samples whenever asked. If I don t give a urine sample, the care team may stop my medicine.    8. If I enroll in the [place] Medical Marijuana program, I will tell my care team. I will also sign an agreement to share my medical records with my care team.   9. I will bring in my list of medicines (or my medicine bottles) each time I come to the clinic.   10. I will tell my care team right away if I become pregnant or have a new medical problem treated outside  of my regular clinic.  11. I understand that this medicine can affect my thinking and judgment. It may be unsafe for me to drive, use machinery and do dangerous tasks. I will not do any of these things until I know how the medicine affects me. If my dose changes, I will wait to see how it affects me. I will contact my care team if I have concerns about medicine side effects.    I understand that if I do not follow any of the conditions above, my prescriptions or treatment may be stopped.      I agree that my provider, clinic care team, and pharmacy may work with any city, state or federal law enforcement agency that investigates the misuse, sale, or other diversion of my controlled medicine. I will allow my provider to discuss my care with or share a copy of this agreement with any other treating provider, pharmacy or emergency room where I receive care. I agree to give up (waive) any right of privacy or confidentiality with respect to these consents.     I have read this agreement and have asked questions about anything I did not understand.      ________________________________________________________________________  Patient signature - Date/Time -  Rm Duarte                                      ________________________________________________________________________  Witness signature                                                            ________________________________________________________________________  Provider signature - Dominguez Maradiaga MD      [id number]  Rev 12/18         Registration to scan to EHR                         Page 2 of 2                   Controlled Substance Agreement Opioid           Page 1 of 2  Opioid Pain Medicines (also known as Narcotics)  What You Need to Know    What are opioids?   Opioids are pain medicines that must be prescribed by a doctor.  They are also known as narcotics.    Examples are:     morphine (MS Contin, Vivi)    oxycodone (Oxycontin)    oxycodone and  acetaminophen (Percocet)    hydrocodone and acetaminophen (Vicodin, Norco)     fentanyl patch (Duragesic)     hydromorphone (Dilaudid)     methadone     What do opioids do well?   Opioids are best for short-term pain after a surgery or injury. They also work well for cancer pain. Unlike other pain medicines, they do not cause liver or kidney failure or ulcers. They may help some people with long-lasting (chronic) pain.     What do opioids NOT do well?   Opioids never get rid of pain entirely, and they do not work well for most patients with chronic pain. Opioids do not reduce swelling, one of the causes of pain. They also don t work well for nerve pain.                           For informational purposes only.  Not to replace the advice of your care provider.  Copyright [address] [facility]. All right reserved. [employer] 490229-Dum 02/18.      Page 2 of 2    Risks and side effects   Talk to your doctor before you start or decide to keep taking one of these medicines. Side effects include:    Lowering your breathing rate enough to cause death    Overdose, including death, especially if taking higher than prescribed doses    Long-term opioid use    Worse depression symptoms; less pleasure in things you usually enjoy    Feeling tired or sluggish    Slower thoughts or cloudy thinking    Being more sensitive to pain over time; pain is harder to control    Trouble sleeping or restless sleep    Changes in hormone levels (for example, less testosterone)    Changes in sex drive or ability to have sex    Constipation    Unsafe driving    Itching and sweating    Feeling dizzy    Nausea, vomiting and dry mouth    What else should I know about opioids?  When someone takes opioids for too long or too often, they become dependent. This means that if you stop or reduce the medicine too quickly, you will have withdrawal symptoms.    Dependence is not the same as addiction. Addiction is when people keep using a substance  that harms their body, their mind or their relations with others. If you have a history of drug or alcohol abuse, taking opioids can cause a relapse.    Over time, opioids don t work as well. Most people will need higher and higher doses. The higher the dose, the more serious the side effects. We don t know the long-term effects of opioids.      Prescribed opioids aren't the best way to manage chronic pain    Other ways to manage pain include:      Ibuprofen or acetaminophen.  You should always try this first.      Treat health problems that may be causing pain.      acupuncture or massage, deep breathing, meditation, visual imagery, aromatherapy.      Use heat or ice at the pain site      Physical therapy and exercise      Stop smoking      See a counselor or therapist                                                  People who have used opioids for a long time may have a lower quality of life, worse depression, higher levels of pain and more visits to doctors.    Never share your opioids with others. Be sure to store opioids in a secure place, locked if possible.Young children can easily swallow them and overdose.     You can overdose on opioids.  Signs of overdose include decrease or loss of consciousness, slowed breathing, trouble waking and blue lips.  If someone is worried about overdose, they should call 911.    If you are at risk for overdose, you may get naloxone (Narcan, a medicine that reverses the effects of opioids.  If you overdose, a friend or family member can give you Narcan while waiting for the ambulance.  They need to know the signs of overdose and how to give Narcan.    While you're taking opioids:    Don't use alcohol or street drugs. Taking them together can cause death.    Don't take any of these medicines unless your doctor says its okay.  Taking these with opioids can cause death.    Benzodiazepines (such as lorazepam         or diazepam)    Muscle relaxers (such as  cyclobenzaprine)    sleeping pills    other opioids    Safe disposal of opioids  Find your area drug take-back program, your pharmacy mail-back program, buy a special disposal bag (such as Deterra) from your pharmacy or flush them down the toilet.  Use the guidelines at:  www.fda.gov/drugs/resourcesforyou

## 2020-01-30 ASSESSMENT — ANXIETY QUESTIONNAIRES: GAD7 TOTAL SCORE: 0

## 2020-02-03 LAB — PAIN DRUG SCR UR W RPTD MEDS: NORMAL

## 2020-02-04 ENCOUNTER — PATIENT OUTREACH (OUTPATIENT)
Dept: CARE COORDINATION | Facility: OTHER | Age: 80
End: 2020-02-04

## 2020-02-04 ASSESSMENT — ACTIVITIES OF DAILY LIVING (ADL): DEPENDENT_IADLS:: INDEPENDENT

## 2020-02-04 NOTE — PROGRESS NOTES
Clinic Care Coordination Contact    Situation: Patient chart reviewed by care coordinator.    Background: Pt had PCP visits 1/7/20 and 1/29/20 regarding his postherpetic neuralgia.    Assessment: Noted care plan updates and relevant medications.  Normal drug screen 1/29/20.    Plan/Recommendations: Will continue to monitor.    Lauren Pipkin, BS-RN   Care Coordination  Primary Care- [facility]  [phone] Option # 1

## 2020-02-11 ENCOUNTER — ANTICOAGULATION THERAPY VISIT (OUTPATIENT)
Dept: ANTICOAGULATION | Facility: OTHER | Age: 80
End: 2020-02-11
Attending: FAMILY MEDICINE
Payer: COMMERCIAL

## 2020-02-11 DIAGNOSIS — I48.20 CHRONIC ATRIAL FIBRILLATION (H): ICD-10-CM

## 2020-02-11 DIAGNOSIS — Z79.01 LONG TERM CURRENT USE OF ANTICOAGULANT THERAPY: ICD-10-CM

## 2020-02-11 DIAGNOSIS — I73.9 PVD (PERIPHERAL VASCULAR DISEASE) (H): ICD-10-CM

## 2020-02-11 LAB
CAPILLARY BLOOD COLLECTION: NORMAL
INR BLD: 1.4 (ref 0.86–1.14)

## 2020-02-11 PROCEDURE — 85610 PROTHROMBIN TIME: CPT | Mod: QW,ZL | Performed by: FAMILY MEDICINE

## 2020-02-11 PROCEDURE — 36416 COLLJ CAPILLARY BLOOD SPEC: CPT | Mod: ZL | Performed by: FAMILY MEDICINE

## 2020-02-11 NOTE — PROGRESS NOTES
ANTICOAGULATION FOLLOW-UP CLINIC VISIT    Patient Name:  Rm Duarte  Date:  2020  Contact Type:  Telephone    SUBJECTIVE:  Patient Findings     Comments:   Received INR results from lab. Call placed to patient and spoke to patient re: INR results, warfarin dosing/INR recheck date. Patient to return call to warfarin clinic with changes in bruising/bleeding, new changes in diet/meds/activity or questions.  Patient verbalized understanding.         Clinical Outcomes     Negatives:   Major bleeding event, Thromboembolic event, Anticoagulation-related hospital admission, Anticoagulation-related ED visit, Anticoagulation-related fatality    Comments:   Received INR results from lab. Call placed to patient and spoke to patient re: INR results, warfarin dosing/INR recheck date. Patient to return call to warfarin clinic with changes in bruising/bleeding, new changes in diet/meds/activity or questions.  Patient verbalized understanding.            OBJECTIVE    INR Point of Care   Date Value Ref Range Status   2020 1.4 (H) 0.86 - 1.14 Final     Comment:     This test is intended for monitoring Coumadin therapy.  Results are not   accurate in patients with prolonged INR due to factor deficiency.         ASSESSMENT / PLAN  INR assessment SUB    Recheck INR In: 1 WEEK    INR Location Clinic      Anticoagulation Summary  As of 2020    INR goal:   2.0-3.0   TTR:   38.1 % (1 y)   INR used for dosin.4! (2020)   Warfarin maintenance plan:   3.75 mg (2.5 mg x 1.5) every Mon, Fri; 2.5 mg (2.5 mg x 1) all other days   Full warfarin instructions:   : 5 mg; Otherwise 3.75 mg every Mon, Fri; 2.5 mg all other days   Weekly warfarin total:   20 mg   Plan last modified:   Jeanie Arechiga RN (12/3/2019)   Next INR check:   2020   Priority:   High   Target end date:   Indefinite    Indications    Chronic atrial fibrillation [I48.20]  PVD (peripheral vascular disease) (H) [I73.9]  Long-term (current) use of  anticoagulants [Z79.01] [Z79.01]             Anticoagulation Episode Summary     INR check location:       Preferred lab:       Send INR reminders to:   [facility] ANTICOAG POOL    Comments:         Anticoagulation Care Providers     Provider Role Specialty Phone number    Dominguez Maradiaga MD [facility] [phone]            See the Encounter Report to view Anticoagulation Flowsheet and Dosing Calendar (Go to Encounters tab in chart review, and find the Anticoagulation Therapy Visit)        Jeanie Arechiga RN

## 2020-02-19 NOTE — PROGRESS NOTES
ANTICOAGULATION FOLLOW-UP CLINIC VISIT    Patient Name:  Rm Duarte  Date:  2020  Contact Type:  Telephone    SUBJECTIVE:  Patient Findings     Comments:   Received INR results from lab. Call placed to patient and spoke to patient re: INR results, warfarin dosing/INR recheck date. Patient to return call to warfarin clinic with changes in bruising/bleeding, new changes in diet/meds/activity or questions.  Patient verbalized understanding.         Clinical Outcomes     Negatives:   Major bleeding event, Thromboembolic event, Anticoagulation-related hospital admission, Anticoagulation-related ED visit, Anticoagulation-related fatality    Comments:   Received INR results from lab. Call placed to patient and spoke to patient re: INR results, warfarin dosing/INR recheck date. Patient to return call to warfarin clinic with changes in bruising/bleeding, new changes in diet/meds/activity or questions.  Patient verbalized understanding.            OBJECTIVE    INR Point of Care   Date Value Ref Range Status   2020 2.0 (H) 0.86 - 1.14 Final     Comment:     This test is intended for monitoring Coumadin therapy.  Results are not   accurate in patients with prolonged INR due to factor deficiency.         ASSESSMENT / PLAN  INR assessment THER    Recheck INR In: 6 WEEKS    INR Location Clinic      Anticoagulation Summary  As of 2020    INR goal:   2.0-3.0   TTR:   37.5 % (1 y)   INR used for dosin.0 (2020)   Warfarin maintenance plan:   3.75 mg (2.5 mg x 1.5) every Mon, Fri; 2.5 mg (2.5 mg x 1) all other days   Full warfarin instructions:   3.75 mg every Mon, Fri; 2.5 mg all other days   Weekly warfarin total:   20 mg   No change documented:   Geni, Jeanie, RN   Plan last modified:   Jeanie Arechiga RN (12/3/2019)   Next INR check:   2020   Priority:   Maintenance   Target end date:   Indefinite    Indications    Chronic atrial fibrillation [I48.20]  PVD (peripheral vascular disease) (H)  [I73.9]  Long-term (current) use of anticoagulants [Z79.01] [Z79.01]             Anticoagulation Episode Summary     INR check location:       Preferred lab:       Send INR reminders to:   [facility] ANTICOAG POOL    Comments:         Anticoagulation Care Providers     Provider Role Specialty Phone number    Dominguez Maradiaga MD [facility] [phone]            See the Encounter Report to view Anticoagulation Flowsheet and Dosing Calendar (Go to Encounters tab in chart review, and find the Anticoagulation Therapy Visit)        Jeanie Arechiga RN

## 2020-02-24 NOTE — TELEPHONE ENCOUNTER
tramadol      Last Written Prescription Date:  1/6/20  Last Fill Quantity: 120,   # refills: 0  Last Office Visit: 1/29/20  Future Office visit:       Routing refill request to provider for review/approval because:  Drug not on the [facility], [facility] or [facility] refill protocol or controlled substance

## 2020-03-04 PROBLEM — J10.1 INFLUENZA A: Status: ACTIVE | Noted: 2020-01-01

## 2020-03-04 PROBLEM — F11.90 CHRONIC, CONTINUOUS USE OF OPIOIDS: Chronic | Status: ACTIVE | Noted: 2019-09-23

## 2020-03-04 PROBLEM — J96.21 ACUTE AND CHRONIC RESPIRATORY FAILURE WITH HYPOXIA (H): Status: ACTIVE | Noted: 2020-01-01

## 2020-03-04 NOTE — PROGRESS NOTES
This writer, June GONZALEZ got permission from Rm to call his sister to let Patt Falcon know that the had been brought to the ER.  Patt lives in [place], so she is not close.  I told her that a staff member would call her back to update her on his condition and the plan of care.
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
## 2020-03-05 NOTE — PROVIDER NOTIFICATION
DATE:  3/5/2020   TIME OF RECEIPT FROM LAB:  1257  LAB TEST:  Lactic acid  LAB VALUE:  2.4  RESULTS GIVEN WITH READ-BACK TO (PROVIDER):  Kristie Lynch, NP  TIME LAB VALUE REPORTED TO PROVIDER:   1584 via text page
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
## 2020-03-05 NOTE — PLAN OF CARE
Discharge Planner PT   Patient plan for discharge: not discussed this session  Current status: Naomi for all transfers for management of O2 tubing and IV Pole.  Patient able to ambulate distances up to 50' w/ quad cane otherwise w/o LOB though significant fatigue w/ SpO2 90% 2-3 min to recover to 96%  Barriers to return to prior living situation: SBA for all gait, improved functional tolerance, maintain SpO2 levels   Recommendations for discharge: Patient did well w/ PT today outside of managing O2 and IV cords.  No LOB or concerns w/ balance, though patient demonstrates significant fatigue and weakness.  If patient continues to progress he will likely be able to return home w/ continued use of quad cane  Rationale for recommendations: Patient likely to improve endurance and strength as flu symptoms improve.  Demonstrates good safety awareness and balance, just seems to have general weakness and fatigue due to influenza       Entered by: Geraldo New 03/05/2020 11:37 AM

## 2020-03-05 NOTE — PHARMACY-ANTICOAGULATION SERVICE
Pharmacy Consult-Coumadin Assessment Day #2    Rm Duarte is a 79 year old male admitted on 3/4/2020 with Influenza A    Primary Indication(s) for Anticoagulation: afib    Goal INR: 2-3 (consult placed for INR goal of 2-2.5 but previous pharmacist clarified with provider and goal of 2-3 was decided since that is the outpt goal)    FYI, patient is followed by the [facility] at:      Patient Active Problem List   Diagnosis     Chronic atrial fibrillation     Arteriosclerotic cardiovascular disease (ASCVD)     Obstructive chronic bronchitis with exacerbation (H)     PVD (peripheral vascular disease) (H)     Long-term (current) use of anticoagulants [Z79.01]     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)     COPD exacerbation (H)     Acute exacerbation of chronic obstructive pulmonary disease (H)     Postherpetic neuralgia     Chronic, continuous use of opioids     Influenza A     Acute and chronic respiratory failure with hypoxia (H)     Patient previously anticoagulated on Coumadin at a dose of 20 mg/week; dosed as: 3.75 mg every Monday and Friday; 2.5 mg ROW     Factors that may increase patient's bleeding risk and/or sensitivity to warfarin (Coumadin) include: fall, dyspnea, meds (aspirin, ibuprofen, azithromycin, levofloxacin)    Factors that may decrease patient's sensitivity to warfarin (Coumadin) include: -    Anticoagulation Dose History     Recent Dosing and Labs 3/4/20 3/5/20    Warfarin 2.5 mg 2.5 mg -    INR 2.47 2.34       Assessment/Plan: INR therapeutic, starting 750 mg PO levofloxacin daily. Will slightly dose reduce from regular home dose of 2.5 to 2 mg tonight.     Thank You for the Consult. Will continue to follow.    Thelma Strong RPH ....................  3/5/2020   8:13 AM

## 2020-03-05 NOTE — PLAN OF CARE
Pt with max temp of 101.1 - received Tylenol (very reluctant to take), VS as charted.  His O2 sats are in the mid 90's on 2 LPM via NC, lung sounds are with coarse expiratory wheezing, he has a very loose/congested/productive cough - received Robitussin x 1.  He does rate his pain/discomfort to left side of face 6-8/10 and received tramadol x 1 (states pain is from history of shingles) stated not much change in pain level.  He did pop the sepsis flag - lactic acid came back at 2.4.  He does have IV fluids running at 125 mL/hr, continues on PO antibiotics and tamiflu. He did eat a good breakfast - stated not hungry for lunch, good output, one loose stool - received imodium.  He has remained free of falls, call light within reach - does make his needs known, alarms on and frequent rounding done to assess needs.

## 2020-03-05 NOTE — PLAN OF CARE
A&O, SBA, regular diet.  VSS, afebrile, on 2LPM sating adequately.  Assessment as charted.  Continues w/ harsh, congested, productive cough w/ clear sputum.  Neuros intact, PERRLA. Voiding adequately.  IV infusing, other IV is SL.  Per ICU tele pt is a-fib 90s w/ occasional ST depression.  Brakes locked, alarms on, call light within reach, frequent rounding done.  Free from falls, frequent rounding done.      ETA- 0625 K+ was 3.9 w/ lab, gave oral replacement 20 mcg x1, re-check w/ lab 3/6/20 morning.     Face to face report given with opportunity to observe patient.    Report given to RADHA Patel RN   3/5/2020  5:50 AM
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
## 2020-03-05 NOTE — PROGRESS NOTES
[facility]    Hospitalist Progress Note    Date of Service (when I saw the patient): 03/05/2020    Assessment & Plan       Acute and chronic respiratory failure with hypoxia (H):  Improved. He is now down to his baseline oxygen of 2 liters. He is at high risk for worsening condition due to his underlying lung disease.       Influenza A: Started on Tamiflu on arrival. Continue same.       Chronic atrial fibrillation: Rates well controlled, continue warfarin. He is not on any beta blockers at home.       Chronic, continuous use of opioids: Continue tramadal for post-herpatic neuralgia.       DVT Prophylaxis: Warfarin  Code Status: Full Code    Disposition: Expected discharge in 1-2 days once respiratory status stable.    Kristie Lynch, CNP    Interval History   Feels dyspnea has improved compared to arrival. Still having copious amounts of sputum.     -Data reviewed today: I reviewed all new labs and imaging results over the last 24 hours.    Physical Exam   Temp: 99.9  F (37.7  C) Temp src: Tympanic BP: 98/56 Pulse: 100 Heart Rate: 84 Resp: 20 SpO2: 94 % O2 Device: Nasal cannula Oxygen Delivery: 2 LPM  Vitals:    03/04/20 2000   Weight: 77.7 kg (171 lb 4.8 oz)     Vital Signs with Ranges  Temp:  [99.4  F (37.4  C)-101.1  F (38.4  C)] 99.9  F (37.7  C)  Pulse:  [] 100  Heart Rate:  [] 84  Resp:  [18-24] 20  BP: ()/(49-98) 98/56  SpO2:  [92 %-98 %] 94 %  I/O last 3 completed shifts:  In: 1970 [P.O.:600; I.V.:1320; IV Piggyback:50]  Out: 600 [Urine:600]    Peripheral IV 03/04/20 Right Upper arm (Active)   Site Assessment WDL 3/5/2020  4:10 PM   Line Status Infusing;Checked every 1-2 hour 3/5/2020  4:10 PM   Phlebitis Scale 0-->no symptoms 3/5/2020  4:10 PM   Infiltration Scale 0 3/5/2020  4:10 PM   Extravasation? No 3/5/2020  1:09 AM   Number of days: 1       Peripheral IV 03/04/20 Right Hand (Active)   Site Assessment WDL 3/5/2020  4:10 PM   Line Status Saline locked 3/5/2020  4:10 PM    Phlebitis Scale 0-->no symptoms 3/5/2020  4:10 PM   Infiltration Scale 0 3/5/2020  4:10 PM   Extravasation? No 3/5/2020  1:09 AM   Number of days: 1     Line/device assessment(s) completed for medical necessity    Constitutional: Fatigues but awake, alert, no acute distress.  Respiratory: Crackles throughout with scattered wheezes and diminished throughout but particularly on the right.   Cardiovascular: HRR, no murmurs, rubs,thrills.   GI: Soft,nontender, bowel sounds positive.   Skin/Integumen: Pale, scattered bruising bilateral arms.       Medications     lactated ringers       - MEDICATION INSTRUCTIONS -       Warfarin Therapy Reminder         acetylcysteine  4 mL Nebulization TID     arformoterol  15 mcg Nebulization Q12H     aspirin  81 mg Oral Daily     azithromycin  250 mg Oral Daily     budesonide  0.25 mg Nebulization BID     cilostazol  100 mg Oral BID     gabapentin  600 mg Oral TID     levofloxacin  750 mg Oral Daily     oseltamivir  75 mg Oral BID     pregabalin  100 mg Oral Daily     pregabalin  50 mg Oral Daily     roflumilast  500 mcg Oral At Bedtime     simvastatin  10 mg Oral At Bedtime     sodium chloride (PF)  3 mL Intracatheter Q8H     tamsulosin  0.4 mg Oral Daily     warfarin ANTICOAGULANT  2 mg Oral ONCE at 18:00       Data   Recent Labs   Lab 03/05/20  0535 03/04/20  2328 03/04/20 2023 03/04/20  1455   WBC 5.0  --   --  9.1   HGB 9.3*  --   --  10.8*   MCV 90  --   --  88     --   --  206   INR 2.34*  --   --  2.47*     --   --  139   POTASSIUM 3.9  --   --  4.5   CHLORIDE 107  --   --  107   CO2 27  --   --  26   BUN 25  --   --  30   CR 0.94  --   --  1.14   ANIONGAP 5  --   --  6   KENDRICK 7.8*  --   --  8.2*   *  --   --  119*   ALBUMIN 1.9*  --   --  2.4*   PROTTOTAL 5.3*  --   --  6.4*   BILITOTAL 0.2  --   --  0.3   ALKPHOS 45  --   --  60   ALT 14  --   --  19   AST 22  --   --  19   TROPI  --  0.043 0.071*  --        No results found for this or any previous  visit (from the past 24 hour(s)).

## 2020-03-05 NOTE — PLAN OF CARE
Pulled medications from omni cell, provider entering room - placed pulled meds in the med room - came back a few minutes later and medications were taken by med tech - had to pull an additional set of am medications - pt only received one dose of medications

## 2020-03-05 NOTE — PHARMACY-ANTICOAGULATION SERVICE
Clinical Pharmacy - Warfarin Dosing Consult     Pharmacy has been consulted to manage this patient s warfarin therapy.  Indication: Atrial Fibrillation  Therapy Goal: INR 2-3  OP [facility]ag [facility]: [facility]  Warfarin Prior to Admission: Yes  Warfarin PTA Regimen: 3.75 mg M,F: 2.5 mg ROW  Recent documented change in oral intake/nutrition: Unknown    INR   Date Value Ref Range Status   03/04/2020 2.47 (H) 0.86 - 1.14 Final     INR Point of Care   Date Value Ref Range Status   02/19/2020 2.0 (H) 0.86 - 1.14 Final     Comment:     This test is intended for monitoring Coumadin therapy.  Results are not   accurate in patients with prolonged INR due to factor deficiency.         Recommend warfarin 2.5 mg today.  Pharmacy will monitor Abdalla M Gerald daily and order warfarin doses to achieve specified goal.      Please contact pharmacy as soon as possible if the warfarin needs to be held for a procedure or if the warfarin goals change.

## 2020-03-05 NOTE — PLAN OF CARE
Prior to Admission Medication Reconciliation:     Medications added:   [x] None  [] As listed below:      Medications deleted:   [] None  [x] As listed below:    Depakote: prescribed for nerve pain, discontinued shortly after:  divalproex sodium delayed-release (DEPAKOTE) 250 MG DR tablet [[id number]]  DISCONTINUED     Dose: 250 mg Route: Oral Frequency: 3 TIMES DAILY   Dispense Quantity: 90 tablet Refills: 3 Fills remaining: --           Sig: Take 1 tablet (250 mg) by mouth 3 times daily          Discontinue Date:  1/29/2020 15:06 Discontinue User:  Dominguez Maradiaga MD Discontinue Reason:  Not Effective   Written Date: 01/07/20 Expiration Date: --     Start Date: 01/07/20 End Date: 01/29/20     Ordering Provider:  -- Authorizing Provider: Dominguez Maradiaga MD Ordering User:  Dominguez Maradiaga MD          Diagnosis Association: Postherpetic neuralgia (B02.29)          Changes made to existing medications:   [] None  [x] As listed below: (COPD med  dates)    duonebs picked up October 2018    spiriva picked up August 2018    combivent inhaler picked up December 2017    Budesonide 0.25 mg nebs picked up February 2019 (BID UD)    Last times/dates taken verified with patient:  [] Yes- completed myself  [x] Nurse completed no changes made  [] Unable to review with patient:  [] Nurse completed/changes made:     Allergy modifications:    [x]Did not review  []Patient verified NKA  []Patient verified current existing allergies: no changes made  []New allergies: listed below      Medication reconciliation sources:   [x]Patient  []Patient family member/emergency contact: **  [x][facility] Report Review: (older, but a lot of similarities)  Home Medications    [x]aspirin 81 mg tablet,delayed release 81 mg PO DAILY 06/06/19 [History Confirmed 06/06/19]  [x]budesonide 0.25 mg/2 mL suspension for nebulization 2 ml INHALATION BID 06/06/19 [History Confirmed 06/06/19]  [x]cilostazol 100 mg tablet 100 mg PO BID 06/06/19  [History Confirmed 06/06/19]  [x]formoterol fumarate 20 mcg/2 mL solution for nebulization 2 ml INHALATION Q12H 06/06/19 [History Confirmed 06/06/19]  []ipratropium 20 mcg-albuterol 100 mcg/actuation mist for inhalation 1 puff INHALATION 6XD 06/06/19 [History Confirmed 06/06/19]  [x]loperamide 2 mg capsule 2 mg PO Q4H 06/06/19 [History Confirmed 06/06/19]  [x]multivit with min-folic acid-lutein 400 mcg-250 mcg chewable tablet 1 tab PO DAILY 06/06/19 [History Confirmed 06/06/19]  [x]roflumilast 500 mcg tablet 500 mcg PO DAILY 06/06/19 [History Confirmed 06/06/19]  [x]simvastatin 10 mg tablet 10 mg PO BEDTIME 06/06/19 [History Confirmed 06/06/19]  [x]tamsulosin 0.4 mg capsule 0.4 mg PO DAILY 06/06/19 [History Confirmed 06/06/19]  []warfarin 5 mg tablet 5 mg PO QTUTHSASU 06/06/19 [History Confirmed 06/06/19]  [x]pregabalin 50 mg capsule 50 mg PO TID 07/11/19 [History Confirmed 07/11/19]  [x]tramadol 50 mg tablet 50 mg PO TID PRN 07/11/19 [History Confirmed 07/11/19]  []trazodone 50 mg tablet 50 mg PO BEDTIME PRN 07/11/19 [History Confirmed 07/11/19]  []Epic Chart Review  []Care Everywhere review  []Pharmacy med list: **  []Pharmacy phone call  [x]Outside meds dispense report:  Medication Dispense History (from 3/6/2019 to 3/4/2020)   Expand All  Collapse All   Azithromycin     Dispensed Days Supply Quantity Provider Pharmacy   AZITHROMYCIN 250MG   TAB 01/28/2020 90 90 [facility]   AZITHROMYCIN 250MG   TAB 01/06/2020 6 6 [facility] ...   AZITHROMYCIN 250 MG TABLET 09/23/2019 84 84 tablet [facility] MD [facility] ...   AZITHROMYCIN 250MG   TAB 09/23/2019 6 6 each [facility] ...   AZITHROMYCIN 250MG   TAB 06/25/2019 90 90 each [facility] ...   AZITHROMYCIN 250MG   TAB 03/27/2019 90 0.09 each [facility] ...   AZITHROMYCIN 250MG   TAB 03/27/2019 90 90 each RANDAL  [facility] ...         Cilostazol     Dispensed Days Supply Quantity Provider Pharmacy   CILOSTAZOL 100MG    [facility]2019 [facility] ...   CILOSTAZOL 100MG    [facility]13/2019 [facility] ...   CILOSTAZOL 100MG    TAB 05/10/2019 [facility] ...         Divalproex Sodium     Dispensed Days Supply Quantity Provider Pharmacy   DIVALPROEX DR 250MG TAB 01/07/2020 30 90 [facility] ...         Gabapentin     Dispensed Days Supply Quantity Provider Pharmacy   GABAPENTIN 300MG    CAP 01/06/2020 30 180 [facility] ...   GABAPENTIN 300MG    CAP 11/25/2019 30 180 each [facility] ...   GABAPENTIN 300MG    CAP 10/12/2019 30 180 each [facility] ...   GABAPENTIN 300MG    CAP 09/04/2019 30 180 each [place] [facility] ...   GABAPENTIN 300 MG CAPSULE 07/30/2019 30 90 capsule Alejandro Marcos MD [facility] ...   GABAPENTIN 300MG    CAP 07/08/2019 30 180 each ANKUR [facility] ...   GABAPENTIN 300MG    CAP 06/29/2019 30 90 each ALEJANDRO MARCOS [facility] ...   GABAPENTIN 300MG    CAP 05/28/2019 30 90 each ALEJANDRO MARCOS [facility] ...   GABAPENTIN 300MG    CAP 04/29/2019 30 90 each ALEJANDRO MARCOS [facility] ...         Pregabalin     Dispensed Days Supply Quantity Provider Pharmacy   PREGABALIN 225MG CAP 01/07/2020 90 [place] [facility] ...   PREGABALIN 225MG CAP 10/21/2019 30 60 each JESSICA SHABAZZ [facility] ...   LYRICA 225MG        CAP 09/10/2019 30 60 each [facility] ...   LYRICA 225MG        CAP 08/13/2019 30 60 [place] [facility] ...   LYRICA 225MG        CAP 07/10/2019 30 60 each [facility] ...   LYRICA 225MG        CAP 06/10/2019 30  60 each [facility] ...   LYRICA 225MG        CAP 05/09/2019 30 60 each [facility] T[facility] ...   LYRICA 225MG        CAP 04/11/2019 30 0.06 each [facility] ...   LYRICA 225MG        CAP 04/11/2019 30 60 each [facility] ...   LYRICA 100MG        CAP 03/20/2019 30 0.09 each [facility] ...   LYRICA 100MG        CAP 03/20/2019 30 90 each [facility] ...         Roflumilast     Dispensed Days Supply Quantity Provider Pharmacy   DALIRESP 500MCG     TAB 11/26/2019 90 90 each [facility] ...   DALIRESP 500MCG     TAB 09/11/2019 90 90 each [facility]AST[facility] ...   DALIRESP 500MCG     TAB 04/30/2019 90 90 each [facility] ...         Simvastatin     Dispensed Days Supply Quantity Provider Pharmacy   SIMVASTATIN 10MG    TAB 08/13/2019 90 90 each [facility] ...   SIMVASTATIN 10MG    TAB 05/08/2019 90 90 each [facility] ...         Tamsulosin HCl     Dispensed Days Supply Quantity Provider Pharmacy   TAMSULOSIN 0.4MG    CAP 11/21/2019 90 90 each [facility] ...   TAMSULOSIN HCL 0.4 MG CAPSULE 07/26/2019 90 90 capsule Catina Tee MD [facility] ...   TAMSULOSIN 0.4MG    CAP 04/18/2019 90 0.09 each [facility] ...   TAMSULOSIN 0.4MG    CAP 04/18/2019 90 90 each [facility] ...         Warfarin Sodium     Dispensed Days Supply Quantity Provider Pharmacy   WARFARIN 2.5MG      TAB 02/25/2020 90 104 [facility] ...   WARFARIN 2.5MG      TAB 11/25/2019 89 95 each [facility] ...   WARFARIN SODIUM 2.5 MG TABLET 08/12/2019 89 95 tablet Catina Tee MD [facility] ...   WARFARIN 2.5MG      TAB  2019 89 95 each DOMINGUEZ MARADIAGA [facility] ...         Zoster Vac Recomb Adjuvanted     Dispensed Days Supply Quantity Provider Pharmacy   SHINGRIX 50MCG      INJ 2020 1 1 RERE SIERRA [facility] ...         traMADol HCl     Dispensed Days Supply Quantity Provider Pharmacy   TRAMADOL 50MG TAB 2020 15 120 DOMINGUEZ MARADIAGA [facility] ...   TRAMADOL 50MG TAB 10/16/2019 15 120 each JESSICA SHABAZZ [facility] ...   TRAMADOL 50MG TAB 2019 15 120 each DOMINGUEZ MARADIAGA [facility] ...   TRAMADOL 50MG TAB 2019 15 120 each DOMINGUEZ MARADIAGA [facility] ...   TRAMADOL 50MG TAB 07/15/2019 15 120 each DOMINGUEZ MARADIAGA [facility] ...   TRAMADOL 50MG TAB 2019 15 120 each DOMINGUEZ MARADIAGA [facility] ...   TRAMADOL 50MG TAB 2019 15 120 each DOMINGUEZ MARADIAGA [facility] ...   TRAMADOL 50MG TAB 2019 20 0.06 each JESSICA SHABAZZ [facility] ...   TRAMADOL 50MG TAB 2019 20 60 each JESSICA SHABAZZ [facility] ...   TRAMADOL HCL 50 MG TABLET 2019 20 60 tablet Dominguez Maradiaga MD [facility] ...         traZODone HCl     Dispensed Days Supply Quantity Provider Pharmacy   TRAZODONE 50MG      TAB 2019 30 30 each DOMINGUEZ MARADIAGA [facility] ...         []Nursing home MAR:   []Other: **    Is patient on coumadin?  []No  [x]Yes:   ASSESSMENT / PLAN  INR assessment THER     Recheck INR In: 6 WEEKS     INR Location Clinic        Anticoagulation Summary  As of 2020                INR goal:   2.0-3.0   TTR:   37.5 % (1 y)   INR used for dosin.0 (2020)   Warfarin maintenance plan:   3.75 mg (2.5 mg x 1.5) every Mon, Fri; 2.5 mg (2.5 mg x 1) all other days   Full warfarin instructions:   3.75 mg every Mon, Fri; 2.5 mg all other days   Weekly warfarin total:   20 mg   No change documented:   Geni, Jeanei, RN   Plan last modified:    Jeanie Arechiga, RN (12/3/2019)   Next INR check:   4/1/2020   Priority:   Maintenance   Target end date:   Indefinite    Indications    Chronic atrial fibrillation [I48.20]  PVD (peripheral vascular disease) (H) [I73.9]  Long-term (current) use of anticoagulants [Z79.01] [Z79.01]             Anticoagulation Episode Summary                INR check location:          Preferred lab:          Send INR reminders to:    ANTICOAG POOL      Comments:        Anticoagulation Care Providers      Provider Role Specialty Phone number     Dominguez Maradiaga MD [facility] [phone]       Requests for consultation by provider or pharmacist:   [x] Patient understands why all of their meds were prescribed and how to take them. No questions.   [x] Fill dates coincide with compliancy for all maintenance meds.   [] Fill dates do not coincide with compliancy with maintenance meds. See notes in PTA medlist about how patient is taking.   [] Patient has questions about the following:    Comments: patient manages his own meds. He wasn't sure about depakote when I asked him about it and when I reviewed the chart I found where Dr. Maradiaga discontinued it so I deleted it from his med list. Fill date reflects this as it was only picked up once for a 30 ds. No other concerns. There is an older trazodone that he has history of trying but it was discontinued, reason: stopped by patient. See COPD med  dates above and in PTA med list notes.     Fara Bah on 3/5/2020 at 7:55 AM       Discrepancies: [x] No []Not Applicable []Yes: listed below    Time spent on medication reconciliation:   []5-20 mins  [x]20-40 mins  []> 40 mins    Issues completing PTA medication reconciliation:  [] On hold for a long time  [] Waited for a call back  [] Fax didn't come through  [] Fax took a long time  [] Other:    Notifying appropriate party of changes/additions/discrepancies:  []No changes made, notification not necessary.   [x] Notified  attending provider via text page  [] Notified attending provider in person  [] Notified pharmacy  [] Notified nurse  [] Attending provider not available, left detailed notes  [] Changes/additions made don't need provider notification because provider has not seen patient or input any orders  [] Changes/additions made don't need provider notification because changes made are to medications not ordered  Medications Prior to Admission   Medication Sig Dispense Refill Last Dose     aspirin (ASPIRIN LOW DOSE) 81 MG tablet Take 1 tablet by mouth daily.   3/4/2020 at 0800     azithromycin (ZITHROMAX) 250 MG tablet TAKE 1 TABLET BY MOUTH ONCE DAILY 90 tablet 3 3/3/2020 at 0800     budesonide (PULMICORT) 0.25 MG/2ML neb solution Take 0.25 mg by nebulization 2 times daily   3/4/2020 at 0800     cilostazol (PLETAL) 100 MG tablet TAKE 1 TABLET BY MOUTH TWICE DAILY 180 tablet 0 3/4/2020 at 0800     Dextromethorphan-Guaifenesin (ROBITUSSIN COUGH+CHEST EDVIN DM) 5-100 MG/5ML LIQD Take 20 mLs by mouth every 4 hours as needed   3/4/2020 at 0800     gabapentin (NEURONTIN) 300 MG capsule Take 2 capsules (600 mg) by mouth 3 times daily 180 capsule 3 3/4/2020 at 0800     loperamide (IMODIUM A-D) 2 MG tablet Take 2 mg by mouth 4 times daily as needed. Take 2 tablet by oral route after 1st loose stool and 1 tablet after each subsequent bowel movement; do not exceed 16 mg in 24 hrs. As needed.   3/4/2020 at 0800     Multiple Vitamins-Minerals (CENTRUM SILVER) per tablet Take 1 tablet by mouth daily.   3/4/2020 at Unknown time     PERFOROMIST 20 MCG/2ML neb solution USE 1 VIAL IN NEBULIZER EVERY 12 HOURS 360 mL 1 Past Week at Unknown time     pregabalin (LYRICA) 50 MG capsule Take 2 twice daily for 3 days, 2 in am and 1 in pm daily for 3 days, 1 pill twice daily x 3 days, 1 pill once daily x 3 days, then stop. 30 capsule 0 3/4/2020 at 0800     roflumilast (DALIRESP) 500 MCG TABS tablet Take 500 mcg by mouth At Bedtime    Past Week at Unknown  time     simvastatin (ZOCOR) 10 MG tablet TAKE 1 TABLET BY MOUTH AT BEDTIME 90 tablet 0 Past Week at Unknown time     tamsulosin (FLOMAX) 0.4 MG capsule TAKE 1 CAPSULE BY MOUTH ONCE DAILY 90 capsule 0 Past Week at Unknown time     traMADol (ULTRAM) 50 MG tablet TAKE 1 TO 2 TABLETS BY MOUTH EVERY 6 HOURS AS NEEDED FOR PAIN 120 tablet 0 Past Week at Unknown time     warfarin ANTICOAGULANT (COUMADIN) 2.5 MG tablet Take 3.75 mg(1 1/2 tabs) every Mon and Fri; 2.5 mg (1 tab) all other days or as directed by Coumadin Clinic 125 tablet 3 3/3/2020 at Unknown time

## 2020-03-05 NOTE — PROGRESS NOTES
Inpatient Physical Therapy Evaluation    Name: Rm Duarte MRN# [id number]   Age: 79 year old YOB: 1940     Date of Consultation: 2020  Consultation is requested by:  Kristie Lynch NP  Specific Consultation Request:  Weakness, Fall - Eval and treat as indicated  Primary care provider: Dominguez Maradiaga    General Information:   Onset Date: 3/5/20    History of Current Problem/Admission: Influenza A [J10.1]  COPD exacerbation (H) [J44.1]  Fall, initial encounter [W19.XXXA]    Prior Level of Function: Patient was independent living at home   Ambulation: 1 - Assistive Equipment   Transferrin - Assistive Equipment   Toiletin - Independent    Bathin - Independent   Dressin - Independent   Eatin - Independent   Communication: 0 - Understands/communicates without difficulty  Swallowing:   Cognition: 0 - no cognitive issues reported    Fall history within the last 6 months: No    Current Living Situation: Patient has 0 stairs to enter the home.      Current Equipment Used at Home: quad cane      Patient & Family Goals: not discussed this session     Past Medical History:   Past Medical History:   Diagnosis Date     Atrial fibrillation (H) 3/23/2012     COPD 2011     PVD (peripheral vascular disease) (H)        Past Surgical History:  Past Surgical History:   Procedure Laterality Date     COPD:FEV1-0.74/FVC-3.35 22%, DLCO-28%  3/1/2010    Severe COPD; 2008 PFT's done : 0.96/3.81 25% Fev1, DLCO 45%.  1991 were 4.31/6.20!!!!!!!??     ECHO: TDS, EF~50%, abnl septum, o/w NORMAL  2011     PAD: CTA> occlusion of L mid SFA with reconstitution  2010     S/P colonoscopy: tubular adenoma & tics  2010    Repeat in ; Dr Graham     Tobacco Abuse Ongoing         Medications:   Current Facility-Administered Medications   Medication     acetaminophen (TYLENOL) tablet 650 mg     acetylcysteine (MUCOMYST) 10 % nebulizer solution 4 mL     albuterol (PROVENTIL) neb  solution 2.5 mg     arformoterol (BROVANA) neb solution 15 mcg     aspirin (ASA) chewable tablet 81 mg     azithromycin (ZITHROMAX) tablet 250 mg     budesonide (PULMICORT) neb solution 0.25 mg     cilostazol (PLETAL) tablet 100 mg     gabapentin (NEURONTIN) capsule 600 mg     guaiFENesin-dextromethorphan (ROBITUSSIN DM) 100-10 MG/5ML syrup 5 mL     ibuprofen (ADVIL/MOTRIN) tablet 600 mg     lactated ringers infusion     levofloxacin (LEVAQUIN) tablet 750 mg     lidocaine (LMX4) kit     lidocaine 1 % 0.1-1 mL     loperamide (IMODIUM) capsule 2 mg     melatonin tablet 1 mg     naloxone (NARCAN) injection 0.1-0.4 mg     ondansetron (ZOFRAN-ODT) ODT tab 4 mg    Or     ondansetron (ZOFRAN) injection 4 mg     oseltamivir (TAMIFLU) capsule 75 mg     Patient is already receiving anticoagulation with heparin, enoxaparin (LOVENOX), warfarin (COUMADIN)  or other anticoagulant medication     pregabalin (LYRICA) capsule 100 mg     pregabalin (LYRICA) capsule 50 mg     roflumilast (DALIRESP) tablet 500 mcg     simvastatin (ZOCOR) tablet 10 mg     sodium chloride (PF) 0.9% PF flush 3 mL     sodium chloride (PF) 0.9% PF flush 3 mL     tamsulosin (FLOMAX) capsule 0.4 mg     traMADol (ULTRAM) tablet 50 mg     warfarin ANTICOAGULANT (COUMADIN) tablet 2 mg     Warfarin Therapy Reminder (Check START DATE - warfarin may be starting in the FUTURE)       Weight Bearing Status: No WB restrictions     Cognitive Status Examination:  Orientation: awake and alert  Level of Consciousness: alert  Follows Commands and Answers Questions: 100% of the time  Personal Safety and Judgement: Intact  Memory: Immediate recall intact  Comments:     Pain:   Currently in pain? No  Pain Location?   Pain Rating:     Physical Therapy Evaluation:   Integumentary/Edema: NT  ROM: WFL  Strength: WFL  Bed Mobility: Mod(I)  Transfers: SBA  Gait: Naomi - needed assist w/ tubing while using quad cane  Stairs:   Balance: Fair  Sensory: NT  Coordination: Fair    Physical  Therapy Interventions: Gait Training , Transfer Training and Progressive Activity/Exercise     Clinical Impressions:  Criteria for Skilled Therapeutic Intervention Met: Yes, treatment indicated  PT Diagnosis: Gait impairment, General Weakness  Influenced by the following impairments: General weakness, Shortness of breath, Fall Risk  Functional limitations due to impairment: Walking, self care, fall risk  Clinical presentation: Stable/Uncomplicated  Clinical presentation rationale: Therapist Discretion  Clinical Decision making (complexity): Low Complexity  Frequency: 6 times/week  Predicted Duration of Therapy Intervention (days/wks): 5 days  Anticipated Discharge Disposition: Home and Transitional Care Facility   Anticipated Equipment Needs at Discharge:   Risks and Benefits of therapy have been explained: Yes  Patient, Family & other staff in agreement with plan of care: Yes  Clinical Impression Comments: Patient doing well w/ gait, though limited by significant fatigue and shortness of breath distances 50' w/ SpO2 dropping 90% 2-3 min to recover.  If patient continues to improve will likely be appropriate to go home.  If no further improvement over next 2-3 days will require SNF for strengthening.    Total Eval Time: 15

## 2020-03-05 NOTE — SIGNIFICANT EVENT
Sepsis Evaluation Progress Note    I was called to see Rm Duarte due to abnormal vital signs triggering the Sepsis SIRS screening alert. He is not known to have an infection.     Physical Exam   Vital Signs:  Temp: 101.1  F (38.4  C) Temp src: Tympanic BP: 115/56 Pulse: 100 Heart Rate: 101 Resp: 20 SpO2: 98 % O2 Device: Nasal cannula Oxygen Delivery: 3 LPM(turned down to 2 LPM)    Lab:  Lactic Acid   Date Value Ref Range Status   03/04/2020 0.8 0.7 - 2.0 mmol/L Final     Lactate for Sepsis Protocol   Date Value Ref Range Status   03/05/2020 2.4 (H) 0.7 - 2.0 mmol/L Final     Comment:     Significant value called to and read back by  ADA LAMBERT AT 1256 03/05/20 AJE         The patient is at baseline mental status.     The rest of their physical exam is significant for no changes since AM assessment.    Assessment & Plan   NO EVIDENCE OF SEPSIS at this time.  Vital sign, physical exam, and lab findings are likely due to Influenza A with tachypnea .    Disposition: The patient will remain on the current unit. We will continue to monitor this patient closely..  Kristie Lynch NP    Sepsis Criteria   Sepsis: 2+ SIRS criteria due to infection  Severe Sepsis: Sepsis AND 1+ new sign of acute organ dysfunction (Note: lactate >2 is organ dysfunction)  Septic Shock: Sepsis AND hypotension despite volume resuscitation with 30 ml/kg crystalloid

## 2020-03-06 NOTE — PHARMACY-ANTICOAGULATION SERVICE
Pharmacy Consult-Coumadin Assessment Day #3    Rm Duarte is a 79 year old male admitted on 3/4/2020 with Acute and chronic respiratory failure with hypoxia (H)    Primary Indication(s) for Anticoagulation: afib     Goal INR: 2-3 (consult placed for INR goal of 2-2.5 but previous pharmacist clarified with provider and goal of 2-3 was decided since that is the outpt goal)    FYI, patient is followed by the Anticoagulation/Protime Clinic at: [facility]    Patient Active Problem List   Diagnosis     Chronic atrial fibrillation     Arteriosclerotic cardiovascular disease (ASCVD)     Obstructive chronic bronchitis with exacerbation (H)     PVD (peripheral vascular disease) (H)     Long-term (current) use of anticoagulants [Z79.01]     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)     COPD exacerbation (H)     Acute exacerbation of chronic obstructive pulmonary disease (H)     Postherpetic neuralgia     Chronic, continuous use of opioids     Influenza A     Acute and chronic respiratory failure with hypoxia (H)     Patient previously anticoagulated on Coumadin at a dose of 20 mg/week; dosed as: 3.75 mg every Monday and Friday; 2.5 mg ROW      Factors that may increase patient's bleeding risk and/or sensitivity to warfarin (Coumadin) include: fall, dyspnea, meds (aspirin, ibuprofen, azithromycin, levofloxacin)     Factors that may decrease patient's sensitivity to warfarin (Coumadin) include: -    Anticoagulation Dose History     Recent Dosing and Labs 3/4/20 3/5/20 3/6/20    Warfarin 2 mg - 2 mg -    Warfarin 2.5 mg 2.5 mg - -    INR 2.47 2.34 1.73     Assessment/Plan: INR dropped from yesterday, question if patient took 3/3 dose at home. Will give 4 mg tonight and continue to monitor.     Thank You for the Consult. Will continue to follow.    Thelma Strong RPH ....................  3/6/2020   11:27 AM

## 2020-03-06 NOTE — PLAN OF CARE
A&O, SBA, regular diet. VSS, afebrile, O2 @ 2LPM and sating adequately, denies pain.  Assessment as charted.  Encouragement given t/o shift to TCDB, copious sputum production.  Given PRN Tussin x1 for coughing.  Per ICU tele report pt is a-fib 100s w/ occ PVCs. IVF continue, other IV is SL.  Abx continue.  Brakes locked, alarms on, makes needs known.  Frequent rounding done, free from falls.      Face to face report given with opportunity to observe patient.    Report given to RADHA Patel RN   3/6/2020  4:08 AM

## 2020-03-06 NOTE — PLAN OF CARE
Discharge Planner PT   Patient plan for discharge: Home with possible help of family  Current status: Patient was up in chair upon PT arrival. Patient very agreeable to participate in skilled PT intervention working on strength and improving activity tolerance. Performed sit to stand transfer with SBA with quad cane. Patient on 2 LO2 and face mask placed per precautions. Patient ambulated 40ft with visible exertion. Seated rest break with spO2 down to 87. Patient required a 6 minute rest break to get O2 sats back up to 97. Ambulated back to room with spO2 87 upon return to chair, required 4 minutes to bring back up to 95 - stated he felt weak all over, but was hopeful his strength would come back as his health improves. Questioned patient if he would have help at home and he said his inlaws might be able to stay a couple days if needed.  Barriers to return to prior living situation: lives alone, poor activity tolerance, concerns for help at home  Recommendations for discharge: SNF for rehab vs Home with home PT depending on progress as he improves medically  Rationale for recommendations: due to current activity tolerance. He would be a great rehab candidate, especially since it seems he had been declining some at home due to this recent illness and a short stay in rehab may be the best way to ensure he regains strength and his fall risk is decreased       Entered by: Yamile Alfaro 03/06/2020 12:44 PM

## 2020-03-06 NOTE — PROGRESS NOTES
[facility]    Hospitalist Progress Note    Date of Service (when I saw the patient): 03/06/2020    Assessment & Plan       Acute and chronic respiratory failure with hypoxia (H):   Oxygen requirements at his baseline.  Still has significant issues with secretion clearance and dyspnea even at rest.  Mild tachypnea.  Although not requiring supplemental oxygen still has some degree of acute, as well as chronic, hypoxemic respiratory failure.  Possibly borderline hypercapnia based on his respiratory alkalosis.  Treatment of his respiratory failure directed at treatment of his influenza as well as moderate to severe COPD.    Influenza A infection:   Treatment largely supportive in addition to oseltamivir.      Moderate to severe COPD in exacerbation  Frequent bronchodilators.  Secretion clearance remains a significant issue.  Discontinue acetylcysteine as giving this via nebulization has high risk of bronchospasm and generally is minimally effective.  Trial of Acapella for airway percussion treatment.  Continue vigorous pulmonary hygiene.  Short acting bronchodilators as needed.  Continuing Roflumilast.  Levofloxacin at pneumonia doses begun yesterday with modest elevation in procalcitonin.  CT imaging in fact primarily shows bronchiectasis, mucous plugging with severe radiographic emphysema consistent with his advanced COPD.  No clear postobstructive process but depending on his course altering spectrum of treatment to include anaerobes may not be unreasonable.  He is now received 2 out of 5 days of treatment with levofloxacin and it may not be unreasonable to continue this.  He is chronically treated with azithromycin given his underlying bronchiectasis.  Withhold this at present while he is receiving levofloxacin.  .   Chronic atrial fibrillation:   Overall this is not been a prominent issue.  Warfarin anticoagulation.         Chronic, continuous use of opioids:   Continuing tramadol for post-herpatic  neuralgia.       DVT Prophylaxis: Full dose warfarin  Code Status: Full Code    Disposition: Expected discharge in 2-3 days depending on his course.  He has advanced COPD and at baseline limited respiratory function.        Interval History   Still significant secretions.  Clearance is only moderately difficult.  Continued moderate resting dyspnea.  No chest discomfort.  Fair appetite.  Good liquid oral intake.    -Data reviewed today: I reviewed all new labs and imaging results over the last 24 hours.    Physical Exam   Temp: 97.7  F (36.5  C) Temp src: Tympanic BP: 106/53   Heart Rate: 75 Resp: 20 SpO2: 95 % O2 Device: Nasal cannula Oxygen Delivery: 2 LPM  Vitals:    03/04/20 2000   Weight: 77.7 kg (171 lb 4.8 oz)     Vital Signs with Ranges  Temp:  [97.7  F (36.5  C)-101  F (38.3  C)] 97.7  F (36.5  C)  Heart Rate:  [] 75  Resp:  [20] 20  BP: ()/(53-65) 106/53  SpO2:  [93 %-96 %] 95 %  I/O last 3 completed shifts:  In: 3206 [P.O.:780; I.V.:2426]  Out: 500 [Urine:500]    Peripheral IV 03/04/20 Right Upper arm (Active)   Site Assessment WDL except;Bleeding 3/6/2020  8:45 AM   Line Status Infusing;Checked every 1-2 hour 3/6/2020  8:45 AM   Phlebitis Scale 0-->no symptoms 3/6/2020  8:45 AM   Infiltration Scale 0 3/6/2020  8:45 AM   Extravasation? No 3/6/2020 12:08 AM   Number of days: 2       Peripheral IV 03/04/20 Right Hand (Active)   Site Assessment WDL 3/6/2020  8:45 AM   Line Status Saline locked 3/6/2020  8:45 AM   Phlebitis Scale 0-->no symptoms 3/6/2020  8:45 AM   Infiltration Scale 0 3/6/2020  8:45 AM   Extravasation? No 3/6/2020 12:08 AM   Number of days: 2     Line/device assessment(s) completed for medical necessity March 6    Constitutional: Fatigued man sitting in chair on medical wards.  Speech is clear.  Mildly distractible.  Oriented x3..  Respiratory: Aeration globally diminished.  Mid inspiratory moderate to coarse crackles throughout.  Few sonorous rhonchi right greater than left.  Mid  zones.  No tidal wheeze.  No dullness to percussion.  No egophony..   Cardiovascular: PMI not displaced.  Distinct heart tones.  Regular rate and rhythm, no murmurs, rubs,thrills.   GI: Soft,nontender to palpation and percussion, bowel sounds positive in all quadrants.   Skin/Integumen: Pale, scattered bruising bilateral arms.   Extremities: Warm.  Brisk capillary refill      Medications     - MEDICATION INSTRUCTIONS -       Warfarin Therapy Reminder         arformoterol  15 mcg Nebulization Q12H     aspirin  81 mg Oral Daily     azithromycin  250 mg Oral Daily     budesonide  0.25 mg Nebulization BID     cilostazol  100 mg Oral BID     gabapentin  600 mg Oral TID     levofloxacin  750 mg Oral Daily     oseltamivir  75 mg Oral BID     pregabalin  100 mg Oral Daily     pregabalin  50 mg Oral Daily     roflumilast  500 mcg Oral At Bedtime     simvastatin  10 mg Oral At Bedtime     sodium chloride (PF)  3 mL Intracatheter Q8H     tamsulosin  0.4 mg Oral Daily     warfarin ANTICOAGULANT  4 mg Oral ONCE at 18:00       Data   Recent Labs   Lab 03/06/20  0541 03/05/20  0535 03/04/20  2328 03/04/20 2023 03/04/20  1455   WBC 2.9* 5.0  --   --  9.1   HGB 8.7* 9.3*  --   --  10.8*   MCV 90 90  --   --  88   * 161  --   --  206   INR 1.73* 2.34*  --   --  2.47*    139  --   --  139   POTASSIUM 4.2 3.9  --   --  4.5   CHLORIDE 107 107  --   --  107   CO2 28 27  --   --  26   BUN 20 25  --   --  30   CR 0.94 0.94  --   --  1.14   ANIONGAP 3 5  --   --  6   KENDRICK 7.9* 7.8*  --   --  8.2*   GLC 97 100*  --   --  119*   ALBUMIN  --  1.9*  --   --  2.4*   PROTTOTAL  --  5.3*  --   --  6.4*   BILITOTAL  --  0.2  --   --  0.3   ALKPHOS  --  45  --   --  60   ALT  --  14  --   --  19   AST  --  22  --   --  19   TROPI  --   --  0.043 0.071*  --        No results found for this or any previous visit (from the past 24 hour(s)).

## 2020-03-06 NOTE — PLAN OF CARE
Face to face report given with opportunity to observe patient.    Report given to Ruma Arzate RN   3/5/2020  9:01 PM

## 2020-03-06 NOTE — PLAN OF CARE
Face to face report given with opportunity to observe patient.    Report given to Olegario Arzate RN   3/6/2020  3:14 PM

## 2020-03-06 NOTE — PROGRESS NOTES
CIRILO LAYTON.  Patient visit during  rounds. Patient alert and orientated. No spiritual care requests at this time.

## 2020-03-06 NOTE — PLAN OF CARE
Max temp this evening was 99.9, VS as charted (blood pressures are soft).  His O2 sats are in the mid 90's on 2 LPM via NC, lung sounds are very diminished with expiratory wheezing, he has a congested/loose/productive cough.  He does rate his pain/discomfort to left side of face as 6/10 - stated chronic for history of shingles - declined need for additional pain medications. He does have IV fluids running at 75 mL/hr, continues on PO antibiotics and tamiflu.  He has had good oral intake/output.  He has remained free of falls, call light within reach - does make his needs known, up with standby assist, frequent rounding done to assess needs.

## 2020-03-06 NOTE — PLAN OF CARE
Pt has been afebrile through the day, VS as charted.  His O2 sats are in the mid 90's on 2 LPM via NC, lung sounds are with coarse crackles, he has a good loose/congested/productive cough - received robitussin x 1.  He has rated his pain/discomfort to left side of face (history of shingles) and some chest discomfort from coughing and has received Tramadol (dose was increased to  mg as needed) stated helpful, but chronic discomfort.  He had IV fluids running at 75 mL/hr converted to saline locked - good oral intake/output - right arm/hand a little puffy. He has remained free of falls, call light within reach - does make his needs known, alarms on and frequent rounding done to assess needs.

## 2020-03-06 NOTE — PLAN OF CARE
Face to face report given with opportunity to observe patient.    Report given to She HEARN RN   3/5/2020  11:47 PM

## 2020-03-07 NOTE — PHARMACY
[facility]    Pharmacy      Antimicrobial Stewardship Note     Current antimicrobial therapy:  Anti-infectives (From now, onward)    Start     Dose/Rate Route Frequency Ordered Stop    03/05/20 1000  levofloxacin (LEVAQUIN) tablet 750 mg      750 mg Oral DAILY 03/05/20 0939      03/05/20 0600  oseltamivir (TAMIFLU) capsule 75 mg      75 mg Oral 2 TIMES DAILY 03/04/20 2016            Indication: CAP    Days of Therapy: 3     Pertinent labs:  Creatinine   Creatinine   Date Value Ref Range Status   03/06/2020 0.94 0.66 - 1.25 mg/dL Final   03/05/2020 0.94 0.66 - 1.25 mg/dL Final   03/04/2020 1.14 0.66 - 1.25 mg/dL Final     WBC   WBC   Date Value Ref Range Status   03/06/2020 2.9 (L) 4.0 - 11.0 10e9/L Final   03/05/2020 5.0 4.0 - 11.0 10e9/L Final   03/04/2020 9.1 4.0 - 11.0 10e9/L Final     Procalcitonin   Procalcitonin   Date Value Ref Range Status   03/06/2020 0.79 ng/ml Final     Comment:     0.50-1.99 ng/ml  Moderate risk of systemic infection.  Recommendation:   Recommend antibiotics. Evaluate culture results and clinical features to   target antibacterial therapy. Obtain blood cultures and other relevant   cultures if not done.  If empiric antibiotics were started, recheck PCT in: 2 days to guide   antibiotic de-escalation.  Discontinue or de-escalate antibiotics when PCT   concentration is <80% of peak or abs PCT <0.5. If empiric antibiotics were NOT   started, recheck PCT in 6-24 hours to re-evaluate need for antibiotics.     03/05/2020 1.02 ng/ml Final     Comment:     0.50-1.99 ng/ml  Moderate risk of systemic infection.  Recommendation:   Recommend antibiotics. Evaluate culture results and clinical features to   target antibacterial therapy. Obtain blood cultures and other relevant   cultures if not done.  If empiric antibiotics were started, recheck PCT in: 2 days to guide   antibiotic de-escalation.  Discontinue or de-escalate antibiotics when PCT   concentration is <80% of peak or abs PCT  <0.5. If empiric antibiotics were NOT   started, recheck PCT in 6-24 hours to re-evaluate need for antibiotics.     12/01/2017 0.19 ng/ml Final     Comment:     0.05-0.24 ng/ml Low risk of systemic bacterial infection. Local bacterial   infection possible.  Recommendation: Assess other clinical features of   infection. Discourage antibiotics unless strong clinical suspicion for serious   infection.       CRP   CRP Inflammation   Date Value Ref Range Status   06/20/2017 <2.9 0.0 - 8.0 mg/L Final       Culture Results:   (3/4/20) Blood  (3/4/20) Influenza = A  (3/4/20) MRSA Surveillance = negative     Recommendations/Interventions:  1. Consider addition of probiotics  2. No stop date on Tamiflu; recommend stopping after 10 doses    Nicanor Alexander, [facility]  March 7, 2020

## 2020-03-07 NOTE — PLAN OF CARE
"Reason for hospital stay:  Acute and chronic respiratory failure with hypoxia. influenza  Living situation PTA: home  Most recent vitals: BP 94/55   Pulse 101   Temp 98.5  F (36.9  C) (Tympanic)   Resp 20   Ht 1.803 m (5' 11\")   Wt 77.7 kg (171 lb 4.8 oz)   SpO2 95%   BMI 23.89 kg/m      Pain Management:  Pt. States pain is 6 out of 10 in left side of his face with tingling administered Ultram x1 for decrease in pain to 3. Tussin x2 was also administered for cough.  LOC:  A&O  Cardiac:  Paroxymal Afib 70-140s via ICU tele report.  Irregular apical pulse   Respiratory: Lungs sounds are course throughout. Productive frequent cough. Secretions are thick and tenacious. Tussin x1 was also administered for cough. Dyspnea on exertion.   GI:  WDL bowel sounds active x4. Had loose BM this shift.  Skin Issues:  2+ edema in RUE skin very dry and flaky throughout. Applied lotion and encouraged pt to as well. Pt declined for writer and CNA to do morning cares on him through the shift.   IVF:  Saline locked  ABX:  Administered Zithromax at 0839 last does to be given  Nutrition: Regular  Ambulation:stand by assist   Safety:  Pt free from falls and injury this shift.      3/7/2020  12:48 PM  Fara Lorenzo RN    Face to face report given with opportunity to observe patient.    Report given to Olegario Lorenzo RN   3/7/2020  3:15 PM          "

## 2020-03-07 NOTE — PLAN OF CARE
A&O, regular diet, SBA. VSS, afebrile, weaned to 1L this shift, c/o left sided facial pain.  Medicated w/ PRN Ultram x1 and Tussin x1 for cough.  Cough continues to be tenacious, congested and productive.  RUE is edematous, 2+ edema w/ sticky note to MD to assess.  IV in that arm is SL, flushes well.  Per ICU tele pt is a-fib 100-110 w/ frequent PVCs.  Brakes locked, alarms on, makes needs known.  Frequent rounding, droplet precautions and free from falls.      ETA- sepsis BPA fired x1, lactic was 0.6      Face to face report given with opportunity to observe patient.    Report given to RADHA Chaudhari RN   3/7/2020  7:38 AM

## 2020-03-07 NOTE — PLAN OF CARE
Discharge Planner PT   Patient plan for discharge: home with possible help from family  Current status: Patient in chair at start of therapy. Patient on 1L 02. Ambulated roughly 50 feet with quad cane with wheelchair follow. Patient's O2 dipped to 85%. Had patient sit and spoke with nursing regarding drop in O2 stats. Patient's O2 bumped up to 2L O2 to maintain >90 % saturation. Patient's O2 recovered to 95% on 2L of O2. Patient then ambulated 50 feet back to room after longer rest break in chair to recover. No LOB during ambulation.  Barriers to return to prior living situation: deconditioning, low activity tolerance, concerns for help at home  Recommendations for discharge: SNF vs home with Home PT depending on progress  Rationale for recommendations: clinical judgement       Entered by: Brian Lo 03/07/2020 12:16 PM

## 2020-03-07 NOTE — PHARMACY-ANTICOAGULATION SERVICE
Pharmacy Consult-Coumadin Assessment Day #4    Rm Duarte is a 79 year old male admitted on 3/4/2020 with Acute and chronic respiratory failure with hypoxia (H)    Primary Indication(s) for Anticoagulation: afib     Goal INR: 2-3 (consult placed for INR goal of 2-2.5 but previous pharmacist clarified with provider and goal of 2-3 was decided since that is the outpt goal)     FYI, patient is followed by the Anticoagulation/Protime Clinic at: [facility]    Patient Active Problem List   Diagnosis     Chronic atrial fibrillation     Arteriosclerotic cardiovascular disease (ASCVD)     Obstructive chronic bronchitis with exacerbation (H)     PVD (peripheral vascular disease) (H)     Long-term (current) use of anticoagulants [Z79.01]     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)     COPD exacerbation (H)     Acute exacerbation of chronic obstructive pulmonary disease (H)     Postherpetic neuralgia     Chronic, continuous use of opioids     Influenza A     Acute and chronic respiratory failure with hypoxia (H)       Patient previously anticoagulated on Coumadin at a dose of 20 mg/week; dosed as: 3.75 mg every Monday and Friday; 2.5 mg ROW     Factors that may increase patient's bleeding risk and/or sensitivity to warfarin (Coumadin) include: advanced age, aspirin, ibuprofen, levofloxacin    Factors that may decrease patient's sensitivity to warfarin (Coumadin) include: unsure if patient took dose at home on 3/3/20    Anticoagulation Dose History     Recent Dosing and Labs Latest Ref Rng & Units 3/4/2020 3/5/2020 3/6/2020 3/7/2020    Warfarin 2 mg - - 2 mg 4 mg -    Warfarin 2.5 mg - 2.5 mg - - -    INR 0.86 - 1.14 2.47(H) 2.34(H) 1.73(H) 1.65(H)    INR 0.86 - 1.14 - - - -    INR Point of Care 0.86 - 1.14 - - - -        Assessment/Plan: INR remains subtherapeutic today. Will give warfarin 4 mg today. Conservative dosing despite low INR due to quinolone use- these may have a delayed effect on INR.   Check INR with AM labs.    Thank You for the Consult. Will continue to follow.    Nicanor Alexander [facility] ....................  3/7/2020   12:09 PM

## 2020-03-07 NOTE — PLAN OF CARE
A&O. VSS T Max 99.6 O2 97% 2.5 L NC. Endorses Left facial pain R/T shingles as well as generalized pain. Medicated with Tramadol 50 mg x 2 with stated decrease in pain. Also reports overall chest/rib pain R/T chest percussion. Frequent  cough productive of clear tenacious sputum; robitussin 5 ml x2 given per request. Declines dinner. Telemetry SR with PVCs per ICU report.  Face to face report given with opportunity to observe patient.    Report given to She Chavez RN   3/6/2020  11:30 PM

## 2020-03-08 NOTE — PLAN OF CARE
"Reason for hospital stay:  Influenza A and a fall  Living situation PTA:  home  Most recent vitals: BP (!) 95/46   Pulse 96   Temp 96.7  F (35.9  C) (Tympanic)   Resp 20   Ht 1.803 m (5' 11\")   Wt 77.7 kg (171 lb 4.8 oz)   SpO2 92%   BMI 23.89 kg/m      Pain Management:  C/o 8 out of 10 pain in his left side of the face. Administered Ultram for decrease in pain.   LOC:  A&O   Cardiac: On tele A Fib 's via ICU tele report. Denies chest pain  Respiratory:  Lungs sounds are very course. Productive cough with clear to yellow thick mucus. 2 liters of O2 via nasal cannula. No c/o shortness of breath. Administered tussin x1   GI:  Bowel souinds active x4 pt requested Imodium x1 for loose stools.   :  WDL minimal dribbling in brief.  Skin Issues:  Upper extremities edematous and cesar.   IVF: IV came out, Per Doctor Bing IV can be left out and discontinued.   ABX:  Levaquin PO    Nutrition: Regular  ADL's:  Ind.  Ambulation: Stand by   Safety:  Pt free from falls and injury this shift. Chair alarm in place.     Face to face report given with opportunity to observe patient.    Report given to Olegario Lorenzo RN   3/8/2020  3:40 PM            3/8/2020  2:01 PM  Fara Lorenzo RN      "

## 2020-03-08 NOTE — PHARMACY-ANTICOAGULATION SERVICE
Pharmacy Consult-Coumadin Assessment Day #5    Rm Duarte is a 79 year old male admitted on 3/4/2020 with Acute and chronic respiratory failure with hypoxia (H)    Primary Indication(s) for Anticoagulation: afib     Goal INR: 2-3 (consult placed for INR goal of 2-2.5 but previous pharmacist clarified with provider and goal of 2-3 was decided since that is the outpt goal)     FYI, patient is followed by the Anticoagulation/Protime Clinic at: [facility]    Patient Active Problem List   Diagnosis     Chronic atrial fibrillation     Arteriosclerotic cardiovascular disease (ASCVD)     Obstructive chronic bronchitis with exacerbation (H)     PVD (peripheral vascular disease) (H)     Long-term (current) use of anticoagulants [Z79.01]     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)     COPD exacerbation (H)     Acute exacerbation of chronic obstructive pulmonary disease (H)     Postherpetic neuralgia     Chronic, continuous use of opioids     Influenza A     Acute and chronic respiratory failure with hypoxia (H)       Patient previously anticoagulated on Coumadin at a dose of 20 mg/week; dosed as: 3.75 mg every Monday and Friday; 2.5 mg ROW      Factors that may increase patient's bleeding risk and/or sensitivity to warfarin (Coumadin) include: advanced age, aspirin, ibuprofen, levofloxacin     Factors that may decrease patient's sensitivity to warfarin (Coumadin) include: unsure if patient took dose at home on 3/3/20    Anticoagulation Dose History     Recent Dosing and Labs 3/4/2020 3/5/2020 3/6/2020 3/7/2020 3/8/2020    Warfarin 2 mg - 2 mg 4 mg 4 mg -    Warfarin 2.5 mg 2.5 mg - - - -    INR 2.47(H) 2.34(H) 1.73(H) 1.65(H) 1.79(H)    INR - - - - -    INR Point of Care - - - - -        Assessment/Plan: INR remains subtherapeutic today. Will give warfarin 4 mg today. Conservative dosing despite low INR due to quinolone use- these may have a delayed effect on INR.  Check INR with AM labs.    Thank You  for the Consult. Will continue to follow.    Nicanor Alexander [facility] ....................  3/8/2020   9:21 AM

## 2020-03-08 NOTE — PROGRESS NOTES
[facility]    Hospitalist Progress Note    Date of Service (when I saw the patient): 03/08/2020    Assessment & Plan       Acute and chronic respiratory failure with hypoxia (H):   Nearly at baseline in terms of gas exchange and mechanics.  Oxygen requirements at his baseline.  Still has significant issues with secretion clearance and dyspnea even at rest, but not unexpected given underlying bronchiectasis.  Good cough effort.  Mild tachypnea.  Likely still some acute, as well as chronic, hypoxemic respiratory failure, but any acute process is modest.  Possibly borderline hypercapnia based on his respiratory alkalosis.  Treatment of his respiratory failure directed at treatment of his influenza as well as moderate to severe COPD.    Influenza A infection:   Treatment largely supportive in addition to oseltamivir.    Moderate to severe COPD in exacerbation  Frequent bronchodilators.  Secretion clearance remains a significant issue, but he has good cough effort.  No improvement with Acapella.  Likely best approach is to continue encouraging deep breathing and coughing rather than any other specific modality.  Discontinued acetylcysteine as giving this via nebulization has high risk of bronchospasm and generally is minimally effective.  TShort acting bronchodilators as needed.  Continuing Roflumilast.  Levofloxacin at pneumonia doses begun yesterday with modest elevation in procalcitonin.  Plan 5-day course of treatment.  CT imaging in fact primarily shows bronchiectasis, mucous plugging with severe radiographic emphysema consistent with his advanced COPD.  No clear postobstructive process but depending on his course altering spectrum of treatment to include anaerobes may not be unreasonable.  He is now received 4 out of 5 days of treatment with levofloxacin and it may not be unreasonable to continue this.  He is chronically treated with azithromycin given his underlying bronchiectasis.  Withhold this at  present while he is receiving levofloxacin, then would plan to resume.  .   Chronic atrial fibrillation:   Overall this is not been a prominent issue.  Warfarin anticoagulation.         Chronic, continuous use of opioids:   Continuing tramadol for post-herpatic neuralgia.     Drug interactions  Multiple agents with possible risk of prolonged QT.  With hold PRN tramadol until levofloxacin discontinued.  EKG 3 4 shows no interval prolongation.    Generalized weakness  Likely because of acute illness.  Dyspnea has limited efforts at walking which particular yesterday was minimal.  He is in a chair a majority of the day.  May be a good candidate for subacute rehabilitation.      DVT Prophylaxis: Full dose warfarin  Code Status: Full Code    Disposition: Expected discharge in another 1-2 days depending on his course.  He has advanced COPD and at baseline limited respiratory function.        Interval History   Still significant secretions.  Clearance remains a prominent issue.  Continued resting dyspnea.  Needs improved walking and other mobilization.  No chest discomfort.  Fair appetite.  Good liquid oral intake.    -Data reviewed today: I reviewed all new labs and imaging results over the last 24 hours.    Physical Exam   Temp: 96.7  F (35.9  C) Temp src: Tympanic BP: (!) 95/46 Pulse: 96 Heart Rate: 85 Resp: 20 SpO2: 92 % O2 Device: Nasal cannula Oxygen Delivery: 2 LPM  Vitals:    03/04/20 2000   Weight: 77.7 kg (171 lb 4.8 oz)     Vital Signs with Ranges  Temp:  [96.6  F (35.9  C)-98.2  F (36.8  C)] 96.7  F (35.9  C)  Pulse:  [96] 96  Heart Rate:  [] 85  Resp:  [18-21] 20  BP: ()/(46-72) 95/46  SpO2:  [92 %-95 %] 92 %  I/O last 3 completed shifts:  In: 960 [P.O.:960]  Out: 325 [Urine:325]       Line/device assessment(s) completed for medical necessity March 8    Constitutional: Awake, mildly fatigued, man sitting in chair on medical wards.  Speech is clear.  Oriented x3..  Respiratory: Aeration globally  diminished.  Mid inspiratory moderate crackles throughout.  Few sonorous rhonchi right greater than left.  Mid zones.  No tidal wheeze.  No dullness to percussion.  No egophony..   Cardiovascular: PMI not displaced.  Distinct heart tones.  Regular rate and rhythm, no murmurs, rubs,thrills.   GI: Soft,nontender to palpation and percussion, bowel sounds positive in all quadrants.   Skin/Integumen: Scattered bruising bilateral arms.   Extremities: Warm.  Brisk capillary refill      Medications     - MEDICATION INSTRUCTIONS -       Warfarin Therapy Reminder         arformoterol  15 mcg Nebulization Q12H     aspirin  81 mg Oral Daily     budesonide  0.25 mg Nebulization BID     cilostazol  100 mg Oral BID     gabapentin  600 mg Oral TID     levofloxacin  750 mg Oral Daily     oseltamivir  75 mg Oral BID     [START ON 3/9/2020] pregabalin  50 mg Oral BID    Followed by     [START ON 3/12/2020] pregabalin  50 mg Oral Daily     pregabalin  50 mg Oral Daily     roflumilast  500 mcg Oral At Bedtime     simvastatin  10 mg Oral At Bedtime     sodium chloride (PF)  3 mL Intracatheter Q8H     tamsulosin  0.4 mg Oral Daily     warfarin ANTICOAGULANT  4 mg Oral ONCE at 18:00       Data   Recent Labs   Lab 03/08/20  0521 03/07/20  0539 03/06/20  0541 03/05/20  0535 03/04/20  2328 03/04/20 2023 03/04/20  1455   WBC 2.8*  --  2.9* 5.0  --   --  9.1   HGB 9.1*  --  8.7* 9.3*  --   --  10.8*   MCV 89  --  90 90  --   --  88     --  148* 161  --   --  206   INR 1.79* 1.65* 1.73* 2.34*  --   --  2.47*     --  138 139  --   --  139   POTASSIUM 4.2  --  4.2 3.9  --   --  4.5   CHLORIDE 106  --  107 107  --   --  107   CO2 28  --  28 27  --   --  26   BUN 18  --  20 25  --   --  30   CR 0.92  --  0.94 0.94  --   --  1.14   ANIONGAP 5  --  3 5  --   --  6   KENDRICK 8.0*  --  7.9* 7.8*  --   --  8.2*   GLC 93  --  97 100*  --   --  119*   ALBUMIN  --   --   --  1.9*  --   --  2.4*   PROTTOTAL  --   --   --  5.3*  --   --  6.4*    BILITOTAL  --   --   --  0.2  --   --  0.3   ALKPHOS  --   --   --  45  --   --  60   ALT  --   --   --  14  --   --  19   AST  --   --   --  22  --   --  19   TROPI  --   --   --   --  0.043 0.071*  --        No results found for this or any previous visit (from the past 24 hour(s)).

## 2020-03-08 NOTE — PHARMACY
"                      [facility] Consultation    Date of visit: 9/10/2018    Reason for consultation:    Ria Nj is a 45 year old female who is seen in consultation today at the request of her primary care physician, Wegener, Joel Daniel Irwin.     Consultation and Evaluation for: PAIN AND ADDICTION CONSULT:  Are there any red flags that may impact the assessment or management of the patient? Previously not engaging substantially in self-management of pain or anxiety/depression.  Working with Dr. Wegener and has improved reliability.  Continues to be relatively medication focused however I feel ready to engage in pain focused psychotherapy and to get past trying to \"fix\" the pain.      Also working with Dr. Wegener for improved psychiatric medication strategies to be determined after genesight testing.     Was \"fired\" from our pain clinic I believe after several no-shows in past.     What is your diagnosis for the patient's pain? Chronic back pain after injury from epidural and also spinal arthritis.     Drug of choice: opioids  Do they have a [place] [facility] PCP:  Yes      Review of [place] Prescription Monitoring Program (): Today I have also reviewed the patient's history of controlled substance use, as provided by [place] licensed pharmacies and prescriber dispensers. YES, early refills    Review of Pain Questionnaire: Please see the [facility] health questionnaire, which the patient completed and reviewed with me in detail.    Review of Electronic Chart: Today I have also reviewed available medical information in the patient's medical record at [facility] ([facility]), including relevant provider notes, laboratory work, and imaging.     Ria Nj has been seen at a pain clinic in the past.  [facility], [facility], [facility] and others that she doesn't remember    Chief Complaint:    Chief Complaint   Patient presents with     Pain       Pain history:    She arrived over 15 " [facility]    Pharmacy      Antimicrobial Stewardship Note     Current antimicrobial therapy:  Anti-infectives (From now, onward)    Start     Dose/Rate Route Frequency Ordered Stop    03/05/20 1000  levofloxacin (LEVAQUIN) tablet 750 mg      750 mg Oral DAILY 03/05/20 0939      03/05/20 0600  oseltamivir (TAMIFLU) capsule 75 mg      75 mg Oral 2 TIMES DAILY 03/04/20 2016            Indication: CAP     Days of Therapy: 4     Pertinent labs:  Creatinine   Creatinine   Date Value Ref Range Status   03/08/2020 0.92 0.66 - 1.25 mg/dL Final   03/06/2020 0.94 0.66 - 1.25 mg/dL Final   03/05/2020 0.94 0.66 - 1.25 mg/dL Final     WBC   WBC   Date Value Ref Range Status   03/08/2020 2.8 (L) 4.0 - 11.0 10e9/L Final   03/06/2020 2.9 (L) 4.0 - 11.0 10e9/L Final   03/05/2020 5.0 4.0 - 11.0 10e9/L Final     Procalcitonin   Procalcitonin   Date Value Ref Range Status   03/06/2020 0.79 ng/ml Final     Comment:     0.50-1.99 ng/ml  Moderate risk of systemic infection.  Recommendation:   Recommend antibiotics. Evaluate culture results and clinical features to   target antibacterial therapy. Obtain blood cultures and other relevant   cultures if not done.  If empiric antibiotics were started, recheck PCT in: 2 days to guide   antibiotic de-escalation.  Discontinue or de-escalate antibiotics when PCT   concentration is <80% of peak or abs PCT <0.5. If empiric antibiotics were NOT   started, recheck PCT in 6-24 hours to re-evaluate need for antibiotics.     03/05/2020 1.02 ng/ml Final     Comment:     0.50-1.99 ng/ml  Moderate risk of systemic infection.  Recommendation:   Recommend antibiotics. Evaluate culture results and clinical features to   target antibacterial therapy. Obtain blood cultures and other relevant   cultures if not done.  If empiric antibiotics were started, recheck PCT in: 2 days to guide   antibiotic de-escalation.  Discontinue or de-escalate antibiotics when PCT   concentration is <80% of peak or abs  PCT <0.5. If empiric antibiotics were NOT   started, recheck PCT in 6-24 hours to re-evaluate need for antibiotics.     12/01/2017 0.19 ng/ml Final     Comment:     0.05-0.24 ng/ml Low risk of systemic bacterial infection. Local bacterial   infection possible.  Recommendation: Assess other clinical features of   infection. Discourage antibiotics unless strong clinical suspicion for serious   infection.       CRP   CRP Inflammation   Date Value Ref Range Status   06/20/2017 <2.9 0.0 - 8.0 mg/L Final       Culture Results:   (3/4/20) Blood  (3/4/20) Influenza = A  (3/4/20) MRSA Surveillance = negative     Recommendations/Interventions:  1. Consider addition of probiotics  2. No stop date on Tamiflu; recommend stopping after 10 doses (tomorrow)       Nicanor Alexander, [facility]  March 8, 2020         minutes late for her appointment today    Ria Nj is a 45 year old female who presents for initial evaluation of chief pain complaint of chronic low back pain. Her pain began after having an epidural during the birth of her son 8 years ago.  She is on disability for her pain and has not worked for the last 8 years.  She has participated in many different pain clinics, had injections, PT and therapy none of which have been helpful.      She was started on opioids which were ramped up to over 400ME's at points.  She has been on chronic opioids for 8 years now and is ready to be done with them.  She no longer gets pain relief from them and she is overusing every month.  She ends up being out early and going through a week of withdrawal at the end of every month.  She has currently been without opioids for the last 30 hours and is having active withdrawal currently.      Her PCP started her on an anti-depressant but she has not started it yet.     She has a 8 year old son and they live in the basement of her mom and step father.  [place], [place].     Quality: aching  Severity/Intensity: 10/10 at worst, 0/10 at best, 5/10 on average  Aggravating factors include: walking, not moving, laying down  Relieving factors include: riding her bike, moving around  Red Flags: The patient denies bowel or bladder incontinence, parasthesias, weakness, saddle anesthesia, unintentional weight loss, or fever/chills/sweats.     Opioid Use Disorder: Diagnostic Criteria  A problematic pattern of opioid use leading to clinically significant impairment or distress, as manifested by at least two of the following, occurring within a 12-month period:    she admits to taking larger amounts than initially intended  she admits to unsuccessful efforts to cut down or control use  she denies spending a great deal of time in activities necessary to obtain, use and recover from opioids  she denies cravings or a strong desire to use   she denies  failure to fulfill obligations at work, school or home  she denies continued use despite negative consequences  she denies giving up important activities to use  she denies use in situations in which it is physically dangerous  she admits to tolerance (in the setting of at lease 2 of above)    Ria Nj has experienced the following withdrawal symptoms in the past:     sweating, shakes/tremors, agitation, problems with sleep, muscle aches, sweating, nausea/vomitting, diarrhea, loss of appetite, and goose bumps.     Ria Nj meets more than 2-3 of the above criteria thus meeting the diagnosis for Opioid Use Disorder - Mild       Pain Treatments:  1. Medications:       Current pain medications:   Gabapentin 600mg BID   Ativan 1mg TID   Oxycontin 40mg BID = 80mg total   Oxycodone 10mg #3-4 daily = 35mg total  (172.5 MME)         Previous pain medications:  Opiates: Fentanyl:NH, Hydrocodone:?, hydromorphone:?, Morphine:NH, Oxycodone:H, Tramadol:NH                        NSAIDS: Ibuprofen:NH, Naproxen:NH                        Muscle Relaxants: Cyclobenzaprine:Nh, Tizanidine:NH                        Anti-migraine mediations: none noted                        Anti-depressants: none noted                        Sleep aids:Trazodoen:NH, nightmares                        Anxiolytics: Lorazepam:H                        Neuropathics: Gabapentin:NH                                             Topicals: Lidocaine:NH                        Other medications not covered above: Tylenol:NH      2. Physical Therapy: YES, made pain worse     TENS unit: YES, somewhat helpful  3. Pain psychology: NH  4. Surgery: denies  5. Injections: many - NH  6. Alternative Therapies:    Chiropractic: denies    Acupuncture: denies      Diagnostic tests:  MRI of the lumbar spine was completed on 11/09/2017 was reviewed with the patient and shows:  Findings: There are 5 lumbar-type vertebrae assumed for the purposes  of this dictation.  The tip of the conus medullaris is at L1. The  lumbar vertebral column is normally aligned. Unchanged mild  disc  space narrowing and disc degeneration at L4-5 and L5-S1. Unchanged  minimal disc degeneration at L3-4. Normal bone marrow signal  throughout the entire lumbar spine. On a level by level basis:     T12-L1: No focal abnormality.     L1-2: No focal abnormality.     L2-3: No spinal canal or neuroforaminal stenosis. Right-sided dorsal  synovial cyst associated with the right facet joint measuring 3 mm,  without any clinical significance. Findings are unchanged since  8/27/2016.     L3-4: Unchanged disc bulge and bilateral facet hypertrophy resulting  in bilateral borderline foraminal narrowing without any significant  spinal canal stenosis.      L4-5: Disc bulge and superimposed central disc protrusion with  annular fissure, indenting the ventral thecal sac and partially  effacing both lateral recesses. Findings are unchanged since  8/27/2015. In addition, there is bilateral facet joint hypertrophy,  contributing to bilateral mild foraminal narrowing and borderline  spinal canal narrowing. No neural impingement.     L5-S1: Disc bulge and small central disc protrusion with associated  left central annular fissure. In addition the disc slightly indents  the ventral thecal sac. This may abut the left S1 nerve root within  the lateral recess. No spinal canal or neuroforaminal stenosis.   Visualized paraspinous tissues are unremarkable.       Impression: Multilevel degenerative disc disease, grossly stable since  8/27/2015. The most significant findings are again at L4-5, where  there is mild bilateral neural foraminal narrowing. Also, at L5-S1, a  left paracentral protrusion may again again slightly abut the left S1  nerve root.      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 Started         Past Surgical History:  Past Surgical History:   Procedure Laterality Date     OVARY SURGERY N/A 2004    Ovarian cyst removed.      SURGICAL HISTORY OF -   2007    ovarian cyst removal        Medications:  Current Outpatient Prescriptions   Medication Sig Dispense Refill     albuterol (2.5 MG/3ML) 0.083% neb solution Take 1 vial (2.5 mg) by nebulization every 4 hours as needed for shortness of breath / dyspnea or wheezing 30 vial 11     albuterol (PROAIR HFA, PROVENTIL HFA, VENTOLIN HFA) 108 (90 BASE) MCG/ACT inhaler Inhale 2 puffs into the lungs every 4 hours as needed for shortness of breath / dyspnea or wheezing 1 Inhaler 0     calcium carbonate 500 MG tablet Take 1 tablet by mouth 2 times daily. 100 tablet 3     Cholecalciferol (VITAMIN D) 2000 UNITS tablet Take 2,000 Units by mouth daily 100 tablet 3     cloNIDine (CATAPRES) 0.1 MG tablet Take 1 tablet (0.1 mg) by mouth 2 times daily 4 tablet 0     desvenlafaxine succinate (PRISTIQ) 25 MG 24 hr tablet Take 1 tablet (25 mg) by mouth daily Start prior auth if needed 30 tablet 0     EPINEPHrine (EPIPEN) 0.3 MG/0.3ML injection Inject 0.3 mLs (0.3 mg) into the muscle once as needed for anaphylaxis 2 each 1     gabapentin (NEURONTIN) 300 MG capsule TAKE 1 CAPSULE BY MOUTH THREE TIMES DAILY 270 capsule 3     gabapentin (NEURONTIN) 300 MG capsule Take 2 capsules (600 mg) by mouth 3 times daily 180 capsule 11     IBUPROFEN 200 MG OR TABS Pt is taking 4 TABLET EVERY 4 TO 6 HOURS AS NEEDED       LORazepam (ATIVAN) 1 MG tablet TAKE 1 TABLET BY MOUTH EVERY 8 HOURS AS NEEDED FOR ANXIETY, 90 tablet 2     Magnesium 250 MG tablet Take 1 tablet by mouth daily. 100 tablet 3     naloxone (NARCAN) nasal spray  Spray 1 spray (4 mg) into one nostril alternating nostrils as needed for opioid reversal every 2-3 minutes until assistance arrives 0.2 mL 0     nicotine (NICODERM CQ) 21 MG/24HR 24 hr patch Place 1 patch onto the skin every 24 hours 30 patch 0     Omega-3 Fatty Acids (FISH OIL CONCENTRATE PO) Take 300 mg by mouth 3 times daily       oxyCODONE (OXYCONTIN) 40 MG 12 hr tablet Take 1 tablet (40 mg) by mouth every 12 hours 60 tablet 0     oxyCODONE-acetaminophen (PERCOCET)  MG per tablet One tablet 3-4 times daily #100/month 100 tablet 0     tiZANidine (ZANAFLEX) 4 MG tablet TAKE ONE TABLET BY MOUTH THREE TIMES DAILY AS NEEDED FOR SHOULDER AND UPPER BACK PAIN 270 tablet 0       Allergies:     Allergies   Allergen Reactions     Bee Venom        Social History:  Home situation: single, lives with her 8 year old son and mother in [place]  Occupation/Schooling: NA  Tobacco use: current every day smoker  Drug use: denies iligal drug use  Alcohol use: denies  History of chemical dependency treatment: denies  Mental health admissions: denies    Family history:  Family History   Problem Relation Age of Onset     Cancer Maternal Grandmother      Cancer Maternal Grandfather      Cancer Paternal Grandmother      Cancer Paternal Grandfather        Review of Systems:    POSTIVE IN BOLD  GENERAL: fever/chills, fatigue, general unwell feeling, weight gain/loss.  HEAD/EYES:  headache, dizziness, or vision changes.    EARS/NOSE/THROAT:  Nosebleeds, hearing loss, sinus infection, earache, tinnitus.  IMMUNE:  Allergies, cancer, immune deficiency, or infections.  SKIN:  Urticaria, rash, hives  HEME/Lymphatic:   anemia, easy bruising, easy bleeding.  RESPIRATORY:  cough, wheezing, or shortness of breath  CARDIOVASCULAR/Circulation:  Extremity edema, syncope, hypertension, tachycardia, or angina.  GASTROINTESTINAL:  abdominal pain, nausea/emesis, diarrhea, constipation,  hematochezia, or melena.  ENDOCRINE:  Diabetes, steroid use,   thyroid disease or osteoporosis.  MUSCULOSKELETAL: neck pain, back pain, arthralgia, arthritis, or gout.  GENITOURINARY:  frequency, urgency, dysuria, difficulty voiding, hematuria or incontinence.  NEUROLOGIC:  weakness, numbness, paresthesias, seizure, tremor, stroke or memory loss.  PSYCHIATRIC:  depression, anxiety, stress, suicidal thoughts or mood swings.     Physical Exam:  Vitals:    09/10/18 1448   BP: (!) 154/100   Pulse: 99   SpO2: 100%   Weight: 158 lb (71.7 kg)     Exam:  Constitutional: Well developed, well nourished, appears stated age. Sweating, anxious and forgetful  HEENT: Head atraumatic, normocephalic. Eyes without conjunctival injection or jaundice. Oropharynx clear. Neck supple. No obvious neck masses.  Skin: No rash, lesions, or petechiae of exposed skin.   Extremities: Peripheral pulses intact. No clubbing, cyanosis, or edema.  Psychiatric/mental status: Alert, without lethargy or stupor. Speech fluent. Appropriate affect. Mood normal. Able to follow commands without difficulty.     Musculoskeletal exam:  Gait/Station/Posture:   Normal stance, arm swing, and stride; no antalgia or Trendelenburg  Normal bulk and tone. Unremarkable spinal curvature    Cervical spine:  Range of motion within normal limits     Neurologic exam:  CN:  Cranial nerves 2-12 are grossly intact  Motor Strength:  5/5 symmetric UE and LE strength  Shoulder shrug (C4)  Shoulder abduction (deltoid C5,6)  Elbow flexion (bicepts C5,6)   Elbow extension (tricepts C7)  Finger abduction (C8)  Thumb flexion (C8)     Hip flexion (Iliosoas L1,2,3)  Hip extension (gluteus maxiumus L5, S1,2)   Hip abduction (gluteus medius L4,5, S1)  Knee flexion (hamstrings L5, S1-2)  Knee extension (quadricepts L2,3,4)  Ankle dorsiflexion (tibialis anterior L4,5)  Ankle plantarflexion (gastrocnemius, soleus S1-2)  Ankle eversion (peronei L5, S1)  Big toe extension (ext. Madrid lungus L5,S1)    Reflexes:     Biceps C5/6:     R:  2/4 L:  2/4   Brachioradialis  C6: R:  2/4 L: 2/4   Triceps C7/8:  R:  2/4 L: 2/4   Patella L2,3,4:  R:  2/4 L: 2/4   Achilles S1:  R:  2/4 L: 2/4  Other reflexes:  Toes downgoing   No ankle clonus  Sensory:  (upper and lower extremities):   Light touch: normal    Allodynia: absent    Hyperalgesia: absent         Assessment:      Chronic high dose opioids for chronic pain with admitted overuse    Opioid Use disorder mild - DSM qualifications in note    Chronic use of benzodiazepines    Chronic low back pain - MRI shows mild DDD    Somatic focus - medication focused    Mental Health - the patient's mental health concerns, specifically history of childhood trauma, anxiety and depression, affect her experience of pain and contribute to her clinically significant distress.      ICD-10-CM    1. Chronic pain syndrome G89.4    2. Opioid use disorder, mild, on maintenance therapy F11.10 buprenorphine HCl-naloxone HCl (SUBOXONE) 8-2 MG per film     Urine Drugs of Abuse Screen Panel 13   3. Encounter for long-term (current) use of medications Z79.899 buprenorphine HCl-naloxone HCl (SUBOXONE) 8-2 MG per film     Urine Drugs of Abuse Screen Panel 13       Plan:  The following recommendations were given to the patient. Diagnosis, treatment options, risks, benefits, and alternatives were discussed, and all questions were answered. The patient expressed understanding of the plan for management.     I am recommending a multidisciplinary treatment plan to help this patient better manage her pain. However, she is not currently in a state of mind where she can benefit from these things. She was in severe withdrawal today and having trouble answering basic questions.   I am recommending we get her induced on suboxone and then began to get her set up with these services.  This includes:     1. Physical Therapy: YES, in the future  2. Clinical Health Pain Psychologist: YES, in the future   1. Therapy can help reduce physical and psychosocial  triggers or reinforcers of pain by adapting thoughts, feelings and behaviors to reduce symptoms and increase quality of life.  Evidence indicates that the practice of relaxation, meditation, and mindfulness techniques can significantly affect pain levels and overall well being.  3. Self Care Recommendations: Teofilo progressive exercise that does not increase pain - gradually increase daily walking program.  Take mini breaks - 5 minutes of mindfullness a couple times a day.   4. Diagnostic Studies: reviewed lumbar MRI  5. Medication Management:   1. Home induction of Suboxone - she has already been off all opioids for over 30 hours.  Instructions were given in AVS as to how to do this.  2. Recommend gradual decrease off benzodiazepines  MEDICATIONS:   No orders of the defined types were placed in this encounter.           3. Urine toxicology screen today - YES    6. Follow up: Call me tomorrow and follow up in clinic in ONE WEEK - she was double booked in my clinic for next Monday.       I spent 60 minutes of time face to face with the patient.  Greater than 50% of this time was spent in patient counseling and/or coordination of care regarding principles of multidisciplinary care, medication management, and treatment options as discussed above.         Scarlet UirbeMD Pain Management

## 2020-03-08 NOTE — DOWNTIME EVENT NOTE
The EMR was down for 3 hours on 3/8/2020.    She Lindsay RN was responsible for completing the paper charting during this time period.     The following information was re-entered into the system by She Lindsay RN: Flowsheet data and Intake and output    The following information will remain in the paper chart: N/A- no papers    She Lindsay RN  3/8/2020

## 2020-03-08 NOTE — PLAN OF CARE
A&O, SBA/independent, regular diet. VSS, O2 1LPM, denies pain.  Assessment as charted. PRN Tussin x1 for cough.  Slept well, up in chair at end of shift.  IV is SL. Per ICU tele report pt is A-fib 80s.  Brakes locked, call light within reach, droplet precautions.  Frequent rounding done, free from falls.      Face to face report given with opportunity to observe patient.    Report given to RADHA Chaudhari RN   3/8/2020  7:00 AM

## 2020-03-09 NOTE — PROGRESS NOTES
Assessment completed see flowsheet.    LOC: alert   Others present: Patient     Dx: influenza A  Chronic Disease Management: COPD, A-Fib    Lives with: alone  Living at:  Home [place] [address]  Transportation: YES, drives self    Primary PCP: Dominguez Maradiaga  Insurance:  [facility] [place] [facility]  Medicare: Inpatient letter reviewed with Rm.    Support System:  Sister and neighbor  Homecare/PCA: no  /County Services:   no  [place]: YES      How was the [facility] notification completed: via fax    Health Care Directive: YES, on file  Guardian: no  POA: no    Pharmacy: [facility]  Meds management: YES, manages own    Adequate Resources for needs (housing, utilities, food/med): YES  Household chores: does own  Work/community/social activity: YES, goes to the [employer]    ADLs: independent  Ambulation:uses a cane  Falls: multiple falls no injury  Nutrition: does own shopping and food prep  Sleep: sleeps well    Equipment used: O2 and Can     Oxygen supplier: [facility]     Does the supplier have valid oxygen orders: yes    Mental health: no  Substance abuse: no  Exposure to violence/abuse: no  Stressors: denies    Able to Return to Prior Living Arrangements: NO    Choice of Vendor: Per discharge planning, the choice of vendor list has been provided to the patient/family for a skilled nursing facility.    First Choice : Skilled Nursing Facility [place]: [facility]     [phone]    Second Choice: Skilled Nursing Facility [place]: [facility]      [phone]    Third Choice: Skilled Nursing Facility [place]: [facility]             [phone]        Barriers: unknown    ZINA: low    Plan: SNF for STR, patient is agreeable. Referrals sent.

## 2020-03-09 NOTE — PROGRESS NOTES
"CLINICAL NUTRITION SERVICES  -  ASSESSMENT NOTE    Abdalla RADHA Duarte : LOS    79 yom admitted for acute and chronic respiratory failure with hypoxia. Pt has a hx of COPD and atrial fibrillation. Pt's intake has been adequate this admission. No significant weight loss noted.     Diet Order: Regular  Intake: 100% of most meals documented    Height: 5' 11\"  Weight: 171 lbs 4.76 oz  Body mass index is 23.89 kg/m .  Weight Status:  Normal BMI  IBW: 75.3kg  Weight History:  Wt Readings from Last 10 Encounters:   03/04/20 77.7 kg (171 lb 4.8 oz)   01/29/20 77.6 kg (171 lb)   05/28/19 81.2 kg (179 lb)   04/10/19 83.3 kg (183 lb 9.6 oz)   12/17/18 81.2 kg (179 lb)   10/22/18 80.7 kg (178 lb)   09/26/18 79.4 kg (175 lb)   08/22/18 80.7 kg (178 lb)   07/31/18 80.7 kg (178 lb)   07/25/18 81.2 kg (179 lb)     Estimated Energy Needs: 1950- 2325kcals (25-30 Kcal/Kg)   Estimated Protein Needs: 80-95 grams protein (1-1.2 g pro/Kg)    Malnutrition Diagnosis: Patient does not meet two of the above criteria necessary for diagnosing malnutrition    NUTRITION RECOMMENDATIONS  - Encourage intake as needed    MONITORING AND EVALUATION:  RD will monitor intake, weight, labs        "

## 2020-03-09 NOTE — PROGRESS NOTES
[facility]    Hospitalist Progress Note      Assessment & Plan   Acute and chronic respiratory failure with hypoxia:   Nearly at baseline in terms of gas exchange and mechanics. Oxygen requirements at his baseline.  Still has significant issues with secretion clearance and dyspnea even at rest, but not unexpected given underlying bronchiectasis.  Good cough effort.  Likely still some acute, as well as chronic, hypoxemic respiratory failure, but any acute process is modest.  Possibly borderline hypercapnia based on his respiratory alkalosis.  Treatment of his respiratory failure directed at treatment of his influenza as well as moderate to severe COPD.     Influenza A infection:   Treatment largely supportive in addition to oseltamivir.     Moderate to severe COPD in exacerbation  Frequent bronchodilators.  Secretion clearance remains a significant issue, but he has good cough effort.  No improvement with Acapella.  States he was using it at least q1h yesterday and thinks he overdid it, with his chest feeling very sore today. Has backed off and now plans to use the Acapella maybe 10 times at most today. Likely best approach is to continue encouraging deep breathing and coughing rather than any other specific modality.  Discontinued acetylcysteine as giving this via nebulization has high risk of bronchospasm and generally is minimally effective.  Short acting bronchodilators as needed.  Continuing Roflumilast.  Levofloxacin at pneumonia doses begun 3/5 with modest elevation in procalcitonin.  Has now completed 5-day course of treatment.  CT imaging primarily shows bronchiectasis and mucous plugging with severe radiographic emphysema consistent with his advanced COPD.  No clear postobstructive process but depending on his course altering spectrum of treatment to include anaerobes may not be unreasonable. He was weaker today with physical therapy, but no fevers.  Dropped sats to mid 80s with ambulation and  about 5 minute recovery time. Has actually been leukopenic, likely related to his influenza A infection, but WBC increasing today. Anticipate he will have slow progression of recovery. He is chronically treated with azithromycin given his underlying bronchiectasis, will resume that now that he has completed his levofloxacin.    Bilateral lower extremity edema:  Does have pitting edema to bilateral lower extremities.  Do not see recent echocardiogram in the system.  Patient thinks he may have had one over 5 years ago in setting of his afib. Given his longstanding pulmonary disease, will plan to obtain echo to evaluate right side heart function and establish if any suggestion of pulmonary hypertension.     Chronic atrial fibrillation:   Overall this is not been a prominent issue.  Warfarin anticoagulation.        Chronic, continuous use of opioids:   Continuing tramadol for post-herpatic neuralgia.      Drug interactions  Multiple agents with possible risk of prolonged QT.  With hold PRN tramadol until levofloxacin discontinued.  EKG 3 4 shows no interval prolongation.     Generalized weakness  Likely because of acute illness.  Becomes very dyspneic with little exertional activity. Functionally, his performance today was worse than yesterday. He is in a chair a majority of the day.  He is a good candidate for subacute rehabilitation. Has been accepted to SNF tomorrow AM     Libertad Orr    Interval History   Up in chair. States he feels weaker today. No other significant changes. Still with quite a bit of difficulty with clearance of his secretions. Chest is sore and thinks he may have overdid things with use of the flutter valve yesterday at least every hour.     -Data reviewed today: I reviewed all new labs and imaging results over the last 24 hours. I personally reviewed no images or EKG's today.    Physical Exam   Temp: 97.6  F (36.4  C) Temp src: Tympanic BP: 96/58 Pulse: 92 Heart Rate: 91 Resp: 20 SpO2: 95 %  O2 Device: Nasal cannula Oxygen Delivery: 2 LPM  Vitals:    03/04/20 2000   Weight: 77.7 kg (171 lb 4.8 oz)     Vital Signs with Ranges  Temp:  [96.7  F (35.9  C)-98  F (36.7  C)] 97.6  F (36.4  C)  Pulse:  [92] 92  Heart Rate:  [71-91] 91  Resp:  [20] 20  BP: ()/(52-76) 96/58  SpO2:  [92 %-97 %] 95 %  I/O last 3 completed shifts:  In: 840 [P.O.:840]  Out: 950 [Urine:950]       Line/device assessment(s) completed for medical necessity    Constitutional: Alert and oriented x3. No distress    HEENT: Normocephalic/atraumatic, PERRL, EOMI, mouth clear, neck supple, no LN.     Cardiovascular: RRR. NO murmur, no  rubs, or gallops.      Respiratory: Diffuse ronchi and diminished air exchange bilaterally. No wheezes    Abdomen: Soft, nontender, nondistended, no organomegaly. Bowel sounds present    Extremities: Warm/dry. 2+ pitting pedal edema bilaterally    Neuro:  Non focal, cranial nerves intact, Moves all extremities.    Medications     - MEDICATION INSTRUCTIONS -       Warfarin Therapy Reminder         arformoterol  15 mcg Nebulization Q12H     aspirin  81 mg Oral Daily     budesonide  0.25 mg Nebulization BID     cilostazol  100 mg Oral BID     gabapentin  600 mg Oral TID     oseltamivir  75 mg Oral BID     pregabalin  50 mg Oral BID    Followed by     [START ON 3/12/2020] pregabalin  50 mg Oral Daily     roflumilast  500 mcg Oral At Bedtime     simvastatin  10 mg Oral At Bedtime     tamsulosin  0.4 mg Oral Daily     warfarin ANTICOAGULANT  3.75 mg Oral ONCE at 18:00       Data   Recent Labs   Lab 03/09/20  0529 03/08/20  0521 03/07/20  0539 03/06/20  0541 03/05/20  0535 03/04/20  2328 03/04/20 2023 03/04/20  1455   WBC 3.3* 2.8*  --  2.9* 5.0  --   --  9.1   HGB 8.4* 9.1*  --  8.7* 9.3*  --   --  10.8*   MCV 88 89  --  90 90  --   --  88    164  --  148* 161  --   --  206   INR 2.20* 1.79* 1.65* 1.73* 2.34*  --   --  2.47*    139  --  138 139  --   --  139   POTASSIUM 4.4 4.2  --  4.2 3.9  --   --   4.5   CHLORIDE 107 106  --  107 107  --   --  107   CO2 29 28  --  28 27  --   --  26   BUN 15 18  --  20 25  --   --  30   CR 0.95 0.92  --  0.94 0.94  --   --  1.14   ANIONGAP 3 5  --  3 5  --   --  6   KENDRICK 7.9* 8.0*  --  7.9* 7.8*  --   --  8.2*   GLC 88 93  --  97 100*  --   --  119*   ALBUMIN  --   --   --   --  1.9*  --   --  2.4*   PROTTOTAL  --   --   --   --  5.3*  --   --  6.4*   BILITOTAL  --   --   --   --  0.2  --   --  0.3   ALKPHOS  --   --   --   --  45  --   --  60   ALT  --   --   --   --  14  --   --  19   AST  --   --   --   --  22  --   --  19   TROPI  --   --   --   --   --  0.043 0.071*  --        No results found for this or any previous visit (from the past 24 hour(s)).

## 2020-03-09 NOTE — PLAN OF CARE
Discharge Planner PT   Patient plan for discharge: SNF for strengthening  Current status: SBA for all transfers, but Naomi for O2 tubing management.  Significant fatigue an SOB w/ ambulation distances up to 70' using FWW SPO2 dropping to 85 % and taking 5 min to recover to 90 % on 2 L O2.  Barriers to return to prior living situation: Mod(I) for all transfers and in home mobility  Recommendations for discharge: short term rehab for strengthening and improve activity tolerance  Rationale for recommendations: based on current status       Entered by: Geraldo New 03/09/2020 2:58 PM

## 2020-03-09 NOTE — PHARMACY-ANTICOAGULATION SERVICE
Pharmacy Consult-Coumadin Assessment Day #6    Primary Indication(s) for Anticoagulation: afib     Goal INR: 2-3 (consult placed for INR goal of 2-2.5 but previous pharmacist clarified with provider and goal of 2-3 was decided since that is the outpt goal)     FYI, patient is followed by the Anticoagulation/Protime Clinic at: [facility]    Patient Active Problem List   Diagnosis     Chronic atrial fibrillation     Arteriosclerotic cardiovascular disease (ASCVD)     Obstructive chronic bronchitis with exacerbation (H)     PVD (peripheral vascular disease) (H)     Long-term (current) use of anticoagulants [Z79.01]     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)     COPD exacerbation (H)     Acute exacerbation of chronic obstructive pulmonary disease (H)     Postherpetic neuralgia     Chronic, continuous use of opioids     Influenza A     Acute and chronic respiratory failure with hypoxia (H)     Patient previously anticoagulated on Coumadin at a dose of 20 mg/week; dosed as: 3.75 mg every Monday and Friday; 2.5 mg ROW      Factors that may increase patient's bleeding risk and/or sensitivity to warfarin (Coumadin) include: advanced age, dyspnea aspirin, ibuprofen, levofloxacin     Factors that may decrease patient's sensitivity to warfarin (Coumadin) include:     Anticoagulation Dose History     Recent Dosing and Labs 3/4/20 3/5/20 3/6/20 3/7/20 3/8/20 3/9/20    Warfarin 2 mg - 2 mg 4 mg 4 mg 4 mg -    Warfarin 2.5 mg 2.5 mg - - - - -    INR 2.47 2.34 1.73 1.65 1.79 2.20       Assessment/Plan: INR therapeutic. Will give home dose of 3.75 mg today and continue to monitor.     Thank You for the Consult.    Thelma Strong RP ....................  3/9/2020   8:05 AM

## 2020-03-09 NOTE — PROGRESS NOTES
Inpatient Occupational Therapy Evaluation    Name: Rm Duarte MRN# [id number]   Age: 79 year old YOB: 1940     Date of Consultation: 2020  Consultation is requested by:  Dr. Orr  Specific Consultation Request:  Generalized weakness  Primary care provider: Dominguez Maradiaga    General Information:   Onset Date: 3/9/2020    History of Current Problem/Admission: Influenza A [J10.1]  COPD exacerbation (H) [J44.1]  Fall, initial encounter [W19.XXXA]    Prior Level of Function: Pt previously living alone and completed all ADLs independently with use of a cane.   Ambulation: 1 - Assistive Equipment   Transferrin - Assistive Equipment   Toiletin - Independent    Bathin - Assistive Equipment   Dressin - Independent   Eatin - Independent   Communication: 0 - Understands/communicates without difficulty  Swallowin - swallows foods/liquids without difficulty  Cognition: 0 - no cognitive issues reported - pt reports some issues with short term memory, specifically names    Fall history within the last 6 months: Yes    Current Living Situation: Pt lives alone in a house with 3 steps to enter and 13 steps to the basement. However, pt does not have to go to the basement. Bathroom has a taller toilet and a shower/tub combo with a shower chair. No grab bars in the bathroom    Current Equipment Used at Home: Cane, shower chair     Patient & Family Goals: Improve tolerance for ADLs/IADLs     Past Medical History:   Past Medical History:   Diagnosis Date     Atrial fibrillation (H) 3/23/2012     COPD 2011     PVD (peripheral vascular disease) (H)        Past Surgical History:  Past Surgical History:   Procedure Laterality Date     COPD:FEV1-0.74/FVC-3.35 22%, DLCO-28%  3/1/2010    Severe COPD; 2008 PFT's done : 0.96/3.81 25% Fev1, DLCO 45%.  1991 were 4.31/6.20!!!!!!!??     ECHO: TDS, EF~50%, abnl septum, o/w NORMAL  2011     PAD: CTA> occlusion of L mid SFA with  reconstitution  12/2/2010     S/P colonoscopy: tubular adenoma & tics  5/4/2010    Repeat in 2014; Dr Graham     Tobacco Abuse Ongoing         Medications:   Current Facility-Administered Medications   Medication     acetaminophen (TYLENOL) tablet 650 mg     albuterol (PROVENTIL) neb solution 2.5 mg     arformoterol (BROVANA) neb solution 15 mcg     aspirin (ASA) chewable tablet 81 mg     budesonide (PULMICORT) neb solution 0.25 mg     cilostazol (PLETAL) tablet 100 mg     gabapentin (NEURONTIN) capsule 600 mg     guaiFENesin-dextromethorphan (ROBITUSSIN DM) 100-10 MG/5ML syrup 5 mL     ibuprofen (ADVIL/MOTRIN) tablet 600 mg     lidocaine (LMX4) kit     lidocaine 1 % 0.1-1 mL     loperamide (IMODIUM) capsule 2 mg     melatonin tablet 1 mg     naloxone (NARCAN) injection 0.1-0.4 mg     ondansetron (ZOFRAN-ODT) ODT tab 4 mg    Or     ondansetron (ZOFRAN) injection 4 mg     oseltamivir (TAMIFLU) capsule 75 mg     Patient is already receiving anticoagulation with heparin, enoxaparin (LOVENOX), warfarin (COUMADIN)  or other anticoagulant medication     pregabalin (LYRICA) capsule 50 mg    Followed by     [START ON 3/12/2020] pregabalin (LYRICA) capsule 50 mg     roflumilast (DALIRESP) tablet 500 mcg     simvastatin (ZOCOR) tablet 10 mg     sodium chloride (PF) 0.9% PF flush 3 mL     tamsulosin (FLOMAX) capsule 0.4 mg     traMADol (ULTRAM) tablet  mg     warfarin ANTICOAGULANT (COUMADIN) half-tab 3.75 mg     Warfarin Therapy Reminder (Check START DATE - warfarin may be starting in the FUTURE)       Weight Bearing Status: FWB     Cognitive Status Examination:  Orientation: oriented to time, place and person  Level of Consciousness: alert  Follows Commands and Answers Questions: 100% of the time  Personal Safety and Judgement: Intact  Memory: Immediate recall intact and Long-term memory intact    Pain:   Currently in pain? Yes  Pain Location? R side of face - due to shingles, and lungs due to COPD  Pain Rating:  4    Occupational Therapy Evaluation:   Integumentary/Edema: no significant findings - some edema noted in BUEs   Range of Motion: BUEs WFL   Strength: BUE's grossly 4/5  Hand Strength: Bilateral gross grasp fair  Muscle Tone Assessment: No issues observed  Coordination: Normal     Mobility:   Transfer Skills: sit-stand from chair SBA  Toilet Transfer: CGA on/off the toilet with heavy use of grab bars   Balance: no LOB  Pt ambulated from chair to BR with SBA-CGA     ADLs:   Lower Body Dressing: SBA to doff/don socks in sitting  Grooming: SBA to wash his hands standing at the sink (simulated task)     SpO2 before activity was 90% on 2L. After tasks, it was 86-88%. Pt reports having a harder time today with his breathing. Pt required a good minute or so to recover to 90%, with instruction in pursed lip breathing.     IADLs:   Previous Responsibilities of the Patient: Meal Prep, Housekeeping, Laundry, Shopping, Medication Management, Finances, and Driving   Comments: Pt only gets help from his neighbor with winter yard work. Last summer, pt was able to cut his grass on a riding .  Pt goes to the laundry mat for his laundry.     Activities of Daily Living Analysis:   Impairments Contributing to Impaired Activities of Daily Living: pain and activity tolerance decreased    Occupational Therapy Interventions: ADL Retraining , progressive activity/exercise and risk factor education     Clinical Impressions:  Criteria for Skilled Therapeutic Intervention Met: Yes, treatment indicated  OT Diagnosis: Impaired ADLs/IADLs  Influenced by the following impairments: Decreased endurance  Functional limitations due to impairment: Tolerance for ADLs/IADLs  Clinical presentation: Stable/Uncomplicated  Clinical presentation rationale: Clinical judgement  Clinical Decision making (complexity): Low Complexity  Frequency: 5 times/week  Predicted Duration of Therapy Intervention (days/wks): 5 days  Anticipated Discharge  Disposition: Transitional Care Facility   Anticipated Equipment Needs at Discharge:   TBD  Risks and Benefits of therapy have been explained: Yes  Patient, Family & other staff in agreement with plan of care: Yes  Clinical Impression Comments: Pt presents with impaired ADLs due to decreased endurance. Pt very pleasant and cooperative of tasks asked of him. Although he only required SBA-CGA, he was only able to tolerate LB dressing, a toilet transfer, and grooming standing at the sink. Pt reports his endurance is worse than baseline. Pt has little to no assistance at home. Safety concerns noted with bathroom accessibility, too. Pt would benefit from a short term rehab stay to improve his endurance to increased safety and independence in ADLs/IADLs for an eventual, safe return home. Plan to progress pt's activity tolerance during his hospital stay.     Total Eval Time: 20

## 2020-03-09 NOTE — PLAN OF CARE
A&O. VSS/AF. O2 97% 2L NC. Lungs coarse, audibly at times. PC continues but less frequent per patient report. Medicated with Robitussin x 1 per request. Ultram 100mg at HS for left facial pain . Chair/dangles most of shift. Voiding. Fair appetite.  Face to face report given with opportunity to observe patient.    Report given to She Chavez RN   3/8/2020  11:30 PM

## 2020-03-09 NOTE — PLAN OF CARE
Discharge Planner OT   Patient plan for discharge: Agreeable to short term rehab  Current status: Transfer Skills: sit-stand from chair SBA  Toilet Transfer: CGA on/off the toilet with heavy use of grab bars   Pt ambulated from chair to BR with SBA-CGA   Lower Body Dressing: SBA to doff/don socks in sitting  Grooming: SBA to wash his hands standing at the sink (simulated task)   SpO2 before activity was 90% on 2L. After tasks, it was 86-88%. Pt reports having a harder time today with his breathing. Pt required a good minute or so to recover to 90%, with instruction in pursed lip breathing.   Barriers to return to prior living situation: Decreased tolerance for activity, safety concerns with bathroom accessibility, little to no assistance/support  Recommendations for discharge: Short term rehab  Rationale for recommendations: Pt presents with impaired ADLs due to decreased endurance. Pt very pleasant and cooperative of tasks asked of him. Although he only required SBA-CGA, he was only able to tolerate LB dressing, a toilet transfer, and grooming standing at the sink. Pt reports his endurance is worse than baseline. Pt has little to no assistance at home. Safety concerns noted with bathroom accessibility, too. Pt would benefit from a short term rehab stay to improve his endurance to increased safety and independence in ADLs/IADLs for an eventual, safe return home. Plan to progress pt's activity tolerance during his hospital stay.        Entered by: Jeanie John 03/09/2020 2:27 PM

## 2020-03-09 NOTE — PLAN OF CARE
A&O, SBA, regular diet. VSS, afebrile, O2 @ 2L sating adequately.  C/o left sided facial pain, medicated w/ PRN Ultram & Ibuprofen w/ relief.  Pt also given PRN Tussin x1.  Assessment as charted.  Per ICU tele report pt is afib 90s VR w/ occ PVCs.  No IV access.  Brakes locked, call light within reach, makes needs known.  Frequent rounding done, free from falls.      Face to face report given with opportunity to observe patient.    Report given to RADHA Chaudhari RN   3/9/2020  5:51 AM

## 2020-03-09 NOTE — PHARMACY
[facility]    Pharmacy    Antimicrobial Stewardship Note     Current antimicrobial therapy:  Anti-infectives (From now, onward)    Start     Dose/Rate Route Frequency Ordered Stop    03/05/20 0600  oseltamivir (TAMIFLU) capsule 75 mg      75 mg Oral 2 TIMES DAILY 03/04/20 2016          Indication: Influenza     Days of Therapy: 5     Pertinent labs:  Creatinine   Creatinine   Date Value Ref [facility] Status   03/09/2020 0.95 0.66 - 1.25 mg/dL Final   03/08/2020 0.92 0.66 - 1.25 mg/dL Final   03/06/2020 0.94 0.66 - 1.25 mg/dL Final     WBC   WBC   Date Value Ref [facility] Status   03/09/2020 3.3 (L) 4.0 - 11.0 10e9/L Final   03/08/2020 2.8 (L) 4.0 - 11.0 10e9/L Final   03/06/2020 2.9 (L) 4.0 - 11.0 10e9/L Final     Procalcitonin   Procalcitonin   Date Value Ref [facility] Status   03/06/2020 0.79 ng/ml Final     Comment:     0.50-1.99 ng/ml  Moderate risk of systemic infection.  Recommendation:   Recommend antibiotics. Evaluate culture results and clinical features to   target antibacterial therapy. Obtain blood cultures and other relevant   cultures if not done.  If empiric antibiotics were started, recheck PCT in: 2 days to guide   antibiotic de-escalation.  Discontinue or de-escalate antibiotics when PCT   concentration is <80% of peak or abs PCT <0.5. If empiric antibiotics were NOT   started, recheck PCT in 6-24 hours to re-evaluate need for antibiotics.     03/05/2020 1.02 ng/ml Final     Comment:     0.50-1.99 ng/ml  Moderate risk of systemic infection.  Recommendation:   Recommend antibiotics. Evaluate culture results and clinical features to   target antibacterial therapy. Obtain blood cultures and other relevant   cultures if not done.  If empiric antibiotics were started, recheck PCT in: 2 days to guide   antibiotic de-escalation.  Discontinue or de-escalate antibiotics when PCT   concentration is <80% of peak or abs PCT <0.5. If empiric antibiotics were NOT   started, recheck PCT in 6-24 hours to  re-evaluate need for antibiotics.     12/01/2017 0.19 ng/ml Final     Comment:     0.05-0.24 ng/ml Low risk of systemic bacterial infection. Local bacterial   infection possible.  Recommendation: Assess other clinical features of   infection. Discourage antibiotics unless strong clinical suspicion for serious   infection.       CRP   CRP Inflammation   Date Value Ref [facility] Status   06/20/2017 <2.9 0.0 - 8.0 mg/L Final     Culture Results:        Recommendations/Interventions:  1. Consider discontinuing therapy after this evening's dose. Currently no stop date.     Thelma Strong RPH  March 9, 2020

## 2020-03-10 NOTE — PROGRESS NOTES
Name: Rm Duarte    MRN#: [id number]    Reason for Hospitalization: Influenza A [J10.1]  COPD exacerbation (H) [J44.1]  Fall, initial encounter [W19.XXXA]    Discharge Date: 3/10/2020    Patient / Family response to discharge plan: in agreement    Follow-Up Appt:   Future Appointments   Date Time Provider Department Center   4/1/2020 10:40 AM HC ANTI COAGULATION [facility]   4/1/2020 10:45 AM NA LAB [facility]       Other Providers (Care Coordinator, County Services, PCA services etc): Yes: [facility]    Discharge Disposition: short-term care facility, [facility]    Pt/family was given the Medicare Compare list for SNF, with associated star ratings to assist with choice for referrals/discharge planning Yes    Education was given to pt/family that star ratings are updated/maintained by Medicare and can be reviewed by visiting www.medicare.gov Yes      PAS-RR    Per DHS regulation, CTS team completed and submitted PAS-RR to [place] [employer] Direct Connect via the Senior LinkAge Line. CTS team advised SNF and they are aware a PAS-RR has been submitted.     CTS team reviewed with pt or health care agent that they may be contacted for a follow up appointment within 10 days of hospital discharge if SNF stay is <30 days. Contact information for [facility] LinkAge Line was also provided.     Pt or health care agent verbalized understanding.     PAS-RR # [id number]        Antonia Dominguez CM

## 2020-03-10 NOTE — PLAN OF CARE
Pt alert and oriented x3, pleasant and cooperative. VSS and assessment as charted. Afebrile and rates pain 6/10 to left face frequent sharp pain from hx of shingles- medicated with tramadol with relief of s/s. Lungs dim and coarse t/o, remains on 2 LPM with sats mid 90's and frequent congested cough- using flutter valve and encouraging TCDB. Up in chair, watching television and declined dinner tonight as pt states he is not hungry.  All extremities remain edematous- encouraging elevation. Free from falls/ injury, call light within reach and alarms on.   Face to face report given with opportunity to observe patient.    Report given to Donovan RN  Peggy Paul RN   3/9/2020  11:01 PM
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
## 2020-03-10 NOTE — PHARMACY-ANTICOAGULATION SERVICE
Pharmacy Consult-Coumadin Assessment Day #7    Rm Duarte is a 79 year old male admitted on 3/4/2020 with Acute and chronic respiratory failure with hypoxia (H)    Primary Indication(s) for Anticoagulation: afib     Goal INR: 2-3 (consult placed for INR goal of 2-2.5 but previous pharmacist clarified with provider and goal of 2-3 was decided since that is the outpt goal)     FYI, patient is followed by the Anticoagulation/Protime Clinic at: [facility]    Patient Active Problem List   Diagnosis     Chronic atrial fibrillation     Arteriosclerotic cardiovascular disease (ASCVD)     Obstructive chronic bronchitis with exacerbation (H)     PVD (peripheral vascular disease) (H)     Long-term (current) use of anticoagulants [Z79.01]     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)     COPD exacerbation (H)     Acute exacerbation of chronic obstructive pulmonary disease (H)     Postherpetic neuralgia     Chronic, continuous use of opioids     Influenza A     Acute and chronic respiratory failure with hypoxia (H)       Patient previously anticoagulated on Coumadin at a dose of 20 mg/week; dosed as: 3.75 mg every Monday and Friday; 2.5 mg ROW      Factors that may increase patient's bleeding risk and/or sensitivity to warfarin (Coumadin) include: advanced age, dyspnea, meds (aspirin, ibuprofen, azithromycin, tramadol)     Factors that may decrease patient's sensitivity to warfarin (Coumadin) include:     Anticoagulation Dose History     Recent Dosing and Labs 3/4/20 3/5/20 3/6/20 3/7/20 3/8/20 3/9/20 3/10/20    Warfarin 2 mg - 2 mg 4 mg 4 mg 4 mg - -    Warfarin 2.5 mg 2.5 mg - - - - - -    Warfarin 3.75 mg - - - - - 3.75 mg -    INR 2.47 2.34 1.73 1.65 1.79 2.20 2.58       Assessment/Plan:     The patient is discharging today to [facility] for short-term rehab (estimated # of days <30) where [facility] will manage while he is there.    Recommended home dosing (3.75 mg Mon & Fri and 2.5  mg ROW) with INR recheck on Tuesday, 3/17/20 and additional dosing per Anticoagulation clinic.           Thelma Strong RP ....................  3/10/2020   8:21 AM

## 2020-03-10 NOTE — PLAN OF CARE
Occupational Therapy Discharge Summary    Reason for therapy discharge:    Discharged to transitional care facility.    Progress towards therapy goal(s). See goals on Care Plan in [facility] electronic health record for goal details.  Goals not met.  Barriers to achieving goals:   discharge from facility.    Therapy recommendation(s):    Continued therapy is recommended.  Rationale/Recommendations:  To improve patient's endurance for increased safety and independence in ADLs/IADLs for an eventual, safe return home.

## 2020-03-10 NOTE — PLAN OF CARE
Physical Therapy Discharge Summary    Reason for therapy discharge:    Discharged to transitional care facility.    Progress towards therapy goal(s). See goals on Care Plan in [facility] electronic health record for goal details.  Goals not met.  Barriers to achieving goals:   limited tolerance for therapy and discharge from facility.    Therapy recommendation(s):    Continued therapy is recommended.  Rationale/Recommendations:  to improve functional mobility and safety.

## 2020-03-10 NOTE — PLAN OF CARE
VSS, afebrile, HR Irregular, lungs are mostly coarse with expiratory wheezes in bilateral bases. Pt has a weak, loose, congested gurgle cough. Pt remains on 2L O2, pt does desaturate down to 83% after ambulating to the bathroom. Pt did receive PRN nebs x 2, and PRN tramadol this shift for rib cage pain from coughing. Pt is alert and oriented x 4, makes his needs known, up standby assist to bathroom.     Face to face report given with opportunity to observe patient.    Report given to RADHA Dow RN   3/10/2020  7:15 AM

## 2020-03-10 NOTE — PLAN OF CARE
A&O. VSS. Afebrile. O2 95% on home use of 2 LPM. LS coarse.SOB and DACOSTA noted. Nebs given as ordered. Infrequent productive cough, administered prn Robitussin per the Pt's request. BS active. Pt reports loose stools, administered Imodium per his request. Skin as documented. No IV.    Patient discharged at 11:20 AM via wheel chair accompanied by other:[employer]  and staff. Prescriptions sent to patients preferred pharmacy. All belongings sent with patient.     Discharge instructions reviewed with Mandeep. Listed belongings gathered and returned to patient. Yes    Patient discharged to [facility].   Report called to [facility]:  Shandra for report    Core Measures and Vaccines  Core Measures applicable during stay: No. If yes, state diagnosis:  Flu A  Pneumonia and Influenza given prior to discharge, if indicated: N/A    Surgical Patient   Surgical Procedures during stay: No  Did patient receive discharge instruction on wound care and recognition of infection symptoms? N/A    MISC  Follow up appointment made:  No, NH to schedule per their preference  Home and hospital aquired medications returned to patient: N/A  Patient reports pain was well managed at discharge: Yes

## 2020-03-11 NOTE — PROGRESS NOTES
Got called by RN for pt needing tramadol script filled.    He is at nursing home at this time.    I called in to [facility] script for :   tramadol tab 50 mg   One tab PO q 6 hour prn moderate to severe pain.   # 15 tabs and no refills.

## 2020-03-12 NOTE — TELEPHONE ENCOUNTER
tramadol      Last Written Prescription Date:  2/24/20  Last Fill Quantity: 120,   # refills: 0  Last Office Visit: 1/29/20  Future Office visit:       Routing refill request to provider for review/approval because:  Drug not on the [facility], [facility] or [facility] refill protocol or controlled substance

## 2020-03-17 NOTE — PROGRESS NOTES
ANTICOAGULATION FOLLOW-UP CLINIC VISIT    Patient Name:  Rm Duarte  Date:  3/17/2020  Contact Type:  New warfarin dosing/INR recheck date faxed to [facility] after speaking to nurseTatiana    SUBJECTIVE:  Patient Findings     Positives:   Change in activity (activity increase,  doing physical rehab)    Comments:   Call and message left by Tatiana at the [facility] in [place]. Call returned to her. She asked if I wanted to continue managing patient INR and warfarin dosing or turn it over to [facility]. We discussed that I would rather continue warfarin management. No reported bleeding/bruising of patient and no new changes in diet/meds but is doing rehab so activity is increased. New warfarin dosing/INR recheck date faxed to rehab. Staff to notify warfarin clinic if patient has any bleeding/bruising, new changes in diet/meds/activity or questions.         Clinical Outcomes     Negatives:   Major bleeding event, Thromboembolic event, Anticoagulation-related hospital admission, Anticoagulation-related ED visit, Anticoagulation-related fatality    Comments:   Call and message left by Tatiana at the [facility] in [place]. Call returned to her. She asked if I wanted to continue managing patient INR and warfarin dosing or turn it over to [facility]. We discussed that I would rather continue warfarin management. No reported bleeding/bruising of patient and no new changes in diet/meds but is doing rehab so activity is increased. New warfarin dosing/INR recheck date faxed to rehab. Staff to notify warfarin clinic if patient has any bleeding/bruising, new changes in diet/meds/activity or questions.            OBJECTIVE    INR   Date Value Ref Range Status   03/17/2020 2.01 (H) 0 - 1.3 Final       ASSESSMENT / PLAN  INR assessment THER    Recheck INR In: 2 WEEKS    INR Location Outside lab      Anticoagulation Summary  As of 3/17/2020    INR goal:   2.0-3.0   TTR:   39.2 % (11.7 mo)   INR used for  dosin.01 (3/17/2020)   Warfarin maintenance plan:   3.75 mg (2.5 mg x 1.5) every Mon, Fri; 2.5 mg (2.5 mg x 1) all other days   Full warfarin instructions:   3.75 mg every Mon, Fri; 2.5 mg all other days   Weekly warfarin total:   20 mg   No change documented:   Zena Haas RN   Plan last modified:   Jeanie Arechiga RN (12/3/2019)   Next INR check:   3/31/2020   Priority:   Maintenance   Target end date:   Indefinite    Indications    Chronic atrial fibrillation [I48.20]  PVD (peripheral vascular disease) (H) [I73.9]  Long-term (current) use of anticoagulants [Z79.01] [Z79.01]             Anticoagulation Episode Summary     INR check location:       Preferred lab:       Send INR reminders to:   HC ANTICOAG POOL    Comments:   Currently at [facility] in [facility]   Fax  [phone]      Anticoagulation Care Providers     Provider Role Specialty Phone number    Dominguez Maradiaga MD [facility] [phone]            See the Encounter Report to view Anticoagulation Flowsheet and Dosing Calendar (Go to Encounters tab in chart review, and find the Anticoagulation Therapy Visit)        Zena Haas, RN

## 2020-03-27 NOTE — TELEPHONE ENCOUNTER
traMADol (ULTRAM) 50 MG tablet         Last Written Prescription Date:  3/12/20  Last Fill Quantity: 120,   # refills: 0  Last Office Visit: 1/7/20  Future Office visit:       Routing refill request to provider for review/approval because:    Drug not on the [facility], [facility] or [facility] refill protocol or controlled substance

## 2020-03-28 NOTE — PROGRESS NOTES
Visit Date:   03/26/2020      CHIEF COMPLAINT:  Skilled nursing facility discharge.      HISTORY OF PRESENT ILLNESS:  The patient was hospitalized at [facility] from 03/04 through 03/10.  He was diagnosed with influenza A and was found to have acute respiratory failure and a COPD exacerbation.  There was suspicion that he likely had pneumonia.  He was placed on Levaquin.  He has finished out that antibiotic.  He is chronically on azithromycin for suppression of COPD exacerbation.  He has chronic atrial fibrillation, and most recent INR was 2.0.  He does take cilostazol 100 mg twice daily for  peripheral vascular disease and aspirin 81 mg daily for coronary artery disease.  He had hemoglobin of 14.5 grams per deciliter in 0-6/2018.  On admission to the hospital, his hemoglobin was 10.8 grams per deciliter and now down to 9.4 grams per deciliter on the date of hospital discharge.  MCV at 90 and stable platelets.  No iron, folate or B12 evaluation completed during hospitalization.  Basic metabolic panel has been normal.  He reports that he is not having any nausea, vomiting, rectal bleeding, black stools, or hematuria.  His last colonoscopy was 2010.  He was recommended 4-year followup.  He was found to have tubular adenoma and diverticulitis at colonoscopy in 2010.  He does report that over the past 2 years, he has had a slow weight loss.  He had been 200 pounds.  He states his appetite has been good since he has been at the nursing home.  He has lost weight.  Admit weight was 174 pounds.  It dropped down to 160 pounds, but now is back up to 163 pounds.  He was on Tamiflu also for influenza prophylaxis, as the nursing home was quarantined.  He states over the last several days, his appetite has improved.  By reviewing his vital signs, his blood pressures range from 82/33 to 108/62.  He states his blood pressures always to run low.  His pulse has been recorded between 30 and 55.  However, this was a digital pulse,  but axillary pulse has been in the 70s.  He did have a couple of EKGs while hospitalized, and those were both stable.  He has chronic atrial fibrillation.  His apical pulse today is 70.  He has not had lightheadedness or dizziness.  However, he does state while at home over the last year, he has had about 5 falls.  He states he can kind of tell when the falls are occurring but does not become syncopal or presyncopal.  He does not know how to describe it.  He has discussed this with his primary doctor, and he felt it was related to his tramadol use, which he has been using more frequently because of his shingles pain.  He is trying to get this better controlled, and he is also on frequent pregabalin.        The patient is hoping to discharge home tomorrow.  He lives by himself.  He is chronically on oxygen and states that each day he is getting stronger.  Therapy states he is able to do all of his ADLs, and nursing staff really are not doing much more for him besides giving in his meds.  He wants to discharge.  He is requesting not to have any blood work completed but will plan to follow up closely with his primary physician and also will review his low hemoglobin, low blood pressures and his use of aspirin, warfarin, and cilostazol, also his slow progressive weight loss over the last couple of years and his falls.      PAST MEDICAL HISTORY, PAST SURGICAL HISTORY, ALLERGIES, MEDICATIONS RISK FACTOR AND SOCIAL HISTORY:  All reviewed.      ADVANCED DIRECTIVES:  Do not resuscitate with a comfort focus.      REVIEW OF SYSTEMS:  A 12-point complete review of systems discussed with nursing staff and resident.  See HPI for positive findings, otherwise unremarkable.      PHYSICAL EXAMINATION:   VITAL SIGNS:  Blood pressure 106/60, pulse 66, respiratory rate 18, temperature 97.2, SpO2 98% on 2 liters of oxygen.   SKIN:  Color is pink.   HEENT:  Sclerae nonicteric.  Mucous membranes moist.   NECK:  Supple without adenopathy.   No thyromegaly.   LUNGS:  Fields with diminished breath sounds throughout.   CARDIOVASCULAR:  Irregularly irregular with an apical pulse of 72.   ABDOMEN:  Soft and without masses, tenderness and organomegaly.   EXTREMITIES:  Without edema.  A normal temperature to the touch of lower extremities.  Gait is stable.  He was able to walk down the hallway without his oxygen.      ASSESSMENT:   1.  Acute on chronic respiratory failure.   2.  Oxygen dependent chronic obstructive pulmonary disease (COPD).   3.  Influenza A.   4.  Atherosclerotic cardiovascular disease.   5.  Peripheral vascular disease (PVD).   6.  Hypertension.   7.  Atrial fibrillation.   8.  Anemia   9.  Weight loss.     10.  Falls.      PLAN:  Recommended to the patient that we could check some blood work including CBC and basic metabolic panel due to his blood pressures running low, the weight loss and the low hemoglobin.  He is declined to have this completed.  He wants to return home regardless tomorrow.  He otherwise is asymptomatic besides his usual chronic complaints.  He will plan to follow up closely with his primary physician within the next 1-2 weeks.  I did explain with the Covid-19 pandemic that he might have difficulty being seen in clinic by his primary physician, but he will try to at least to a telephone call.  They can decide if further workup is warranted.         AURORA LOWERY NP             D: 2020   T: 2020   MT:       Name:     CIRILO LAYTON   MRN:      [id number]        Account:      [id number]   :      1940           Visit Date:   2020      Document: [id number]       cc: Joanna Maradiaga MD       [facility]

## 2020-03-30 NOTE — TELEPHONE ENCOUNTER
francis from [facility] called requesting verbal orders to begin home care for pt 2 visit per week for 2 weeks, 1 visit a week for 6 weeks.    Francis phone number:[phone]    Please advise    Verena Velazquez LPN

## 2020-03-30 NOTE — TELEPHONE ENCOUNTER
"Patient called requesting a hospital/rehab visit follow up as he was told upon release of rehab to make one. Patient is also requesting a letter \"to someone\" so he does not lose his license. Please advise.       Patt Polo MA    "

## 2020-03-30 NOTE — TELEPHONE ENCOUNTER
We should do a phone call visit.  I want to clarify the need for the letter and also review how he is doing and his echo.  Thanks. Dominguez Maradiaga MD

## 2020-03-30 NOTE — PROGRESS NOTES
"Subjective     Rm Duarte is a 79 year old male who is being evaluated via a billable telephone visit.      The patient has been notified of following:     \"This telephone visit will be conducted via a call between you and your physician/provider. We have found that certain health care needs can be provided without the need for a physical exam.  This service lets us provide the care you need with a short phone conversation.  If a prescription is necessary we can send it directly to your pharmacy.  If lab work is needed we can place an order for that and you can then stop by our lab to have the test done at a later time.    If during the course of the call the physician/provider feels a telephone visit is not appropriate, you will not be charged for this service.\"     Physician has received verbal consent for a Telephone Visit from the patient?     Rm Duarte complains of   Chief Complaint   Patient presents with     Hospital F/U     hospital follow up and letter to not lose license       ALLERGIES  Patient has no known allergies.                   Reviewed and updated as needed this visit by Provider         Review of Systems          Objective   Reported vitals:  There were no vitals taken for this visit.           Assessment/Plan:  (J10.1) Influenza A  (primary encounter diagnosis)  Comment: resolved.   Plan: continue routine cares.      (J44.1) COPD exacerbation (H)  Comment: as above.   Plan: as above.  Back at baseline.      Reviewed echo, decreased EF reviewed.  Stable sx right now without issue.  Return to clinic when safe.  Stressed social distancing.  Got letter as was found confused when driving which lead to the hospitalization.  He has no issues at baseline.  The letter says this.      Recheck HGB with the low hgb.      No follow-ups on file.      Phone call duration:  10 minutes    Dominguez Maradiaga MD      "

## 2020-03-30 NOTE — PROGRESS NOTES
ANTICOAGULATION FOLLOW-UP CLINIC VISIT    Patient Name:  Rm Duarte  Date:  3/30/2020  Contact Type:  Telephone    SUBJECTIVE:  Patient Findings     Comments:   INR done by lab. Call placed to patient and spoke to him re: INR result, warfarin dosing/INR recheck date. Patient just discharged from LTC rehab. He states he does not know why INR is low as it was therapeutic 2 weeks ago. We discussed warfarin dosing/INR recheck date. He verbalized understanding and has no questions. He has no bleeding/bruising, has not missed any warfarin dosing but does not really know what doses he got in rehab either.         Clinical Outcomes     Negatives:   Major bleeding event, Thromboembolic event, Anticoagulation-related hospital admission, Anticoagulation-related ED visit, Anticoagulation-related fatality    Comments:   INR done by lab. Call placed to patient and spoke to him re: INR result, warfarin dosing/INR recheck date. Patient just discharged from LTC rehab. He states he does not know why INR is low as it was therapeutic 2 weeks ago. We discussed warfarin dosing/INR recheck date. He verbalized understanding and has no questions. He has no bleeding/bruising, has not missed any warfarin dosing but does not really know what doses he got in rehab either.            OBJECTIVE    INR Point of Care   Date Value Ref Range Status   2020 1.3 (H) 0.86 - 1.14 Final     Comment:     This test is intended for monitoring Coumadin therapy.  Results are not   accurate in patients with prolonged INR due to factor deficiency.         ASSESSMENT / PLAN  INR assessment SUB    Recheck INR In: 10 DAYS    INR Location Clinic      Anticoagulation Summary  As of 3/30/2020    INR goal:   2.0-3.0   TTR:   35.6 % (11.7 mo)   INR used for dosin.3! (3/30/2020)   Warfarin maintenance plan:   3.75 mg (2.5 mg x 1.5) every Mon, Fri; 2.5 mg (2.5 mg x 1) all other days   Full warfarin instructions:   3/30: 5 mg; 3/31: 3.75 mg; Otherwise 3.75 mg  every Mon, Fri; 2.5 mg all other days   Weekly warfarin total:   20 mg   Plan last modified:   Jeanie Arechiga RN (12/3/2019)   Next INR check:   4/8/2020   Priority:   Maintenance   Target end date:   Indefinite    Indications    Chronic atrial fibrillation [I48.20]  PVD (peripheral vascular disease) (H) [I73.9]  Long-term (current) use of anticoagulants [Z79.01] [Z79.01]             Anticoagulation Episode Summary     INR check location:       Preferred lab:       Send INR reminders to:   HC ANTICOAG POOL    Comments:   Currently at [facility] in [facility]   Fax  [phone]      Anticoagulation Care Providers     Provider Role Specialty Phone number    Dominguez Maradiaga MD [facility] [phone]            See the Encounter Report to view Anticoagulation Flowsheet and Dosing Calendar (Go to Encounters tab in chart review, and find the Anticoagulation Therapy Visit)        Zena Haas RN

## 2020-03-30 NOTE — LETTER
March 30, 2020      Rm Duarte  [address] [place] [address]        To Whom It May Concern:    Rm Duarte was seen in our clinic in a virtual visit.  He had an event which lead to a hospitalization.  He had a driving issue when he was sick, which lead to the hospitalization.  That issue is resolved.  There is no medical contraindication to this patient driving at his baseline.  He is back to his baseline now.      Sincerely,        Dominguez Maradiaga MD

## 2020-04-08 NOTE — PROGRESS NOTES
ANTICOAGULATION FOLLOW-UP CLINIC VISIT    Patient Name:  Rm Duarte  Date:  2020  Contact Type:  Telephone/ spoke to homecare nurseMarilee    SUBJECTIVE:  Patient Findings     Comments:   INR done by homecare nurse and result called to warfarin clinic. Per nurse report, patient has no bleeding/bruising, no new changes in diet/meds/activity. We discussed warfarin dosing/INR recheck date. She verbalized understanding and has no questions.         Clinical Outcomes     Negatives:   Major bleeding event, Thromboembolic event, Anticoagulation-related hospital admission, Anticoagulation-related ED visit, Anticoagulation-related fatality    Comments:   INR done by homecare nurse and result called to warfarin clinic. Per nurse report, patient has no bleeding/bruising, no new changes in diet/meds/activity. We discussed warfarin dosing/INR recheck date. She verbalized understanding and has no questions.            OBJECTIVE    INR   Date Value Ref Range Status   2020 1.8 (A) 0.90 - 1.10 Final       ASSESSMENT / PLAN  INR assessment SUB    Recheck INR In: 2 WEEKS    INR Location Homecare INR      Anticoagulation Summary  As of 2020    INR goal:   2.0-3.0   TTR:   33.1 % (11.7 mo)   INR used for dosin.8! (2020)   Warfarin maintenance plan:   3.75 mg (2.5 mg x 1.5) every Mon, Fri; 2.5 mg (2.5 mg x 1) all other days   Full warfarin instructions:   : 3.75 mg; Otherwise 3.75 mg every Mon, Fri; 2.5 mg all other days   Weekly warfarin total:   20 mg   Plan last modified:   Jeanie Arechiga RN (12/3/2019)   Next INR check:   2020   Priority:   High   Target end date:   Indefinite    Indications    Chronic atrial fibrillation [I48.20]  PVD (peripheral vascular disease) (H) [I73.9]  Long-term (current) use of anticoagulants [Z79.01] [Z79.01]             Anticoagulation Episode Summary     INR check location:       Preferred lab:       Send INR reminders to:   HC ANTICOAG POOL    Comments:   Currently at  [facility] rehab in [facility]   Fax  [phone]      Anticoagulation Care Providers     Provider Role Specialty Phone number    Dominguez Maradiaga MD [facility] [phone]            See the Encounter Report to view Anticoagulation Flowsheet and Dosing Calendar (Go to Encounters tab in chart review, and find the Anticoagulation Therapy Visit)        Zena Haas RN

## 2020-04-09 NOTE — TELEPHONE ENCOUNTER
Telephone call received from Physical Therapist from [facility] reporting upon therapy visit today in the patients home, the therapist and patient noticed the patients fingertips are blue in color and fingers feel cold.   Patient has had a  diagnosis of Influenza A recently. Currently, no shortness of breath, no changes from normal breathing pattern, no fever, O2 Sat 90% upon assessment and heart rate 64. Therapist wanted to report so MD is aware.     Current dx: COPD and Chronic A-fib     Reporting patient had dizziness x 2 days ago that subsided by the next morning.     PCP: Dr. Maradiaga     Please advise if you would like a telephone visit with patient

## 2020-04-09 NOTE — TELEPHONE ENCOUNTER
Clinic Care Coordination Contact  Care Team Conversations    CC noted today's triage call and reached out to him to relay the following per Gabi Koo:    Call. Have patient monitor fingertips. Not sure if new symptom. He had virtual appointment 11/2 weeks ago. No mention. O2 usually low 90's so OK. If he has oximeter should check twice daily and report readings beginning of next week. If increasing SOB or sat <90, should go to UC/ER.    Rm sounds very good on the phone today.  Tells CC he feels very good.  He even got on his stationary bike today and did a 5 minute ride.  He reports his O2 level was 99% when he got on and 99% when he finished so he was very happy to see this.  He states he is going to continue to increase his activity as tolerated.    Rm is very happy to be home now from his STR stay.  He states he is able to go to the grocery store independently and plans to go to the dump tomorrow.  He tells CC he is getting back to his old routine.  He denies any lack of food, medication, or supplies at home.  We discussed the importance of good handwashing and social distancing when he does go out in public for essentials.  He acknowledges understanding that he is high risk for complications should he become ill from COVID-19.    CC asked if Rm could please write down his oxygen levels twice daily and call us on Monday or Tuesday of next week to report these.  Advised he may contact triage or Dr Maradiaga/Gaib Koo's nurse as CC will not be in the office.  CC will check in later next week.    Lauren Pipkin, BS-RN   Care Coordination  Primary Care- [facility]  [phone] Option # 1

## 2020-04-09 NOTE — TELEPHONE ENCOUNTER
Call. Have patient monitor fingertips. Not sure if new symptom. He had virtual appointment 11/2 weeks ago. No mention. O2 usually low 90's so OK. If he has oximeter should check twice daily and report readings beginning of next week. If increasing SOB or sat <90, should go to UC/ER.

## 2020-04-10 NOTE — TELEPHONE ENCOUNTER
Pt states he was instructed to record his oxygen and send in the results.  4/9- 94%  4/10- 97%  Pt  States he will call back Monday with how the weekend went.

## 2020-04-13 NOTE — TELEPHONE ENCOUNTER
Patient called to give update from 4.9.2020 when his finger tips where blue.    He has no SOB and feels good today.No blue finger tips at this time.    On 4.10.2020 at 10pm sats O2 sat 97% on 2LPM via NC.    4.11.2020 at 5:15 AM O2 sats 92% on 2LPM via NC and at 8pm 93%.    4.12.2020  at 8:30 AM 94% on 2LPM via NC. And at 8pm 97%    This AM O2 sats at 8AM 98% on 2LPM via NC.    Advised if s/s of increase difficulty breathing to go to ED and he verbalized understanding      Please note.Any recommendations?      Angelia Grijalva RN

## 2020-04-20 NOTE — PROGRESS NOTES
ANTICOAGULATION FOLLOW-UP CLINIC VISIT    Patient Name:  Rm Duarte  Date:  2020  Contact Type:  Telephone/ unable to reach homecare nurse, I spoke to patient via phone, will try to reach homecare nurse later.     SUBJECTIVE:  Patient Findings     Comments:   INR done by homecare nurse and result called to warfarin clinic. Unable to reach nurse due to long distance connection disturbance so call placed to patient. He states his INR was 2.6. He has no bleeding/bruising and no new changes in diet/meds/activity. We discussed INR result, warfarin dosing/INR recheck date. He verbalized understanding and has no questions. He will call if any changes, issues, questions.         Clinical Outcomes     Negatives:   Major bleeding event, Thromboembolic event, Anticoagulation-related hospital admission, Anticoagulation-related ED visit, Anticoagulation-related fatality    Comments:   INR done by homecare nurse and result called to warfarin clinic. Unable to reach nurse due to long distance connection disturbance so call placed to patient. He states his INR was 2.6. He has no bleeding/bruising and no new changes in diet/meds/activity. We discussed INR result, warfarin dosing/INR recheck date. He verbalized understanding and has no questions. He will call if any changes, issues, questions.            OBJECTIVE    INR   Date Value Ref Range Status   2020 2.6 (A) 0.90 - 1.10 Final       ASSESSMENT / PLAN  INR assessment THER    Recheck INR In: 3 WEEKS    INR Location [facility]      Anticoagulation Summary  As of 2020    INR goal:   2.0-3.0   TTR:   32.2 % (11.7 mo)   INR used for dosin.6 (2020)   Warfarin maintenance plan:   3.75 mg (2.5 mg x 1.5) every Mon, Fri; 2.5 mg (2.5 mg x 1) all other days   Full warfarin instructions:   3.75 mg every Mon, Fri; 2.5 mg all other days   Weekly warfarin total:   20 mg   No change documented:   Zena Haas RN   Plan last modified:   Jeanie Arechiga RN  (12/3/2019)   Next INR check:   5/11/2020   Priority:   High   Target end date:   Indefinite    Indications    Chronic atrial fibrillation [I48.20]  PVD (peripheral vascular disease) (H) [I73.9]  Long-term (current) use of anticoagulants [Z79.01] [Z79.01]             Anticoagulation Episode Summary     INR check location:       Preferred lab:       Send INR reminders to:   [facility] ANTICOAG POOL    Comments:   Currently at [facility] in [place],    Fax  [phone]      Anticoagulation Care Providers     Provider Role Specialty Phone number    Dominguez Maradiaga MD [facility] [phone]            See the Encounter Report to view Anticoagulation Flowsheet and Dosing Calendar (Go to Encounters tab in chart review, and find the Anticoagulation Therapy Visit)        Zena Haas RN

## 2020-04-22 NOTE — TELEPHONE ENCOUNTER
8:55 AM    Reason for Call: Phone Call    Description: Pt called & asked if Dr Maradiaga would fax or email another approval that it is okay for him to drive.  He was told that it wasn't received & that he legally isn't okay to drive but Dr Maradiaga said the pt is ok to drive now.    Was an appointment offered for this call? No  If yes : Appointment type              Date    Preferred method for responding to this message: Telephone Call  What is your phone number ?[phone]    If we cannot reach you directly, may we leave a detailed response at the number you provided? Yes    Can this message wait until your PCP/provider returns, if available today? No, patient is wanting to get an answer as soon as he can on this issue    Elizabeth Patino

## 2020-04-22 NOTE — TELEPHONE ENCOUNTER
Spoke with patient, he did not have fax number for letter. He gave writer 2 separate numbers to contact however neither were a working number. Left message to patient that we would mail him the letter.

## 2020-04-23 NOTE — TELEPHONE ENCOUNTER
Patient called in and stated he needs to know if the letter has been sent the state is stating they did not get the fax the state stated the fax machine did not work. Please email letter to [email or web address].  Please contact patient [phone]

## 2020-04-23 NOTE — TELEPHONE ENCOUNTER
Spoke with patient, notified we are not able to email letter and dmv fax machine down. Patient is going to  letter to take care of.

## 2020-05-06 NOTE — TELEPHONE ENCOUNTER
traMADol (ULTRAM) 50 MG tablet         Last Written Prescription Date:  3/30/20  Last Fill Quantity: 120,   # refills: 0  Last Office Visit: 3/30/20  Future Office visit:       Routing refill request to provider for review/approval because:  Drug not on the [facility], [facility] or [facility] refill protocol or controlled substance    cilostazol (PLETAL) 100 MG tablet         Last Written Prescription Date:  12/26/19  Last Fill Quantity: 180,   # refills: 0  Last Office Visit: 3/30/20  Future Office visit:       Routing refill request to provider for review/approval because:  Platelet Inhibitors Failed    Normal HGB on file in past 12 months    Hemoglobin   Date Value Ref Range Status   03/30/2020 11.5 (L) 13.3 - 17.7 g/dL Final   ]

## 2020-05-07 NOTE — TELEPHONE ENCOUNTER
Failed protocol due to    Recent (12 mo) or future (30 days) visit within the authorizing provider's specialty Protocol Details    Order for Serevent, Striverdi, or Foradil and pt has steroid inhaler

## 2020-05-07 NOTE — TELEPHONE ENCOUNTER
perforomist 20g       Last Written Prescription Date:  11/7/2018  Last Fill Quantity: 360,   # refills: 1  Last Office Visit: 3/30/20  Future Office visit:       Routing refill request to provider for review/approval because:

## 2020-05-11 NOTE — PROGRESS NOTES
ANTICOAGULATION FOLLOW-UP CLINIC VISIT    Patient Name:  Rm Duarte  Date:  2020  Contact Type:  Telephone/ spoke to homecare  nurse and also spoke to patient.    SUBJECTIVE:  Patient Findings     Comments:   INR done by homecare nurse and result called to and message left on warfarin clinic voicemail. Call returned to homecare nurse. She states she is just leaving patient residence. She states he has no bleeding/bruising, no new changes in diet/meds/activity. States today is her last day with patient and homecare discontinued. I told her I will call patient with warfarin dosing/INR recheck date. Call then placed to patient and spoke to him re: INR result, warfarin dosing/INR recheck date. He verbalized understanding and has no questions. He will call warfarin clinic if any changes/issues/illness        Clinical Outcomes     Negatives:   Major bleeding event, Thromboembolic event, Anticoagulation-related hospital admission, Anticoagulation-related ED visit, Anticoagulation-related fatality    Comments:   INR done by homecare nurse and result called to and message left on warfarin clinic voicemail. Call returned to homecare nurse. She states she is just leaving patient residence. She states he has no bleeding/bruising, no new changes in diet/meds/activity. States today is her last day with patient and homecare discontinued. I told her I will call patient with warfarin dosing/INR recheck date. Call then placed to patient and spoke to him re: INR result, warfarin dosing/INR recheck date. He verbalized understanding and has no questions. He will call warfarin clinic if any changes/issues/illness           OBJECTIVE    INR   Date Value Ref Range Status   2020 2.0 (A) 0.90 - 1.10 Final       ASSESSMENT / PLAN  INR assessment THER    Recheck INR In: 3 WEEKS    INR Location [facility]      Anticoagulation Summary  As of 2020    INR goal:   2.0-3.0   TTR:   37.2 % (11.7 mo)   INR used for dosin.0  (5/11/2020)   Warfarin maintenance plan:   3.75 mg (2.5 mg x 1.5) every Mon, Fri; 2.5 mg (2.5 mg x 1) all other days   Full warfarin instructions:   3.75 mg every Mon, Fri; 2.5 mg all other days   Weekly warfarin total:   20 mg   No change documented:   Zena Haas RN   Plan last modified:   Jeanie Arechiga RN (12/3/2019)   Next INR check:   6/1/2020   Target end date:   Indefinite    Indications    Chronic atrial fibrillation [I48.20]  PVD (peripheral vascular disease) (H) [I73.9]  Long-term (current) use of anticoagulants [Z79.01] [Z79.01]             Anticoagulation Episode Summary     INR check location:       Preferred lab:       Send INR reminders to:   RUBIA HOYT    Comments:   Currently at [facility] in [facility]   Fax  [phone]      Anticoagulation Care Providers     Provider Role Specialty Phone number    Dominguez Maradiaga MD [facility] [phone]            See the Encounter Report to view Anticoagulation Flowsheet and Dosing Calendar (Go to Encounters tab in chart review, and find the Anticoagulation Therapy Visit)        Zena Haas, RN

## 2020-05-22 NOTE — TELEPHONE ENCOUNTER
PERFOROMIST 20 MCG/2ML neb solution       Last Written Prescription Date:  3/8/18  Last Fill Quantity: 360ml,   # refills: 1  Last Office Visit: 10/22/18  Future Office visit:          A.  DISTAL ESOPHAGUS BX

## 2020-05-28 NOTE — TELEPHONE ENCOUNTER
simvastatin (ZOCOR) 10 MG tablet       Last Written Prescription Date:  11/27/19  Last Fill Quantity: 90,   # refills: 0  Last Office Visit: 3/30/20 Virtual Visit   Future Office visit:       Routing refill request to provider for review/approval because:  Statin Protocol Failed due to:       LDL on file in past 12 months    LDL Cholesterol Calculated   Date Value Ref Range Status   08/02/2018 40 <100 mg/dL Final     Comment:     Desirable:       <100 mg/dl

## 2020-06-01 NOTE — PROGRESS NOTES
ANTICOAGULATION FOLLOW-UP CLINIC VISIT    Patient Name:  Rm Duarte  Date:  2020  Contact Type:  Telephone    SUBJECTIVE:  Patient Findings     Comments:   INR done by lab. Call placed to mobile phone and did not leave message as he does not listen to his messages.         Clinical Outcomes     Comments:   INR done by lab. Call placed to mobile phone and did not leave message as he does not listen to his messages.            OBJECTIVE    Recent labs: (last 7 days)     20  1005   INR 1.5*       ASSESSMENT / PLAN  INR assessment SUB    Recheck INR In: 4 WEEKS    INR Location Clinic      Anticoagulation Summary  As of 2020    INR goal:   2.0-3.0   TTR:   36.2 % (11.7 mo)   INR used for dosin.5! (2020)   Warfarin maintenance plan:   3.75 mg (2.5 mg x 1.5) every Mon, Wed, Fri; 2.5 mg (2.5 mg x 1) all other days   Full warfarin instructions:   : 5 mg; Otherwise 3.75 mg every Mon, Wed, Fri; 2.5 mg all other days   Weekly warfarin total:   21.25 mg   Plan last modified:   Zena Haas RN (2020)   Next INR check:   2020   Priority:   High   Target end date:   Indefinite    Indications    Chronic atrial fibrillation [I48.20]  PVD (peripheral vascular disease) (H) [I73.9]  Long-term (current) use of anticoagulants [Z79.01] [Z79.01]             Anticoagulation Episode Summary     INR check location:       Preferred lab:       Send INR reminders to:   RUBIA HOYT    Comments:   Currently at [facility] in [place],    Fax  [phone]      Anticoagulation Care Providers     Provider Role Specialty Phone number    Dominguez Maradiaga MD [facility] [phone]            See the Encounter Report to view Anticoagulation Flowsheet and Dosing Calendar (Go to Encounters tab in chart review, and find the Anticoagulation Therapy Visit)        Zena Haas, RN

## 2020-06-16 NOTE — TELEPHONE ENCOUNTER
tramadol      Last Written Prescription Date:  5/6/20  Last Fill Quantity: 120,   # refills: 0  Last Office Visit: 3/30/20  Future Office visit:       Routing refill request to provider for review/approval because:  Drug not on the [facility], [facility] or [facility] refill protocol or controlled substance

## 2020-06-29 NOTE — PROGRESS NOTES
ANTICOAGULATION FOLLOW-UP CLINIC VISIT    Patient Name:  Rm Duarte  Date:  2020  Contact Type:  Telephone    SUBJECTIVE:  Patient Findings     Comments:   Received INR results from lab. Call placed to patient and message left on  voicemail re: INR results, warfarin dosing/INR recheck date. Patient to return call to warfarin clinic with changes in bruising/bleeding, new changes in diet/meds/activity or questions.          Clinical Outcomes     Negatives:   Major bleeding event, Thromboembolic event, Anticoagulation-related hospital admission, Anticoagulation-related ED visit, Anticoagulation-related fatality    Comments:   Received INR results from lab. Call placed to patient and message left on  voicemail re: INR results, warfarin dosing/INR recheck date. Patient to return call to warfarin clinic with changes in bruising/bleeding, new changes in diet/meds/activity or questions.             OBJECTIVE    Recent labs: (last 7 days)     20  1001   INR 1.8*       ASSESSMENT / PLAN  INR assessment SUB    Recheck INR In: 2 WEEKS    INR Location Clinic      Anticoagulation Summary  As of 2020    INR goal:   2.0-3.0   TTR:   33.6 % (11.7 mo)   INR used for dosin.8! (2020)   Warfarin maintenance plan:   3.75 mg (2.5 mg x 1.5) every Mon, Wed, Fri; 2.5 mg (2.5 mg x 1) all other days   Full warfarin instructions:   : 5 mg; Otherwise 3.75 mg every Mon, Wed, Fri; 2.5 mg all other days   Weekly warfarin total:   21.25 mg   Plan last modified:   Zena Haas RN (2020)   Next INR check:   2020   Priority:   Maintenance   Target end date:   Indefinite    Indications    Chronic atrial fibrillation (H) [I48.20]  PVD (peripheral vascular disease) (H) [I73.9]  Long-term (current) use of anticoagulants [Z79.01] [Z79.01]             Anticoagulation Episode Summary     INR check location:       Preferred lab:       Send INR reminders to:   RUBIA HOYT    Comments:   Currently at [facility]  rehab in [facility]   Fax  [phone]      Anticoagulation Care Providers     Provider Role Specialty Phone number    Dominguez Maradiaga MD [facility] [phone]            See the Encounter Report to view Anticoagulation Flowsheet and Dosing Calendar (Go to Encounters tab in chart review, and find the Anticoagulation Therapy Visit)        Jeanie Arechiga RN

## 2020-07-07 NOTE — TELEPHONE ENCOUNTER
fran       Last Written Prescription Date:  3/30/2020  Last Fill Quantity: 360ml ,   # refills: 0  Last Office Visit: 3/30/2020  Future Office visit:

## 2020-07-20 NOTE — PROGRESS NOTES
ANTICOAGULATION FOLLOW-UP CLINIC VISIT    Patient Name:  Rm Duarte  Date:  2020  Contact Type:  Telephone    SUBJECTIVE:  Patient Findings     Comments:   INR done by lab. Call placed to patient home phone and spoke to him re: INR result, warfarin dosing/INR recheck date. He states  No changes in diet/meds/activity. No bleeding/bruising. He verbalized understanding of warfarin dosing/INR recheck date and has no questions. He will increase vit K intake to 2-3 times/week. He will go to ER if any uncontrolled bleeding.         Clinical Outcomes     Negatives:   Major bleeding event, Thromboembolic event, Anticoagulation-related hospital admission, Anticoagulation-related ED visit, Anticoagulation-related fatality    Comments:   INR done by lab. Call placed to patient home phone and spoke to him re: INR result, warfarin dosing/INR recheck date. He states  No changes in diet/meds/activity. No bleeding/bruising. He verbalized understanding of warfarin dosing/INR recheck date and has no questions. He will increase vit K intake to 2-3 times/week. He will go to ER if any uncontrolled bleeding.            OBJECTIVE    Recent labs: (last 7 days)     20  1010   INR 4.3*       ASSESSMENT / PLAN  INR assessment SUPRA no vit K intake   Recheck INR In: 1 WEEK    INR Location Clinic      Anticoagulation Summary  As of 2020    INR goal:   2.0-3.0   TTR:   34.5 % (11.7 mo)   INR used for dosin.3! (2020)   Warfarin maintenance plan:   3.75 mg (2.5 mg x 1.5) every Mon, Wed, Fri; 2.5 mg (2.5 mg x 1) all other days   Full warfarin instructions:   : Hold; : Hold; Otherwise 3.75 mg every Mon, Wed, Fri; 2.5 mg all other days   Weekly warfarin total:   21.25 mg   Plan last modified:   Zena Haas RN (2020)   Next INR check:   2020   Priority:   Maintenance   Target end date:   Indefinite    Indications    Chronic atrial fibrillation (H) [I48.20]  PVD (peripheral vascular disease) (H)  [I73.9]  Long-term (current) use of anticoagulants [Z79.01] [Z79.01]             Anticoagulation Episode Summary     INR check location:       Preferred lab:       Send INR reminders to:   RUBIA HOYT    Comments:   Currently at [facility] in [facility]   Fax  [phone]      Anticoagulation Care Providers     Provider Role Specialty Phone number    Dominguez Maradiaga MD [facility] [phone]            See the Encounter Report to view Anticoagulation Flowsheet and Dosing Calendar (Go to Encounters tab in chart review, and find the Anticoagulation Therapy Visit)        Zena Haas RN

## 2020-07-22 NOTE — TELEPHONE ENCOUNTER
Tramadol       Last Written Prescription Date:  6/16/20  Last Fill Quantity: 120,   # refills: 0  Last Office Visit: 3/30/2020  Future Office visit:       Routing refill request to provider for review/approval because:

## 2020-07-27 NOTE — PROGRESS NOTES
ANTICOAGULATION FOLLOW-UP CLINIC VISIT    Patient Name:  Rm Duarte  Date:  2020  Contact Type:  Telephone    SUBJECTIVE:  Patient Findings     Comments:   INR done by [facility]. Call placed to patient and spoke to him re: INR result, warfarin dosing/INR recheck date. He states no bleeding/bruising and no new changes in diet/meds/activity. He verbalized understanding of warfarin dosing/INR Recheck date and has no questions. He will call warfarin clinic if any changes/issues/illness        Clinical Outcomes     Negatives:   Major bleeding event, Thromboembolic event, Anticoagulation-related hospital admission, Anticoagulation-related ED visit, Anticoagulation-related fatality    Comments:   INR done by [facility] lab. Call placed to patient and spoke to him re: INR result, warfarin dosing/INR recheck date. He states no bleeding/bruising and no new changes in diet/meds/activity. He verbalized understanding of warfarin dosing/INR Recheck date and has no questions. He will call warfarin clinic if any changes/issues/illness           OBJECTIVE    Recent labs: (last 7 days)     20  0823   INR 1.9*       ASSESSMENT / PLAN  INR assessment SUB    Recheck INR In: 2 WEEKS    INR Location Clinic      Anticoagulation Summary  As of 2020    INR goal:   2.0-3.0   TTR:   34.2 % (11.7 mo)   INR used for dosin.9! (2020)   Warfarin maintenance plan:   3.75 mg (2.5 mg x 1.5) every Mon, Wed, Fri; 2.5 mg (2.5 mg x 1) all other days   Full warfarin instructions:   3.75 mg every Mon, Wed, Fri; 2.5 mg all other days   Weekly warfarin total:   21.25 mg   No change documented:   Zena Haas, RN   Plan last modified:   Zena Haas RN (2020)   Next INR check:   8/10/2020   Priority:   Maintenance   Target end date:   Indefinite    Indications    Chronic atrial fibrillation (H) [I48.20]  PVD (peripheral vascular disease) (H) [I73.9]  Long-term (current) use of anticoagulants [Z79.01] [Z79.01]              Anticoagulation Episode Summary     INR check location:       Preferred lab:       Send INR reminders to:   RUBIA HOYT    Comments:         Anticoagulation Care Providers     Provider Role Specialty Phone number    Dominguez Maradiaga MD [facility] [phone]            See the Encounter Report to view Anticoagulation Flowsheet and Dosing Calendar (Go to Encounters tab in chart review, and find the Anticoagulation Therapy Visit)        Zena Haas RN

## 2020-08-10 NOTE — PROGRESS NOTES
ANTICOAGULATION FOLLOW-UP CLINIC VISIT    Patient Name:  Rm Duarte  Date:  8/10/2020  Contact Type:  Telephone    SUBJECTIVE:  Patient Findings     Comments:   INR done by lab. Call placed to pt and spoke to him regarding INR result/warfarin dosing and INR recheck date. Pt denies any bruising/bleeding, no changes in diet/meds/activity. Pt encouraged to eat more vitamin K foods. Pt verbalized understanding and call warfarin clinic with any questions/concerns/illness.         Clinical Outcomes     Negatives:   Major bleeding event, Thromboembolic event, Anticoagulation-related hospital admission, Anticoagulation-related ED visit, Anticoagulation-related fatality    Comments:   INR done by lab. Call placed to pt and spoke to him regarding INR result/warfarin dosing and INR recheck date. Pt denies any bruising/bleeding, no changes in diet/meds/activity. Pt encouraged to eat more vitamin K foods. Pt verbalized understanding and call warfarin clinic with any questions/concerns/illness.            OBJECTIVE    Recent labs: (last 7 days)     08/10/20  0824   INR 3.1*       ASSESSMENT / PLAN  INR assessment SUPRA    Recheck INR In: 2 WEEKS    INR Location Clinic      Anticoagulation Summary  As of 8/10/2020    INR goal:   2.0-3.0   TTR:   33.8 % (11.7 mo)   INR used for dosing:   3.1! (8/10/2020)   Warfarin maintenance plan:   3.75 mg (2.5 mg x 1.5) every Mon, Wed, Fri; 2.5 mg (2.5 mg x 1) all other days   Full warfarin instructions:   8/10: 2.5 mg; Otherwise 3.75 mg every Mon, Wed, Fri; 2.5 mg all other days   Weekly warfarin total:   21.25 mg   Plan last modified:   Zena Haas RN (6/1/2020)   Next INR check:   8/24/2020   Priority:   High   Target end date:   Indefinite    Indications    Chronic atrial fibrillation (H) [I48.20]  PVD (peripheral vascular disease) (H) [I73.9]  Long-term (current) use of anticoagulants [Z79.01] [Z79.01]             Anticoagulation Episode Summary     INR check location:        Preferred lab:       Send INR reminders to:   RUBIA HOYT    Comments:         Anticoagulation Care Providers     Provider Role Specialty Phone number    Dominguez Maradiaga MD [facility] [phone]            See the Encounter Report to view Anticoagulation Flowsheet and Dosing Calendar (Go to Encounters tab in chart review, and find the Anticoagulation Therapy Visit)        Yoselin Garcia RN

## 2020-08-13 NOTE — TELEPHONE ENCOUNTER
Depakote  Last Written Prescription Date: 1/7/20, has been discontinued  Last Fill Quantity: 90 # of Refills: 3  Last Office Visit: 3/30/20

## 2020-08-19 NOTE — TELEPHONE ENCOUNTER
gabapentin (NEURONTIN) 300 MG capsule         Last Written Prescription Date:  1/7/20  Last Fill Quantity: 180,   # refills: 3  Last Office Visit: 3/30/20 Virtual Visit   Future Office visit:       Routing refill request to provider for review/approval because:    Drug not on the [facility], [facility] or [facility] refill protocol or controlled substance

## 2020-08-19 NOTE — TELEPHONE ENCOUNTER
traMADol (ULTRAM) 50 MG tablet      Last Written Prescription Date:  7/22/20  Last Fill Quantity: 120,     # refills: 0Last Office Visit:   1/29/2020  Future Office visit:

## 2020-08-24 NOTE — PROGRESS NOTES
ANTICOAGULATION FOLLOW-UP CLINIC VISIT    Patient Name:  Rm Duarte  Date:  8/24/2020  Contact Type:  Telephone/ message left on home phone voicemail    SUBJECTIVE:  Patient Findings     Comments:   INR done by lab. Call placed to patient and message left on home phone voicemail re: INR result, warfarin dosing/INR recheck date. He is to call warfarin clinic if he has any unusual bleeding/bruising, new changes in diet/meds/activity, questions or illness        Clinical Outcomes     Negatives:   Major bleeding event, Thromboembolic event, Anticoagulation-related hospital admission, Anticoagulation-related ED visit, Anticoagulation-related fatality    Comments:   INR done by lab. Call placed to patient and message left on home phone voicemail re: INR result, warfarin dosing/INR recheck date. He is to call warfarin clinic if he has any unusual bleeding/bruising, new changes in diet/meds/activity, questions or illness           OBJECTIVE    Recent labs: (last 7 days)     08/24/20  0836   INR 3.6*       ASSESSMENT / PLAN  INR assessment SUPRA    Recheck INR In: 1 WEEK    INR Location Clinic      Anticoagulation Summary  As of 8/24/2020    INR goal:   2.0-3.0   TTR:   31.9 % (11.7 mo)   INR used for dosing:   3.6! (8/24/2020)   Warfarin maintenance plan:   3.75 mg (2.5 mg x 1.5) every Mon, Wed, Fri; 2.5 mg (2.5 mg x 1) all other days   Full warfarin instructions:   8/24: Hold; 8/25: 1.25 mg; Otherwise 3.75 mg every Mon, Wed, Fri; 2.5 mg all other days   Weekly warfarin total:   21.25 mg   Plan last modified:   Zena Haas RN (6/1/2020)   Next INR check:   8/31/2020   Priority:   High   Target end date:   Indefinite    Indications    Chronic atrial fibrillation (H) [I48.20]  PVD (peripheral vascular disease) (H) [I73.9]  Long-term (current) use of anticoagulants [Z79.01] [Z79.01]             Anticoagulation Episode Summary     INR check location:       Preferred lab:       Send INR reminders to:   RUBIA HOYT     Comments:         Anticoagulation Care Providers     Provider Role Specialty Phone number    Dominguez Maradiaga MD Referring [facility] [phone]            See the Encounter Report to view Anticoagulation Flowsheet and Dosing Calendar (Go to Encounters tab in chart review, and find the Anticoagulation Therapy Visit)        Zena Haas RN

## 2020-08-31 NOTE — PROGRESS NOTES
"ANTICOAGULATION FOLLOW-UP CLINIC VISIT    Patient Name:  Rm Duarte  Date:  2020  Contact Type:  Telephone    SUBJECTIVE:  Patient Findings     Comments:   INR done by lab. Call placed to patient and no answer. Call placed to patient and did reach him at 1010. We discussed INR result, warfarin dosing/INR recheck date. He verbalized understanding and has no questions. We discussed that he should be eating \"something green\" 2-3 times a week. He verbalized understanding. He has no bleeding/bruising and no new changes in diet/meds/activity. He will call warfarin clinic if any changes/issues/illness        Clinical Outcomes     Negatives:   Major bleeding event, Thromboembolic event, Anticoagulation-related hospital admission, Anticoagulation-related ED visit, Anticoagulation-related fatality    Comments:   INR done by lab. Call placed to patient and no answer. Call placed to patient and did reach him at 1010. We discussed INR result, warfarin dosing/INR recheck date. He verbalized understanding and has no questions. We discussed that he should be eating \"something green\" 2-3 times a week. He verbalized understanding. He has no bleeding/bruising and no new changes in diet/meds/activity. He will call warfarin clinic if any changes/issues/illness           OBJECTIVE    Recent labs: (last 7 days)     20  0836   INR 2.2*       ASSESSMENT / PLAN  INR assessment THER    Recheck INR In: 3 WEEKS    INR Location Clinic      Anticoagulation Summary  As of 2020    INR goal:   2.0-3.0   TTR:   33.1 % (11.7 mo)   INR used for dosin.2 (2020)   Warfarin maintenance plan:   3.75 mg (2.5 mg x 1.5) every Mon, Fri; 2.5 mg (2.5 mg x 1) all other days   Full warfarin instructions:   3.75 mg every Mon, Fri; 2.5 mg all other days   Weekly warfarin total:   20 mg   Plan last modified:   Zena Haas RN (2020)   Next INR check:   2020   Priority:   High   Target end date:   Indefinite    Indications  "   Chronic atrial fibrillation (H) [I48.20]  PVD (peripheral vascular disease) (H) [I73.9]  Long-term (current) use of anticoagulants [Z79.01] [Z79.01]             Anticoagulation Episode Summary     INR check location:       Preferred lab:       Send INR reminders to:   RUBIA HOYT    Comments:   Patient does not listen to messages on home or mobile phone, speak to him directly      Anticoagulation Care Providers     Provider Role Specialty Phone number    Dominguez Maradiaga MD Referring [facility] [phone]            See the Encounter Report to view Anticoagulation Flowsheet and Dosing Calendar (Go to Encounters tab in chart review, and find the Anticoagulation Therapy Visit)        Zena Haas RN
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
## 2020-09-09 NOTE — TELEPHONE ENCOUNTER
Cilostazol 100 mg      Last Written Prescription Date:  5/6/20  Last Fill Quantity: 180,   # refills: 0  Last Office Visit: 3/3/20  Future Office visit:       Routing refill request to provider for review/approval because:

## 2020-09-15 NOTE — PROGRESS NOTES
Clinic Care Coordination Contact  [facility]/Voicemail    Referral Source: Care Team  Clinical Data: Care Coordinator Outreach  Outreach attempted x 1.  Left message on patient's voicemail with call back information and requested return call.    *Pt overdue for follow-up with Dr Maradiaga    Plan: Care Coordinator will await return call.     Lauren Pipkin, BS-RN   [facility] Coordination  [facility]  [phone] Option # 1

## 2020-09-18 NOTE — PROGRESS NOTES
Clinic Care Coordination Contact  Care Team Conversations    Return call received from pt. He states things have been going very well and he feels well.  He does mention his ongoing struggles with post-herpetic neuralgia.  He states he doesn't believe the tramadol he takes even helps with his pain.  He wonders if he should maybe try some medical marijuana.  CC encouraged him to discuss this with his provider.  He is agreeable to scheduling an appointment for follow-up of his COPD and chronic pain.      Next 5 appointments (look out 90 days)    Oct 29, 2020 10:45 AM CDT  (Arrive by 10:30 AM)  [facility]  [facility] [address] [facility] [address]  [phone]        Lauren Pipkin, BS-RN   Care Coordination  Primary Care- [facility]  [phone] Option # 1

## 2020-09-21 NOTE — PROGRESS NOTES
ANTICOAGULATION FOLLOW-UP CLINIC VISIT    Patient Name:  Rm Duarte  Date:  9/21/2020  Contact Type:  Telephone    SUBJECTIVE:  Patient Findings     Comments:   INR done by lab. Call placed to patient home and spoke to him re: INR result, warfarin dosing/INR recheck date. He verbalized understanding and has no questions. He denied any bleeding/bruising and no new changes in diet/meds/activity. He will  Notify warfarin clinic if any changes/issues/illness        Clinical Outcomes     Negatives:   Major bleeding event, Thromboembolic event, Anticoagulation-related hospital admission, Anticoagulation-related ED visit, Anticoagulation-related fatality    Comments:   INR done by lab. Call placed to patient home and spoke to him re: INR result, warfarin dosing/INR recheck date. He verbalized understanding and has no questions. He denied any bleeding/bruising and no new changes in diet/meds/activity. He will  Notify warfarin clinic if any changes/issues/illness           OBJECTIVE    Recent labs: (last 7 days)     09/21/20  1347   INR 3.3*       ASSESSMENT / PLAN  INR assessment SUPRA    Recheck INR In: 2 WEEKS    INR Location Clinic      Anticoagulation Summary  As of 9/21/2020    INR goal:   2.0-3.0   TTR:   34.2 % (11.7 mo)   INR used for dosing:   3.3! (9/21/2020)   Warfarin maintenance plan:   3.75 mg (2.5 mg x 1.5) every Fri; 2.5 mg (2.5 mg x 1) all other days   Full warfarin instructions:   9/21: 1.25 mg; Otherwise 3.75 mg every Fri; 2.5 mg all other days   Weekly warfarin total:   18.75 mg   Plan last modified:   Zena Haas RN (9/21/2020)   Next INR check:   10/5/2020   Priority:   High   Target end date:   Indefinite    Indications    Chronic atrial fibrillation (H) [I48.20]  PVD (peripheral vascular disease) (H) [I73.9]  Long-term (current) use of anticoagulants [Z79.01] [Z79.01]             Anticoagulation Episode Summary     INR check location:       Preferred lab:       Send INR reminders to:    RUBIA HOYT    Comments:   Patient does not listen to messages on home or mobile phone, speak to him directly      Anticoagulation Care Providers     Provider Role Specialty Phone number    Dominguez Maradiaga MD Referring [facility] [phone]            See the Encounter Report to view Anticoagulation Flowsheet and Dosing Calendar (Go to Encounters tab in chart review, and find the Anticoagulation Therapy Visit)        Zena Haas RN

## 2020-09-24 NOTE — TELEPHONE ENCOUNTER
Azithromycin 250 MG      Last Written Prescription Date:  1-7-2020  Last Fill Quantity: 90,   # refills: 3  Last Office Visit: 3-  Future Office visit:    Next 5 appointments (look out 90 days)    Oct 29, 2020 10:45 AM CDT  (Arrive by 10:30 AM)  [facility] [address]  [facility] [address]  [phone]

## 2020-09-28 NOTE — TELEPHONE ENCOUNTER
tramadol      Last Written Prescription Date:  8/20/20  Last Fill Quantity: 120,   # refills: 0  Last Office Visit: 3/30/20  Future Office visit:    Next 5 appointments (look out 90 days)    Oct 29, 2020 10:45 AM CDT  (Arrive by 10:30 AM)  [facility]  [facility] [address] [facility] [address]  [phone]           Routing refill request to provider for review/approval because:  Drug not on the [facility], [facility] or [facility] refill protocol or controlled substance

## 2020-10-05 NOTE — PROGRESS NOTES
ANTICOAGULATION FOLLOW-UP CLINIC VISIT    Patient Name:  Rm Duarte  Date:  10/5/2020  Contact Type:  Telephone    SUBJECTIVE:  Patient Findings     Comments:  INR done by lab. Call placed to patient and spoke to him re: INR result, warfarin dosing/INR recheck date. He verbalized understanding and has no questions. He denied any bleeding/bruising and no new changes in diet/meds/activity. He will call warfarin clinic if any changes/issues/illness        Clinical Outcomes     Negatives:  Major bleeding event, Thromboembolic event, Anticoagulation-related hospital admission, Anticoagulation-related ED visit, Anticoagulation-related fatality    Comments:  INR done by lab. Call placed to patient and spoke to him re: INR result, warfarin dosing/INR recheck date. He verbalized understanding and has no questions. He denied any bleeding/bruising and no new changes in diet/meds/activity. He will call warfarin clinic if any changes/issues/illness           OBJECTIVE    Recent labs: (last 7 days)     10/05/20  1308   INR 1.4*       ASSESSMENT / PLAN  INR assessment SUB dose cut 2 weeks ago   Recheck INR In: 10 DAYS    INR Location Clinic      Anticoagulation Summary  As of 10/5/2020    INR goal:  2.0-3.0   TTR:  34.1 % (11.7 mo)   INR used for dosin.4 (10/5/2020)   Warfarin maintenance plan:  3.75 mg (2.5 mg x 1.5) every Mon, Fri; 2.5 mg (2.5 mg x 1) all other days   Full warfarin instructions:  10/5: 5 mg; Otherwise 3.75 mg every Mon, Fri; 2.5 mg all other days   Weekly warfarin total:  20 mg   Plan last modified:  Zena Haas RN (10/5/2020)   Next INR check:  10/15/2020   Priority:  High   Target end date:  Indefinite    Indications    Chronic atrial fibrillation (H) [I48.20]  PVD (peripheral vascular disease) (H) [I73.9]  Long-term (current) use of anticoagulants [Z79.01] [Z79.01]             Anticoagulation Episode Summary     INR check location:      Preferred lab:      Send INR reminders to:  RUBIA HOYT     Comments:  Patient does not listen to messages on home or mobile phone, speak to him directly      Anticoagulation Care Providers     Provider Role Specialty Phone number    Dominguez Maradiaga MD Referring [facility] [phone]            See the Encounter Report to view Anticoagulation Flowsheet and Dosing Calendar (Go to Encounters tab in chart review, and find the Anticoagulation Therapy Visit)        Zena Haas RN

## 2020-10-06 NOTE — TELEPHONE ENCOUNTER
Depakote       Last Written Prescription Date:  8/13/2020  Last Fill Quantity: 90,   # refills: 0  Last Office Visit: 3/30/2020  Future Office visit:    Next 5 appointments (look out 90 days)    Oct 29, 2020 10:45 AM  (Arrive by 10:30 AM)  SHORT with Dominguez Maradiaga MD  [facility] ([facility] ) [address]  [facility] [address]  [phone]

## 2020-10-14 NOTE — PROGRESS NOTES
Subjective     Rm Duarte is a 79 year old male who presents to clinic today for the following health issues:    HPI         Chronic Pain Follow-Up    Where in your body do you have pain? face  How has your pain affected your ability to work? Not applicable  Which of these pain treatments have you tried since your last clinic visit? none  How well are you sleeping? Fair  How has your mood been since your last visit? About the same  Have you had a significant life event? No  Other aggravating factors: none  Taking medication as directed? Yes    PHQ-9 SCORE 12/17/2018 1/29/2020 10/29/2020   PHQ-9 Total Score - - -   PHQ-9 Total Score 1 2 2     SEMAJ-7 SCORE 12/17/2018 1/29/2020 10/29/2020   Total Score 1 0 0     No flowsheet data found.  Encounter-Level CSA:    There are no encounter-level csa.     Patient-Level CSA:    There are no patient-level csa.         Doing ok.  Breathing stable.  Update flu shot.  Pain stable and we reviewed options.  Ultram helps slightly.  We did contract and UDS.  See below.      Review of Systems   Constitutional, HEENT, cardiovascular, pulmonary, gi and gu systems are negative, except as otherwise noted.      Objective    BP (!) 87/51   Pulse 63   Wt 78.9 kg (174 lb)   SpO2 96%   BMI 24.27 kg/m    Body mass index is 24.27 kg/m .  Physical Exam   GENERAL: healthy, alert and no distress  NECK: no adenopathy, no asymmetry, masses, or scars and thyroid normal to palpation  RESP: lungs clear to auscultation - no rales, rhonchi or wheezes with decreased breath sounds throughout.   CV: regular rate and rhythm, normal S1 S2, no S3 or S4, no murmur, click or rub, no peripheral edema and peripheral pulses strong  ABDOMEN: soft, nontender, no hepatosplenomegaly, no masses and bowel sounds normal  MS: no gross musculoskeletal defects noted, no edema            Assessment & Plan     Lipid screening  Done.   - Lipid Profile (Chol, Trig, HDL, LDL calc); Future  - Comprehensive metabolic panel;  Future    Other chronic pain  Reviewed.  Stable use of ultram.  We talked about leaf line and I told him it might be something to try.  I doubt he will but he's thinking about it.  Basically, continue and update.    - Pain Drug Scr UR W Rptd Meds; Future    Postherpetic neuralgia  This is the chronic pain.     Chronic obstructive pulmonary disease, unspecified COPD type (H)  Severe and stable.  Sent meds for on hand.    - predniSONE (DELTASONE) 20 MG tablet; Take 3 tabs by mouth daily x 3 days, then 2 tabs daily x 3 days, then 1 tab daily x 3 days, then 1/2 tab daily x 3 days.  - azithromycin (ZITHROMAX) 250 MG tablet; Take 2 tablets (500 mg) by mouth daily for 1 day, THEN 1 tablet (250 mg) daily for 4 days.    Chronic, continuous use of opioids  As above.  I only need to see him yearly for this rather than q 3 months.      Need for prophylactic vaccination and inoculation against influenza  Given.   - FLUZONE HIGH DOSE 65+  [08199]            No follow-ups on file.    Dominguez Maradiaga MD  [facility]

## 2020-10-15 NOTE — PROGRESS NOTES
ANTICOAGULATION FOLLOW-UP CLINIC VISIT    Patient Name:  Rm Duarte  Date:  10/15/2020  Contact Type:  Telephone    SUBJECTIVE:  Patient Findings     Comments:  INR done by lab. Call placed to patient and spoke to him re: INR result, warfarin dosing/INR recheck date. He verbalized understanding of instructions and has no questions. He denied any bleeding/bruising and has no new changes in diet/meds/activity. He will call warfarin clinic if any changes/issues/illness        Clinical Outcomes     Negatives:  Major bleeding event, Thromboembolic event, Anticoagulation-related hospital admission, Anticoagulation-related ED visit, Anticoagulation-related fatality    Comments:  INR done by lab. Call placed to patient and spoke to him re: INR result, warfarin dosing/INR recheck date. He verbalized understanding of instructions and has no questions. He denied any bleeding/bruising and has no new changes in diet/meds/activity. He will call warfarin clinic if any changes/issues/illness           OBJECTIVE    Recent labs: (last 7 days)     10/15/20  0829   INR 2.4*       ASSESSMENT / PLAN  INR assessment THER    Recheck INR In: 3 WEEKS    INR Location Clinic      Anticoagulation Summary  As of 10/15/2020    INR goal:  2.0-3.0   TTR:  35.3 % (11.7 mo)   INR used for dosin.4 (10/15/2020)   Warfarin maintenance plan:  3.75 mg (2.5 mg x 1.5) every Mon, Fri; 2.5 mg (2.5 mg x 1) all other days   Full warfarin instructions:  3.75 mg every Mon, Fri; 2.5 mg all other days   Weekly warfarin total:  20 mg   No change documented:  Zena Haas RN   Plan last modified:  Zena Haas RN (10/5/2020)   Next INR check:  2020   Priority:  High   Target end date:  Indefinite    Indications    Chronic atrial fibrillation (H) [I48.20]  PVD (peripheral vascular disease) (H) [I73.9]  Long-term (current) use of anticoagulants [Z79.01] [Z79.01]             Anticoagulation Episode Summary     INR check location:      Preferred lab:       Send INR reminders to:  RUBIA HOYT    Comments:  Patient does not listen to messages on home or mobile phone, speak to him directly      Anticoagulation Care Providers     Provider Role Specialty Phone number    Dominguez Maradiaga MD Referring [facility] [phone]            See the Encounter Report to view Anticoagulation Flowsheet and Dosing Calendar (Go to Encounters tab in chart review, and find the Anticoagulation Therapy Visit)        Zena Haas RN

## 2020-10-15 NOTE — TELEPHONE ENCOUNTER
Zocor       Last Written Prescription Date:  5/28/2020  Last Fill Quantity: 90,   # refills: 0  Last Office Visit: 3/30/2020  Future Office visit:    Next 5 appointments (look out 90 days)    Oct 29, 2020 10:45 AM  (Arrive by 10:30 AM)  [facility] with Dominguez Maradiaga MD  [facility] [address]  [facility] [address]  [phone]

## 2020-10-29 NOTE — LETTER
[facility] [place]  10/29/20    Patient: Rm Duarte  YOB: 1940  Medical Record Number: [id number]                                                                  Opioid / Opioid Plus Controlled Substance Agreement    I understand that my care provider has prescribed an opioid (narcotic) controlled substance to help manage my condition(s). I am taking this medicine to help me function or work. I know this is strong medicine, and that it can cause serious side effects. Opioid medicine can be sedating, addicting and may cause a dependency on the drug. They can affect my ability to drive or think, and cause depression. They need to be taken exactly as prescribed. Combining opioids with certain medicines or chemicals (such as cocaine, sedatives and tranquilizers, sleeping pills, meth) can be dangerous or even fatal. Also, if I stop opioids suddenly, I may have severe withdrawal symptoms. Last, I understand that opioids do not work for all types of pain nor for all patients. If not helpful, I may be asked to stop them.        The risks, benefits, and side effects of these medicine(s) were explained to me. I agree that:    1. I will take part in other treatments as advised by my care team. This may be psychiatry or counseling, physical therapy, behavioral therapy, group treatment or a referral to a pain clinic. I will reduce or stop my medicine when my care team tells me to do so.  2. I will take my medicines as prescribed. I will not change the dose or schedule unless my care team tells me to. There will be no refills if I  run out early.   I may be contactedwithout warning and asked to complete a urine drug test or pill count at any time.   3. I will keep all my appointments, and understand this is part of the monitoring of opioids. My care team may require an office visit for EVERY opioid/controlled substance refill. If I miss appointments or don t follow instructions, my care team may stop my  medicine.  4. I will not ask other providers to prescribe controlled substances, and I will not accept controlled substances from other people. If I need another prescribed controlled substance for a new reason, I will tell my care team within 1 business day.  5. I will use one pharmacy to fill all of my controlled substance prescriptions, and it is up to me to make sure that I do not run out of my medicines on weekends or holidays. If my care team is willing to refill my opioid prescription without a visit, I must request refills only during office hours, refills may take up to 3 days to process, and it may take up to 5 to 7 days for my medicine to be mailed and ready at my pharmacy. Prescriptions will not be mailed anywhere except my pharmacy.        [id number]  Rev 12/18         Registration to scan to EHR                             Page 1 of 2               Controlled Substance Agreement Opioid        [facility] [place]  10/29/20  Patient: Rm Duarte  YOB: 1940  Medical Record Number: [id number]                                                                  6. I am responsible for my prescriptions. If the medicine/prescription is lost or stolen, it will not be replaced. I also agree not to share controlled substance medicines with anyone.  7. I agree to not use ANY illegal or recreational drugs. This includes marijuana, cocaine, bath salts or other drugs. I agree not to use alcohol unless my care team says I may.          I agree to give urine samples whenever asked. If I don t give a urine sample, the care team may stop my medicine.    8. If I enroll in the [place] Medical Marijuana program, I will tell my care team. I will also sign an agreement to share my medical records with my care team.   9. I will bring in my list of medicines (or my medicine bottles) each time I come to the clinic.   10. I will tell my care team right away if I become pregnant or have a new medical problem treated outside  of my regular clinic.  11. I understand that this medicine can affect my thinking and judgment. It may be unsafe for me to drive, use machinery and do dangerous tasks. I will not do any of these things until I know how the medicine affects me. If my dose changes, I will wait to see how it affects me. I will contact my care team if I have concerns about medicine side effects.    I understand that if I do not follow any of the conditions above, my prescriptions or treatment may be stopped.      I agree that my provider, clinic care team, and pharmacy may work with any city, state or federal law enforcement agency that investigates the misuse, sale, or other diversion of my controlled medicine. I will allow my provider to discuss my care with or share a copy of this agreement with any other treating provider, pharmacy or emergency room where I receive care. I agree to give up (waive) any right of privacy or confidentiality with respect to these consents.     I have read this agreement and have asked questions about anything I did not understand.      ________________________________________________________________________  Patient signature - Date/Time -  Rm Duarte                                      ________________________________________________________________________  Witness signature                                                            ________________________________________________________________________  Provider signature - Dominguez Maradiaga MD      [id number]  Rev 12/18         Registration to scan to EHR                         Page 2 of 2                   Controlled Substance Agreement Opioid           Page 1 of 2  Opioid Pain Medicines (also known as Narcotics)  What You Need to Know    What are opioids?   Opioids are pain medicines that must be prescribed by a doctor.  They are also known as narcotics.    Examples are:     morphine (MS Contin, Vivi)    oxycodone (Oxycontin)    oxycodone and  acetaminophen (Percocet)    hydrocodone and acetaminophen (Vicodin, Norco)     fentanyl patch (Duragesic)     hydromorphone (Dilaudid)     methadone     What do opioids do well?   Opioids are best for short-term pain after a surgery or injury. They also work well for cancer pain. Unlike other pain medicines, they do not cause liver or kidney failure or ulcers. They may help some people with long-lasting (chronic) pain.     What do opioids NOT do well?   Opioids never get rid of pain entirely, and they do not work well for most patients with chronic pain. Opioids do not reduce swelling, one of the causes of pain. They also don t work well for nerve pain.                           For informational purposes only.  Not to replace the advice of your care provider.  Copyright 201 [facility]. All right reserved. [employer] 579315-Cfo 02/18.      Page 2 of 2    Risks and side effects   Talk to your doctor before you start or decide to keep taking one of these medicines. Side effects include:    Lowering your breathing rate enough to cause death    Overdose, including death, especially if taking higher than prescribed doses    Long-term opioid use    Worse depression symptoms; less pleasure in things you usually enjoy    Feeling tired or sluggish    Slower thoughts or cloudy thinking    Being more sensitive to pain over time; pain is harder to control    Trouble sleeping or restless sleep    Changes in hormone levels (for example, less testosterone)    Changes in sex drive or ability to have sex    Constipation    Unsafe driving    Itching and sweating    Feeling dizzy    Nausea, vomiting and dry mouth    What else should I know about opioids?  When someone takes opioids for too long or too often, they become dependent. This means that if you stop or reduce the medicine too quickly, you will have withdrawal symptoms.    Dependence is not the same as addiction. Addiction is when people keep using a substance  that harms their body, their mind or their relations with others. If you have a history of drug or alcohol abuse, taking opioids can cause a relapse.    Over time, opioids don t work as well. Most people will need higher and higher doses. The higher the dose, the more serious the side effects. We don t know the long-term effects of opioids.      Prescribed opioids aren't the best way to manage chronic pain    Other ways to manage pain include:      Ibuprofen or acetaminophen.  You should always try this first.      Treat health problems that may be causing pain.      acupuncture or massage, deep breathing, meditation, visual imagery, aromatherapy.      Use heat or ice at the pain site      Physical therapy and exercise      Stop smoking      See a counselor or therapist                                                  People who have used opioids for a long time may have a lower quality of life, worse depression, higher levels of pain and more visits to doctors.    Never share your opioids with others. Be sure to store opioids in a secure place, locked if possible.Young children can easily swallow them and overdose.     You can overdose on opioids.  Signs of overdose include decrease or loss of consciousness, slowed breathing, trouble waking and blue lips.  If someone is worried about overdose, they should call 911.    If you are at risk for overdose, you may get naloxone (Narcan, a medicine that reverses the effects of opioids.  If you overdose, a friend or family member can give you Narcan while waiting for the ambulance.  They need to know the signs of overdose and how to give Narcan.    While you're taking opioids:    Don't use alcohol or street drugs. Taking them together can cause death.    Don't take any of these medicines unless your doctor says its okay.  Taking these with opioids can cause death.    Benzodiazepines (such as lorazepam         or diazepam)    Muscle relaxers (such as  cyclobenzaprine)    sleeping pills    other opioids    Safe disposal of opioids  Find your area drug take-back program, your pharmacy mail-back program, buy a special disposal bag (such as Deterra) from your pharmacy or flush them down the toilet.  Use the guidelines at:  www.fda.gov/drugs/resourcesforyou

## 2020-10-29 NOTE — NURSING NOTE
"Chief Complaint   Patient presents with     Pain       Initial BP (!) 87/51   Pulse 63   Wt 78.9 kg (174 lb)   SpO2 96%   BMI 24.27 kg/m   Estimated body mass index is 24.27 kg/m  as calculated from the following:    Height as of 3/4/20: 1.803 m (5' 11\").    Weight as of this encounter: 78.9 kg (174 lb).  Medication Reconciliation: complete  Lila Serrano LPN  "

## 2020-10-29 NOTE — PROGRESS NOTES
ANTICOAGULATION FOLLOW-UP CLINIC VISIT    Patient Name:  Rm Duarte  Date:  10/29/2020  Contact Type:  Telephone/ message left on home phone voicemail    SUBJECTIVE:  Patient Findings     Positives:  Change in medications (zpak and tapering prednisone dosing )    Comments:  Notified by BPA in inbox, that patient is starting a zpak and in reading MD  Note from today, patient on taper dose of prednisone. 60mg x3d,( 10/29,30,31) 40mg x 3 d (11/1,2,3), 20mg x3 d,(11/4,5,6)) 10mg x3 days (11/7.8.9). Call placed to patient and after numerous attempts to reach patient I did leave a voicemail on his home phone. He has previously stated he does not listen to them but he has not answered his phone. He is to call warfarin clinic if he has any other changes in diet/meds/activity/bleeding/bruising, questions or illness        Clinical Outcomes     Negatives:  Major bleeding event, Thromboembolic event, Anticoagulation-related hospital admission, Anticoagulation-related ED visit, Anticoagulation-related fatality    Comments:  Notified by BPA in inbox, that patient is starting a zpak and in reading MD  Note from today, patient on taper dose of prednisone. 60mg x3d,( 10/29,30,31) 40mg x 3 d (11/1,2,3), 20mg x3 d,(11/4,5,6)) 10mg x3 days (11/7.8.9). Call placed to patient and after numerous attempts to reach patient I did leave a voicemail on his home phone. He has previously stated he does not listen to them but he has not answered his phone. He is to call warfarin clinic if he has any other changes in diet/meds/activity/bleeding/bruising, questions or illness           OBJECTIVE    No results for input(s): INR, [id number], [id number], 2AGN, F2 in the last 168 hours.    ASSESSMENT / PLAN  No question data found.  Anticoagulation Summary  As of 10/29/2020    INR goal:  2.0-3.0   TTR:  36.7 % (11.3 mo)   INR used for dosing:  No new INR was available at the time of this encounter.   Warfarin maintenance plan:  3.75 mg (2.5 mg x  1.5) every Mon, Fri; 2.5 mg (2.5 mg x 1) all other days   Full warfarin instructions:  10/30: 2.5 mg; 10/31: 1.25 mg; 11/1: 1.25 mg; 11/2: 2.5 mg; 11/3: 1.25 mg; 11/6: 2.5 mg; Otherwise 3.75 mg every Mon, Fri; 2.5 mg all other days   Weekly warfarin total:  20 mg   Plan last modified:  Zena Haas RN (10/5/2020)   Next INR check:  11/5/2020   Priority:  High   Target end date:  Indefinite    Indications    Chronic atrial fibrillation (H) [I48.20]  PVD (peripheral vascular disease) (H) [I73.9]  Long-term (current) use of anticoagulants [Z79.01] [Z79.01]             Anticoagulation Episode Summary     INR check location:      Preferred lab:      Send INR reminders to:  RUBIA HOYT    Comments:  Patient does not listen to messages on home or mobile phone, speak to him directly      Anticoagulation Care Providers     Provider Role Specialty Phone number    Dominguez Maradiaga MD Referring Family Medicine [phone]            See the Encounter Report to view Anticoagulation Flowsheet and Dosing Calendar (Go to Encounters tab in chart review, and find the Anticoagulation Therapy Visit)        Zena Haas, RN

## 2020-10-30 NOTE — PROGRESS NOTES
ANTICOAGULATION FOLLOW-UP CLINIC VISIT    Patient Name:  Rm Duarte  Date:  10/30/2020  Contact Type:  Telephone/ no answer at his home or his mobile phone. I did call and speak to his sisterPatt    SUBJECTIVE:  Patient Findings     Comments:  I was unable to reach patient yesterday to let him know about warfarin dosing changes r/t prednisone and antibiotic use. I attempted to call him again today and there was no answer at his home and no answer on his mobile phone. I did call patient sisterPatt and spoke to her to see if she had any idea where patient is. She states she talked to him either yesterday(10/29) or the day before (10/28). She states she [place] and when he does that he stays 3-4 days at a time. I will try to reach patient on Monday. 11/2/2020.    Pt called the Warfarin clinic re: message left on machine. He states that he has not taken any antibiotics or any other medications and has not altered his dose any. Warfarin calender updated to reflect pt scheduled doses. He plans to still come for INR recheck scheduled for 11/5. He denies any other changes in diet/meds/activity.         Clinical Outcomes     Negatives:  Major bleeding event, Thromboembolic event, Anticoagulation-related hospital admission, Anticoagulation-related ED visit, Anticoagulation-related fatality    Comments:  I was unable to reach patient yesterday to let him know about warfarin dosing changes r/t prednisone and antibiotic use. I attempted to call him again today and there was no answer at his home and no answer on his mobile phone. I did call patient sisterPatt and spoke to her to see if she had any idea where patient is. She states she talked to him either yesterday(10/29) or the day before (10/28). She states she thinks he went [place] the [place] and when he does that he stays 3-4 days at a time. I will try to reach patient on Monday. 11/2/2020.    Pt called the Warfarin clinic re: message left on  machine. He states that he has not taken any antibiotics or any other medications and has not altered his dose any. Warfarin calender updated to reflect pt scheduled doses. He plans to still come for INR recheck scheduled for 11/5. He denies any other changes in diet/meds/activity.            OBJECTIVE    No results for input(s): INR, [id number], [id number], 2AGN, F2 in the last 168 hours.    ASSESSMENT / PLAN  No question data found.  Anticoagulation Summary  As of 10/30/2020    INR goal:  2.0-3.0   TTR:  36.8 % (11.2 mo)   INR used for dosing:  No new INR was available at the time of this encounter.   Warfarin maintenance plan:  3.75 mg (2.5 mg x 1.5) every Mon, Fri; 2.5 mg (2.5 mg x 1) all other days   Full warfarin instructions:  3.75 mg every Mon, Fri; 2.5 mg all other days   Weekly warfarin total:  20 mg   No change documented:  Izzy Ang RN   Plan last modified:  Zena Haas RN (10/5/2020)   Next INR check:  11/5/2020   Priority:  High   Target end date:  Indefinite    Indications    Chronic atrial fibrillation (H) [I48.20]  PVD (peripheral vascular disease) (H) [I73.9]  Long-term (current) use of anticoagulants [Z79.01] [Z79.01]             Anticoagulation Episode Summary     INR check location:      Preferred lab:      Send INR reminders to:  RUBIA HOYT    Comments:  Patient does not listen to messages on home or mobile phone, speak to him directly      Anticoagulation Care Providers     Provider Role Specialty Phone number    Dominguez Maradiaga MD Referring Family Medicine [phone]            See the Encounter Report to view Anticoagulation Flowsheet and Dosing Calendar (Go to Encounters tab in chart review, and find the Anticoagulation Therapy Visit)        Zena Haas, RN

## 2020-11-02 NOTE — TELEPHONE ENCOUNTER
ultram      Last Written Prescription Date:  9/28/2020  Last Fill Quantity: 120,   # refills: 0  Last Office Visit: [id number]  Future Office visit:

## 2020-11-05 NOTE — PROGRESS NOTES
ANTICOAGULATION FOLLOW-UP CLINIC VISIT    Patient Name:  Rm Duarte  Date:  11/5/2020  Contact Type:  Telephone    SUBJECTIVE:  Patient Findings     Comments:  INR done by lab. Call placed to pt and spoke to pt re: INR results/Warfarin dosing/INR recheck. He verbalized understanding of instructions. He reported the Antibiotic and Prednisone that he picked up, but again stated that he is not taking them and will let Warfarin clinic know if he starts them. He denies any bleeding/bruising or any new changes in meds/activity/diet. He is to call Warfarin clinic if any changes/questions/illness.         Clinical Outcomes     Negatives:  Major bleeding event, Thromboembolic event, Anticoagulation-related hospital admission, Anticoagulation-related ED visit, Anticoagulation-related fatality    Comments:  INR done by lab. Call placed to pt and spoke to pt re: INR results/Warfarin dosing/INR recheck. He verbalized understanding of instructions. He reported the Antibiotic and Prednisone that he picked up, but again stated that he is not taking them and will let Warfarin clinic know if he starts them. He denies any bleeding/bruising or any new changes in meds/activity/diet. He is to call Warfarin clinic if any changes/questions/illness.            OBJECTIVE    Recent labs: (last 7 days)     11/05/20  1020   INR 3.0*       ASSESSMENT / PLAN  INR assessment THER    Recheck INR In: 4 WEEKS    INR Location Clinic      Anticoagulation Summary  As of 11/5/2020    INR goal:  2.0-3.0   TTR:  41.2 % (11.7 mo)   INR used for dosing:  3.0 (11/5/2020)   Warfarin maintenance plan:  3.75 mg (2.5 mg x 1.5) every Mon, Fri; 2.5 mg (2.5 mg x 1) all other days   Full warfarin instructions:  3.75 mg every Mon, Fri; 2.5 mg all other days   Weekly warfarin total:  20 mg   No change documented:  Izzy Ang RN   Plan last modified:  Zena Haas RN (10/5/2020)   Next INR check:  12/3/2020   Priority:  High   Target end date:  Indefinite     Indications    Chronic atrial fibrillation (H) [I48.20]  PVD (peripheral vascular disease) (H) [I73.9]  Long-term (current) use of anticoagulants [Z79.01] [Z79.01]             Anticoagulation Episode Summary     INR check location:      Preferred lab:      Send INR reminders to:  RUBIA HOYT    Comments:  Patient does not listen to messages on home or mobile phone, speak to him directly      Anticoagulation Care Providers     Provider Role Specialty Phone number    Dominguez Maradiaga MD Referring Family Medicine [phone]            See the Encounter Report to view Anticoagulation Flowsheet and Dosing Calendar (Go to Encounters tab in chart review, and find the Anticoagulation Therapy Visit)        Izzy Ang RN

## 2020-11-19 NOTE — TELEPHONE ENCOUNTER
Depakote 250 mg      Last Written Prescription Date:  10/6/20  Last Fill Quantity: 90,   # refills: 0  Last Office Visit: 10/29/20  Future Office visit:       Routing refill request to provider for review/approval because:

## 2020-11-30 NOTE — TELEPHONE ENCOUNTER
ultram      Last Written Prescription Date:  11/2/2020  Last Fill Quantity: 120,   # refills: 0  Last Office Visit: 10/29/2020  Future Office visit:

## 2020-12-03 NOTE — PROGRESS NOTES
"ANTICOAGULATION FOLLOW-UP CLINIC VISIT    Patient Name:  Rm Duarte  Date:  12/3/2020  Contact Type:  Telephone/ Left voicemail on pt home phone    SUBJECTIVE:  Patient Findings     Positives:  Upcoming dental procedure (Tooth extraction- not scheduled yet)    Comments:  Call received from [facility] reporting critical INR. 7.6 initially, the second was above 8.0. Instructed her to do a venipuncture INR for comparison. Call placed to pt and left voicemail on pt home phone re: INR results/Warfarin dosing/INR recheck date. He had previously picked up prescriptions for an antibiotic and prednisone for \"just in case I get sick\". I am assuming that he has started taking those medications with his critical INR result. He needs to call Warfarin clinic if any bleeding/bruising or any other new changes to his diet/meds/activity or any other questions/issues/illness. Warfarin clinic will follow through with patient this afternoon again as he is known to not respond to voicemail for a week or more.     Hospital lab called Warfarin clinic reporting venipuncture INR- 5.63. Call placed to pt and spoke to him re: Warfarin dosing. He denied taking any of his antibiotic or prednisone and stated that he does not know why his INR is so high as he has not changed anything. Pt verbalized understanding of instructions. He is to call Warfarin clinic if any further questions.        Clinical Outcomes     Negatives:  Major bleeding event, Thromboembolic event, Anticoagulation-related hospital admission, Anticoagulation-related ED visit, Anticoagulation-related fatality    Comments:  Call received from [facility] reporting critical INR. 7.6 initially, the second was above 8.0. Instructed her to do a venipuncture INR for comparison. Call placed to pt and left voicemail on pt home phone re: INR results/Warfarin dosing/INR recheck date. He had previously picked up prescriptions for an antibiotic and prednisone for \"just in case I get " "sick\". I am assuming that he has started taking those medications with his critical INR result. He needs to call Warfarin clinic if any bleeding/bruising or any other new changes to his diet/meds/activity or any other questions/issues/illness. Warfarin clinic will follow through with patient this afternoon again as he is known to not respond to voicemail for a week or more.     Hospital lab called Warfarin clinic reporting venipuncture INR- 5.63. Call placed to pt and spoke to him re: Warfarin dosing. He denied taking any of his antibiotic or prednisone and stated that he does not know why his INR is so high as he has not changed anything. Pt verbalized understanding of instructions. He is to call Warfarin clinic if any further questions.           OBJECTIVE    Recent labs: (last 7 days)     20  0958   INR 7.6*       ASSESSMENT / PLAN  INR assessment SUPRA    Recheck INR In: 4 DAYS    INR Location Clinic      Anticoagulation Summary  As of 12/3/2020    INR goal:  2.0-3.0   TTR:  41.2 % (11.7 mo)   INR used for dosin.63 (12/3/2020)   Warfarin maintenance plan:  3.75 mg (2.5 mg x 1.5) every Mon, Fri; 2.5 mg (2.5 mg x 1) all other days   Full warfarin instructions:  /3: Hold; : Hold; 12/: 1.25 mg; Otherwise 3.75 mg every Mon, Fri; 2.5 mg all other days   Weekly warfarin total:  20 mg   Plan last modified:  Zena Haas RN (10/5/2020)   Next INR check:  2020   Priority:  High   Target end date:  Indefinite    Indications    Chronic atrial fibrillation (H) [I48.20]  PVD (peripheral vascular disease) (H) [I73.9]  Long-term (current) use of anticoagulants [Z79.01] [Z79.01]             Anticoagulation Episode Summary     INR check location:      Preferred lab:      Send INR reminders to:  RUBIA HOYT    Comments:  Patient does not listen to messages on home or mobile phone, speak to him directly      Anticoagulation Care Providers     Provider Role Specialty Phone number    Dominguez Maradiaga, " MD Referring Family Medicine [phone]            See the Encounter Report to view Anticoagulation Flowsheet and Dosing Calendar (Go to Encounters tab in chart review, and find the Anticoagulation Therapy Visit)        Izzy Ang RN

## 2020-12-07 NOTE — PROGRESS NOTES
ANTICOAGULATION FOLLOW-UP CLINIC VISIT    Patient Name:  Rm Duarte  Date:  2020  Contact Type:  Telephone    SUBJECTIVE:  Patient Findings     Comments:  INR done by lab. Call placed to pt and spoke to him re: INR results/Warfarin dosing/INR recheck date. He denied any bleeding/bruising or any other new changes to his diet/meds/activity. He verbalized understanding of instructions. He is to call Warfarin clinic if any questions/issues/illness.         Clinical Outcomes     Negatives:  Major bleeding event, Thromboembolic event, Anticoagulation-related hospital admission, Anticoagulation-related ED visit, Anticoagulation-related fatality    Comments:  INR done by lab. Call placed to pt and spoke to him re: INR results/Warfarin dosing/INR recheck date. He denied any bleeding/bruising or any other new changes to his diet/meds/activity. He verbalized understanding of instructions. He is to call Warfarin clinic if any questions/issues/illness.            OBJECTIVE    Recent labs: (last 7 days)     20  0955   INR 1.8*       ASSESSMENT / PLAN  INR assessment SUB    Recheck INR In: 4 WEEKS    INR Location Clinic      Anticoagulation Summary  As of 2020    INR goal:  2.0-3.0   TTR:  41.3 % (11.7 mo)   INR used for dosin.8 (2020)   Warfarin maintenance plan:  3.75 mg (2.5 mg x 1.5) every Mon, Fri; 2.5 mg (2.5 mg x 1) all other days   Full warfarin instructions:  : 5 mg; Otherwise 3.75 mg every Mon, Fri; 2.5 mg all other days   Weekly warfarin total:  20 mg   Plan last modified:  Zena Haas RN (10/5/2020)   Next INR check:  2021   Priority:  High   Target end date:  Indefinite    Indications    Chronic atrial fibrillation (H) [I48.20]  PVD (peripheral vascular disease) (H) [I73.9]  Long-term (current) use of anticoagulants [Z79.01] [Z79.01]             Anticoagulation Episode Summary     INR check location:      Preferred lab:      Send INR reminders to:  RUBIA HOYT    Comments:   Patient does not listen to messages on home or mobile phone, speak to him directly      Anticoagulation Care Providers     Provider Role Specialty Phone number    Dominguez Maradiaga MD Referring Family Medicine [phone]            See the Encounter Report to view Anticoagulation Flowsheet and Dosing Calendar (Go to Encounters tab in chart review, and find the Anticoagulation Therapy Visit)        Izzy Ang RN

## 2020-12-22 NOTE — TELEPHONE ENCOUNTER
tramadol      Last Written Prescription Date:  11/30/2020  Last Fill Quantity: 120,   # refills: 0  Last Office Visit: 10/29/2020  Future Office visit:       Routing refill request to provider for review/approval because:

## 2021-01-01 ENCOUNTER — OFFICE VISIT (OUTPATIENT)
Dept: FAMILY MEDICINE | Facility: OTHER | Age: 81
End: 2021-01-01
Attending: FAMILY MEDICINE
Payer: COMMERCIAL

## 2021-01-01 ENCOUNTER — ANTICOAGULATION THERAPY VISIT (OUTPATIENT)
Dept: ANTICOAGULATION | Facility: OTHER | Age: 81
End: 2021-01-01
Attending: FAMILY MEDICINE
Payer: COMMERCIAL

## 2021-01-01 ENCOUNTER — TELEPHONE (OUTPATIENT)
Dept: FAMILY MEDICINE | Facility: OTHER | Age: 81
End: 2021-01-01

## 2021-01-01 VITALS
WEIGHT: 166 LBS | OXYGEN SATURATION: 84 % | SYSTOLIC BLOOD PRESSURE: 96 MMHG | HEART RATE: 80 BPM | DIASTOLIC BLOOD PRESSURE: 58 MMHG | HEIGHT: 68 IN | BODY MASS INDEX: 25.16 KG/M2

## 2021-01-01 DIAGNOSIS — I73.9 PVD (PERIPHERAL VASCULAR DISEASE) (H): ICD-10-CM

## 2021-01-01 DIAGNOSIS — F11.90 CHRONIC, CONTINUOUS USE OF OPIOIDS: Chronic | ICD-10-CM

## 2021-01-01 DIAGNOSIS — I48.20 CHRONIC ATRIAL FIBRILLATION (H): ICD-10-CM

## 2021-01-01 DIAGNOSIS — B02.29 POSTHERPETIC NEURALGIA: ICD-10-CM

## 2021-01-01 DIAGNOSIS — Z79.01 LONG TERM CURRENT USE OF ANTICOAGULANT THERAPY: ICD-10-CM

## 2021-01-01 DIAGNOSIS — J44.9 CHRONIC OBSTRUCTIVE PULMONARY DISEASE, UNSPECIFIED COPD TYPE (H): ICD-10-CM

## 2021-01-01 DIAGNOSIS — N40.0 BENIGN PROSTATIC HYPERPLASIA, UNSPECIFIED WHETHER LOWER URINARY TRACT SYMPTOMS PRESENT: ICD-10-CM

## 2021-01-01 DIAGNOSIS — B02.29 POSTHERPETIC NEURALGIA: Primary | ICD-10-CM

## 2021-01-01 DIAGNOSIS — M19.90 ARTHRITIS: ICD-10-CM

## 2021-01-01 LAB
CAPILLARY BLOOD COLLECTION: NORMAL
INR BLD: 2 (ref 0.86–1.14)

## 2021-01-01 PROCEDURE — G0463 HOSPITAL OUTPT CLINIC VISIT: HCPCS

## 2021-01-01 PROCEDURE — 85610 PROTHROMBIN TIME: CPT | Mod: ZL | Performed by: FAMILY MEDICINE

## 2021-01-01 PROCEDURE — 99213 OFFICE O/P EST LOW 20 MIN: CPT | Performed by: FAMILY MEDICINE

## 2021-01-01 PROCEDURE — 36416 COLLJ CAPILLARY BLOOD SPEC: CPT | Mod: ZL | Performed by: FAMILY MEDICINE

## 2021-01-01 RX ORDER — TRAMADOL HYDROCHLORIDE 50 MG/1
50-100 TABLET ORAL EVERY 6 HOURS PRN
Qty: 240 TABLET | Refills: 0 | Status: SHIPPED | OUTPATIENT
Start: 2021-01-01

## 2021-01-01 RX ORDER — TAMSULOSIN HYDROCHLORIDE 0.4 MG/1
CAPSULE ORAL
Qty: 90 CAPSULE | Refills: 1 | Status: SHIPPED | OUTPATIENT
Start: 2021-01-01

## 2021-01-01 RX ORDER — SIMVASTATIN 10 MG
TABLET ORAL
Qty: 90 TABLET | Refills: 0 | Status: SHIPPED | OUTPATIENT
Start: 2021-01-01

## 2021-01-01 RX ORDER — TRAMADOL HYDROCHLORIDE 50 MG/1
TABLET ORAL
Qty: 120 TABLET | Refills: 0 | Status: SHIPPED | OUTPATIENT
Start: 2021-01-01 | End: 2021-01-01

## 2021-01-01 RX ORDER — DIVALPROEX SODIUM 250 MG/1
TABLET, DELAYED RELEASE ORAL
Qty: 270 TABLET | Refills: 1 | Status: SHIPPED | OUTPATIENT
Start: 2021-01-01

## 2021-01-01 RX ORDER — PREDNISONE 20 MG/1
TABLET ORAL
Qty: 20 TABLET | Refills: 0 | Status: SHIPPED | OUTPATIENT
Start: 2021-01-01 | End: 2021-01-01

## 2021-01-01 ASSESSMENT — PAIN SCALES - GENERAL: PAINLEVEL: MODERATE PAIN (5)

## 2021-01-01 ASSESSMENT — MIFFLIN-ST. JEOR: SCORE: 1447.47

## 2021-01-05 NOTE — PROGRESS NOTES
ANTICOAGULATION FOLLOW-UP CLINIC VISIT    Patient Name:  Rm Duarte  Date:  2021  Contact Type:  Telephone    SUBJECTIVE:  Patient Findings     Positives:  Upcoming dental procedure (Consult scheduled for tooth extraction on )    Comments:  INR done by lab. Call placed to pt and spoke to pt re: INR results/Warfarin dosing/INR recheck date. Pt stated that he is going to have a consult for tooth extraction in [place] at the oral surgeon on 2021. Warfarin Clinic requested that he call us after his consult to let us know when they will be scheduling it and also to ask what they would like his INR to be at. Pt denied any bleeding/bruising or any other new changes to their diet/meds/activity. Pt verbalized understanding of all instructions. Pt is to call Warfarin Clinic if any questions/issues/illness.           Clinical Outcomes     Negatives:  Major bleeding event, Thromboembolic event, Anticoagulation-related hospital admission, Anticoagulation-related ED visit, Anticoagulation-related fatality    Comments:  INR done by lab. Call placed to pt and spoke to pt re: INR results/Warfarin dosing/INR recheck date. Pt stated that he is going to have a consult for tooth extraction in [place] at the oral surgeon on 2021. Warfarin Clinic requested that he call us after his consult to let us know when they will be scheduling it and also to ask what they would like his INR to be at. Pt denied any bleeding/bruising or any other new changes to their diet/meds/activity. Pt verbalized understanding of all instructions. Pt is to call Warfarin Clinic if any questions/issues/illness.              OBJECTIVE    Recent labs: (last 7 days)     21  0949   INR 2.0*       ASSESSMENT / PLAN  INR assessment THER    Recheck INR In: 6 WEEKS    INR Location Clinic      Anticoagulation Summary  As of 2021    INR goal:  2.0-3.0   TTR:  39.1 % (11.7 mo)   INR used for dosin.0 (2021)   Warfarin  maintenance plan:  3.75 mg (2.5 mg x 1.5) every Mon, Fri; 2.5 mg (2.5 mg x 1) all other days   Full warfarin instructions:  3.75 mg every Mon, Fri; 2.5 mg all other days   Weekly warfarin total:  20 mg   Plan last modified:  Zena Haas RN (10/5/2020)   Next INR check:  2/16/2021   Priority:  High   Target end date:  Indefinite    Indications    Chronic atrial fibrillation (H) [I48.20]  PVD (peripheral vascular disease) (H) [I73.9]  Long-term (current) use of anticoagulants [Z79.01] [Z79.01]             Anticoagulation Episode Summary     INR check location:      Preferred lab:      Send INR reminders to:  RUBIA HOYT    Comments:  Patient does not listen to messages on home or mobile phone, speak to him directly      Anticoagulation Care Providers     Provider Role Specialty Phone number    Dominguez Maradiaga MD Referring Family Medicine [phone]            See the Encounter Report to view Anticoagulation Flowsheet and Dosing Calendar (Go to Encounters tab in chart review, and find the Anticoagulation Therapy Visit)        Izzy Ang RN

## 2021-01-15 NOTE — TELEPHONE ENCOUNTER
Prednisone       Last Written Prescription Date:  10/29/2020  Last Fill Quantity: 20,   # refills: 0  Last Office Visit: 10/29/2020  Future Office visit:

## 2021-02-02 NOTE — TELEPHONE ENCOUNTER
8:22 AM    Reason for Call: Phone Call    Description: Abdalla would like you to look in his records for who he was referred to in [place] a few years back and what medication they put him on. Please call Rm to advise.    Was an appointment offered for this call?  No    Preferred method for responding to this message: [phone]    If we cannot reach you directly, may we leave a detailed response at the number you provided? Yes      Zena Roldan

## 2021-02-04 NOTE — NURSING NOTE
"Chief Complaint   Patient presents with     Facial Pain       Initial BP 96/58   Pulse 80   Ht 1.727 m (5' 8\")   Wt 75.3 kg (166 lb)   SpO2 (!) 84%   BMI 25.24 kg/m   Estimated body mass index is 25.24 kg/m  as calculated from the following:    Height as of this encounter: 1.727 m (5' 8\").    Weight as of this encounter: 75.3 kg (166 lb).  Medication Reconciliation: complete  Lila Serrano LPN  "

## 2021-02-04 NOTE — PROGRESS NOTES
"  Assessment & Plan     Postherpetic neuralgia  Lengthy review today.  Increase ultram to 1-2 tabs q 6 hours instead of just 1.  He has no side effects at all.  The medicine takes the edge off the nerve pain, which is severe and absolutely debilitating at times.  He is on depakote and gabapentin.  Failed lyrica and trazodone.  Saw neuro as well.  Increase and follow closely.  Warned about SE, etc.      Chronic, continuous use of opioids  As above.  Reliable patient.  Certainly not ideal but it's all we can figure.      Arthritis  As above.    - traMADol (ULTRAM) 50 MG tablet; Take 1-2 tablets ( mg) by mouth every 6 hours as needed for severe pain                       BMI:   Estimated body mass index is 25.24 kg/m  as calculated from the following:    Height as of this encounter: 1.727 m (5' 8\").    Weight as of this encounter: 75.3 kg (166 lb).             No follow-ups on file.    Dominguez Maradiaga MD  [facility]    Mine Abdalla is a 80 year old who presents to clinic today for the following health issues     HPI       Facial pain      Duration: ongoing    Description (location/character/radiation): left side of face    Intensity:  moderate    Accompanying signs and symptoms: painful    History (similar episodes/previous evaluation): None    Precipitating or alleviating factors: None    Therapies tried and outcome: tramadol and gabapentin             Review of Systems   Constitutional, HEENT, cardiovascular, pulmonary, gi and gu systems are negative, except as otherwise noted.      Objective    BP 96/58   Pulse 80   Ht 1.727 m (5' 8\")   Wt 75.3 kg (166 lb)   SpO2 (!) 84%   BMI 25.24 kg/m    Body mass index is 25.24 kg/m .  Physical Exam   GENERAL: healthy, alert and no distress  NECK: no adenopathy, no asymmetry, masses, or scars and thyroid normal to palpation  RESP: lungs clear to auscultation - no rales, rhonchi or wheezes and decreased breath sounds throughout  CV: " regular rate and rhythm, normal S1 S2, no S3 or S4, no murmur, click or rub, no peripheral edema and peripheral pulses strong  ABDOMEN: soft, nontender, no hepatosplenomegaly, no masses and bowel sounds normal  MS: no gross musculoskeletal defects noted, no edema

## 2021-02-10 NOTE — PLAN OF CARE
"Reason for hospital stay: Influenza A,  fall  Living situation PTA: home  Most recent vitals: /56   Pulse 92   Temp 96.7  F (35.9  C) (Tympanic)   Resp 20   Ht 1.803 m (5' 11\")   Wt 77.7 kg (171 lb 4.8 oz)   SpO2 96%   BMI 23.89 kg/m      Pain Management:  Pt c/o of pain 6 out of 10 in the left side of his face. Administered Ultram x1 for decrease in pain.  LOC:  A&O x4  Cardiac:  Apical pulse irregular, Tele removed per orders  Respiratory: Lungs are coarse with expiratory wheezes. Dyspnea on exertion, denies shortness of breath at rest, productive cough administered tussin x1. On 2 LPM per nasal cannula.    GI:  Bowel sounds active x4  :  WDL  Skin Issues: upper extremities are cesar with trace edema (see flowsheets)  No IV access  ABX:  Administered last dose of levofloxacin    Nutrition: Regular  ADL's:  Ind  Ambulation:stand by assist  Safety:  Pt free from falls and injury this shift.      3/9/2020  1:05 PM  Fara Lorenzo RN    Face to face report given with opportunity to observe patient.    Report given to Peggy Lorenzo RN   3/9/2020  3:10 PM        " Four or more times a week

## 2021-02-12 NOTE — TELEPHONE ENCOUNTER
simvastatin (ZOCOR) 10 MG tablet     Last Written Prescription Date:  10/15/20  Last Fill Quantity: 90,   # refills: 0  Last Office Visit: 2/4/21  Future Office visit:       Routing refill request to provider for review/approval

## 2022-12-12 NOTE — TELEPHONE ENCOUNTER
Other MOTHER CALLED WANTS TO KNOW IF SHE CAN PURCHASE 2 ADDITIONAL PAIR OF INSERTS FOR PATIENT.  PLS CALL TO ADVISE [phone] Zocor     Last Written Prescription Date: 07/31/2017  Last Fill Quantity: 30, # refills: 0  Last Office Visit with [facility] or [facility] prescribing provider: 07/13/2017       Lab Results   Component Value Date    CHOL 120 10/13/2015     Lab Results   Component Value Date    HDL 53 10/13/2015     Lab Results   Component Value Date    LDL 52 10/13/2015     Lab Results   Component Value Date    TRIG 73 10/13/2015     Lab Results   Component Value Date    CHOLHDLRATIO 2.3 10/13/2015

## 2023-11-07 NOTE — PROGRESS NOTES
ANTICOAGULATION FOLLOW-UP CLINIC VISIT    Patient Name:  Rm Duarte  Date:  8/2/2017  Contact Type:  Telephone/ message left on home phone voicemail    SUBJECTIVE:     Patient Findings     Positives Change in diet/appetite    Comments Message left on patient home phone voicemail re: INR result, new warfarin dosing and INR recheck date. He is to call warfarin clinic if he has any bleeding/bruising, changes in diet/meds/activity or questions.            OBJECTIVE    INR Point of Care   Date Value Ref Range Status   08/02/2017 3.8 (H) 0.86 - 1.14 Final     Comment:     This test is intended for monitoring Coumadin therapy.  Results are not   accurate   in patients with prolonged INR due to factor deficiency.         ASSESSMENT / PLAN  INR assessment SUPRA    Recheck INR In: 1 WEEK    INR Location Clinic      Anticoagulation Summary as of 8/2/2017     INR goal 2.0-3.0   Today's INR 3.8!   Maintenance plan 5 mg (5 mg x 1) on Mon, Wed, Fri; 2.5 mg (5 mg x 0.5) all other days   Full instructions 8/2: Hold; 8/3: 2.5 mg; Otherwise 5 mg on Mon, Wed, Fri; 2.5 mg all other days   Weekly total 25 mg   Plan last modified Zena Haas RN (8/2/2017)   Next INR check 8/9/2017   Priority INR   Target end date Indefinite    Indications   Chronic atrial fibrillation (H) [I48.2]  PVD (peripheral vascular disease) (H) [I73.9]  Long-term (current) use of anticoagulants [Z79.01] [Z79.01]         Anticoagulation Episode Summary     INR check location     Preferred lab     Send INR reminders to  ANTICOAG POOL    Comments takes Azithromycin 250mg daily long term      Anticoagulation Care Providers     Provider Role Specialty Phone number    Dominguez Maradiaga MD [facility] [phone]            See the Encounter Report to view Anticoagulation Flowsheet and Dosing Calendar (Go to Encounters tab in chart review, and find the Anticoagulation Therapy Visit)        Zena Haas, RN               
18

## 2024-01-22 NOTE — TELEPHONE ENCOUNTER
Patient due for Lipid Profile. Please advise. Zocor pended.  
Zocor     Last Written Prescription Date: 07/13/2017  Last Fill Quantity: 360, # refills: 0  Last Office Visit with [facility] or [facility] prescribing provider: 6/26/2017       Lab Results   Component Value Date    CHOL 120 10/13/2015     Lab Results   Component Value Date    HDL 53 10/13/2015     Lab Results   Component Value Date    LDL 52 10/13/2015     Lab Results   Component Value Date    TRIG 73 10/13/2015     Lab Results   Component Value Date    CHOLHDLRATIO 2.3 10/13/2015     Nebulizer       Last Written Prescription Date:11/28/2016  Last Fill Quantity: 30, # refills: 0    Last Office Visit with [facility], [facility] or [facility] prescribing provider:  7/13/2017  Future Office Visit:       Date of Last Asthma Action Plan Letter:   There are no preventive care reminders to display for this patient.   Asthma Control Test: No flowsheet data found.    Date of Last Spirometry Test:   No results found for this or any previous visit.            
Detail Level: Zone

## 2024-12-19 NOTE — PLAN OF CARE
A&O. VSS/AF O2 94% 1L NC. Still with PC although less frequent than yesterday evening. Robitussin x 1 given with good effect. Reports left facial pain as before, medicated with ultram 100mg x 1 with stated relief. Better appetite. Dangles most of shift. Droplet precautions remain.  Face to face report given with opportunity to observe patient.    Report given to She Chavez RN   3/7/2020  11:55 PM     PT sent for lama catheter replacement